# Patient Record
Sex: MALE
[De-identification: names, ages, dates, MRNs, and addresses within clinical notes are randomized per-mention and may not be internally consistent; named-entity substitution may affect disease eponyms.]

---

## 2017-12-26 ENCOUNTER — HOSPITAL ENCOUNTER (OUTPATIENT)
Dept: HOSPITAL 42 - ED | Age: 67
Setting detail: OBSERVATION
LOS: 2 days | Discharge: HOME | End: 2017-12-28
Attending: INTERNAL MEDICINE | Admitting: INTERNAL MEDICINE
Payer: MEDICARE

## 2017-12-26 VITALS — BODY MASS INDEX: 34.4 KG/M2

## 2017-12-26 VITALS — OXYGEN SATURATION: 97 %

## 2017-12-26 DIAGNOSIS — I48.91: Primary | ICD-10-CM

## 2017-12-26 DIAGNOSIS — E78.5: ICD-10-CM

## 2017-12-26 DIAGNOSIS — Z90.49: ICD-10-CM

## 2017-12-26 DIAGNOSIS — I11.0: ICD-10-CM

## 2017-12-26 DIAGNOSIS — J40: ICD-10-CM

## 2017-12-26 DIAGNOSIS — I50.9: ICD-10-CM

## 2017-12-26 DIAGNOSIS — K50.90: ICD-10-CM

## 2017-12-26 DIAGNOSIS — I27.20: ICD-10-CM

## 2017-12-26 DIAGNOSIS — Z79.899: ICD-10-CM

## 2017-12-26 DIAGNOSIS — M47.812: ICD-10-CM

## 2017-12-26 DIAGNOSIS — M54.16: ICD-10-CM

## 2017-12-26 DIAGNOSIS — J06.9: ICD-10-CM

## 2017-12-26 DIAGNOSIS — G89.29: ICD-10-CM

## 2017-12-26 DIAGNOSIS — E11.9: ICD-10-CM

## 2017-12-26 DIAGNOSIS — G25.81: ICD-10-CM

## 2017-12-26 LAB
ALBUMIN SERPL-MCNC: 4.3 G/DL (ref 3–4.8)
ALBUMIN/GLOB SERPL: 1.1 {RATIO} (ref 1.1–1.8)
ALT SERPL-CCNC: 37 U/L (ref 7–56)
APTT BLD: 28.5 SECONDS (ref 25.1–36.5)
ARTERIAL BLOOD GAS HEMOGLOBIN: 13.2 G/DL (ref 11.7–17.4)
ARTERIAL BLOOD GAS O2 SAT: 98.1 % (ref 95–98)
ARTERIAL BLOOD GAS PCO2: 35 MM/HG (ref 35–45)
ARTERIAL BLOOD GAS TCO2: 27.2 MMOL.L (ref 22–28)
AST SERPL-CCNC: 29 U/L (ref 17–59)
BASE EXCESS BLDV CALC-SCNC: 7 MMOL/L (ref 0–2)
BASOPHILS # BLD AUTO: 0.02 K/MM3 (ref 0–2)
BASOPHILS NFR BLD: 0.2 % (ref 0–3)
BNP SERPL-MCNC: 893 PG/ML (ref 0–450)
BUN SERPL-MCNC: 24 MG/DL (ref 7–21)
CALCIUM SERPL-MCNC: 9.8 MG/DL (ref 8.4–10.5)
EOSINOPHIL # BLD: 0.4 10*3/UL (ref 0–0.7)
EOSINOPHIL NFR BLD: 3.8 % (ref 1.5–5)
ERYTHROCYTE [DISTWIDTH] IN BLOOD BY AUTOMATED COUNT: 16.6 % (ref 11.5–14.5)
GFR NON-AFRICAN AMERICAN: > 60
GRANULOCYTES # BLD: 7.47 10*3/UL (ref 1.4–6.5)
GRANULOCYTES NFR BLD: 72 % (ref 50–68)
HCO3 BLDA-SCNC: 26.1 MMOL/L (ref 21–28)
HGB BLD-MCNC: 12.2 G/DL (ref 14–18)
INHALED O2 CONCENTRATION: 21 %
INR PPP: 1.17 (ref 0.93–1.08)
LYMPHOCYTES # BLD: 1.7 10*3/UL (ref 1.2–3.4)
LYMPHOCYTES NFR BLD AUTO: 16 % (ref 22–35)
MCH RBC QN AUTO: 24.7 PG (ref 25–35)
MCHC RBC AUTO-ENTMCNC: 31.5 G/DL (ref 31–37)
MCV RBC AUTO: 78.3 FL (ref 80–105)
MONOCYTES # BLD AUTO: 0.8 10*3/UL (ref 0.1–0.6)
MONOCYTES NFR BLD: 8 % (ref 1–6)
O2 CAP BLDA-SCNC: 18.2 ML/DL (ref 16–24)
O2 CT BLDA-SCNC: 17.9 ML/DL (ref 15–23)
PH BLDA: 7.48 [PH] (ref 7.35–7.45)
PH BLDV: 7.32 [PH] (ref 7.32–7.43)
PLATELET # BLD: 339 10^3/UL (ref 120–450)
PMV BLD AUTO: 9.6 FL (ref 7–11)
PO2 BLDA: 94 MM/HG (ref 80–100)
PROTHROMBIN TIME: 12.8 SECONDS (ref 9.4–12.5)
RBC # BLD AUTO: 4.94 10^6/UL (ref 3.5–6.1)
TROPONIN I SERPL-MCNC: < 0.01 NG/ML
VENOUS BLOOD FIO2: 21 %
VENOUS BLOOD GAS PCO2: 69 (ref 40–60)
VENOUS BLOOD GAS PO2: 28 MM/HG (ref 30–55)
WBC # BLD AUTO: 10.4 10^3/UL (ref 4.5–11)

## 2017-12-26 PROCEDURE — 82948 REAGENT STRIP/BLOOD GLUCOSE: CPT

## 2017-12-26 PROCEDURE — 85610 PROTHROMBIN TIME: CPT

## 2017-12-26 PROCEDURE — 94640 AIRWAY INHALATION TREATMENT: CPT

## 2017-12-26 PROCEDURE — 36415 COLL VENOUS BLD VENIPUNCTURE: CPT

## 2017-12-26 PROCEDURE — 80053 COMPREHEN METABOLIC PANEL: CPT

## 2017-12-26 PROCEDURE — 83615 LACTATE (LD) (LDH) ENZYME: CPT

## 2017-12-26 PROCEDURE — 93306 TTE W/DOPPLER COMPLETE: CPT

## 2017-12-26 PROCEDURE — 99285 EMERGENCY DEPT VISIT HI MDM: CPT

## 2017-12-26 PROCEDURE — 80048 BASIC METABOLIC PNL TOTAL CA: CPT

## 2017-12-26 PROCEDURE — 93005 ELECTROCARDIOGRAM TRACING: CPT

## 2017-12-26 PROCEDURE — 85025 COMPLETE CBC W/AUTO DIFF WBC: CPT

## 2017-12-26 PROCEDURE — 71010: CPT

## 2017-12-26 PROCEDURE — 96372 THER/PROPH/DIAG INJ SC/IM: CPT

## 2017-12-26 PROCEDURE — 82803 BLOOD GASES ANY COMBINATION: CPT

## 2017-12-26 PROCEDURE — 82550 ASSAY OF CK (CPK): CPT

## 2017-12-26 PROCEDURE — 96375 TX/PRO/DX INJ NEW DRUG ADDON: CPT

## 2017-12-26 PROCEDURE — 96365 THER/PROPH/DIAG IV INF INIT: CPT

## 2017-12-26 PROCEDURE — 87040 BLOOD CULTURE FOR BACTERIA: CPT

## 2017-12-26 PROCEDURE — 85730 THROMBOPLASTIN TIME PARTIAL: CPT

## 2017-12-26 PROCEDURE — 83880 ASSAY OF NATRIURETIC PEPTIDE: CPT

## 2017-12-26 PROCEDURE — 84484 ASSAY OF TROPONIN QUANT: CPT

## 2017-12-26 RX ADMIN — IPRATROPIUM BROMIDE AND ALBUTEROL SULFATE SCH ML: .5; 3 SOLUTION RESPIRATORY (INHALATION) at 15:43

## 2017-12-26 RX ADMIN — INSULIN HUMAN SCH UNITS: 100 INJECTION, SOLUTION PARENTERAL at 23:22

## 2017-12-26 RX ADMIN — IPRATROPIUM BROMIDE AND ALBUTEROL SULFATE SCH ML: .5; 3 SOLUTION RESPIRATORY (INHALATION) at 15:49

## 2017-12-26 RX ADMIN — IPRATROPIUM BROMIDE AND ALBUTEROL SULFATE SCH ML: .5; 3 SOLUTION RESPIRATORY (INHALATION) at 16:00

## 2017-12-26 RX ADMIN — OXYCODONE AND ACETAMINOPHEN PRN TAB: 10; 325 TABLET ORAL at 21:01

## 2017-12-26 NOTE — RAD
HISTORY:

cough r/o pna  



COMPARISON:

01/30/2014. 



FINDINGS:



LUNGS:

The lungs are well inflated and clear.



PLEURA:

No significant pleural effusion identified, no pneumothorax apparent.



CARDIOVASCULAR:

Normal.



OSSEOUS STRUCTURES:

No significant abnormalities.



VISUALIZED UPPER ABDOMEN:

Normal.



OTHER FINDINGS:

None.



IMPRESSION:

No active pulmonary disease.

## 2017-12-26 NOTE — CARD
--------------- APPROVED REPORT --------------





EKG Measurement

Heart [id number]

[id number]

[id number]

[id number]



<Conclusion>

Atrial fibrillation

Right bundle branch block

Inferior infarct, age undetermined

Abnormal ECG

## 2017-12-27 LAB
BASOPHILS # BLD AUTO: 0 K/MM3 (ref 0–2)
BASOPHILS NFR BLD: 0 % (ref 0–3)
BUN SERPL-MCNC: 28 MG/DL (ref 7–21)
CALCIUM SERPL-MCNC: 9.1 MG/DL (ref 8.4–10.5)
EOSINOPHIL # BLD: 0 10*3/UL (ref 0–0.7)
EOSINOPHIL NFR BLD: 0.1 % (ref 1.5–5)
ERYTHROCYTE [DISTWIDTH] IN BLOOD BY AUTOMATED COUNT: 16.7 % (ref 11.5–14.5)
GFR NON-AFRICAN AMERICAN: > 60
GRANULOCYTES # BLD: 13.07 10*3/UL (ref 1.4–6.5)
GRANULOCYTES NFR BLD: 92 % (ref 50–68)
HGB BLD-MCNC: 12.1 G/DL (ref 14–18)
LG PLATELETS BLD QL SMEAR: PRESENT
LYMPHOCYTE: 8 % (ref 22–35)
LYMPHOCYTES # BLD: 0.9 10*3/UL (ref 1.2–3.4)
LYMPHOCYTES NFR BLD AUTO: 6.4 % (ref 22–35)
MCH RBC QN AUTO: 25.1 PG (ref 25–35)
MCHC RBC AUTO-ENTMCNC: 32.7 G/DL (ref 31–37)
MCV RBC AUTO: 76.6 FL (ref 80–105)
MONOCYTES # BLD AUTO: 0.2 10*3/UL (ref 0.1–0.6)
MONOCYTES NFR BLD: 1.5 % (ref 1–6)
NEUTROPHILS NFR BLD AUTO: 92 % (ref 50–70)
PLATELET # BLD EST: NORMAL 10*3/UL
PLATELET # BLD: 306 10^3/UL (ref 120–450)
PMV BLD AUTO: 9.7 FL (ref 7–11)
RBC # BLD AUTO: 4.83 10^6/UL (ref 3.5–6.1)
WBC # BLD AUTO: 14.2 10^3/UL (ref 4.5–11)

## 2017-12-27 RX ADMIN — INSULIN HUMAN SCH UNITS: 100 INJECTION, SOLUTION PARENTERAL at 17:29

## 2017-12-27 RX ADMIN — INSULIN HUMAN SCH: 100 INJECTION, SOLUTION PARENTERAL at 21:52

## 2017-12-27 RX ADMIN — MESALAMINE SCH MG: 500 CAPSULE ORAL at 12:18

## 2017-12-27 RX ADMIN — OXYCODONE AND ACETAMINOPHEN PRN TAB: 10; 325 TABLET ORAL at 08:10

## 2017-12-27 RX ADMIN — INSULIN HUMAN SCH UNITS: 100 INJECTION, SOLUTION PARENTERAL at 08:11

## 2017-12-27 RX ADMIN — INSULIN HUMAN SCH UNITS: 100 INJECTION, SOLUTION PARENTERAL at 12:17

## 2017-12-27 RX ADMIN — OXYCODONE AND ACETAMINOPHEN PRN TAB: 10; 325 TABLET ORAL at 15:30

## 2017-12-27 NOTE — CARD
--------------- APPROVED REPORT --------------





EXAM: Two-dimensional and M-mode echocardiogram with Doppler and 

color Doppler.



INDICATION

NEW ONSET A-FIB



2D DIMENSIONS 

Left Atrium (2D)4.7   (1.6-4.0cm)IVSd1.4   (0.7-1.1cm)

LVDd4.9   (3.9-5.9cm)PWd1.6   (0.7-1.1cm)

LVDs3.6   (2.5-4.0cm)FS (%) 27.3   %

LVEF (%)53.1   (>50%)



M-Mode DIMENSIONS 

Aortic Root4.60   (2.2-3.7cm)Aortic Cusp Exc.2.00   (1.5-2.0cm)



Aortic Valve

AoV Peak Budsnfhh312.0cm/Sue Peak GR.8mmHg



Mitral Valve

E/A ratio0.0



TDI

E/Lateral E'0.0E/Medial E'0.0



Pulmonary Valve

PV Peak Awvipepj10.5cm/sPV Peak Grad.2mmHg



Tricuspid Valve

TR Peak Gtzundzx160wj/sRAP [id number] Peak Gr.37mmHg

[id number]



 LEFT VENTRICLE 

The left ventricle is normal size. There is mild concentric left 

ventricular hypertrophy. The left ventricular function is normal. The 

left ventricular ejection fraction is within the normal range. There 

is normal LV segmental wall motion. A Fib



 RIGHT VENTRICLE 

The right ventricle is normal size. There is normal right ventricular 

wall thickness. The right ventricular systolic function is normal.



 ATRIA 

The left atrium is mildly dilated. The right atrium size is normal.



 AORTIC VALVE 

The aortic valve is normal in structure. There is trace to mild 

aortic regurgitation.



 MITRAL VALVE 

The mitral valve is mildly thickened. Mitral regurgitation is trace 

to mild.



 TRICUSPID VALVE 

There is mild tricuspid regurgitation. There is mild to moderate 

pulmonary hypertension.



 GREAT VESSELS 

The aortic root is normal in size. The IVC is normal in size and 

collapses >50% with inspiration.



 PERICARDIAL EFFUSION 

There is a trace loculated anterior pericardial effusion.



<Conclusion>

The left ventricle is normal size.

There is mild concentric left ventricular hypertrophy.

The left ventricular function is normal.

The left ventricular ejection fraction is within the normal range.

There is normal LV segmental wall motion.

There is trace to mild aortic regurgitation.

Mitral regurgitation is trace to mild.

There is mild to moderate pulmonary hypertension.

## 2017-12-27 NOTE — PN
DATE:



LOCATION:  The patient is seen at [facility] in [place], in

room 271, bed 1.



SUBJECTIVE:  The patient was admitted yesterday with symptoms related to

cardiac condition, cough, and respiratory infection.  The patient has

significant past history of diabetes, hypertension; and the patient also

has history of Crohn's disease, neuromuscular pain, neuropathic pain,

lumbar neuritis, and cervical spondylosis.  This morning, the patient is

sitting in the chair.  He is relatively comfortable, but he complains of

pain.  He is on Percocet for pain treatment.



PHYSICAL EXAMINATION

VITAL SIGNS:  His pulse is 98, blood pressure 120/67, respirations 20, O2

sat is 97% on 2 L of oxygen nasal cannula.

LUNGS:  Clear.

HEART:  Atypical ____ with moderate ventricular response.

ABDOMEN:  Soft.  Obesity present.  The patient has had prior gallbladder

surgery.

CENTRAL NERVOUS SYSTEM:  He is conscious, rational, oriented, and no focal

neurological deficit noted.



LABORATORY DATA:  The patient's blood work, his white count is 14,200,

hemoglobin 12.1.  He had 92% granulocytes.  His chemistry, the blood sugar

is 246, BUN 28, creatinine 0.9.  His BNP was only 893 at the time of

admission.



ASSESSMENT AND PLAN:  The patient's history of cardiac condition, the

patient's echocardiogram results from prior study, however, shows ejection

fraction is close to 50%.  Currently, the patient is waiting for cardiac

evaluation and have the Infectious Disease see the patient because of the

elevated white count and the cough.  He is to continue current management

and follow up with us.





__________________________________________

Myron Lange MD



DD:  12/27/2017 8:30:54

DT:  12/27/2017 9:39:12

Job # [id number]

## 2017-12-27 NOTE — CON
DATE:  12/27/2017



HISTORY OF PRESENT ILLNESS:  The patient is a 67-year-old male who

presented with dyspnea.



In the emergency room, he was found to be in atrial fibrillation.



The patient's past medical history includes a history of hypertension and

mild diabetes mellitus.



No previous cardiac history was noted.  No previous myocardial infarction. 

He denies chest pain.



SOCIAL HISTORY:  The patient does not smoke.



REVIEW OF SYSTEMS:  Fourteen-point review of systems was reviewed.  Besides

his chronic dyspnea, he does complain of cough without edema in the lower

extremities.



PHYSICAL EXAMINATION:

VITAL SIGNS:  On physical exam, blood pressure is 125/67, heart rate is

atrial fibrillation in the 90s.

NECK:  Negative JVD.

LUNGS:  Decreased breath sounds without rales.

HEART:  Reveals S1, S2.

EXTREMITIES:  Without edema.



DIAGNOSTIC DATA:  EKG shows atrial fibrillation with nonspecific ST-T

changes.



LABORATORY DATA:  Laboratories reveal an INR of 1.7.  Chemistries, BUN and

creatinine is 28 and 0.9 with a troponin of 0.01, the hemoglobin is 12.1

with a white count of 14.2.



IMPRESSION:

1.  New-onset atrial fibrillation.

2.  Dyspnea.

3.  Hypertension.

4.  Diabetes mellitus.



Given these findings, we will start the patient on beta blockers for better

heart rate control.



We will obtain an echocardiogram to evaluate his LV function.  In addition,

Lovenox has been ordered.  The patient will need long-term anticoagulation.





__________________________________________

Olu Ramirez MD



DD:  12/27/2017 10:23:58

DT:  12/27/2017 10:28:10

Job # [id number]

## 2017-12-28 VITALS — DIASTOLIC BLOOD PRESSURE: 94 MMHG | SYSTOLIC BLOOD PRESSURE: 132 MMHG | HEART RATE: 87 BPM | TEMPERATURE: 97.9 F

## 2017-12-28 VITALS — RESPIRATION RATE: 20 BRPM

## 2017-12-28 RX ADMIN — OXYCODONE AND ACETAMINOPHEN PRN TAB: 10; 325 TABLET ORAL at 05:41

## 2017-12-28 RX ADMIN — INSULIN HUMAN SCH UNITS: 100 INJECTION, SOLUTION PARENTERAL at 13:17

## 2017-12-28 RX ADMIN — INSULIN HUMAN SCH: 100 INJECTION, SOLUTION PARENTERAL at 08:57

## 2017-12-28 RX ADMIN — MESALAMINE SCH MG: 500 CAPSULE ORAL at 09:59

## 2017-12-28 RX ADMIN — OXYCODONE AND ACETAMINOPHEN PRN TAB: 10; 325 TABLET ORAL at 13:25

## 2017-12-28 NOTE — PN
DATE:  12/28/2017



CARDIOLOGY FOLLOWUP NOTE



SUBJECTIVE:  The patient as comfortable without shortness of breath.



PHYSICAL EXAMINATION:

VITAL SIGNS:  Blood pressure is 132/94 with the heart rate is in the 80s.

NECK:  Negative JVD.

LUNGS:  Without rales.

HEART:  Reveals S1 and S2.

EXTREMITIES:  Without edema.



LABORATORY DATA:  Creatinine is 0.9, glucose is 171, and hemoglobin is 12.1



IMPRESSION

1.  New-onset atrial fibrillation.

2.  Hypertension.

3.  Mild mild-to-moderate pulmonary hypertension.

4.  Dyspnea, which is now resolved.

5.  Systemic hypertension.

6.  Diabetes mellitus.



PLAN:

1.  Given these findings, the patient is currently now on Xarelto.  His

heart rate is well controlled on beta blockers.  We will DC telemetry

today.

2.  From a cardiac perspective, the patient can be discharged.  We will

arrange for an outpatient stress test next week to rule out coronary

disease.







__________________________________________

Olu Ramirez MD







DD:  12/28/2017 13:30:02

DT:  12/28/2017 13:33:59

Job # [id number]

## 2017-12-28 NOTE — CP.PCM.PN
Subjective





- Date & Time of Evaluation


Date of Evaluation: 12/28/17


Time of Evaluation: 08:15





- Subjective


Subjective: 





Patient is seen this morning.  He complains of chronic pain in his neck and 

back.  





Objective





- Vital Signs/Intake and Output


Vital Signs (last 24 hours): 


 











Temp Pulse Resp BP Pulse Ox


 


 97.7 F   83   20   144/95 H  97 


 


 12/28/17 06:00  12/28/17 06:00  12/28/17 06:00  12/28/17 06:00  12/28/17 06:00








Intake and Output: 


 











 12/28/17 12/28/17





 06:59 18:59


 


Intake Total 240 


 


Output Total 0 


 


Balance 240 














- Medications


Medications: 


 Current Medications





Amlodipine Besylate (Norvasc)  10 mg PO DAILY [facility]


   Last Admin: 12/27/17 12:18 Dose:  10 mg


Atorvastatin Calcium (Lipitor)  10 mg PO DIN [facility]


   Last Admin: 12/27/17 17:30 Dose:  10 mg


Duloxetine HCl (Cymbalta)  30 mg PO DAILY [facility]


   Last Admin: 12/27/17 12:19 Dose:  30 mg


Enoxaparin Sodium (Lovenox)  40 mg SC DAILY [facility]


   PRN Reason: Protocol


Escitalopram Oxalate (Lexapro)  5 mg PO DAILY [facility]


   Last Admin: 12/27/17 12:19 Dose:  5 mg


Furosemide (Lasix)  40 mg PO DAILY [facility]


   Last Admin: 12/27/17 12:19 Dose:  40 mg


Insulin Human Regular (Humulin R Low)  0 units SC ACHS [facility]


   PRN Reason: Protocol


   Last Admin: 12/27/17 21:52 Dose:  Not Given


Lidocaine (Lidoderm)  1 ea TD DAILY [facility]


Losartan Potassium (Cozaar)  100 mg PO DAILY [facility]


   Last Admin: 12/27/17 12:19 Dose:  100 mg


Mesalamine (Pentasa)  500 mg PO DAILY [facility]


   Last Admin: 12/27/17 12:18 Dose:  500 mg


Metoprolol Tartrate (Lopressor)  50 mg PO DAILY [facility]


   Last Admin: 12/27/17 12:18 Dose:  50 mg


Oxycodone/Acetaminophen (Percocet 10/325 Mg Tab)  1 tab PO Q8 PRN


   PRN Reason: Pain, moderate (4-7)


   Last Admin: 12/28/17 05:41 Dose:  1 tab


Promethazine HCl/Codeine (Phenergan/Codeine Oral Syrup)  5 ml PO Q4H PRN


   PRN Reason: Cough and congestion


   Last Admin: 12/27/17 22:15 Dose:  5 ml


Rivaroxaban (Xarelto)  20 mg PO DAILY [facility]


   PRN Reason: Protocol


Zolpidem Tartrate (Ambien)  5 mg PO HS [facility]


   Last Admin: 12/27/17 22:15 Dose:  5 mg











- Labs


Labs: 


 





 12/27/17 06:00 





 12/27/17 06:00 





 











PT  12.8 SECONDS (9.4-12.5)  H  12/26/17  15:30    


 


INR  1.17  (0.93-1.08)  H  12/26/17  15:30    


 


APTT  28.5 Seconds (25.1-36.5)   12/26/17  15:30    














- Constitutional


Appears: No Acute Distress





- Head Exam


Head Exam: ATRAUMATIC, NORMOCEPHALIC





- Respiratory Exam


Respiratory Exam: Clear to Ausculation Bilateral, NORMAL BREATHING PATTERN





- Cardiovascular Exam


Cardiovascular Exam: Irregular Rhythm, +S1, +S2





- GI/Abdominal Exam


GI & Abdominal Exam: Soft, Normal Bowel Sounds.  absent: Tenderness





- Neurological Exam


Neurological Exam: Alert, Awake, Oriented x3





Assessment and Plan





- Assessment and Plan (Free Text)


Assessment: 





New onset atrial fibrillation


Cervical spondylosis


Lumbar neuritis


DMII


HTN


HLP


Pulmonary hypertension








Plan: 





Patient is complaining of neck pain this morning.  continue Percocet as needed 

for pain.


continue cymbalta and lexapro.  Lidoderm patch will be ordered for pain of the 

neck and


back.  Cardiology consult appreciated.  Patient had echocardiogram which shows 

normal


LV function, and moderate pulmonary hypertension.  Will consult pulmonary for 

pulmonary


hypertension and cough.

## 2018-01-01 NOTE — DS
HISTORY OF PRESENT ILLNESS:  This is a patient of Dr. Kody Pratt, who

presented to the emergency room with history of cough, shortness of breath.

Patient denied chest pain.  He has past history of Crohn disease,

depression, lumbar neuritis, hypertension, hyperlipidemia, and mild

congestive heart failure.  While in the emergency room, the patient was

found to be in atrial fibrillation with pulse of 109.



HOSPITAL COURSE:  Patient was admitted to the telemetry floor for new-onset

atrial fibrillation.  Dr. Ramirez, cardiologist, was consulted.  Patient was

given therapeutic Lovenox.  He was given IV Solu-Medrol in the emergency

room and IV Rocephin.  Chest x-ray was negative for any infiltrates. 

Patient was seen by Dr. Ramirez and was started on beta-blockers for heart

rate control.  An echocardiogram was also ordered.  Echocardiogram showed

normal left ventricular function, mild-to-moderate pulmonary hypertension,

mild concentric left ventricular hypertrophy, ejection fraction within

normal range, trace-to-mild aortic regurgitation, trace-to-mild mitral

regurgitation.  Patient was scheduled for a stress test in the next week

with a cardiologist.  Patient was advised to follow up with his

cardiologist for the stress test.  No further testing was advised by

Cardiology, so patient was discharged home in stable condition, on Xarelto.



DISCHARGE DIAGNOSES:  New-onset atrial fibrillation, upper respiratory

infection, lumbar neuritis, hypertension, neuropathy, depression,

borderline diabetes.



DISCHARGE MEDICATIONS:  Ambien 10 mg once at night, Crestor 10 mg daily,

Cymbalta 30 mg daily, valsartan and hydrochlorothiazide one tab daily,

Lasix 40 mg daily, Lexapro 5 mg daily, Lopressor 50 mg daily, Norvasc 10 mg

daily, Pentasa 500 mg daily, Percocet one tab three times a day as needed

for pain, vitamin _____ daily.  New prescriptions were given for Xarelto 20

mg daily and Lidoderm patch to be applied as needed to the back and neck

area.



FOLLOWUP:  Patient will follow up with Dr. Pratt when he returns from

vacation.  He will also follow up with his cardiologist for stress test.







__________________________________________

Trinidad Lange MD



DD:  12/31/2017 11:52:36

DT:  12/31/2017 13:20:39

Job # [id number]

## 2018-01-08 ENCOUNTER — HOSPITAL ENCOUNTER (OUTPATIENT)
Dept: HOSPITAL 42 - CATH | Age: 68
LOS: 1 days | Discharge: HOME | End: 2018-01-09
Attending: INTERNAL MEDICINE
Payer: MEDICARE

## 2018-01-08 VITALS — RESPIRATION RATE: 20 BRPM

## 2018-01-08 VITALS — BODY MASS INDEX: 36.5 KG/M2

## 2018-01-08 DIAGNOSIS — I10: ICD-10-CM

## 2018-01-08 DIAGNOSIS — Z79.4: ICD-10-CM

## 2018-01-08 DIAGNOSIS — G47.00: ICD-10-CM

## 2018-01-08 DIAGNOSIS — I25.10: Primary | ICD-10-CM

## 2018-01-08 DIAGNOSIS — I27.21: ICD-10-CM

## 2018-01-08 DIAGNOSIS — E78.00: ICD-10-CM

## 2018-01-08 DIAGNOSIS — E55.9: ICD-10-CM

## 2018-01-08 DIAGNOSIS — D50.9: ICD-10-CM

## 2018-01-08 DIAGNOSIS — K50.90: ICD-10-CM

## 2018-01-08 DIAGNOSIS — Z79.01: ICD-10-CM

## 2018-01-08 DIAGNOSIS — E66.9: ICD-10-CM

## 2018-01-08 DIAGNOSIS — Z91.19: ICD-10-CM

## 2018-01-08 DIAGNOSIS — E11.9: ICD-10-CM

## 2018-01-08 DIAGNOSIS — I48.2: ICD-10-CM

## 2018-01-08 DIAGNOSIS — F32.9: ICD-10-CM

## 2018-01-08 DIAGNOSIS — G89.29: ICD-10-CM

## 2018-01-08 LAB
APTT BLD: 27.8 SECONDS (ref 25.1–36.5)
BASOPHILS # BLD AUTO: 0.01 K/MM3 (ref 0–2)
BASOPHILS NFR BLD: 0.1 % (ref 0–3)
BUN SERPL-MCNC: 17 MG/DL (ref 7–21)
CALCIUM SERPL-MCNC: 9.4 MG/DL (ref 8.4–10.5)
EOSINOPHIL # BLD: 0.2 10*3/UL (ref 0–0.7)
EOSINOPHIL NFR BLD: 1.7 % (ref 1.5–5)
ERYTHROCYTE [DISTWIDTH] IN BLOOD BY AUTOMATED COUNT: 16.5 % (ref 11.5–14.5)
GFR NON-AFRICAN AMERICAN: > 60
GRANULOCYTES # BLD: 8.9 10*3/UL (ref 1.4–6.5)
GRANULOCYTES NFR BLD: 77 % (ref 50–68)
HGB BLD-MCNC: 11.9 G/DL (ref 14–18)
INR PPP: 1.24 (ref 0.93–1.08)
LYMPHOCYTES # BLD: 1.4 10*3/UL (ref 1.2–3.4)
LYMPHOCYTES NFR BLD AUTO: 12.3 % (ref 22–35)
MCH RBC QN AUTO: 24.9 PG (ref 25–35)
MCHC RBC AUTO-ENTMCNC: 32.2 G/DL (ref 31–37)
MCV RBC AUTO: 77.6 FL (ref 80–105)
MONOCYTES # BLD AUTO: 1 10*3/UL (ref 0.1–0.6)
MONOCYTES NFR BLD: 8.9 % (ref 1–6)
PLATELET # BLD: 298 10^3/UL (ref 120–450)
PMV BLD AUTO: 9.5 FL (ref 7–11)
PROTHROMBIN TIME: 14.3 SECONDS (ref 9.4–12.5)
RBC # BLD AUTO: 4.77 10^6/UL (ref 3.5–6.1)
WBC # BLD AUTO: 11.6 10^3/UL (ref 4.5–11)

## 2018-01-08 PROCEDURE — 85610 PROTHROMBIN TIME: CPT

## 2018-01-08 PROCEDURE — 86850 RBC ANTIBODY SCREEN: CPT

## 2018-01-08 PROCEDURE — 86900 BLOOD TYPING SEROLOGIC ABO: CPT

## 2018-01-08 PROCEDURE — 85025 COMPLETE CBC W/AUTO DIFF WBC: CPT

## 2018-01-08 PROCEDURE — 80048 BASIC METABOLIC PNL TOTAL CA: CPT

## 2018-01-08 PROCEDURE — 93005 ELECTROCARDIOGRAM TRACING: CPT

## 2018-01-08 PROCEDURE — 85730 THROMBOPLASTIN TIME PARTIAL: CPT

## 2018-01-08 PROCEDURE — 36415 COLL VENOUS BLD VENIPUNCTURE: CPT

## 2018-01-08 PROCEDURE — 93458 L HRT ARTERY/VENTRICLE ANGIO: CPT

## 2018-01-08 PROCEDURE — 99152 MOD SED SAME PHYS/QHP 5/>YRS: CPT

## 2018-01-08 RX ADMIN — MESALAMINE SCH MG: 500 CAPSULE ORAL at 17:11

## 2018-01-08 RX ADMIN — GUAIFENESIN AND DEXTROMETHORPHAN PRN ML: 100; 10 SYRUP ORAL at 23:49

## 2018-01-08 RX ADMIN — HYDROMORPHONE HYDROCHLORIDE PRN MG: 1 INJECTION, SOLUTION INTRAMUSCULAR; INTRAVENOUS; SUBCUTANEOUS at 14:27

## 2018-01-08 RX ADMIN — MESALAMINE SCH MG: 500 CAPSULE ORAL at 21:37

## 2018-01-08 RX ADMIN — MESALAMINE SCH: 500 CAPSULE ORAL at 16:19

## 2018-01-08 NOTE — CARD
--------------- APPROVED REPORT --------------





EKG Measurement

Heart [id number]

[id number]

[id number]

[id number]



<Conclusion>

Atrial fibrillation with rapid ventricular response

Left axis deviation

Right bundle branch block

Inferior infarct, age undetermined

Abnormal ECG

## 2018-01-08 NOTE — CARD
--------------- APPROVED REPORT --------------





EKG Measurement

Heart [id number]

[id number]

[id number]

[id number]



<Conclusion>

Atrial fibrillation

Right bundle branch block

Inferior infarct, age undetermined

Abnormal ECG

## 2018-01-08 NOTE — CP.PCM.HP
History of Present Illness





- History of Present Illness


History of Present Illness: 





CC: S/p cardiac cath 





67 M whose past medical history includes crohn's disease, hypertension, 

hyperlipidemia, DJD to neck, DM II, chronic back pain, CHF?, new onset afib (dx 

aprox 1 month ago) presents following cardiac cath. Pt was just recently d/kevin 

from the hospital for new onset afib. Patient had an abnormal stress test 3 

days ago and was scheduled for cardiac cath today - JOSEPH stent placed in the 

LAD. Pt currently denies any complaints. Denies any chest pain or sob. 





12 Point ROS performed and negative other than stated above. 





PMH: crohn's disease, hypertension, hyperlipidemia, degenerative disc disease 

to neck, DM II, chronic back pain, CHF?, new onset afib


PSH: denies 


Med: refer to [facility] 


ALL: NKA 


SH: Denies any smoking, drinking, or drugs 


FH: HTN runs in his family     





Present on Admission





- Present on Admission


Any Indicators Present on Admission: No





Review of Systems





- Review of Systems


All systems: reviewed and no additional remarkable complaints except





Past Patient History





- Infectious Disease


Hx of Infectious Diseases: None





- Tetanus Immunizations


Tetanus Immunization: >10 years Ago





- Past Social History


Smoking Status: Never Smoked





- CARDIAC


Hx Pacemaker: No





- PULMONARY


Hx Respiratory Disorders: No





- NEUROLOGICAL


Hx Paralysis: No





- HEENT


Hx HEENT Problems: No





- RENAL


Hx Chronic Kidney Disease: No





- ENDOCRINE/METABOLIC


Hx Endocrine Disorders: Yes


Other/Comment: HIGH BLOOD SUGAR





- HEMATOLOGICAL/ONCOLOGICAL


Hx Blood Transfusions: No





- INTEGUMENTARY


Hx Dermatological Problems: No





- MUSCULOSKELETAL/RHEUMATOLOGICAL


Hx Musculoskeletal Disorders: Yes





- GASTROINTESTINAL


Hx Gastrointestinal Disorders: Yes


Other/Comment: CROHNS





- GENITOURINARY/GYNECOLOGICAL


Hx Genitourinary Disorders: No





- PSYCHIATRIC


Hx Emotional Abuse: No


Hx Physical Abuse: No


Hx Substance Use: No





- SURGICAL HISTORY


Hx Surgeries: Yes





- ANESTHESIA


Hx Anesthesia Reactions: No


Hx Malignant Hyperthermia: No





Meds


Allergies/Adverse Reactions: 


 Allergies











Allergy/AdvReac Type Severity Reaction Status Date / Time


 


No Known Allergies Allergy   Verified 12/26/17 14:49














Physical Exam





- Constitutional


Appears: No Acute Distress





- Head Exam


Head Exam: ATRAUMATIC, NORMOCEPHALIC





- Eye Exam


Eye Exam: EOMI, PERRL





- ENT Exam


ENT Exam: Mucous Membranes Moist





- Respiratory Exam


Respiratory Exam: Clear to Auscultation Bilateral.  absent: Wheezes





- Cardiovascular Exam


Cardiovascular Exam: REGULAR RHYTHM, RRR, +S1, +S2





- GI/Abdominal Exam


GI & Abdominal Exam: Normal Bowel Sounds, Soft





- Extremities Exam


Extremities exam: Negative for: calf tenderness, pedal edema


Additional comments: 





Cath site dressing  CDI, femoral and b/l distal DP pulses present





- Neurological Exam


Neurological exam: Alert, Oriented x3





- Psychiatric Exam


Psychiatric exam: Normal Affect, Normal Mood





Results





- Vital Signs


Recent Vital Signs: 





 Last Vital Signs











Temp  98.8 F   01/08/18 07:00


 


Pulse  94 H  01/08/18 09:52


 


Resp  18   01/08/18 07:00


 


BP  135/87   01/08/18 09:52


 


Pulse Ox  92 L  01/08/18 07:00














- Labs


Result Diagrams: 


 01/08/18 07:18





 01/08/18 07:18


Labs: 





 Laboratory Results - last 24 hr











  01/08/18 01/08/18 01/08/18





  07:18 07:18 07:18


 


WBC  11.6 H  


 


RBC  4.77  


 


Hgb  11.9 L  


 


Hct  37.0 L  


 


MCV  77.6 L  


 


MCH  24.9 L  


 


MCHC  32.2  


 


RDW  16.5 H  


 


Plt Count  298  


 


MPV  9.5  


 


Gran %  77.0 H  


 


Lymph % (Auto)  12.3 L  


 


Mono % (Auto)  8.9 H  


 


Eos % (Auto)  1.7  


 


Baso % (Auto)  0.1  


 


Gran #  8.90 H  


 


Lymph #  1.4  


 


Mono #  1.0 H  


 


Eos #  0.2  


 


Baso #  0.01  


 


PT   14.3 H 


 


INR   1.24 H 


 


APTT   27.8 


 


Sodium    140


 


Potassium    3.7


 


Chloride    101


 


Carbon Dioxide    28


 


Anion Gap    15


 


BUN    17


 


Creatinine    1.0


 


Est GFR ( Amer)    > 60


 


Est GFR (Non-Af Amer)    > 60


 


Random Glucose    173 H


 


Calcium    9.4


 


Blood Type   


 


Blood Type Confirm   


 


Antibody Screen   


 


BBK History Checked   














  01/08/18 01/08/18





  07:18 07:25


 


WBC  


 


RBC  


 


Hgb  


 


Hct  


 


MCV  


 


MCH  


 


MCHC  


 


RDW  


 


Plt Count  


 


MPV  


 


Gran %  


 


Lymph % (Auto)  


 


Mono % (Auto)  


 


Eos % (Auto)  


 


Baso % (Auto)  


 


Gran #  


 


Lymph #  


 


Mono #  


 


Eos #  


 


Baso #  


 


PT  


 


INR  


 


APTT  


 


Sodium  


 


Potassium  


 


Chloride  


 


Carbon Dioxide  


 


Anion Gap  


 


BUN  


 


Creatinine  


 


Est GFR ( Amer)  


 


Est GFR (Non-Af Amer)  


 


Random Glucose  


 


Calcium  


 


Blood Type  O POSITIVE 


 


Blood Type Confirm   O POSITIVE


 


Antibody Screen  Negative 


 


BBK History Checked  No verified bt 














Assessment & Plan





- Assessment and Plan (Free Text)


Assessment: 


67 M whose past medical history includes crohn's disease, hypertension, 

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  

presents following cardiac cath with JOSEPH stent of LAD placed. 





1. CAD s/p cardiac cath POD 0 


- post cardiac cath care 


- ASA(d/c in 1-2 week as per cardiology) and plavix (for atleast 1 year)


- Cont home BB 


- Cont Lipitor 


- Cont NS @ 100 





2. Afib 


- Cont Xarelto and BB 


- Cont to monitor 





3. HLD: 


- Cont Lipitor 





4. DM


- Hold home metformin for 72 hour post cardiac cath 





5. Chronic back pain 


- Dilaudid 0.5mg prn for pain 





6. Crohn's dx 


- Cont home mesalamine





7. GI/DVT ppx 


- Protonix and Xarelto 





Case and plan was reviewed and discussed in detail with Dr Pratt.

## 2018-01-08 NOTE — HP
HISTORY OF PRESENT ILLNESS:  The patient is a 67-year-old obese male who

was admitted post cardiac catheterization and angioplasty by Dr. Olu Ramirez

today on Telemetry.  The patient was recently seen and admitted and had a

stress test done 01/04/2018, which shows coronary ischemic changes with

reversible apical defect and LV dysfunction and diffuse hypokinesis with

left ventricle ejection fraction of 40%.  The patient was advised to come

back for cardiac catheterization today.  The patient underwent cardiac

catheterization today by Dr. Olu Ramirez, undergone an successful

angioplasty and stent placement of the 80% mid-LAD stenosis with a

drug-eluting stent.



CODE STATUS:  Full code.



LIVING WILL/ADVANCE DIRECTIVE:  None.



HEIGHT:  5 feet 8 inches.



WEIGHT:  240 pounds.



BMI:  36.5.



HOME MEDICATIONS:

1.  Norvasc 10 mg daily.

2.  Ambien 10 mg at bedtime.

3.  Diovan-HCTZ 160/25 mg daily.

4.  Crestor 10 mg daily.

5.  Pentasa 1000 mg q.i.d.

6.  Percocet 10/325 mg q.8. p.r.n.

7.  Lopressor 50 mg daily.

8.  Lexapro 5 mg daily.

9.  Lidoderm 5% patch to the affected area.

10.  Lasix 40 mg daily.

11.  Cymbalta 60 or 90 mg daily.

12.  Vitamin D2 50,000 units weekly.

13.  Plavix 75 mg daily.

14.  Symbicort 1 puff twice a day 160/4.5.

15.  Aspirin 325 mg daily.

16.  Glucophage 500 mg daily.

17.  Xarelto 20 mg daily.



SOCIAL HISTORY:  According to the [place], the patient denies smoking,

alcohol, drug use, or communicable transmissible disease.



PAST MEDICAL AND SURGICAL HISTORY:  Significant for narcotic-dependent pain

syndrome, history of obesity, history of hypertension, history of new onset

atrial fibrillation in November or December, history of poor compliance,

history of cervical and lumbar radiculopathy, history of cervical and

lumbar spine degenerative disc disease, history of noncompliance, history

of insomnia, history of dyslipidemia, history of questionable colitis,

history of depression, history of hypovitaminosis D, history of diabetic

neuropathy, history of chronic obstructive pulmonary disease, history of

microcytic anemia, history of type 2 diabetes mellitus, history of

hypercholesterolemia, history of O+ blood type, history of degenerative

joint disease of the cervical spine, history of degenerative joint disease

of the lumbar spine, history of abnormal myocardial stress test on

01/04/2018 showing markedly enlarged left ventricle with decreased

perfusion in the apical wall and partial improvement, left ventricle

ejection fraction 44% and diffuse left ventricular hypokinesis and

partially reversible apical defect.  history of left ventricle ejection

fraction of 53% on echocardiogram, history of concentric left ventricle

hypertrophy,history of mildly thickened mitral valve, history of

trace-to-mild mitral regurgitation, mild tricuspid regurgitation, history

of moderate pulmonary arterial hypertension with right ventricular systolic

pressure of 47 mmHg, history of right bundle-branch block, history of age

indeterminate inferior infarct.  The patient's past medical history is also

significant for history of questionable hepatitis C.



PHYSICAL EXAMINATION:

GENERAL:  The patient was seen in room 273, bed three.  The patient is seen

lying in the bed.

VITAL SIGNS:  Post cardiac catheterization, T-max is 98.8.  Telemetry shows

atrial fibrillation, heart rate 103, 123, and 194.  Blood pressure 135/87,

132/76, 141/79, respiration 18, O2 sat is 92%.

HEENT:  Head:  Normocephalic, atraumatic.  HEENT examination shows pink

conjunctivae.  Anicteric sclerae.  No oropharyngeal lesion.

NECK:  No neck rigidity.

CHEST:  Kyphosis.

LUNGS:  Shows no rales, crackles, or wheezing.

CARDIOVASCULAR:  S1, S2, irregular rhythm.  Positive systolic murmur, right

second intercostal space, left second intercostal space, left sternal

border.

ABDOMEN:  Protuberant.  Positive bowel sound.

GENITALIA:  Male.

RECTAL:  Deferred.

EXTREMITIES:  Shows trace swelling of the lower extremity.  No pitting

edema, no calf tenderness, no Homans' sign.

MUSCULOSKELETAL:  Shows a body mass index of 36.

NEUROLOGIC:  The patient is alert, awake, oriented x3.

VASCULAR:  Palpable pulses.  Gait examination could not be tested.  Cranial

nerves II through XII limited.



DIAGNOSTIC DATA:  WBC 11.6, hemoglobin/hematocrit 11.9/37, MCV 77, platelet

298.  Granulocytes 77.  PT/PTT 14.3, 27.8.  Sodium 140, potassium 3.7,

chloride 101, CO2 28, anion gap 15, BUN 17, creatinine 1.0, GFR greater

than 60, glucose 173.  The patient underwent cardiac catheterization, which

was reviewed.



IMPRESSION AND PLAN:

1.  Coronary artery disease.

2.  Abnormal stress test with partially reversible apical defect with

residual ischemia and left ventricular dysfunction with left ventricle

ejection fraction of 44% with diffuse left ventricular hypokinesis.

3.  Status post successful angioplasty and stent placement of the 80% mid

left anterior descending stenosis with drug-eluting stent.

4.  Type 2 diabetes mellitus.

5.  Hypertension.

6.  History of dyslipidemia.

7.  Atrial fibrillation with right bundle-branch block.

8.  Anterior apical hypokinesis with left ventricle ejection fraction of

45% estimated.

9.  Stenosis of the distal posterior lateral branch.

10.  Intimal irregularities of the left circumflex and obtuse marginal

branch.

11.  80% eccentric stenosis of the mid left anterior descending artery.

12.  Left ventricular diffuse hypokinesis and segmental wall motion

abnormalities with ejection fraction of 45%.

13.  Concentric left ventricular hypertrophy.

14.  Moderate pulmonary arterial hypertension with right ventricular

systolic pressure of 47 mmHg.

15.  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

16.  Microcytic anemia.

17.  History of insomnia.

18.  History of chronic narcotic dependent pain syndrome.

19.  History of questionable colitis.

20.  History of depression.

21.  History of neuropathy.

22.  History of hypovitaminosis D.

23.  History of non-insulin requiring diabetes mellitus.



Plan at this time, the patient is to be admitted to telemetry post cardiac

catheterization.  The patient has been resumed on Ambien 10 mg at bedtime

p.r.n. Symbicort 160/4.5 one puff twice a day, Cymbalta 60 mg p.m., Ecotrin

81 g daily, Lexapro 5 mg daily, Lidoderm 5% patch apply to affected area. 

Lipitor 40 mg daily has been ordered, Lopressor 25 mg twice a day has been

ordered.  The patient is on Pentasa 1000 mg q.i.d., Plavix 75 mg daily, IV

fluid 0.9 normal saline at 100 mL an hour.  Xanax was given 0.25 mg one

dose, Xarelto 20 mg daily.  Repeat EKG ordered.  Consistent carbohydrate

ordered.  Bed rest ordered.  Head of the bed postop ordered.  Pressure

dressing applied to right groin.  The patient will be on bedrest post

angioplasty and cardiac catheterization.  The patient has been updated

about the recommendations by Cardiology regarding treatment options and

treatment plans and medications, including continuation of the Plavix for 1

year.  The patient will be resumed on Xarelto.  Aspirin will be given for 2

weeks, then aspirin will be stopped and the patient will continue on Plavix

and Xarelto.  Plavix will be continued for 1 year.  At present, the patient

is seen and examined in room 273 bed 3.  The patient is seen lying in the

bed.  The patient is comfortable at present.  The patient will be closely

followed.





Dictated and electronically signed, not read.





__________________________________________

Kody Pratt MD





DD:  01/08/2018 11:17:46

DT:  01/08/2018 11:29:53

Job # [id number]

## 2018-01-08 NOTE — CARDCATH
PROCEDURE DATE:  01/08/2018



HISTORY:  The patient is a 67-year-old male with multiple cardiac risk

factors and a history of noncompliance with medical advice and medications

who presents with chest pain and abnormal stress test.



His cardiac risk factors include diabetes mellitus, hypertension,

hypercholesterolemia, and is in chronic atrial fibrillation in which he has

been taking beta-blockers as well as Xarelto.



Because of his risk factors, symptoms, and abnormal stress test, cardiac

catheterization was recommended.



The patient's Xarelto was stopped 2 days ago and he was loaded with Plavix.



PROCEDURE:  Left heart catheterization with coronary arteriography and left

ventriculogram followed by PTCA and stent of an LAD.



The right femoral artery was cannulated with a 6-Setswana sheath.  There were

no complications.



I performed moderate sedation, which included the presence of an

independent trained observer that assisted in monitoring the patient's

level of consciousness and physiologic status.  After administration of

Versed and fentanyl, my intra service time was 30 minutes.



The findings on catheterization revealed a left ventricle that revealed an

apical hypokinesis with mild anterior wall hypokinesis with an estimated

ejection fraction of approximately 45%.



His coronary anatomy revealed a right dominant circulation.



The RCA revealed intimal irregularities without critical lesions.  The

distal portion of the posterior lateral branch was stenotic.



The left main artery was unremarkable.



The circumflex artery and obtuse marginal branches revealed intimal

irregularities without significant stenoses.



The LAD and diagonal vessels revealed diffuse intimal irregularities.  In

the midportion of the LAD, there was an eccentric 80% stenosis noted.



The patient was started on intravenous Angiomax.



On the fluoroscopic guide, the guiding catheter was placed in the ostium of

the left main artery.  An 0.014 ATW wire was used to cross the critical

lesion.



A 2.75 x 12 mm drug-eluting stent was placed and deployed at 14 atmospheres

of pressure in the LAD.



After balloon deflation and removal, repeat coronary arteriography revealed

an excellent result with no residual stenosis and TACO III flow.



The patient tolerated the procedure well.



Manual compression will be used to close the femoral artery site.



In summary, the procedure was successful with PTCA and stent of an 80% mid

LAD stenoses with a drug-eluting stent.



LV function was diffusely hypokinetic with segmental wall motion

abnormalities with an EF of approximately 45%.



Given these findings, the patient will need to remain on aspirin and Plavix

with Plavix for at least a year.



We will restart the patient on Xarelto before discharge to protect him from

a thromboembolic phenomenon atrial fibrillation.



We will discontinue the aspirin in a week or two given the increased risk

of bleeding with all three drugs.



I have discussed the risk of bleeding as well as benefits of taking these

drugs with the patient in detail.  Compliance has been an issue in the past

and I have discussed with her about the risks to remain noncompliant with

the medications and following medical advice.





__________________________________________

Olu Ramirez MD





DD:  01/08/2018 9:30:42

DT:  01/08/2018 9:36:22

Job # [id number]

## 2018-01-09 VITALS — DIASTOLIC BLOOD PRESSURE: 95 MMHG | HEART RATE: 98 BPM | SYSTOLIC BLOOD PRESSURE: 136 MMHG

## 2018-01-09 VITALS — OXYGEN SATURATION: 100 % | TEMPERATURE: 98.1 F

## 2018-01-09 LAB
BASOPHILS # BLD AUTO: 0.01 K/MM3 (ref 0–2)
BASOPHILS NFR BLD: 0.1 % (ref 0–3)
BUN SERPL-MCNC: 13 MG/DL (ref 7–21)
CALCIUM SERPL-MCNC: 8.6 MG/DL (ref 8.4–10.5)
EOSINOPHIL # BLD: 0.2 10*3/UL (ref 0–0.7)
EOSINOPHIL NFR BLD: 2 % (ref 1.5–5)
ERYTHROCYTE [DISTWIDTH] IN BLOOD BY AUTOMATED COUNT: 16.7 % (ref 11.5–14.5)
GFR NON-AFRICAN AMERICAN: > 60
GRANULOCYTES # BLD: 7.53 10*3/UL (ref 1.4–6.5)
GRANULOCYTES NFR BLD: 73.4 % (ref 50–68)
HGB BLD-MCNC: 11 G/DL (ref 14–18)
LYMPHOCYTES # BLD: 1.6 10*3/UL (ref 1.2–3.4)
LYMPHOCYTES NFR BLD AUTO: 15.2 % (ref 22–35)
MCH RBC QN AUTO: 24.3 PG (ref 25–35)
MCHC RBC AUTO-ENTMCNC: 31.1 G/DL (ref 31–37)
MCV RBC AUTO: 78.1 FL (ref 80–105)
MONOCYTES # BLD AUTO: 1 10*3/UL (ref 0.1–0.6)
MONOCYTES NFR BLD: 9.3 % (ref 1–6)
PLATELET # BLD: 286 10^3/UL (ref 120–450)
PMV BLD AUTO: 9.8 FL (ref 7–11)
RBC # BLD AUTO: 4.53 10^6/UL (ref 3.5–6.1)
WBC # BLD AUTO: 10.3 10^3/UL (ref 4.5–11)

## 2018-01-09 RX ADMIN — MESALAMINE SCH MG: 500 CAPSULE ORAL at 09:48

## 2018-01-09 RX ADMIN — GUAIFENESIN AND DEXTROMETHORPHAN PRN ML: 100; 10 SYRUP ORAL at 05:23

## 2018-01-09 RX ADMIN — HYDROMORPHONE HYDROCHLORIDE PRN MG: 1 INJECTION, SOLUTION INTRAMUSCULAR; INTRAVENOUS; SUBCUTANEOUS at 09:46

## 2018-01-09 NOTE — PN
DATE:  01/09/2018



SUBJECTIVE:  The patient is asymptomatic.



PHYSICAL EXAMINATION

VITAL SIGNS:  Blood pressure is 136/95, heart rates in the 90s.

NECK:  Negative JVD.

LUNGS:  Without rales or S1, S2.

EXTREMITIES:  Without edema.



LABORATORY DATA:  Hemoglobin is 11.  Chemistries:  BUN and creatinine are

unremarkable, the glucose is 144.



IMPRESSION:

1.  Status post percutaneous transluminal coronary angioplasty and stent.

2.  Coronary artery disease.

3.  History of atrial fibrillation.

4.  Diabetes mellitus.

5.  Hypertension.

6.  Hypercholesterolemia.



PLAN:  Given these findings, the patient will be discharged on Xarelto as

well as Plavix and aspirin.  We will stop the aspirin in 1 week.  I have

discussed the need for compliance with the patient and family in detail.





__________________________________________

Olu Ramirez MD



DD:  01/09/2018 10:37:06

DT:  01/09/2018 10:40:44

Job # [id number]

## 2018-01-09 NOTE — CP.PCM.DIS
Provider





- Provider


Attending physician: 


Olu Ramirez MD





Primary care physician: 


Kody Pratt MD





Consults: 





Cardiology 


Time Spent in preparation of Discharge (in minutes): 40





Hospital Course





- Lab Results


Lab Results: 


 Most Recent Lab Values











WBC  10.3 10^3/ul (4.5-11.0)   01/09/18  05:30    


 


RBC  4.53 10^6/uL (3.5-6.1)   01/09/18  05:30    


 


Hgb  11.0 g/dL (14.0-18.0)  L  01/09/18  05:30    


 


Hct  35.4 % (42.0-52.0)  L  01/09/18  05:30    


 


MCV  78.1 fl (80.0-105.0)  L  01/09/18  05:30    


 


MCH  24.3 pg (25.0-35.0)  L  01/09/18  05:30    


 


MCHC  31.1 g/dl (31.0-37.0)   01/09/18  05:30    


 


RDW  16.7 % (11.5-14.5)  H  01/09/18  05:30    


 


Plt Count  286 10^3/uL (120.0-450.0)   01/09/18  05:30    


 


MPV  9.8 fl (7.0-11.0)   01/09/18  05:30    


 


Gran %  73.4 % (50.0-68.0)  H  01/09/18  05:30    


 


Lymph % (Auto)  15.2 % (22.0-35.0)  L  01/09/18  05:30    


 


Mono % (Auto)  9.3 % (1.0-6.0)  H  01/09/18  05:30    


 


Eos % (Auto)  2.0 % (1.5-5.0)   01/09/18  05:30    


 


Baso % (Auto)  0.1 % (0.0-3.0)   01/09/18  05:30    


 


Gran #  7.53  (1.4-6.5)  H  01/09/18  05:30    


 


Lymph #  1.6  (1.2-3.4)   01/09/18  05:30    


 


Mono #  1.0  (0.1-0.6)  H  01/09/18  05:30    


 


Eos #  0.2  (0.0-0.7)   01/09/18  05:30    


 


Baso #  0.01 K/mm3 (0.0-2.0)   01/09/18  05:30    


 


PT  14.3 SECONDS (9.4-12.5)  H  01/08/18  07:18    


 


INR  1.24  (0.93-1.08)  H  01/08/18  07:18    


 


APTT  27.8 Seconds (25.1-36.5)   01/08/18  07:18    


 


Sodium  137 mmol/L (132-148)   01/09/18  05:30    


 


Potassium  3.8 mmol/L (3.6-5.0)   01/09/18  05:30    


 


Chloride  103 mmol/L ()   01/09/18  05:30    


 


Carbon Dioxide  25 mmol/L (21-33)   01/09/18  05:30    


 


Anion Gap  13  (10-20)   01/09/18  05:30    


 


BUN  13 mg/dL (7-21)   01/09/18  05:30    


 


Creatinine  0.9 mg/dl (0.8-1.5)   01/09/18  05:30    


 


Est GFR ( Amer)  > 60   01/09/18  05:30    


 


Est GFR (Non-Af Amer)  > 60   01/09/18  05:30    


 


Random Glucose  144 mg/dL ()  H  01/09/18  05:30    


 


Calcium  8.6 mg/dL (8.4-10.5)   01/09/18  05:30    


 


Blood Type  O POSITIVE   01/08/18  07:18    


 


Blood Type Confirm  O POSITIVE   01/08/18  07:25    


 


Antibody Screen  Negative   01/08/18  07:18    


 


BBK History Checked  No verified bt   01/08/18  07:18    














- Hospital Course


Hospital Course: 





67 M whose past medical history includes crohn's disease, hypertension, 

hyperlipidemia, DJD to neck, DM II, chronic back pain, CHF?, new onset afib (dx 

aprox 1 month ago) presents following cardiac cath. Pt was just recently d/kevin 

from the hospital for new onset afib. Patient had an abnormal stress test 4 

days ago and was scheduled for cardiac cath yesterday - JOSEPH stent placed in the 

LAD. Today the patient states that he is doing well. As per cardiology he 

should cont to take ASA (d/c in 1-2 week as per cardiology) and plavix (for 

atleast 1 year) cont with Xarelto for his afib- pt verbalizes understanding. Pt 

knows to follow up with Dr Pratt and Dr Ramirez with in 1 week. Today pt denies 

any complaints. Denies any chest pain, palpitations, or sob. 





DX: s/p cardiac cath with JOSEPH stenT placement / Afib 





Discharge Exam





- Head Exam


Head Exam: ATRAUMATIC, NORMOCEPHALIC





- Eye Exam


Eye Exam: EOMI, PERRL


Pupil Exam: NORMAL ACCOMODATION





- Respiratory Exam


Respiratory Exam: Clear to PA & Lateral.  absent: Rales, Rhonchi, Wheezes





- Cardiovascular Exam


Cardiovascular Exam: REGULAR RHYTHM, +S1, +S2





- GI/Abdominal Exam


GI & Abdominal Exam: Normal Bowel Sounds, Soft





- Extremities Exam


Additional comments: 





no calf tenderness / dressing in groin c/d/i no hematoma / dital pulses present 

b/l 





- Neurological Exam


Neurological exam: Alert, CN II-XII Intact, Oriented x3





- Psychiatric Exam


Psychiatric exam: Normal Affect, Normal Mood





- Skin


Skin Exam: Dry, Intact, Warm





Discharge Plan





- Discharge Medications


Prescriptions: 


Clopidogrel [Plavix] 75 mg PO DAILY #30 tab


Pantoprazole [Protonix EC Tab] 40 mg PO 0600 #30 ect





- Follow Up Plan


Condition: IMPROVED


Disposition: HOME/ ROUTINE


Patient education suggested?: Yes


Additional Instructions: 


DISCHARGE HOME AFTER CLEARED BY 


FOLLOW UP  WITHIN 1 WEEK 


DISCHARGE MEDS AS PER UPDATED AMBULATORY ORDERS


HOLD METFORMIN FOR 2 MORE DAYS - MAY RESUME ON THURSDAY


Referrals: 


Kody Pratt MD [Primary Care Provider] - 1 Week


(DISCHARGE HOME AFTER CLEARED BY 


FOLLOW UP  AND  WITHIN 1 WEEK 


DISCHARGE MEDS AS PER UPDATED AMBULATORY ORDERS)

## 2018-01-09 NOTE — DS
FINAL PROGRESS NOTE AND DISCHARGE SUMMARY



HISTORY OF PRESENT ILLNESS:  The patient was seen in room 273, bed 3.  The

patient is seen lying in the bed.  Overnight nurse's notes were reviewed. 

The patient was seen by Dr. Ramirez today cleared for discharge.  The

patient's right groin examination was negative.



PHYSICAL EXAMINATION:

VITAL SIGNS:  T-max 98.1 to 98.4.  Telemetry shows sinus rhythm, heart rate

of 98, 96, and 92.  Blood pressure 136/95, 148/97, 141/84 and 131/90,

respirations 20, and O2 saturation 100%.

HEENT:  Head examination; normocephalic and atraumatic.  HEENT examination

shows pinkish conjunctivae.  Anicteric sclerae.  No oropharyngeal lesion.

NECK:  No neck rigidity.  No jugular venous distention.  No audible carotid

bruit.

CHEST:  Kyphosis.

LUNGS:  Shows occasional rhonchi.

CARDIOVASCULAR:  S1 and S2, regular rhythm.  Questionable soft systolic

murmur left sternal border, left second intercostal space.

ABDOMEN:  Protuberant, obese.

GENITALIA:  Male.

RECTAL:  Deferred.

EXTREMITIES:  Shows no pitting edema.  No calf tenderness.  No Homans'

sign.  No clubbing.  No cyanosis.

VASCULAR:  Palpable pulses.

MUSCULOSKELETAL:  Shows a body mass index of 36.

NEUROLOGIC:  The patient is alert, awake, and oriented x3.  Cranial nerves

II through XII intact.  Gait examination not tested.



DIAGNOSTIC DATA:  On 01/09/2018; WBC 10.3, hemoglobin/hematocrit 11 and

_____, and platelet 286.  Granulocytes 73%.  Sodium 137, potassium 3.8,

chloride 103, CO2 of 25, anion gap 13, BUN 13, creatinine 0.9, GFR greater

than 60, and glucose 144.



FINAL IMPRESSION, PLAN, AND DISCHARGE DIAGNOSES:

1.  Coronary artery disease.

2.  New onset atrial fibrillation.

3.  Poor compliance.

4.  Morbid obesity.

5.  Status post cardiac catheterization and angioplasty and stent placement

of the left anterior descending artery.

6.  Left ventricular ejection fraction of 45% with apical anterior

hypokinesis.

7.  Stenosis of the distal portion of the posterolateral branch of the

right coronary artery.

8.  An 80% eccentric stenosis of the mid portion of the left anterior

descending artery.

9.  Status post angioplasty and stent placement of 80% mid left anterior

descending artery.

10.  Diffuse left ventricular hypokinesis with left ventricular ejection

fraction of 45%.

11.  Atrial fibrillation with right bundle-branch block and age

indeterminate inferior infarct.

12.  Hypertension.

13.  Hyperlipidemia.

14.  Type 2 diabetes mellitus.

15.  Diabetic neuropathy.

16.  History of depression.

17.  Dyslipidemia.

18.  History of questionable colitis.

19.  History of insomnia.

20.  History of asthma and chronic obstructive pulmonary disease.

21.  Hypovitaminosis D.

22.  History of depression.

23.  Chronic narcotic-dependent _____ pain syndrome and opioid dependency.

24.  Insomnia.



PLAN:  At this time, the patient is to be discharged home.  Follow up with

Dr. Pratt and Dr. Ramirez within 1 week.  The patient is to follow up with

discharge medications as per updated ambulatory orders.  The patient is

advised to hold metformin for 48 hours.  The patient is advised to follow

up in the office within 1 week.



Time spent in the entire discharge process, more than 45 minutes.



Dictated and electronically signed, not read.





__________________________________________

Kody Pratt MD





DD:  01/09/2018 11:52:06

DT:  01/09/2018 12:33:16

Job # [id number]

## 2018-01-09 NOTE — CARD
--------------- APPROVED REPORT --------------





EKG Measurement

Heart [id number]

[id number]

[id number]

[id number]



<Conclusion>

Atrial fibrillation with rapid ventricular response

Right bundle branch block

Inferior infarct, age undetermined

Abnormal ECG

## 2018-02-11 ENCOUNTER — HOSPITAL ENCOUNTER (INPATIENT)
Dept: HOSPITAL 42 - ED | Age: 68
LOS: 3 days | Discharge: HOME | DRG: 292 | End: 2018-02-14
Attending: INTERNAL MEDICINE | Admitting: INTERNAL MEDICINE
Payer: MEDICARE

## 2018-02-11 VITALS — BODY MASS INDEX: 36.5 KG/M2

## 2018-02-11 DIAGNOSIS — E11.40: ICD-10-CM

## 2018-02-11 DIAGNOSIS — J44.9: ICD-10-CM

## 2018-02-11 DIAGNOSIS — I25.5: ICD-10-CM

## 2018-02-11 DIAGNOSIS — D68.9: ICD-10-CM

## 2018-02-11 DIAGNOSIS — E11.65: ICD-10-CM

## 2018-02-11 DIAGNOSIS — Z79.899: ICD-10-CM

## 2018-02-11 DIAGNOSIS — E66.9: ICD-10-CM

## 2018-02-11 DIAGNOSIS — E78.00: ICD-10-CM

## 2018-02-11 DIAGNOSIS — G89.29: ICD-10-CM

## 2018-02-11 DIAGNOSIS — Z91.19: ICD-10-CM

## 2018-02-11 DIAGNOSIS — I25.118: ICD-10-CM

## 2018-02-11 DIAGNOSIS — I27.21: ICD-10-CM

## 2018-02-11 DIAGNOSIS — D50.9: ICD-10-CM

## 2018-02-11 DIAGNOSIS — M47.9: ICD-10-CM

## 2018-02-11 DIAGNOSIS — Z95.5: ICD-10-CM

## 2018-02-11 DIAGNOSIS — Z79.02: ICD-10-CM

## 2018-02-11 DIAGNOSIS — Z79.01: ICD-10-CM

## 2018-02-11 DIAGNOSIS — Z79.51: ICD-10-CM

## 2018-02-11 DIAGNOSIS — Z79.82: ICD-10-CM

## 2018-02-11 DIAGNOSIS — I45.10: ICD-10-CM

## 2018-02-11 DIAGNOSIS — E78.5: ICD-10-CM

## 2018-02-11 DIAGNOSIS — I87.8: ICD-10-CM

## 2018-02-11 DIAGNOSIS — K50.90: ICD-10-CM

## 2018-02-11 DIAGNOSIS — I42.0: ICD-10-CM

## 2018-02-11 DIAGNOSIS — K64.9: ICD-10-CM

## 2018-02-11 DIAGNOSIS — K57.90: ICD-10-CM

## 2018-02-11 DIAGNOSIS — I48.91: ICD-10-CM

## 2018-02-11 DIAGNOSIS — I11.0: Primary | ICD-10-CM

## 2018-02-11 DIAGNOSIS — Z79.84: ICD-10-CM

## 2018-02-11 DIAGNOSIS — I25.2: ICD-10-CM

## 2018-02-11 DIAGNOSIS — I50.23: ICD-10-CM

## 2018-02-11 DIAGNOSIS — K64.8: ICD-10-CM

## 2018-02-11 DIAGNOSIS — Z90.49: ICD-10-CM

## 2018-02-11 DIAGNOSIS — M81.0: ICD-10-CM

## 2018-02-11 DIAGNOSIS — F11.20: ICD-10-CM

## 2018-02-11 LAB
ALBUMIN SERPL-MCNC: 4.3 G/DL (ref 3–4.8)
ALBUMIN/GLOB SERPL: 1.3 {RATIO} (ref 1.1–1.8)
ALT SERPL-CCNC: 58 U/L (ref 7–56)
APTT BLD: 37.7 SECONDS (ref 25.1–36.5)
AST SERPL-CCNC: 71 U/L (ref 17–59)
BNP SERPL-MCNC: 1700 PG/ML (ref 0–450)
BUN SERPL-MCNC: 22 MG/DL (ref 7–21)
CALCIUM SERPL-MCNC: 9 MG/DL (ref 8.4–10.5)
ERYTHROCYTE [DISTWIDTH] IN BLOOD BY AUTOMATED COUNT: 15.7 % (ref 11.5–14.5)
GFR NON-AFRICAN AMERICAN: > 60
HGB BLD-MCNC: 10 G/DL (ref 14–18)
INR PPP: 1.94 (ref 0.93–1.08)
MCH RBC QN AUTO: 23.5 PG (ref 25–35)
MCHC RBC AUTO-ENTMCNC: 30.3 G/DL (ref 31–37)
MCV RBC AUTO: 77.6 FL (ref 80–105)
PLATELET # BLD: 303 10^3/UL (ref 120–450)
PMV BLD AUTO: 9.7 FL (ref 7–11)
PROTHROMBIN TIME: 22.4 SECONDS (ref 9.4–12.5)
RBC # BLD AUTO: 4.25 10^6/UL (ref 3.5–6.1)
TROPONIN I SERPL-MCNC: < 0.01 NG/ML
WBC # BLD AUTO: 7.1 10^3/UL (ref 4.5–11)

## 2018-02-11 NOTE — ED PDOC
Arrival/HPI





- General


Chief Complaint: Shortness Of Breath


Time Seen by Provider: 02/11/18 19:59


Historian: Patient





- History of Present Illness


Narrative History of Present Illness (Text): 





02/11/18 20:29


Pt. to ED BIBA with PMHX atrial fibrillation,crohns disease.DM,HTN,

hyperlipidemia,CHF with c/o SOB for past 5-6 days.C/o orthopnea/unable to 

sleep.Also with occassional vague chest discomfort described.Denies any leg or 

back pain.No hx. of any fever,chills or cough.





Past Medical History





- Provider Review


Nursing Documentation Reviewed: Yes





- Travel History


Have you recently traveled outside [place] w/in the past 3 mons?: No





- Infectious Disease


Hx of Infectious Diseases: None





- Tetanus Immunization


Tetanus Immunization: >10 years Ago





- Cardiac


Hx Pacemaker: No


Other/Comment: cardiac cath x 1 stent





- Pulmonary


Hx Respiratory Disorders: No





- Neurological


Hx Paralysis: No





- HEENT


Hx HEENT Disorder: No





- Renal


Hx Renal Disorder: No





- Endocrine/Metabolic


Hx Endocrine Disorders: Yes


Other/Comment: HIGH BLOOD SUGAR





- Hematological/Oncological


Hx Blood Transfusions: No





- Integumentary


Hx Dermatological Disorder: No





- Musculoskeletal/Rheumatological


Hx Musculoskeletal Disorders: Yes





- Gastrointestinal


Hx Gastrointestinal Disorders: Yes


Other/Comment: CROHNS





- Genitourinary/Gynecological


Hx Genitourinary Disorders: No





- Psychiatric


Hx Emotional Abuse: No


Hx Physical Abuse: No


Hx Substance Use: No





- Surgical History


Other/Comment: cardiac cath





- Anesthesia


Hx Anesthesia: Yes


Hx Anesthesia Reactions: No


Hx Malignant Hyperthermia: No





- Suicidal Assessment


Feels Threatened In Home Enviroment: No





Family/Social History





- Physician Review


Nursing Documentation Reviewed: Yes


Family/Social History: Diabetes, Hypertension, CAD/MI


Smoking Status: Never Smoked


Hx Alcohol Use: No


Hx Substance Use: No


Hx Substance Use Treatment: No





Allergies/Home Meds


Allergies/Adverse Reactions: 


Allergies





No Known Allergies Allergy (Verified 12/26/17 14:49)


 








Home Medications: 


 Home Meds











 Medication  Instructions  Recorded  Confirmed


 


DULoxetine [Cymbalta] 60 mg PO QPM 12/26/17 02/12/18


 


Escitalopram Oxalate [Lexapro] 5 mg PO DAILY 12/26/17 02/12/18


 


Furosemide [Lasix] 40 mg PO DAILY 12/26/17 02/12/18


 


Mesalamine ER Cap [Pentasa] 1,000 mg PO QID 12/26/17 02/12/18


 


Metoprolol Tartrate [Lopressor] 50 mg PO DAILY 12/26/17 02/12/18


 


Oxycodone HCl/Acetaminophen 1 tab PO Q8 PRN 12/26/17 02/12/18





[Percocet  mg Tablet]   


 


Rosuvastatin Calcium [Crestor] 10 mg PO DAILY 12/26/17 02/12/18


 


Valsartan/Hydrochlorothiazide 1 tab PO DAILY 12/26/17 02/12/18





[Diovan Hct 160-25 mg Tablet]   


 


Zolpidem [Ambien] 10 mg PO HS 12/26/17 02/12/18


 


Budesonide/Formoterol Fumarate 1 puff INH BID 01/05/18 02/12/18





[Symbicort 160-4.5 Mcg Inhaler]   


 


Ergocalciferol (Vitamin D2) 5,000 units PO Q14D 01/05/18 02/12/18





[Ergocal]   


 


metFORMIN [glucOPHAGE] 500 mg PO DAILY 01/05/18 02/12/18


 


Benzonatate [Tessalon Perles] 1 cap PO TID 02/11/18 02/12/18


 


tiZANidine [Zanaflex] 1 tab PO DAILY 02/11/18 02/12/18














Review of Systems





- Review of Systems


Constitutional: Normal


Eyes: Normal


ENT: Normal


Respiratory: SOB


Cardiovascular: Chest Pain


Gastrointestinal: Normal


Genitourinary Male: Normal


Musculoskeletal: Normal


Skin: Normal


Neurological: Normal


Endocrine: Normal


Hemo/Lymphatic: Normal


Psychiatric: Normal





Physical Exam


Vital Signs











  Temp Pulse Pulse Resp BP Pulse Ox


 


 02/12/18 16:00  98.5 F  91 H   18  100/65  96


 


 02/12/18 14:00   86    


 


 02/12/18 13:04  97.9 F  117 H  117 H  19  126/64 


 


 02/12/18 12:37   80   18  108/72  96


 


 02/12/18 12:11   74   18  110/59 L  97


 


 02/12/18 10:18   110 H    108/72 


 


 02/12/18 09:30   117 H   19  126/64  97


 


 02/12/18 08:21      118/89 


 


 02/12/18 06:40  97.8 F  105 H   17  141/75  97


 


 02/12/18 02:00   106 H    


 


 02/11/18 23:00  97.9 F  74   18  152/81 H  97


 


 02/11/18 22:09      117/74 


 


 02/11/18 21:58  97.8 F  80   19  117/50 L  97


 


 02/11/18 20:00     22   94 L


 


 02/11/18 19:58  97.6 F  86   15  126/61  95











Temperature: Afebrile


Blood Pressure: Normal


Pulse: Regular


Respiratory Rate: Normal


Appearance: Positive for: Well-Appearing, Non-Toxic, Comfortable


Pain Distress: None


Mental Status: Positive for: Alert and Oriented X 3





- Systems Exam


Head: Present: Atraumatic, Normocephalic


Pupils: Present: PERRL


Extroacular Muscles: Present: EOMI


Conjunctiva: Present: Normal


Mouth: Present: Moist Mucous Membranes


Pharnyx: Present: Normal


Neck: Present: Normal Range of Motion


Respiratory/Chest: Present: Good Air Exchange, Rhonchi.  No: Respiratory 

Distress, Accessory Muscle Use


Cardiovascular: Present: Regular Rate and Rhythm, Normal S1, S2.  No: Murmurs


Abdomen: Present: Normal Bowel Sounds.  No: Tenderness, Distention, Peritoneal 

Signs


Back: Present: Normal Inspection


Upper Extremity: Present: Normal Inspection.  No: Cyanosis, Edema


Lower Extremity: Present: Normal Inspection, NORMAL PULSES, Normal ROM, 

Neurovascularly Intact.  No: Edema, CALF TENDERNESS, Staci's Sign, Tenderness, 

Swelling


Neurological: Present: GCS=15, CN II-XII Intact, Speech Normal, Motor Func 

Grossly Intact, Normal Sensory Function


Skin: Present: Warm, Dry, Normal Color.  No: Rashes


Psychiatric: Present: Alert, Oriented x 3, Normal Insight, Normal Concentration





Medical Decision Making


ED Course and Treatment: 





02/11/18 21:00


Chest XRay reviewed, shows cardiomegaly





02/11/18 21:53


Case discussed with Dr. Pratt, who is aware and agrees with plan. Accepts pt 

into his service. Patient will be admitted to telemetry for CHF and Atrial 

fibrillation. Medical resident paged.





02/11/18 22:04


Case discussed with medical resident on call, who is aware and agrees with 

treatment plan. 





- Lab Interpretations


Lab Results: 








 02/11/18 20:00 





 02/11/18 20:00 





 Lab Results





02/11/18 20:00: Iron 31 L, TIBC 423, % Saturation 7 L


02/11/18 20:00: Retic Count 2.24 H


02/11/18 20:00: Magnesium 1.9, Ferritin 8.3, Vitamin B12 599, Folate 15.5


02/11/18 20:00: WBC 7.1  D, RBC 4.25, Hgb 10.0 L, Hct 33.0 L, MCV 77.6 L, MCH 

23.5 L, MCHC 30.3 L, RDW 15.7 H, Plt Count 303, MPV 9.7


02/11/18 20:00: Sodium 137, Potassium 4.2, Chloride 99, Carbon Dioxide 23, 

Anion Gap 19, BUN 22 H, Creatinine 1.1, Est GFR (African Amer) > 60, Est GFR (

Non-Af Amer) > 60, Random Glucose 165 H, Calcium 9.0, Total Bilirubin 0.5, AST 

71 H D, ALT 58 H, Alkaline Phosphatase 56, Lactate Dehydrogenase 597, Total 

Creatine Kinase 103, Troponin I < 0.01, NT-Pro-B Natriuret Pep 1700 H, Total 

Protein 7.5, Albumin 4.3, Globulin 3.2, Albumin/Globulin Ratio 1.3


02/11/18 20:00: PT 22.4 H, INR 1.94 H, APTT 37.7 H








I have reviewed the lab results: Yes





- RAD Interpretation


Radiology Orders: 








02/11/18 20:12


CHEST PORTABLE [RAD] Stat 











: ED Physician





- EKG Interpretation


EKG Interpretation (Text): 





02/11/18 20:33


Atrial fibrillation@94,RBBB,inferior infarct,NSSTT changes


Interpreted by ED Physician: Yes


Type: 12 lead EKG





- Medication Orders


Current Medication Orders: 








Atorvastatin Calcium (Lipitor)  20 mg PO [facility]


   Last Admin: 02/12/18 00:39  Dose: 20 mg





Benzonatate (Tessalon Perles)  100 mg PO TID [facility]


Clopidogrel Bisulfate (Plavix)  75 mg PO DAILY [facility]


   Last Admin: 02/12/18 10:18  Dose: 75 mg





Docusate Sodium (Colace)  100 mg PO TID [facility]


   Last Admin: 02/12/18 17:21  Dose: 100 mg





Last Bowel Movement


 Document     02/12/18 17:21  FG  (Rec: 02/12/18 17:22  FG  [id number])


     Last Bowel Movement


      Last Bowel Movement                        02/12/18





Duloxetine HCl (Cymbalta)  60 mg PO QPM [facility]


   Last Admin: 02/12/18 17:19  Dose: 60 mg





Escitalopram Oxalate (Lexapro)  5 mg PO DAILY [facility]


   Last Admin: 02/12/18 10:36  Dose: 5 mg





Furosemide (Lasix)  40 mg IVP Q12H [facility]


   Last Admin: 02/12/18 08:21  Dose: 40 mg





MAR Blood Pressure


 Document     02/12/18 08:21  LA  (Rec: 02/12/18 08:22  LA  [id number][place]


     Blood Pressure


      Blood Pressure (100//90)             118/89


IVP Administration


 Document     02/12/18 08:21  LA  (Rec: 02/12/18 08:22  LA  [id number])


     Charges for Administration


      # of IVP Administrations                   1





Iron Sucrose 200 mg/ Sodium (Chloride)  110 mls @ 110 mls/hr IVPB DAILY [facility]


   Stop: 02/16/18 10:59


   Last Admin: 02/12/18 10:33  Dose: 110 mls/hr





eMAR Start Stop


 Document     02/12/18 10:33  LA  (Rec: 02/12/18 10:33  LA  [id number])


     Intravenous Solution


      Start Date                                 02/12/18


      Start Time                                 10:33





Insulin Human Regular (Humulin R Med)  0 units SC ACHS [facility]


   PRN Reason: Protocol


   Last Admin: 02/12/18 17:40 Dose:  Not Given


   Non-Admin Reason: Blood Sugar Parameter





MAR Blood Glucose


 Document     02/12/18 17:40  JFR  (Rec: 02/12/18 17:40  JFR  [id number])


     Blood Glucose


      Finger Stick Blood Glucose ()        134





Levalbuterol HCl (Xopenex)  0.63 mg IH [id number] [facility]


   Last Admin: 02/12/18 08:22  Dose: 0.63 mg





Losartan Potassium (Cozaar)  100 mg PO DAILY [facility]


   Last Admin: 02/12/18 10:19  Dose: 100 mg





Mesalamine (Pentasa)  1,000 mg PO QID [facility]


   Last Admin: 02/12/18 17:22  Dose: 1,000 mg





Metoprolol Tartrate (Lopressor)  50 mg PO DAILY [facility]


   Last Admin: 02/12/18 10:18  Dose: 50 mg





MAR Pulse and Blood Pressure


 Document     02/12/18 10:18  LA  (Rec: 02/12/18 10:18  LA  [id number])


     Pulse


      Pulse Rate (60-90)                         110


     Blood Pressure


      Blood Pressure (100//90)             108/72





Oxycodone/Acetaminophen (Percocet 10/325 Mg Tab)  1 tab PO Q8 PRN


   PRN Reason: Pain, moderate (4-7)


   Last Admin: 02/12/18 17:20  Dose: 1 tab





MAR Pain Assessment


 Document     02/12/18 17:20  FG  (Rec: 02/12/18 17:21  FG  [id number])


     Pain Reassessment


      Is this a pain reassessment?               Yes


     Sleep


      Is patient sleeping during reassessment?   No


     Presence of Pain


      Presence of Pain                           Yes


     Pain Scale Used


      Pain Scale Used                            Numeric


     Location


      Left, Right or Bilateral                   Bilateral


      Pain Location Body Site                    Neck


                                                 Shoulder


     Description


      Description                                Pressure


      Intensity of Pain at present               9


      Acceptable Level of Pain                   2


      Pain Behavior                              Irritability


      Alleviating Factors/Management             Medication


       Techniques                                


      Alleviating Factors                        Medication


      Pain not relieved and LIP/MD was           No


       notified                                  





Pantoprazole Sodium (Protonix Ec Tab)  40 mg PO 0600,1600 [facility]


   Last Admin: 02/12/18 17:20  Dose: 40 mg





Rivaroxaban (Xarelto)  20 mg PO DAILY [facility]


   PRN Reason: Protocol


   Last Admin: 02/12/18 10:34  Dose: 20 mg





Tizanidine HCl (Zanaflex)  4 mg PO DAILY [facility]


   Last Admin: 02/12/18 10:35  Dose: 4 mg





Zolpidem Tartrate (Ambien)  10 mg PO HS [facility]


   PRN Reason: Protocol





Discontinued Medications





Albuterol/Ipratropium (Duoneb 3 Mg/0.5 Mg (3 Ml) Ud)  3 ml IH ONCE STA


   Stop: 02/11/18 20:28


   Last Admin: 02/11/18 20:49  Dose: 3 ml





Furosemide (Lasix)  40 mg IVP ONCE ONE


   Stop: 02/11/18 21:29


   Last Admin: 02/11/18 22:09  Dose: 40 mg





MAR Blood Pressure


 Document     02/11/18 22:09  AB  (Rec: 02/11/18 22:10  AB  [email or web address]


     Blood Pressure


      Blood Pressure (100//90)             117/74


IVP Administration


 Document     02/11/18 22:09  AB  (Rec: 02/11/18 22:10  AB  [email or web address]


     Charges for Administration


      # of IVP Administrations                   1





Sodium Chloride (Sodium Chloride 0.9%)  250 mls @ 999 mls/hr IV .Q16M [facility]


   Last Admin: 02/12/18 13:52  Dose:  





Non-Formulary Medication (Budesonide/Formoterol Fumarate [Symbicort 160-4.5 Mcg 

Inhaler])  1 puff INH BID [facility]


Oxycodone/Acetaminophen (Percocet 10/325 Mg Tab)  1 tab PO Q8 PRN


   PRN Reason: Pain, moderate (4-7)


   Last Admin: 02/12/18 07:25  Dose: 1 tab





MAR Pain Assessment


 Document     02/12/18 07:25  CANDI  (Rec: 02/12/18 07:26  CANDI  [id number][place]


     Pain Reassessment


      Is this a pain reassessment?               No


     Presence of Pain


      Presence of Pain                           Yes


     Pain Scale Used


      Pain Scale Used                            Numeric


     Location


      Pain Location Body Site                    Neck


     Description


      Intensity of Pain at present               9





Pantoprazole Sodium (Protonix Ec Tab)  40 mg PO 0600 [facility]


   Last Admin: 02/12/18 06:17  Dose: 40 mg





Pneumococcal Polyvalent Vaccine (Pneumovax 23 Vaccine)  0.5 ml IM .ONCE ONE


   Stop: 02/12/18 13:20


Zolpidem Tartrate (Ambien)  10 mg PO ONCE ONE


   PRN Reason: Protocol


   Stop: 02/12/18 00:31


   Last Admin: 02/12/18 00:39  Dose: 10 mg





Behavioural


 Document     02/12/18 00:39  CANDI  (Rec: 02/12/18 00:39  CANDI  [facility] [id number])


     Maintenance


      Maintenance Dose                           Yes


Re-Assess: Reassess Psych Meds


 Document     02/12/18 01:39  CANDI  (Rec: 02/12/18 03:40  CANDI  [facility] [id number])


      Reassess Psych Med                         Effective














Disposition/Present on Arrival





- Present on Arrival


Any Indicators Present on Arrival: No


History of DVT/PE: No


History of Uncontrolled Diabetes: No


Urinary Catheter: No


History of Decub. Ulcer: No


History Surgical Site Infection Following: None





- Disposition


Have Diagnosis and Disposition been Completed?: Yes


Diagnosis: 


 Dyspnea, CHF (congestive heart failure)





Disposition: HOSPITALIZED


Disposition Time: 22:00


Patient Plan: Admission


Patient Problems: 


 Current Active Problems











Problem Status Onset


 


CHF (congestive heart failure) Acute  


 


Dyspnea Acute  











Condition: STABLE

## 2018-02-11 NOTE — CP.PCM.HP
History of Present Illness





- History of Present Illness


History of Present Illness: 





This patient is a 67 year old male with a PMHx of Crohn's Disease, 

Diverticulitis, Hypertension, Hyperlipidemia, DJD (Cervical), DM II, Chronic 

Lower back pain, and A-fib who presents complaining of shortness of breath x 4-

5 day that has worsened today. Patient states his SOB is worsened with exertion 

and lying flat.  He states that nothing makes his symptoms better. He denies 

any long periods of immobility, recent travel, recent illness, sick contacts, 

or fever.  Patient does admit to having blood in the stool for about a month. 

He admits to nausea but denies any vomiting, diarrhea, or constipation. He also 

denies any urinary symptoms.  He does complain of slight lower abdominal pain 

for the past 2-3 days.





ROS





   POSITIVES: SOB, Dyspnea on exertion, nasal congestion, chest pain(mild/non-

radiating), orthopnea, lower abdominal pain, blood in stool, nausea, insomnia


   NEGATIVES: Fever, Chills Vomiting, Urinary symptoms, long travel, long 

periods of immobilization, cough 





PMHx: Crohn's Disease, Diverticulitis, Hypertension, Hyperlipidemia, DJD (

Cervical), DM II, Chronic Lower back pain, A-fib, CAD s/p JOSEPH


PSHx: Cardiac Cath with JOSEPH placement in LAD (1/9/18)


Allergies: NKDA


SocialHx: Denies tobacco, alcohol, or illicit drug use


Hos: Stent in 1/9/18


FamHx: Denies


Meds: Reviewed. 





PMD: Dr. Pratt 











Present on Admission





- Present on Admission


Any Indicators Present on Admission: No





Review of Systems





- Review of Systems


Review of Systems: 





As per HPI








Past Patient History





- Infectious Disease


Hx of Infectious Diseases: None





- Tetanus Immunizations


Tetanus Immunization: >10 years Ago





- Past Social History


Smoking Status: Never Smoked





- CARDIAC


Hx Pacemaker: No


Other/Comment: cardiac cath x 1 stent





- PULMONARY


Hx Respiratory Disorders: No





- NEUROLOGICAL


Hx Paralysis: No





- HEENT


Hx HEENT Problems: No





- RENAL


Hx Chronic Kidney Disease: No





- ENDOCRINE/METABOLIC


Hx Endocrine Disorders: Yes


Other/Comment: HIGH BLOOD SUGAR





- HEMATOLOGICAL/ONCOLOGICAL


Hx Blood Transfusions: No





- INTEGUMENTARY


Hx Dermatological Problems: No





- MUSCULOSKELETAL/RHEUMATOLOGICAL


Hx Musculoskeletal Disorders: Yes





- GASTROINTESTINAL


Hx Gastrointestinal Disorders: Yes


Other/Comment: CROHNS





- GENITOURINARY/GYNECOLOGICAL


Hx Genitourinary Disorders: No





- PSYCHIATRIC


Hx Emotional Abuse: No


Hx Physical Abuse: No


Hx Substance Use: No





- SURGICAL HISTORY


Other/Comment: cardiac cath





- ANESTHESIA


Hx Anesthesia: Yes


Hx Anesthesia Reactions: No


Hx Malignant Hyperthermia: No





Meds


Allergies/Adverse Reactions: 


 Allergies











Allergy/AdvReac Type Severity Reaction Status Date / Time


 


No Known Allergies Allergy   Verified 12/26/17 14:49














Physical Exam





- Constitutional


Appears: Non-toxic, No Acute Distress





- Head Exam


Head Exam: ATRAUMATIC, NORMAL INSPECTION, NORMOCEPHALIC





- Eye Exam


Eye Exam: EOMI, Normal appearance.  absent: Scleral icterus





- ENT Exam


ENT Exam: Mucous Membranes Moist





- Respiratory Exam


Respiratory Exam: Accessory Muscle Use, Clear to Auscultation Bilateral.  absent

: Rales, Rhonchi





- Cardiovascular Exam


Cardiovascular Exam: Irregular Rhythm, +S1, +S2.  absent: JVD





- GI/Abdominal Exam


GI & Abdominal Exam: Normal Bowel Sounds, Soft.  absent: Firm, Guarding, 

Organomegaly, Rebound, Tenderness





- Rectal Exam


Rectal Exam: Bloody Stool.  absent: Hemorrhoids, Fecal Impaction, NORMAL 

INSPECTION





- Extremities Exam


Extremities exam: Positive for: normal capillary refill, pedal edema (+1 B/L.)





- Neurological Exam


Neurological exam: Alert, Oriented x3





- Psychiatric Exam


Psychiatric exam: Normal Affect, Normal Mood





- Skin


Skin Exam: Dry, Intact, Normal Color, Warm





Results





- Vital Signs


Recent Vital Signs: 





 Last Vital Signs











Temp  97.8 F   02/11/18 21:58


 


Pulse  80   02/11/18 21:58


 


Resp  19   02/11/18 21:58


 


BP  117/74   02/11/18 22:09


 


Pulse Ox  97   02/11/18 21:58














- Labs


Result Diagrams: 


 02/11/18 20:00





 02/11/18 20:00


Labs: 





 Laboratory Results - last 24 hr











  02/11/18 02/11/18 02/11/18





  20:00 20:00 20:00


 


WBC    7.1  D


 


RBC    4.25


 


Hgb    10.0 L


 


Hct    33.0 L


 


MCV    77.6 L


 


MCH    23.5 L


 


MCHC    30.3 L


 


RDW    15.7 H


 


Plt Count    303


 


MPV    9.7


 


PT  22.4 H  


 


INR  1.94 H  


 


APTT  37.7 H  


 


Sodium   137 


 


Potassium   4.2 


 


Chloride   99 


 


Carbon Dioxide   23 


 


Anion Gap   19 


 


BUN   22 H 


 


Creatinine   1.1 


 


Est GFR ( Amer)   > 60 


 


Est GFR (Non-Af Amer)   > 60 


 


Random Glucose   165 H 


 


Calcium   9.0 


 


Total Bilirubin   0.5 


 


AST   71 H D 


 


ALT   58 H 


 


Alkaline Phosphatase   56 


 


Lactate Dehydrogenase   597 


 


Total Creatine Kinase   103 


 


Troponin I   < 0.01 


 


NT-Pro-B Natriuret Pep   1700 H 


 


Total Protein   7.5 


 


Albumin   4.3 


 


Globulin   3.2 


 


Albumin/Globulin Ratio   1.3 














Assessment & Plan





- Assessment and Plan (Free Text)


Assessment: 


67 year old male with a PMHx of Crohn's Disease, Diverticulitis, Hypertension, 

Hyperlipidemia, DJD (Cervical), DM II, Chronic Lower back pain, and A-fib. 

Patient admitted for evaluation and treatment of SOB. 





Plan: 





Shortness of Breath: Likely 2/2 to CHF exacerbation vs Anemia


Lasix 40 IV in ED


Portable CXR: Mild Pulm Congestion/Cardiomegaly. PENDING Official read


EKG: A-fib. PENDING Official read


BNP 1700 on Admission | 1st Trop NEGATIVE





-Lasix 40 Q12H


-Repeat CXR PA/Lat in AM


-Repeat BNP in AM


-Stat Mg


-CBC/CMP with Mg in AM


-Trend EKG


-Trend CHUYITA Panel


-Cardio Consult (Dr. Ramirez) 


-NC 02 PRN





Anemia: Likely 2/2 to Iron Def vs GI Bleed


HgB 10 on Admission


Rectal Exam in ED was positive for Blood 





-CBC in AM


-Iron, TIBC, Ferritin, Retic Count, B12, Folate


-Type and Cross


-Stool Occult Blood


-GI Consult (Dr. Madrid)





Hx of DM II


ISS (Regular)





Hx of Crohn's


-Home Mesalamine 1000 PO QID





Hx of A-fib


-Home Metoprolol 50 Daily 





Hx of HTN


-Home Diovan (Not on Formulary).


   Start HCTZ 25/Losartan 100mg PO Daily





Hx of HLD


-Home Crestor (Not on Formulary)


   Start Lipitor 20





Hx CAD s/p JOSEPH


-Home Plavix


-Home Xarelto 





Hx Insomnia


-Ambien 10 PO HS Daily





Hx of neuromuscular Disorders/MSK


- Home Duloxetine


- Home Percocet


- Home Zanaflex





Proph 


Home Protonix


Home Xarelto 


SCD's Contraindicated due to lower extremity edema 





Patient discussed with Dr. Terence Sheikh, PGY1

## 2018-02-12 LAB
% IRON SATURATION: 7 % (ref 20–55)
ALBUMIN SERPL-MCNC: 4 G/DL (ref 3–4.8)
ALBUMIN/GLOB SERPL: 1.3 {RATIO} (ref 1.1–1.8)
ALT SERPL-CCNC: 56 U/L (ref 7–56)
AST SERPL-CCNC: 44 U/L (ref 17–59)
BASOPHILS # BLD AUTO: 0.02 K/MM3 (ref 0–2)
BASOPHILS # BLD AUTO: 0.02 K/MM3 (ref 0–2)
BASOPHILS NFR BLD: 0.2 % (ref 0–3)
BASOPHILS NFR BLD: 0.2 % (ref 0–3)
BILIRUB DIRECT SERPL-MCNC: 0.4 MG/DL (ref 0–0.4)
BNP SERPL-MCNC: 1660 PG/ML (ref 0–450)
BUN SERPL-MCNC: 25 MG/DL (ref 7–21)
CALCIUM SERPL-MCNC: 9.1 MG/DL (ref 8.4–10.5)
EOSINOPHIL # BLD: 0.3 10*3/UL (ref 0–0.7)
EOSINOPHIL # BLD: 0.4 10*3/UL (ref 0–0.7)
EOSINOPHIL NFR BLD: 3.8 % (ref 1.5–5)
EOSINOPHIL NFR BLD: 3.9 % (ref 1.5–5)
ERYTHROCYTE [DISTWIDTH] IN BLOOD BY AUTOMATED COUNT: 15.6 % (ref 11.5–14.5)
ERYTHROCYTE [DISTWIDTH] IN BLOOD BY AUTOMATED COUNT: 15.6 % (ref 11.5–14.5)
FERRITIN SERPL-MCNC: 8.3 NG/ML
FOLATE SERPL-MCNC: 15.5 NG/ML
GFR NON-AFRICAN AMERICAN: > 60
GRANULOCYTES # BLD: 6.07 10*3/UL (ref 1.4–6.5)
GRANULOCYTES # BLD: 6.21 10*3/UL (ref 1.4–6.5)
GRANULOCYTES NFR BLD: 66 % (ref 50–68)
GRANULOCYTES NFR BLD: 69.8 % (ref 50–68)
HDLC SERPL-MCNC: 28 MG/DL (ref 29–60)
HGB BLD-MCNC: 10.1 G/DL (ref 14–18)
HGB BLD-MCNC: 9.4 G/DL (ref 14–18)
IRON SERPL-MCNC: 31 UG/DL (ref 45–180)
LDLC SERPL-MCNC: < 30 MG/DL (ref 0–129)
LYMPHOCYTES # BLD: 1.6 10*3/UL (ref 1.2–3.4)
LYMPHOCYTES # BLD: 2 10*3/UL (ref 1.2–3.4)
LYMPHOCYTES NFR BLD AUTO: 18.3 % (ref 22–35)
LYMPHOCYTES NFR BLD AUTO: 21.1 % (ref 22–35)
MAGNESIUM SERPL-MCNC: 1.8 MG/DL (ref 1.7–2.2)
MAGNESIUM SERPL-MCNC: 1.9 MG/DL (ref 1.7–2.2)
MCH RBC QN AUTO: 23.5 PG (ref 25–35)
MCH RBC QN AUTO: 23.6 PG (ref 25–35)
MCHC RBC AUTO-ENTMCNC: 30.5 G/DL (ref 31–37)
MCHC RBC AUTO-ENTMCNC: 30.7 G/DL (ref 31–37)
MCV RBC AUTO: 76.7 FL (ref 80–105)
MCV RBC AUTO: 77 FL (ref 80–105)
MONOCYTES # BLD AUTO: 0.7 10*3/UL (ref 0.1–0.6)
MONOCYTES # BLD AUTO: 0.8 10*3/UL (ref 0.1–0.6)
MONOCYTES NFR BLD: 7.8 % (ref 1–6)
MONOCYTES NFR BLD: 8.9 % (ref 1–6)
PLATELET # BLD: 271 10^3/UL (ref 120–450)
PLATELET # BLD: 296 10^3/UL (ref 120–450)
PMV BLD AUTO: 9.3 FL (ref 7–11)
PMV BLD AUTO: 9.9 FL (ref 7–11)
RBC # BLD AUTO: 3.99 10^6/UL (ref 3.5–6.1)
RBC # BLD AUTO: 4.3 10^6/UL (ref 3.5–6.1)
T4 FREE SERPL-MCNC: 1.1 NG/DL (ref 0.78–2.19)
T4 SERPL-MCNC: 7 UG/DL (ref 5.5–11)
TIBC SERPL-MCNC: 423 UG/DL (ref 261–462)
TOTAL NUMBER OF RETICS COUNTED: 0 (ref 0.5–4)
TROPONIN I SERPL-MCNC: < 0.01 NG/ML
VIT B12 SERPL-MCNC: 599 PG/ML (ref 239–931)
WBC # BLD AUTO: 8.7 10^3/UL (ref 4.5–11)
WBC # BLD AUTO: 9.4 10^3/UL (ref 4.5–11)

## 2018-02-12 RX ADMIN — INSULIN HUMAN SCH UNITS: 100 INJECTION, SOLUTION PARENTERAL at 13:22

## 2018-02-12 RX ADMIN — MESALAMINE SCH MG: 500 CAPSULE ORAL at 10:35

## 2018-02-12 RX ADMIN — PANTOPRAZOLE SODIUM SCH MG: 40 TABLET, DELAYED RELEASE ORAL at 17:20

## 2018-02-12 RX ADMIN — MESALAMINE SCH MG: 500 CAPSULE ORAL at 17:22

## 2018-02-12 RX ADMIN — INSULIN HUMAN SCH: 100 INJECTION, SOLUTION PARENTERAL at 08:10

## 2018-02-12 RX ADMIN — LEVALBUTEROL SCH MG: 0.63 SOLUTION RESPIRATORY (INHALATION) at 08:22

## 2018-02-12 RX ADMIN — LEVALBUTEROL SCH MG: 0.63 SOLUTION RESPIRATORY (INHALATION) at 20:32

## 2018-02-12 RX ADMIN — INSULIN HUMAN SCH: 100 INJECTION, SOLUTION PARENTERAL at 22:00

## 2018-02-12 RX ADMIN — OXYCODONE AND ACETAMINOPHEN PRN TAB: 10; 325 TABLET ORAL at 17:20

## 2018-02-12 RX ADMIN — MESALAMINE SCH: 500 CAPSULE ORAL at 16:20

## 2018-02-12 RX ADMIN — INSULIN HUMAN SCH: 100 INJECTION, SOLUTION PARENTERAL at 17:40

## 2018-02-12 RX ADMIN — MESALAMINE SCH MG: 500 CAPSULE ORAL at 21:29

## 2018-02-12 NOTE — HP
HISTORY OF PRESENT ILLNESS:  The patient is a 67-year-old obese male

presented to the emergency room complaining of worsening and increasing

shortness of breath for about 5 days.  The patient came to the emergency

room by the [facility].  The patient has not been taking the

prescribed dose of Lasix which has been reinforced with the patient on the

last few office visits, the patient stated that he has been only taking

Lasix once a day.



REVIEW OF SYSTEMS:  The 13-system review was positive for increasing

shortness of breath.  Complains of orthopnea, PND.  Chest discomfort with

shortness of breath.



CODE STATUS:  Full code.



LIVING WILL ADVANCE DIRECTIVE:  None.



ALLERGIES:  NONE.



HEIGHT:  5 feet 8 inches.



BODY WEIGHT:  240.



BODY MASS INDEX:  36.5.



HOME MEDICATIONS:   Ambien 10 mg at bedtime, Percocet 10/325 one tablet q.

8 p.r.n., Pentasa 1000 mg four times a day, Drisdol 50,000 units every 2

weeks, Symbicort 160/4.5 inhaler daily, Diovan /25 or 320/25,

Crestor 10 mg daily, Protonix 40 mg daily, Lasix 40 mg twice a day, Lexapro

5 mg daily, Cymbalta 60 mg daily, metformin 500 mg once or twice a day,

metoprolol 50 mg daily, Tessalon Perles 200 mg three times a day, Zanaflex

4 mg or 2 mg daily, Xarelto 20 mg daily, Plavix 75 mg daily.



SOCIAL HISTORY:  Positive for narcotic dependent pain syndrome.  Denies

alcohol.  Denies smoking.



OCCUPATIONAL HISTORY:  Disabled.



FAMILY HISTORY:  Positive for hypertension and diabetes.



PAST MEDICAL HISTORY:  History of cervical, lumbar and thoracic spine

degenerative disk disease; history of insomnia, history of hypovitaminosis

D, history of coronary artery disease, history of hypertension, history of

dyslipidemia, history of type 2 diabetes mellitus, history of diabetic

neuropathy, history of Xarelto-dependent atrial fibrillation, history of

cholecystectomy, history of degenerative joint disease of the spine,

history of depression and anxiety.  The patient's past medical history is

significant for normocytic anemia, history of leukocytosis, significant for

iron deficiency, history of congestive heart failure with elevated BNP,

abnormal myocardial stress test done in January 2018, history of reversible

ischemic changes of the apical area with LV dysfunction and diffuse LV

hypokinesis with left ventricle ejection fraction of 44%, multilevel lumbar

spine degenerative disk disease with disk bulging, history of elevated body

mass index, history of history of cardiac catheterization, history of

angioplasty and stent placement of the left anterior descending artery,

history of apical and anterior wall hypokinesis with ejection fraction of

40% to 45%, history of posterior lateral branch distal portion stenosis,

history of 80% stenosis of the midportion of the LAD, history of successful

angioplasty and stent placement of the 80% mid-LAD stenosis with a

drug-eluting stent, history of right bundle-branch block, history of age

indeterminate inferior infarct, history of poor compliance and

noncompliance, history of moderate pulmonary artery hypertension with right

ventricular systolic pressure of 47 mmHg, significant for coronary artery

disease, morbid obesity, history of posterolateral branch of the right

coronary stenosis of the distal portion, history of hypercholesteremia,

history of diabetic neuropathy, history of questionable ulcerative colitis,

on Pentasa; history of COPD and asthma, history of chronic narcotic

dependent pain syndrome and opiate dependency, history of left ventricle

ejection fraction low to mid 40%, history of polypharmacy, O+ blood type,

history of coronary ischemic changes, questionable history of hepatitis C.



PHYSICAL EXAMINATION:

GENERAL:  The patient is seen in the emergency room in bed #18.

VITAL SIGNS:  T-max is 97.8.  Telemetry shows atrial fibrillation, heart

rate is 80s and 90s.  Blood pressure 126/61, respiration 18 to 19, O2 sat

is _____.

HEENT:  Head examination normocephalic, atraumatic.  HEENT examination

shows pinkish pale conjunctivae.  Anicteric sclerae.  No oropharyngeal

lesion.

NECK:  No neck rigidity.  Positive jugular venous distention.

CHEST:  Kyphosis, decreased breath sound at the bases.  Positive

crepitations and crackles, right more than the left.

CARDIOVASCULAR:  S1, S2, Irregular rhythm.  Positive systolic murmur at

left sternal border, right second intercostal space, left second

intercostal space.

ABDOMEN:  Protuberant.  Positive bowel sounds.

GENITALIA:  Male.

RECTAL:  According to the medical resident,  _____ was Guaiac positive

and hard stools.

EXTREMITIES:  Shows 1+ to 2+ pitting edema of the lower extremity, with

swelling and pitting edema of feet.

VASCULAR:  Palpable decreased pulses.

MUSCULOSKELETAL:  Shows a body mass index of 37+.

NEUROLOGIC:  The patient is alert, awake and oriented x3.  Cranial nerves

II through XII grossly intact.  Gait examination is not tested.

PSYCHIATRIC:  Positive for history of depression.



DIAGNOSTICS:  02/11/2018, WBC 7.1, hemoglobin/hematocrit 10 and 33, MCV 77,

platelet 303.  Retic count 2.24.  PT/PTT 22.4, INR 1.94, PTT 37.  Sodium

137, potassium 4.2, chloride 99, CO2 of 23, anion gap 19, BUN 22,

creatinine 1.1, GFR greater than 60, glucose 165, calcium 9.0, magnesium

1.9, iron 31, saturation 7, AST 71, ALT 58, troponin 0.01.  CPK is 103. 

BNP 1700.



Chest x-ray done portable in the emergency room on 02/11/2018 shows

cardiomegaly with questionable left pleural effusion.  EKG shows atrial

fibrillation, right bundle-branch block, left axis deviation, heart rate in

90s.



The patient was seen in the emergency room by Dr. Fuentes.  The patient

was treated in the emergency room.  The patient was given DuoNeb nebulizer.

Lasix 40 IV was given with improvement of the patient's symptoms and the

patient was admitted to telemetry.



IMPRESSION AND PLAN:

1.  Acute recurrent systolic, questionable diastolic congestive heart

failure with elevated BNP and bilateral lower extremity venous stasis and

pitting edema secondary to poor compliance with diuretics.

2.  Microcytic anemia.

3.  Elevated reticulocyte count.

4.  Coagulopathy, etiology undetermined.

5.  Mild prerenal kidney injury.

6.  Hyperglycemia.

7.  Transaminitis.

8.  Iron-deficiency anemia.

9.  Fecal occult blood positive.

10.  Cardiomegaly.

11.  Degenerative joint disease and diffuse bone demineralization of the

thoracic spine with compression deformities.

12.  Atrial fibrillation with right bundle-branch block.

13.  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

14.  Symptomatic congestive heart failure with symptoms of shortness of

breath, dyspnea on exertion, paroxysmal nocturnal dyspnea and orthopnea.

15.  History of insomnia.

16.  History of chronic narcotic dependent pain syndrome, history of opiate

dependency.

17.  History of ulcerative colitis.

18.  History of hypovitaminosis D.

19.  History of chronic obstructive pulmonary disease.

20.  History of dyslipidemia.

21.  History of depression.

22.  History of diabetic neuropathy.

23.  History of type 2 diabetes mellitus.



PLAN:  At this time, the patient is to be admitted to telemetry.  Serial

labs, serial cardiac enzymes are ordered.  Serial CBC, PT/PTT ordered.



CONSULTATION:

1.  Cardiology.

2.  Gastroenterology.,

3.  Diabetic educator ordered.



The patient has been typed and screened.



CURRENT MEDICATIONS:  The patient has been resumed on Ambien 10 mg bedtime,

Symbicort 760/4.5 one puff twice a day, Colace 100 mg three times a day,

Cozaar 100 mg daily, Cymbalta 60 mg daily.  The patient is on insulin

medium dose sliding scale coverage before meals and at bedtime.  The

patient is also started on Venofer 200 mg IV daily, Lasix 40 mg IV q. 12,

Lexapro 5 mg daily, Lipitor 20 mg daily, Lopressor 50 mg daily, Pentasa

1000 mg four times a day; Percocet 10/325 one tablet q. 8 hours p.r.n.,

hold for sedation.  Plavix 75 mg daily, Protonix 40 mg twice a day,

Tessalon Perles 100 three times a day, Xarelto 20 mg daily, Xopenex 0.63 mg

every 6 hours nebulizer, Zanaflex 4 mg daily, oxygen 2 liters _____.



Repeat EKG ordered.  The patient will be put on diabetic diet.  The patient

has been ordered out of bed, BETH stockings, SCDs, physical therapy,

occupational therapy.  At present, the patient was seen and examined in the

stretcher #[id number] in the emergency room.  At present, the patient's further

management will be dependent upon the patient's clinical condition,

hemodynamic status and as per the patient response to therapeutic

intervention, as per the patient's diagnostic test results and as per

recommendation by Gastroenterology and Cardiology.



Dictated and electronically signed, not read.





__________________________________________

Kody Pratt MD



DD:  02/12/2018 11:51:35

DT:  02/12/2018 12:05:19

Job # [id number]

## 2018-02-12 NOTE — CARD
--------------- APPROVED REPORT --------------





EKG Measurement

Heart [id number]

[id number]

[id number]

[id number]



<Conclusion>

Atrial fibrillation with rapid ventricular response

Right bundle branch block

Inferior infarct, age undetermined

NSSTW changes

## 2018-02-12 NOTE — CON
DATE:  02/12/2018



REQUESTING PHYSICIAN:  Kody Pratt MD.



REASON FOR CONSULT:  I have been asked to see this 67-year-old male with

known history of diverticulosis, Crohn disease treated with mesalamine for

many years, who comes to the hospital with shortness of breath and found to

be in congestive heart failure with rectal bleeding for many years.  I have

been asked to see this patient for rectal bleeding, which he states he has

had for over 10 years, mostly on wiping after having a bowel movement.  His

gastroenterologist is Dr. Enzo Burns in [place], who performed a

colonoscopy approximately 1 year ago.  His Crohn disease has been stable

with mesalamine.  He denies any weight loss, nausea, vomiting, abdominal

pain, fevers or chills.



PAST MEDICAL HISTORY:  Notable for atrial fibrillation, hypertension,

diabetes mellitus type 2, hyperlipidemia, cervical disc disease, chronic

low back pain, Crohn disease, coronary artery disease, status post drug

eluting stent placement in his LAD in January 2008.



PAST SURGICAL HISTORY:  Unremarkable.



FAMILY HISTORY:  Unremarkable.



REVIEW OF SYSTEMS:  A 14-point review of systems is notable for dyspnea on

exertion and rectal bleeding.



MEDICATIONS AT HOME:  Include Ambien 10 mg at night, Percocet 10/325 every

8 hours as needed, Pentasa ER 1000 mg q.i.d., vitamin D 5000 units every 14

days, Symbicort inhaler, valsartan hydrochlorothiazide 160/25 mg daily,

Crestor 10 mg daily, Protonix 40 mg once a day, Lasix 40 mg daily, Lexapro

5 mg daily, Cymbalta 60 mg each evening, Glucophage 500 mg daily,

metoprolol 50 mg daily, Tessalon Perles 1 tablet 3 times a day, Zanaflex 1

tablet daily, Xarelto 20 mg once a day, Plavix 75 mg once a day.



PHYSICAL EXAMINATION:

VITAL SIGNS:  Reveal temperature of 97.8, blood pressure 110/59, heart rate

110.

HEENT:  Reveal sclerae to be white.  Conjunctivae pink.

NECK:  Supple.

CHEST:  Reveal lungs to be clear.

HEART:  Reveals an irregularly irregular rate with a mild tachycardia.

ABDOMEN:  Soft, nontender.

EXTREMITIES:  Show no edema.



LABORATORY DATA:  Reveal white blood cell count 9.4, hemoglobin 10.1,

reticulocyte count is 2.24.  Chemistries reveal BUN 25, creatinine 1.2. 

BNP is 1660.  Albumin of 4.  AST, ALT, alk phos were all normal.



IMPRESSION:

1. A 67-year-old male with coronary artery disease, atrial fibrillation,

admitted to the hospital with increasing shortness of breath on exertion. 

Clinically, the patient has congestive heart failure as well as atrial

fibrillation with rapid ventricular response.

2.  Chronic rectal bleeding with stable Crohn disease.  The patient is

being followed by Dr. Enzo Burns in [place] and last had a

colonoscopy within 1 year ago.  He is anemic, but there is no evidence of

significant gastrointestinal bleeding.  His chronic rectal bleeding most

likely secondary to hemorrhoids.



RECOMMENDATIONS:  No invasive GI studies planned at this time.  The patient

can follow up with his gastroenterologist, Dr. Burns for the rectal

bleeding once he is discharged from the hospital.







__________________________________________

Julio César Madrid MD



DD:  02/12/2018 12:24:17

DT:  02/12/2018 12:29:59

Job # [id number]

## 2018-02-12 NOTE — CP.PCM.PN
Subjective





- Date & Time of Evaluation


Date of Evaluation: 02/12/18


Time of Evaluation: 08:00





- Subjective


Subjective: 





Medicine Progress note. Dr. Pratt





Pt seen and examined at bedside. No acute events overnight. No N/V/D. No F/C. 

Denies any more episodes of blood per rectum but does report episodes of BRBPR 

mixed in with stool and on toilet paper. States that he has been increasingly 

SOB at home for the past 5 days and came in to the ER. 





Upon further examination, patient became hypotensive, and dizzy. Improved with 

a fluid bolus and admin of food. 





Objective





- Vital Signs/Intake and Output


Vital Signs (last 24 hours): 


 











Temp Pulse Resp BP Pulse Ox


 


 97.8 F   110 H  19   108/72   97 


 


 02/12/18 06:40  02/12/18 10:18  02/12/18 09:30  02/12/18 10:18  02/12/18 09:30











- Medications


Medications: 


 Current Medications





Albuterol/Ipratropium (Duoneb 3 Mg/0.5 Mg (3 Ml) Ud)  3 ml IH Q4H PRN


   PRN Reason: Shortness of Breath


Atorvastatin Calcium (Lipitor)  20 mg PO HS Counts include 234 beds at the [facility]


   Last Admin: 02/12/18 00:39 Dose:  20 mg


Benzonatate (Tessalon Perles)  100 mg PO TID Counts include 234 beds at the [facility]


Clopidogrel Bisulfate (Plavix)  75 mg PO DAILY Counts include 234 beds at the [facility]


   Last Admin: 02/12/18 10:18 Dose:  75 mg


Duloxetine HCl (Cymbalta)  60 mg PO QPM Counts include 234 beds at the [facility]


Escitalopram Oxalate (Lexapro)  5 mg PO DAILY Counts include 234 beds at the [facility]


   Last Admin: 02/12/18 10:36 Dose:  5 mg


Furosemide (Lasix)  40 mg IVP Q12H Counts include 234 beds at the [facility]


   Last Admin: 02/12/18 08:21 Dose:  40 mg


Iron Sucrose 200 mg/ Sodium (Chloride)  110 mls @ 110 mls/hr IVPB DAILY Counts include 234 beds at the [facility]


   Stop: 02/16/18 10:59


   Last Admin: 02/12/18 10:33 Dose:  110 mls/hr


Insulin Human Regular (Humulin R Med)  0 units SC ACHS Counts include 234 beds at the [facility]


   PRN Reason: Protocol


   Last Admin: 02/12/18 08:10 Dose:  Not Given


Levalbuterol HCl (Xopenex)  0.63 mg IH [id number] Counts include 234 beds at the [facility]


   Last Admin: 02/12/18 08:22 Dose:  0.63 mg


Losartan Potassium (Cozaar)  100 mg PO DAILY Counts include 234 beds at the [facility]


   Last Admin: 02/12/18 10:19 Dose:  100 mg


Mesalamine (Pentasa)  1,000 mg PO QID Counts include 234 beds at the [facility]


   Last Admin: 02/12/18 10:35 Dose:  1,000 mg


Metoprolol Tartrate (Lopressor)  50 mg PO DAILY Counts include 234 beds at the [facility]


   Last Admin: 02/12/18 10:18 Dose:  50 mg


Non-Formulary Medication (Budesonide/Formoterol Fumarate [Symbicort 160-4.5 Mcg 

Inhaler])  1 puff INH BID Counts include 234 beds at the [facility]


Oxycodone/Acetaminophen (Percocet 10/325 Mg Tab)  1 tab PO Q8 PRN


   PRN Reason: Pain, moderate (4-7)


   Last Admin: 02/12/18 07:25 Dose:  1 tab


Pantoprazole Sodium (Protonix Ec Tab)  40 mg PO 0600,1600 Counts include 234 beds at the [facility]


Rivaroxaban (Xarelto)  20 mg PO DAILY Counts include 234 beds at the [facility]


   PRN Reason: Protocol


   Last Admin: 02/12/18 10:34 Dose:  20 mg


Tizanidine HCl (Zanaflex)  4 mg PO DAILY Counts include 234 beds at the [facility]


   Last Admin: 02/12/18 10:35 Dose:  4 mg


Zolpidem Tartrate (Ambien)  10 mg PO HS Counts include 234 beds at the [facility]


   PRN Reason: Protocol











- Labs


Labs: 


 





 02/12/18 06:45 





 02/12/18 06:45 





 











PT  22.4 SECONDS (9.4-12.5)  H  02/11/18  20:00    


 


INR  1.94  (0.93-1.08)  H  02/11/18  20:00    


 


APTT  37.7 Seconds (25.1-36.5)  H  02/11/18  20:00    














- Constitutional


Appears: Non-toxic, No Acute Distress





- Head Exam


Head Exam: ATRAUMATIC, NORMAL INSPECTION, NORMOCEPHALIC





- Eye Exam


Eye Exam: EOMI





- ENT Exam


ENT Exam: Mucous Membranes Moist





- Respiratory Exam


Respiratory Exam: Clear to Ausculation Bilateral.  absent: Accessory Muscle Use

, Rales, Rhonchi


Additional comments: 





visibly becomes mildly SOB during patient encounter questioning





- Cardiovascular Exam


Cardiovascular Exam: RRR.  absent: JVD





- GI/Abdominal Exam


GI & Abdominal Exam: Distended, Soft.  absent: Firm, Guarding, Tenderness


Additional comments: 





diffusely distended





- Extremities Exam


Extremities Exam: Pedal Edema.  absent: Calf Tenderness





- Back Exam


Back Exam: NORMAL INSPECTION





- Neurological Exam


Neurological Exam: Alert, Awake, Oriented x3





- Skin


Skin Exam: Dry, Intact, Normal Color, Warm





Assessment and Plan





- Assessment and Plan (Free Text)


Assessment: 





68yo M with PMHx of crohn's disease, diverticulitis, HTN, HLD, Cervical DJD, DM

, A.Fib here for evaluation of worsening SOB. Also found to be anemic.





1. SOB


- Started on diuresis overnight, however, patient now hypotensive and dizzy. 

Will hold diuresis for at least 24hrs


- Cardiology eval requested, Dr. Ramirez


- Repeat Troponins


- BNP elevated. 


- Supplemental O2 as needed





2. Anemia


- Consider Iron deficiency as apparent on iron studies


- Iron supplements


- Stool hemoccult positive. 


- GI consult requested, Dr. Madrid





3. Hx of DM


- Accuchecks


- ISS





4. Hx of Crohn's 


- Continue home meds


- monitor for signs of exacerbation





5. Hx of AFib, HTN


- Continue Metoprolol


- Will hold Losartan due to hypotension this AM





6. Hx of HLD


- Continue Lipitor





7. Hx of CAD s/p JOSEPH in Jan


- Awaiting Cardio recs


- Continue ASA/Plavix/Xarelto for now





8. PPx


Protonix





Further recs as per Dr. Terence Cat PGY1


[phone]

## 2018-02-12 NOTE — RAD
HISTORY:

sob  



COMPARISON:

12/2026 



FINDINGS:



LUNGS:

No active pulmonary disease.



PLEURA:

No significant pleural effusion identified, no pneumothorax apparent.



CARDIOVASCULAR:

This persistent cardiomegaly.



OSSEOUS STRUCTURES:

No significant abnormalities.



VISUALIZED UPPER ABDOMEN:

Normal.



OTHER FINDINGS:

None.



IMPRESSION:

No active pulmonary disease.

## 2018-02-13 LAB
ALBUMIN SERPL-MCNC: 3.7 G/DL (ref 3–4.8)
ALBUMIN/GLOB SERPL: 1.4 {RATIO} (ref 1.1–1.8)
ALT SERPL-CCNC: 58 U/L (ref 7–56)
APTT BLD: 31.6 SECONDS (ref 25.1–36.5)
AST SERPL-CCNC: 37 U/L (ref 17–59)
BASOPHILS # BLD AUTO: 0.02 K/MM3 (ref 0–2)
BASOPHILS NFR BLD: 0.2 % (ref 0–3)
BILIRUB DIRECT SERPL-MCNC: 0 MG/DL (ref 0–0.4)
BNP SERPL-MCNC: 1160 PG/ML (ref 0–450)
BUN SERPL-MCNC: 21 MG/DL (ref 7–21)
CALCIUM SERPL-MCNC: 9.5 MG/DL (ref 8.4–10.5)
EOSINOPHIL # BLD: 0.3 10*3/UL (ref 0–0.7)
EOSINOPHIL NFR BLD: 3.6 % (ref 1.5–5)
ERYTHROCYTE [DISTWIDTH] IN BLOOD BY AUTOMATED COUNT: 15.6 % (ref 11.5–14.5)
GFR NON-AFRICAN AMERICAN: > 60
GRANULOCYTES # BLD: 5.75 10*3/UL (ref 1.4–6.5)
GRANULOCYTES NFR BLD: 68.6 % (ref 50–68)
HGB BLD-MCNC: 9 G/DL (ref 14–18)
INR PPP: 1.5 (ref 0.93–1.08)
LYMPHOCYTES # BLD: 1.5 10*3/UL (ref 1.2–3.4)
LYMPHOCYTES NFR BLD AUTO: 17.9 % (ref 22–35)
MAGNESIUM SERPL-MCNC: 2.1 MG/DL (ref 1.7–2.2)
MCH RBC QN AUTO: 23.4 PG (ref 25–35)
MCHC RBC AUTO-ENTMCNC: 30.7 G/DL (ref 31–37)
MCV RBC AUTO: 76.3 FL (ref 80–105)
MONOCYTES # BLD AUTO: 0.8 10*3/UL (ref 0.1–0.6)
MONOCYTES NFR BLD: 9.7 % (ref 1–6)
PLATELET # BLD: 260 10^3/UL (ref 120–450)
PMV BLD AUTO: 9.7 FL (ref 7–11)
PROTHROMBIN TIME: 17.4 SECONDS (ref 9.4–12.5)
RBC # BLD AUTO: 3.84 10^6/UL (ref 3.5–6.1)
WBC # BLD AUTO: 8.4 10^3/UL (ref 4.5–11)

## 2018-02-13 RX ADMIN — INSULIN HUMAN SCH UNITS: 100 INJECTION, SOLUTION PARENTERAL at 09:33

## 2018-02-13 RX ADMIN — OXYCODONE AND ACETAMINOPHEN PRN TAB: 10; 325 TABLET ORAL at 20:20

## 2018-02-13 RX ADMIN — LEVALBUTEROL SCH MG: 0.63 SOLUTION RESPIRATORY (INHALATION) at 01:28

## 2018-02-13 RX ADMIN — LEVALBUTEROL SCH MG: 0.63 SOLUTION RESPIRATORY (INHALATION) at 21:39

## 2018-02-13 RX ADMIN — MESALAMINE SCH MG: 500 CAPSULE ORAL at 21:17

## 2018-02-13 RX ADMIN — OXYCODONE AND ACETAMINOPHEN PRN TAB: 10; 325 TABLET ORAL at 08:21

## 2018-02-13 RX ADMIN — PANTOPRAZOLE SODIUM SCH MG: 40 TABLET, DELAYED RELEASE ORAL at 16:59

## 2018-02-13 RX ADMIN — PANTOPRAZOLE SODIUM SCH MG: 40 TABLET, DELAYED RELEASE ORAL at 05:21

## 2018-02-13 RX ADMIN — MESALAMINE SCH MG: 500 CAPSULE ORAL at 17:00

## 2018-02-13 RX ADMIN — OXYCODONE AND ACETAMINOPHEN PRN TAB: 10; 325 TABLET ORAL at 00:23

## 2018-02-13 RX ADMIN — INSULIN HUMAN SCH: 100 INJECTION, SOLUTION PARENTERAL at 22:41

## 2018-02-13 RX ADMIN — LEVALBUTEROL SCH MG: 0.63 SOLUTION RESPIRATORY (INHALATION) at 13:44

## 2018-02-13 RX ADMIN — MESALAMINE SCH MG: 500 CAPSULE ORAL at 13:51

## 2018-02-13 RX ADMIN — LEVALBUTEROL SCH MG: 0.63 SOLUTION RESPIRATORY (INHALATION) at 08:08

## 2018-02-13 RX ADMIN — INSULIN HUMAN SCH UNITS: 100 INJECTION, SOLUTION PARENTERAL at 12:23

## 2018-02-13 RX ADMIN — INSULIN HUMAN SCH UNITS: 100 INJECTION, SOLUTION PARENTERAL at 16:59

## 2018-02-13 RX ADMIN — MESALAMINE SCH MG: 500 CAPSULE ORAL at 09:31

## 2018-02-13 NOTE — CARD
--------------- APPROVED REPORT --------------





EKG Measurement

Heart [id number]

[id number]

[id number]

[id number]



<Conclusion>

Atrial fibrillation with rapid ventricular response

Right bundle branch block

Inferior infarct, age undetermined

NSSTW changes

T waves flatter V 4 - 6 c/w ECG 2/12/18

## 2018-02-13 NOTE — PN
DATE:  02/13/2018



SUBJECTIVE:  The patient is lying in bed.  He denies any abdominal pain. 

He still admits to some dyspnea on exertion.  He had some scant rectal

bleeding with bowel movement yesterday.



MEDICATIONS:  Currently include Ambien 10 mg at bedtime, Colace 100 mg

three times a day, Cymbalta 60 mg each evening, Venofer 200 mg IV daily,

Lasix 40 mg daily, Lexapro 50 mg daily, Lipitor 20 mg at bedtime, Lopressor

50 mg b.i.d., Pentasa 1000 mg q.i.d., Percocet 10/325 one tab every 8

hours, Plavix 75 mg daily, ProAmatine 2.5 mg p.o. three times a day,

Protonix 40 mg once a day, Xarelto 20 mg daily, Xopenex inhaler q. 6 hours,

Zanaflex 4 mg daily.



PHYSICAL EXAMINATION:

VITAL SIGNS:  Reveal temperature of 97.8, blood pressure 118/89, heart rate

of 118.

HEENT:  Reveal sclerae to be white.  Conjunctivae pale.

NECK:  Supple.

CHEST:  Reveal lungs to be clear.

HEART:  Reveals AN irregularly irregular rate with a tachycardia.

ABDOMEN:  Obese, soft, nontender.

EXTREMITIES:  Show no edema.

RECTAL:  Shows the presence of some bleeding external hemorrhoids.



LABORATORY DATA:  Reveals white blood cell count 8.4, hemoglobin 9. 

Chemistries reveal BUN 21, creatinine 1, blood sugar 207.  BNP is 1160.



IMPRESSION:

1.  Chronic rectal bleeding from hemorrhoids.

2.  History of Crohn's colitis, clinically quiescent.

3.  Diverticulosis.

4.  Congestive heart failure.

5.  Atrial fibrillation with rapid ventricular rate.



RECOMMENDATIONS:

1.  We will start the patient on Anusol-HC 25 mg suppository twice a day.

2.  Continue Colace three times a day.

3.  Follow serial hematocrits.





__________________________________________

Julio César Madrid MD





DD:  02/13/2018 12:32:54

DT:  02/13/2018 12:37:37

Job # [id number]

## 2018-02-13 NOTE — CON
DATE:  02/13/2018



CARDIOLOGY CONSULTATION



HISTORY:  The patient is a 67-year-old male who presented with orthopnea

and shortness of breath.



The patient's past medical history is notable for diabetes mellitus,

hypertension, and hypercholesterolemia.



He recently had a PTCA and stent of an LAD in 01/2018 and was found to have

an ejection fraction of approximately 45%.



His breathing is better after IV Lasix.



SOCIAL HISTORY:  The patient does not smoke.



A 14-point review of systems was reviewed in detail.  Positive exertional

dyspnea.  Positive orthopnea.  Negative pedal edema.  No angina.



PHYSICAL EXAMINATION:

VITAL SIGNS:  Blood pressure is 118/90, heart rate is in the 90s.

NECK:  Negative JVD.

LUNGS:  Without rales.

HEART:  Reveal S1, S2.

EXTREMITIES:  Without edema.



BUN and creatinine are unremarkable.  Troponin is negative x1.  ProBNP is

1160, hemoglobin is 9.0.  Coagulation reveals an INR of 1.5.



IMPRESSION:

1.  Stable angina.

2.  Acute systolic congestive heart failure.

3.  Ischemic dilated cardiomyopathy.

4.  Diabetes mellitus.

5.  Hypertension.

6.  Hypercholesterolemia.

7.  History of recent percutaneous transluminal coronary angioplasty and

stent.

8.  Anemia.



Given these findings, I agree with continuing IV Lasix b.i.d.



We will change to p.o. today.



In addition, we will need to follow his hemoglobin carefully given his

anticoagulation and Plavix combination.





__________________________________________

Olu Ramirez MD



DD:  02/13/2018 12:26:51

DT:  02/13/2018 23:25:00

Job # [id number]

## 2018-02-13 NOTE — PN
DATE:  02/12/2018



SUBJECTIVE:  Te patient is seen in the emergency room holding area in bed

18.  The patient is out of bed to recliner.  The patient is alert, awake,

responsive.  The patient was seen with the medical residents.  The patient

still has shortness of breath, but some improvement since yesterday.



PHYSICAL EXAMINATION:

VITAL SIGNS:  T-max 98.5.  Telemetry shows atrial fibrillation, heart rate

in low 100S and high 90s.  Blood pressure 152/81, 141/75, 118/89, 126/64,

108/72.  Respiration 18, O2 sat 96%.

HEENT:  Head examination normocephalic, atraumatic.  HEENT examination

shows pinkish pale conjunctivae.  Anicteric sclerae.  No oropharyngeal

lesion.  No neck rigidity.  Positive jugular venous distention.

CHEST:  Kyphosis.

LUNGS:  Shows positive creps, crackles, right more than the left. 

Decreased breath sound at base.

CARDIOVASCULAR:  S1 and S2.  Irregular rhythm.  Positive systolic murmur,

left sternal border, left second intercostal space, right second

intercostal space.

ABDOMEN:  Protuberant.  Positive bowel sound.

GENITALIA:  Male.  Rectal examination is deferred.

EXTREMITY:  Still shows pitting edema of the lower extremity.

MUSCULOSKELETAL:  Shows a body mass index of 36.

NEUROLOGIC:  The patient is alert, awake, oriented x3.  Cranial nerves II

through XII limited.  Gait examination not tested.

VASCULAR:  Palpable pulses.

PSYCHIATRIC:  Positive history of depression, anxiety.



DIAGNOSTICS:  On 02/12/2018, WBC 9.4, hemoglobin and hematocrit 10.1 and

33.1, MCV 77, platelet 296.  Sodium 137, potassium 3.8, chloride 100, CO2

of 25, anion gap 17, BUN 25, creatinine 1.2, GFR greater than 60, glucose

163, hemoglobin A1c 8.0, calcium 9.1, magnesium 1.8.  Troponin three sets

negative.  BNP 1660, PSA 0.4, vitamin D 25-hydroxy 33.2, cholesterol 65,

LDL less than 30, HDL 28, PSA is 0.4, TSH 2.4, T4 of 7.0, B12 of 599. 

Stool occult blood positive.  Blood type O+.  Chest x-ray shows severe

cardiomegaly, diffuse bony demineralization.  EKG shows atrial fibrillation

with right bundle-branch block, age indeterminate inferior infarct.



ASSESSMENT AND PLAN:

1.  Acute systolic congestive heart failure with elevated BNP.

2.  Atrial fibrillation with rapid ventricle response.

3.  Bilateral lower extremity venous stasis.

4.  Microcytic anemia.

5.  Questionable gastrointestinal bleeding with fecal occult blood

positive.

6.  Elevated reticulocyte count of 2.24.

7.  Questionable coagulopathy.

8.  Uncontrolled diabetes mellitus with hyperglycemia and hemoglobin A1c of

8.0.

9.  Iron-deficiency anemia.

10.  Transaminitis.

11.  Hyperglycemia.

12.  Prerenal kidney injury.

13.  Fecal occult blood positive.

14.  O+ blood type.

15.  Severe cardiomegaly.

16.  Diffuse bony demineralization with age indeterminate chronic superior

endplate compression deformities of the thoracic spine.

17.  Cardiomegaly.

18.  Atrial fibrillation with rapid ventricle response and right

bundle-branch block and age indeterminate inferior infarct.

19.  Gait dysfunction.

20.  Bilateral lower extremity venous stasis.



PLAN:  At this time, the patient is awaiting Cardiology and

Gastroenterology evaluation.  Serial labs ordered.  Current consultation

cardiology and gastroenterology.  The patient's case is referred for

diabetic education, TCU.  Type and crossmatch.  The patient is on Ambien 10

mg at bedtime, Colace 100 mg three times a day, Lasix 40 mg IV q. 12,

Cymbalta 60 mg daily, insulin medium-dose sliding scale coverage before

meals and at bedtime, Venofer 200 mg IV daily.  Lexapro 5 mg daily, Lipitor

20 mg at bedtime, Lopressor 50 mg daily, Pentasa 1000 mg q.i.d., Percocet

10/325 one tab q. 8 p.r.n., Plavix 75 mg daily, Protonix 40 mg twice a day,

Tessalon Perles 100 mg three times a day, Xarelto 20 mg daily, Xopenex 0.63

mg every 6 hours, Zanaflex 4 mg daily.  Repeat EKG ordered.  Heart-healthy

diet, out of bed, BETH stockings, SCDs, occupational therapy, physical

therapy, stool occult blood ordered.  At present, we are still awaiting

Cardiology, Gastroenterology evaluation and recommendation.  At present,

the patient will be continued on above therapeutic intervention.  The

patient is yet to be seen by physical therapist.



Dictated and electronically signed, not read.





__________________________________________

Kody Pratt MD





DD:  02/12/2018 23:05:49

DT:  02/12/2018 23:15:08

[facility] # [id number]

## 2018-02-13 NOTE — PN
DATE:  02/13/2018



SUBJECTIVE:  The patient was seen sitting up in room 372, bed 2.  The

patient is out of bed to chair.  Overnight nurse's notes were reviewed. 

There was no documentation about any bleeding.  The patient slept well

overnight.  No rectal bleeding were reported by the nurses.



PHYSICAL EXAMINATION:

VITAL SIGNS:  T-max 97.8.  Telemetry shows atrial fibrillation, heart rate

88, 106, 100, 98, 82.  Blood pressure is 118/89, 145/99, 117/72, O2 sat

96%.  Respiration is 19-18.

HEENT:  Head examination is normocephalic, atraumatic.  HEENT examination

shows pinkish pale conjunctivae.  Anicteric sclerae.  No oropharyngeal

lesion.  No neck rigidity.  No visible jugular venous distention at this

time.  No audible carotid bruit.

CHEST:  Kyphosis.

LUNGS:  Still shows fine creps at the right base.  No crackles, rales or

wheezing noted.

CARDIOVASCULAR:  S1, S2.  Irregular rhythm.  Positive systolic murmur, left

sternal border, right second intercostal space, left second intercostal

space.

ABDOMEN:  Obese, protuberant.  No hepatosplenomegaly appreciated.  No

guarding.  No rigidity.  No rebound tenderness.

GENITALIA:  Male.

RECTAL:  Deferred.

EXTREMITY:  Shows positive BETH stockings, positive swelling.  1+ pitting

edema of the lower extremity or less than 1+ pitting edema of the lower

extremity, but positive swelling of the ankles noted.  Positive BETH

stockings noted.

NEUROLOGIC:  The patient is alert, awake, oriented x3.  Cranial nerves II

through XII limited.

MUSCULOSKELETAL:  Shows a body mass index of 36.



DIAGNOSTICS:  On 02/13/2018, WBC 8.4, hemoglobin and hematocrit 9 and 29.3,

MCV 76, platelet 260.  PT is 17.4, INR 1.5.  Sodium 137, potassium 4.1,

chloride 101, CO2 of 27, anion gap 13, BUN 21, creatinine 1.0, GFR greater

than 60.  Glucose 118, 161, 134.  Hemoglobin A1c is 8.0, which is elevated.

Calcium 9.5, magnesium 2.1, ALT is 58.  BNP is 1160, down from 1700. 

Cardiac enzymes are negative.  Vitamin D 25-hydroxy 33.  Thyroid profile is

normal.  Stool occult blood x1 is positive.  EKG shows atrial fibrillation,

right bundle-branch block, age indeterminate inferior infarct.



IMPRESSION AND PLAN:

1.  Acute recurrent systolic congestive heart failure.

2.  Probable ischemic cardiomyopathy.

3.  History of diverticulosis and Crohn disease, treated with Pentasa.

4.  Symptomatic acute recurrent systolic congestive heart failure.

5.  Internal hemorrhoids.

6.  Chronic rectal bleed with stable Crohn disease and chronic rectal

bleeding secondary to hemorrhoids.

7.  Hypertension.

8.  Atrial fibrillation.

9.  Right bundle-branch block and age indeterminate inferior infarct.

10.  Microcytic anemia.

11.  Elevated reticulocyte count.

12.  Mild coagulopathy.

13.  Hyperglycemia with uncontrolled type 2 diabetes mellitus with

hemoglobin A1c of 8.0.

14.  Acute recurrent systolic congestive heart failure with elevated BNP.

15.  Fecal occult blood positive.

16.  O+ blood type.

17.  Status post hypotension yesterday (resolved), probably secondary to

medication versus narcotic and Cymbalta and Lexapro and narcotic

combination.



PLAN:  At this time, the patient is to be continued on telemetry with

serial labs.  Current consultation, Gastroenterology and Cardiology.  We

are still awaiting for Cardiology evaluation since admission.  Case is

referred for diabetic educator, ALLY whitney.



CURRENT MEDICATIONS:

1.  Ambien 10 mg at bedtime.

2.  Colace 100 mg three times a day.

3.  Cymbalta 60 mg daily.

4.  Humulin medium-dose sliding scale coverage before meals and at bedtime.

5.  Venofer 200 mg IV daily.

6.  Lasix 20 mg IV q. 12.

7.  Lexapro 5 mg daily.

8.  Lipitor 20 mg daily.

9.  Metoprolol 50 mg daily, which will be considered to be increased to 50

mg twice a day because of rapid ventricular response.  Metoprolol increased

to 50 twice a day.

10.  Pentasa 1000 mg q.i.d.

11.  Percocet 10/325 q. 8 hours p.r.n., which has been ordered to be hold

for sedation.

12.  The patient is on Plavix 75 mg daily.

13.  The patient is started on ProAmatine 2.5 mg three times a day for

hypotension.

14.  Protonix 40 mg daily.

15.  Tessalon Perles 100 three times a day.

16.  Xarelto 20 mg daily.

17.  Xopenex nebulizer 0.63 mg every 6 hours.

18.  Zanaflex 4 mg daily.



Repeat EKG ordered.  Heart-healthy diet, SCDs, BETH stocking, physical

therapy, ambulation therapy ordered.  The patient was seen by physical

therapist.  Recommendation is home with services.  We are still waiting for

Cardiology evaluation and recommendation.



The patient's further disposition, treatment plan, treatment management,

further diagnostic therapeutic intervention will be dependent upon the

evaluation and recommendation by Cardiology.



The patient has been updated about his condition, diagnosis, diagnostic

test results.  All questions concerned answered.



Dictated and electronically signed, not read.





__________________________________________

Kody Pratt MD





DD:  02/13/2018 11:08:37

DT:  02/13/2018 11:17:16

[facility] # [id number]

## 2018-02-13 NOTE — CP.PCM.PN
Subjective





- Date & Time of Evaluation


Date of Evaluation: 02/13/18


Time of Evaluation: 11:20





- Subjective


Subjective: 





Patient seen and examined at bedside. Patient states he has chronic neck pain. 

No acute events overnight. Patient admits to improvement in respiratory status. 

Denies chest pain, shortness of breath, nausea, vomiting, diarrhea, fever, 

chills. 





Objective





- Vital Signs/Intake and Output


Vital Signs (last 24 hours): 


 











Temp Pulse Resp BP Pulse Ox


 


 97.8 F   118 H  19   118/89   96 


 


 02/13/18 06:00  02/13/18 10:00  02/13/18 06:00  02/13/18 09:32  02/13/18 06:00








Intake and Output: 


 











 02/13/18 02/13/18





 06:59 18:59


 


Intake Total 100 


 


Output Total 0 


 


Balance 100 














- Medications


Medications: 


 Current Medications





Atorvastatin Calcium (Lipitor)  20 mg PO HS [facility]


   Last Admin: 02/12/18 21:29 Dose:  20 mg


Benzonatate (Tessalon Perles)  100 mg PO TID [facility]


Clopidogrel Bisulfate (Plavix)  75 mg PO DAILY [facility]


   Last Admin: 02/13/18 09:31 Dose:  75 mg


Docusate Sodium (Colace)  100 mg PO TID [facility]


   Last Admin: 02/13/18 09:31 Dose:  100 mg


Duloxetine HCl (Cymbalta)  60 mg PO QPM [facility]


   Last Admin: 02/12/18 17:19 Dose:  60 mg


Escitalopram Oxalate (Lexapro)  5 mg PO DAILY [facility]


   Last Admin: 02/13/18 09:31 Dose:  5 mg


Furosemide (Lasix)  20 mg IVP Q12 [facility]


   Last Admin: 02/13/18 09:32 Dose:  20 mg


Iron Sucrose 200 mg/ Sodium (Chloride)  110 mls @ 110 mls/hr IVPB DAILY [facility]


   Stop: 02/16/18 10:59


   Last Admin: 02/13/18 10:06 Dose:  110 mls/hr


Insulin Human Regular (Humulin R Med)  0 units SC ACHS [facility]


   PRN Reason: Protocol


   Last Admin: 02/13/18 09:33 Dose:  1 units


Levalbuterol HCl (Xopenex)  0.63 mg IH [id number] [facility]


   Last Admin: 02/13/18 08:08 Dose:  0.63 mg


Mesalamine (Pentasa)  1,000 mg PO QID [facility]


   Last Admin: 02/13/18 09:31 Dose:  1,000 mg


Metoprolol Tartrate (Lopressor)  50 mg PO BID [facility]


Midodrine (Proamatine)  2.5 mg PO TID [facility]


   Last Admin: 02/13/18 09:32 Dose:  2.5 mg


Oxycodone/Acetaminophen (Percocet 10/325 Mg Tab)  1 tab PO Q8 PRN


   PRN Reason: Pain, moderate (4-7)


   Last Admin: 02/13/18 08:21 Dose:  1 tab


Pantoprazole Sodium (Protonix Ec Tab)  40 mg PO 0600,1600 [facility]


   Last Admin: 02/13/18 05:21 Dose:  40 mg


Rivaroxaban (Xarelto)  20 mg PO DAILY [facility]


   PRN Reason: Protocol


   Last Admin: 02/13/18 09:31 Dose:  20 mg


Tizanidine HCl (Zanaflex)  4 mg PO DAILY [facility]


   Last Admin: 02/13/18 09:53 Dose:  4 mg


Zolpidem Tartrate (Ambien)  10 mg PO HS [facility]


   PRN Reason: Protocol


   Last Admin: 02/12/18 21:29 Dose:  10 mg











- Labs


Labs: 


 





 02/13/18 06:30 





 02/13/18 06:30 





 











PT  17.4 SECONDS (9.4-12.5)  H  02/13/18  06:30    


 


INR  1.50  (0.93-1.08)  H  02/13/18  06:30    


 


APTT  31.6 Seconds (25.1-36.5)   02/13/18  06:30    














- Constitutional


Appears: Non-toxic, No Acute Distress





- Head Exam


Head Exam: ATRAUMATIC, NORMAL INSPECTION, NORMOCEPHALIC





- ENT Exam


ENT Exam: Mucous Membranes Moist





- Respiratory Exam


Respiratory Exam: Decreased Breath Sounds, Rales (Mild at bases), NORMAL 

BREATHING PATTERN.  absent: Rhonchi, Wheezes





- Cardiovascular Exam


Cardiovascular Exam: RRR, +S1, +S2





- GI/Abdominal Exam


GI & Abdominal Exam: Soft, Normal Bowel Sounds.  absent: Tenderness





- Extremities Exam


Extremities Exam: Pedal Edema (+1 edema b/l).  absent: Calf Tenderness





- Neurological Exam


Neurological Exam: Alert, Awake, Oriented x3





- Psychiatric Exam


Psychiatric exam: Normal Affect, Normal Mood





- Skin


Skin Exam: Dry, Normal Color, Warm





Assessment and Plan





- Assessment and Plan (Free Text)


Plan: 





68 yo/ Male with PMHx of crohn's disease, diverticulitis, HTN, HLD, Cervical DJD

, DM, A.Fib presents with likely CHF exacerbation and anemia. Patient was 

initially noted to have bright red blood per rectum, but has since denied blood 

with bowel movements. Hemoglobin is stable this morning. Patient seen by GI who 

recommends follow up with outpatient GI doctor, Dr. Burns. No acute 

intervention as per GI. Patient had episode of hypotension yesterday and had 

medications adjusted, in addition to being given fluid. Will consider 

decreasing pain medication. Patient will be seen by cardiology today. Will 

continue to monitor closely. Continue current medical regimen. 





Mars, PGY-2

## 2018-02-14 VITALS
TEMPERATURE: 97.4 F | SYSTOLIC BLOOD PRESSURE: 128 MMHG | DIASTOLIC BLOOD PRESSURE: 82 MMHG | RESPIRATION RATE: 18 BRPM | HEART RATE: 83 BPM

## 2018-02-14 VITALS — OXYGEN SATURATION: 95 %

## 2018-02-14 LAB
ALBUMIN SERPL-MCNC: 4.1 G/DL (ref 3–4.8)
ALBUMIN/GLOB SERPL: 1.3 {RATIO} (ref 1.1–1.8)
ALT SERPL-CCNC: 65 U/L (ref 7–56)
APTT BLD: 31.6 SECONDS (ref 25.1–36.5)
AST SERPL-CCNC: 42 U/L (ref 17–59)
BASOPHILS # BLD AUTO: 0.02 K/MM3 (ref 0–2)
BASOPHILS NFR BLD: 0.2 % (ref 0–3)
BILIRUB DIRECT SERPL-MCNC: 0.4 MG/DL (ref 0–0.4)
BNP SERPL-MCNC: 1360 PG/ML (ref 0–450)
BUN SERPL-MCNC: 22 MG/DL (ref 7–21)
CALCIUM SERPL-MCNC: 9.2 MG/DL (ref 8.4–10.5)
EOSINOPHIL # BLD: 0.4 10*3/UL (ref 0–0.7)
EOSINOPHIL NFR BLD: 4.1 % (ref 1.5–5)
ERYTHROCYTE [DISTWIDTH] IN BLOOD BY AUTOMATED COUNT: 15.8 % (ref 11.5–14.5)
GFR NON-AFRICAN AMERICAN: > 60
GRANULOCYTES # BLD: 6.39 10*3/UL (ref 1.4–6.5)
GRANULOCYTES NFR BLD: 69.2 % (ref 50–68)
HGB BLD-MCNC: 9.4 G/DL (ref 14–18)
INR PPP: 1.39 (ref 0.93–1.08)
LYMPHOCYTES # BLD: 1.6 10*3/UL (ref 1.2–3.4)
LYMPHOCYTES NFR BLD AUTO: 17.3 % (ref 22–35)
MAGNESIUM SERPL-MCNC: 2.2 MG/DL (ref 1.7–2.2)
MCH RBC QN AUTO: 23.2 PG (ref 25–35)
MCHC RBC AUTO-ENTMCNC: 30 G/DL (ref 31–37)
MCV RBC AUTO: 77.1 FL (ref 80–105)
MONOCYTES # BLD AUTO: 0.9 10*3/UL (ref 0.1–0.6)
MONOCYTES NFR BLD: 9.2 % (ref 1–6)
PLATELET # BLD: 299 10^3/UL (ref 120–450)
PMV BLD AUTO: 10 FL (ref 7–11)
PROTHROMBIN TIME: 16.1 SECONDS (ref 9.4–12.5)
RBC # BLD AUTO: 4.06 10^6/UL (ref 3.5–6.1)
WBC # BLD AUTO: 9.2 10^3/UL (ref 4.5–11)

## 2018-02-14 RX ADMIN — MESALAMINE SCH MG: 500 CAPSULE ORAL at 09:44

## 2018-02-14 RX ADMIN — INSULIN HUMAN SCH: 100 INJECTION, SOLUTION PARENTERAL at 08:38

## 2018-02-14 RX ADMIN — PANTOPRAZOLE SODIUM SCH MG: 40 TABLET, DELAYED RELEASE ORAL at 05:32

## 2018-02-14 RX ADMIN — LEVALBUTEROL SCH: 0.63 SOLUTION RESPIRATORY (INHALATION) at 02:04

## 2018-02-14 RX ADMIN — OXYCODONE AND ACETAMINOPHEN PRN TAB: 10; 325 TABLET ORAL at 04:39

## 2018-02-14 RX ADMIN — LEVALBUTEROL SCH: 0.63 SOLUTION RESPIRATORY (INHALATION) at 14:19

## 2018-02-14 RX ADMIN — MESALAMINE SCH MG: 500 CAPSULE ORAL at 13:05

## 2018-02-14 RX ADMIN — LEVALBUTEROL SCH MG: 0.63 SOLUTION RESPIRATORY (INHALATION) at 07:53

## 2018-02-14 RX ADMIN — INSULIN HUMAN SCH UNITS: 100 INJECTION, SOLUTION PARENTERAL at 11:42

## 2018-02-14 RX ADMIN — LEVALBUTEROL SCH MG: 0.63 SOLUTION RESPIRATORY (INHALATION) at 02:04

## 2018-02-14 NOTE — PN
DATE:  02/14/2018



CARDIOLOGY [facility]



SUBJECTIVE:  The patient's breathing is improved.



PHYSICAL EXAMINATION

VITAL SIGNS:  Stable.

NECK:  Negative JVD.

LUNGS:  Decreased breath sounds bilaterally.

HEART:  Reveals S1, S2.

EXTREMITIES:  Without edema.



LABORATORY DATA:  BUN and creatinine are 22 and 1.1 with a potassium of

4.1.  ProBNP is 1360, glucose is 144.  The hemoglobin is 9.4.



Ejection fraction is approximately 45%.



Review of echo reveals mild-to-moderate pulmonary hypertension.



IMPRESSION

1.  Acute systolic congestive heart failure, which is better.

2.  Mild dilated cardiomyopathy.

3.  Pulmonary hypertension.

4.  Coronary artery disease.

5.  Diabetes mellitus.

6.  Hypertension.

7.  Hypercholesterolemia.



PLAN:  Given these findings, the patient is responding to bronchodilators.



We will continue the Lasix.  We will increase the Lasix to b.i.d.  We will

begin ambulation today.



__________________________________________

Olu Ramirez MD





DD:  02/14/2018 8:21:12

DT:  02/14/2018 8:25:25

[facility] # [id number]

## 2018-02-14 NOTE — PN
DATE:  02/14/2018



SUBJECTIVE:  The patient reports two episodes of "blood dripping from his

rectum early this morning."  He denies any abdominal pain, nausea, or

vomiting.  His breathing is better.  He denies any chest pain, shortness of

breath, or palpitations.



OBJECTIVE:

VITAL SIGNS:  Reveal temperature of 97.7, blood pressure 129/85, heart rate

of 108.

HEENT:  Reveal sclerae to be white.  Conjunctivae pale.

NECK:  Supple.

CHEST:  Reveals coarse rales at the bases.

HEART:  Reveals an irregularly irregular rate with a mild tachycardia.

ABDOMEN:  Obese, soft, nontender.

EXTREMITIES:  Show no edema.



LABORATORY DATA:  Reveals hemoglobin of 9.4 which is remained stable over

the last 48 hours.  Chemistries reveal BUN 22, creatinine 1.1.  BNP remains

elevated at 1360.



MEDICATIONS:  Currently include Ambien 10 mg at bedtime, hydrocortisone

suppository 25 mg per rectum b.i.d., Colace 100 mg t.i.d., Venofer 200 mg

IV, Lasix 40 mg p.o. b.i.d., Lexapro 5 mg daily, atorvastatin 20 mg once a

day, metoprolol 50 mg twice a day, Pentasa 1 g q.i.d., Plavix 75 mg daily,

ProAmatine 2.5 mg p.o. three times a day, Cymbalta 60 mg each evening,

Lopressor 50 mg b.i.d., Xarelto 20 mg once a day, Zanaflex 4 mg daily,

Xopenex 0.63 mg q. 6 hours inhaled.



IMPRESSION:

1.  Rectal bleeding secondary to hemorrhoids.

2.  Crohn disease, stable.

3.  Diverticulosis.

4.  Congestive heart failure.

5.  Atrial fibrillation on Xarelto.

6.  Coronary artery disease, on aspirin.



RECOMMENDATIONS:

1.  Patient may need to hold Xarelto and/or Plavix if rectal bleeding

continues and his blood count drops.

2.  He will need an elective referral to Colorectal Surgery for possible

hemorrhoidectomy.

3.  The patient is to follow up with his outside gastroenterologist, Dr. Enzo Burns upon discharge from the hospital.

4.  Continue mesalamine 1 g four times a day as well as stool softeners and

Dulcolax 25 mg suppository twice a day.  He is stable at this time from a

GI standpoint.









__________________________________________

Julio César Madrid MD



DD:  02/14/2018 11:44:53

DT:  02/14/2018 11:48:31

[facility] # [id number]

## 2018-02-14 NOTE — CARD
--------------- APPROVED REPORT --------------





EKG Measurement

Heart [id number]

[id number]

[id number]

[id number]



<Conclusion>

Atrial fibrillation with rapid ventricular response

Right bundle branch block

Inferior infarct, age undetermined

NSSTW changes

## 2018-02-15 NOTE — DS
HOSPITAL COURSE:  The patient was seen and cleared by Dr. Olu Ramirez for

discharge.  The patient's IV medication was switched to p.o. medication. 

The patient was seen lying in the bed in room 372, bed 2.  The patient is

seen lying in the bed comfortable.  Patient's overnight nurse's notes were

reviewed.  Patient slept well without any adverse events.



PHYSICAL EXAMINATION:

GENERAL:  Patient was found to be alert, awake, and oriented x3.

VITAL SIGNS:  Telemetry monitoring shows atrial fibrillation.  Heart rate

in 80s, 90s, low 100s.  T-max 97.4, heart rate 83, blood pressure 128/82

and 129/85, respirations 18, O2 sat 95%.

INTAKE AND OUTPUT:  Output was documented as 500.

HEENT:  Head examination:  Normocephalic, atraumatic.  HEENT examination

shows pinkish conjunctivae.  Anicteric sclerae.  No oropharyngeal lesion.

NECK:  No neck rigidity.  No jugular venous distention.

CHEST:  Kyphosis.

LUNGS:  Shows positive bibasilar crepitus which are unchanged.  No

crackles, rales, or wheezing noted.

CARDIOVASCULAR:  S1, S2, regular rhythm.  Positive systolic murmur, left

sternal border, right second intercostal space, left second intercostal

space.

ABDOMEN:  Protuberant.  No hepatosplenomegaly appreciable.

GENITALIA:  Male.

RECTAL:  Deferred.

EXTREMITIES:  Shows positive trace to 1+ pitting edema of the lower

extremity.  No pitting edema of the feet or the ankle.  Patient has BETH

stockings on, what patient was advised to take home with him.

MUSCULOSKELETAL:  Shows body mass index of 36.

NEUROLOGIC:  The patient is alert, awake, and oriented x3.  Cranial nerves

II though XII grossly intact.  Motor strength is 5/5.



DIAGNOSTICS:  February 14; WBC 9.2, hemoglobin/hematocrit 9.4/31.3, MCV 77,

and platelets 299.  PT 16.1, INR 1.39.  Sodium 138, potassium 4.1, chloride

101, CO2 of 28, anion gap 14.  BUN 22, creatinine 1.1.  GFR greater than

60.  Glucose 111.  Calcium 9.2, magnesium 2.2.  LFTs are normal.  . 

BNP is 1360.



EKG shows atrial fibrillation, left axis deviation, and right bundle-branch

block.



Patient was seen by Gastroenterology, Infectious Disease, and Cardiology,

cleared for discharge.



FINAL IMPRESSION AND DISCHARGE DIAGNOSES:

1.  Acute recurrent systolic congestive heart failure.

2.  Ischemic dilated cardiomyopathy.

3.  Bleeding internal hemorrhoid causing rectal bleeding.

4.  Crohn disease.

5.  Diverticulosis.

6.  Atrial fibrillation with rapid ventricle response.

7.  Coronary artery disease.

8.  Dilated ischemic cardiomyopathy.

9.  Pulmonary hypertension.

10.  Noncompliance.

11.  Bilateral lower extremity venous stasis.

12.  Atrial fibrillation, right bundle-branch block, left axis deviation,

and inferior age indeterminate inferior infarct.

13.  Status post transient hypotension.

14.  Microcytic anemia.

15.  Elevated reticulocyte count of 2.2.

16.  Xarelto dependent atrial fibrillation.

17.  Hyperglycemia.

18.  Acute recurrent systolic congestive heart failure with elevated BNP.

19.  Iron deficiency.

20.  Uncontrolled non-insulin requiring diabetes mellitus with hemoglobin

A1c of 8 and hyperglycemia.

21.  Fecal occult blood positive.

22.  O+ blood type.

23.  Chronic narcotic dependent pain syndrome.

24.  Constipation.

25.  Diabetic neuropathy.

26.  Iron deficiency microcytic anemia.

27.  Depression.

28.  Dyslipidemia.

29.  Hypotension.



PLAN:  At this time;

1.  Patient was seen by Cardiology, recommended to increase the Lasix to 40

mg twice a day.

2.  Patient was seen by Gastroenterology, recommendations were given to the

patient by me and Dr. Madrid extensively.  Patient was advised to hold

Xarelto and/or Plavix if active bleeding continues and hemoglobin and

hematocrit drops.  The patient was advised to see a colorectal surgeon as

an outpatient for possible hemorrhoidectomy.  Patient was also advised to

follow up with his primary gastroenterologist, Dr. Enzo Burns. 

Patient was advised to continue Pentasa 1 g q.i.d., stool softeners, and

Dulcolax suppository.

3.  Patient was cleared for discharge by Cardiology, Gastroenterology.



MEDICATIONS:

1.  Patient was discharged on Dulcolax suppository twice a day.

2.  Pentasa 1 g q.i.d.

3.  Patient was discharged home on Ambien 10 mg at bedtime p.r.n.

4.  Diovan 160/25 daily.

5.  Zanaflex 2 mg daily.

6.  Crestor 10 mg daily.

7.  Xarelto 20 mg daily.

8.  Protonix 40 mg daily.

9.  Patient is to resume his Percocet 10/325 one tablet q.8 p.r.n.

10.  ProAmatine 2.5 mg three times a day.

11.  Lopressor 50 mg twice a day.

12.  Metformin 500 mg daily.

13.  Xopenex nebulizer 0.63 mg four times a day.

14.  Anusol suppository 25 mg twice a day.

15.  Lasix 40 mg twice a day.

16.  Lexapro 5 mg daily.

17.  Drisdol 50,000 weekly every 2 weeks.

18.  Cymbalta 60 mg daily.

19.  Colace 100 mg three times a day.

20.  Plavix 75 mg daily.

21.  Symbicort 4.5/160 one puff twice a day.

22.  Tessalon Perles 100 to 200 mg three times a day.

23.  Lopressor 50 mg twice a day.

24.  Midodrine 2.5 mg three times a day.



In addition, patient was also discharged on Pentasa 1 g four times a day,

Dulcolax suppository twice a day.



Patient was discharged home after cleared by Cardiology and after today's

dose of Venofer.  Patient's discharge medications as per ambulatory orders

and new scripts.  Patient's discharge followup with Dr. Pratt within 1 week

with all medications.  Copy of diet to patient upon discharge.  Patient was

advised weight loss of about 50 pounds.  Patient was advised to follow up

with Dr. Burns within 1 week.



Time spent in the entire discharge process more than 45 minutes.



Dictated and electronically signed, not read.











__________________________________________

Kody Pratt MD



DD:  02/14/2018 20:12:18

DT:  02/14/2018 20:22:49

Job # [id number]

## 2018-02-21 ENCOUNTER — HOSPITAL ENCOUNTER (OUTPATIENT)
Dept: HOSPITAL 42 - ED | Age: 68
Setting detail: OBSERVATION
LOS: 1 days | Discharge: HOME | End: 2018-02-22
Attending: INTERNAL MEDICINE | Admitting: INTERNAL MEDICINE
Payer: MEDICARE

## 2018-02-21 VITALS — BODY MASS INDEX: 34.3 KG/M2

## 2018-02-21 DIAGNOSIS — I25.118: ICD-10-CM

## 2018-02-21 DIAGNOSIS — I11.0: ICD-10-CM

## 2018-02-21 DIAGNOSIS — J44.9: ICD-10-CM

## 2018-02-21 DIAGNOSIS — G47.00: ICD-10-CM

## 2018-02-21 DIAGNOSIS — Z91.19: ICD-10-CM

## 2018-02-21 DIAGNOSIS — E55.9: ICD-10-CM

## 2018-02-21 DIAGNOSIS — E11.40: ICD-10-CM

## 2018-02-21 DIAGNOSIS — K64.8: ICD-10-CM

## 2018-02-21 DIAGNOSIS — I50.23: ICD-10-CM

## 2018-02-21 DIAGNOSIS — I48.2: Primary | ICD-10-CM

## 2018-02-21 DIAGNOSIS — E66.01: ICD-10-CM

## 2018-02-21 DIAGNOSIS — I87.2: ICD-10-CM

## 2018-02-21 DIAGNOSIS — I42.0: ICD-10-CM

## 2018-02-21 DIAGNOSIS — E78.5: ICD-10-CM

## 2018-02-21 DIAGNOSIS — I25.5: ICD-10-CM

## 2018-02-21 DIAGNOSIS — F32.9: ICD-10-CM

## 2018-02-21 DIAGNOSIS — F11.20: ICD-10-CM

## 2018-02-21 DIAGNOSIS — K59.00: ICD-10-CM

## 2018-02-21 DIAGNOSIS — M47.9: ICD-10-CM

## 2018-02-21 DIAGNOSIS — E87.6: ICD-10-CM

## 2018-02-21 DIAGNOSIS — K50.90: ICD-10-CM

## 2018-02-21 DIAGNOSIS — Z95.5: ICD-10-CM

## 2018-02-21 DIAGNOSIS — E11.65: ICD-10-CM

## 2018-02-21 DIAGNOSIS — D50.9: ICD-10-CM

## 2018-02-21 LAB
ALBUMIN SERPL-MCNC: 3.9 G/DL (ref 3–4.8)
ALBUMIN/GLOB SERPL: 1.2 {RATIO} (ref 1.1–1.8)
ALT SERPL-CCNC: 108 U/L (ref 7–56)
APTT BLD: 30.9 SECONDS (ref 25.1–36.5)
AST SERPL-CCNC: 54 U/L (ref 17–59)
BASOPHILS # BLD AUTO: 0.02 K/MM3 (ref 0–2)
BASOPHILS NFR BLD: 0.2 % (ref 0–3)
BNP SERPL-MCNC: 966 PG/ML (ref 0–450)
BUN SERPL-MCNC: 16 MG/DL (ref 7–21)
CALCIUM SERPL-MCNC: 9.4 MG/DL (ref 8.4–10.5)
EOSINOPHIL # BLD: 0.3 10*3/UL (ref 0–0.7)
EOSINOPHIL NFR BLD: 3.4 % (ref 1.5–5)
ERYTHROCYTE [DISTWIDTH] IN BLOOD BY AUTOMATED COUNT: 18.6 % (ref 11.5–14.5)
GFR NON-AFRICAN AMERICAN: > 60
GRANULOCYTES # BLD: 5.85 10*3/UL (ref 1.4–6.5)
GRANULOCYTES NFR BLD: 71.7 % (ref 50–68)
HGB BLD-MCNC: 10.1 G/DL (ref 14–18)
INR PPP: 2.1 (ref 0.93–1.08)
LYMPHOCYTES # BLD: 1.1 10*3/UL (ref 1.2–3.4)
LYMPHOCYTES NFR BLD AUTO: 13.5 % (ref 22–35)
MCH RBC QN AUTO: 23.9 PG (ref 25–35)
MCHC RBC AUTO-ENTMCNC: 30.7 G/DL (ref 31–37)
MCV RBC AUTO: 77.8 FL (ref 80–105)
MONOCYTES # BLD AUTO: 0.9 10*3/UL (ref 0.1–0.6)
MONOCYTES NFR BLD: 11.2 % (ref 1–6)
PLATELET # BLD: 302 10^3/UL (ref 120–450)
PMV BLD AUTO: 10 FL (ref 7–11)
PROTHROMBIN TIME: 24.5 SECONDS (ref 9.4–12.5)
RBC # BLD AUTO: 4.23 10^6/UL (ref 3.5–6.1)
TROPONIN I SERPL-MCNC: < 0.01 NG/ML
TROPONIN I SERPL-MCNC: < 0.01 NG/ML
WBC # BLD AUTO: 8.2 10^3/UL (ref 4.5–11)

## 2018-02-21 PROCEDURE — 83615 LACTATE (LD) (LDH) ENZYME: CPT

## 2018-02-21 PROCEDURE — 82550 ASSAY OF CK (CPK): CPT

## 2018-02-21 PROCEDURE — 84484 ASSAY OF TROPONIN QUANT: CPT

## 2018-02-21 PROCEDURE — 82948 REAGENT STRIP/BLOOD GLUCOSE: CPT

## 2018-02-21 PROCEDURE — 96374 THER/PROPH/DIAG INJ IV PUSH: CPT

## 2018-02-21 PROCEDURE — 99284 EMERGENCY DEPT VISIT MOD MDM: CPT

## 2018-02-21 PROCEDURE — 85025 COMPLETE CBC W/AUTO DIFF WBC: CPT

## 2018-02-21 PROCEDURE — 85610 PROTHROMBIN TIME: CPT

## 2018-02-21 PROCEDURE — 93005 ELECTROCARDIOGRAM TRACING: CPT

## 2018-02-21 PROCEDURE — 82248 BILIRUBIN DIRECT: CPT

## 2018-02-21 PROCEDURE — 80053 COMPREHEN METABOLIC PANEL: CPT

## 2018-02-21 PROCEDURE — 97530 THERAPEUTIC ACTIVITIES: CPT

## 2018-02-21 PROCEDURE — 71045 X-RAY EXAM CHEST 1 VIEW: CPT

## 2018-02-21 PROCEDURE — 83880 ASSAY OF NATRIURETIC PEPTIDE: CPT

## 2018-02-21 PROCEDURE — 36415 COLL VENOUS BLD VENIPUNCTURE: CPT

## 2018-02-21 PROCEDURE — 83735 ASSAY OF MAGNESIUM: CPT

## 2018-02-21 PROCEDURE — 97116 GAIT TRAINING THERAPY: CPT

## 2018-02-21 PROCEDURE — 85730 THROMBOPLASTIN TIME PARTIAL: CPT

## 2018-02-21 PROCEDURE — 97161 PT EVAL LOW COMPLEX 20 MIN: CPT

## 2018-02-21 PROCEDURE — 87040 BLOOD CULTURE FOR BACTERIA: CPT

## 2018-02-21 RX ADMIN — OXYCODONE AND ACETAMINOPHEN PRN TAB: 10; 325 TABLET ORAL at 13:52

## 2018-02-21 RX ADMIN — MESALAMINE SCH MG: 500 CAPSULE ORAL at 22:17

## 2018-02-21 RX ADMIN — MESALAMINE SCH MG: 500 CAPSULE ORAL at 14:22

## 2018-02-21 RX ADMIN — DILTIAZEM HYDROCHLORIDE PRN MLS/HR: 100 INJECTION, POWDER, LYOPHILIZED, FOR SOLUTION INTRAVENOUS at 18:16

## 2018-02-21 RX ADMIN — INSULIN LISPRO SCH UNITS: 100 INJECTION, SOLUTION INTRAVENOUS; SUBCUTANEOUS at 17:26

## 2018-02-21 RX ADMIN — POTASSIUM CHLORIDE SCH MEQ: 20 TABLET, EXTENDED RELEASE ORAL at 22:17

## 2018-02-21 RX ADMIN — MESALAMINE SCH MG: 500 CAPSULE ORAL at 17:26

## 2018-02-21 RX ADMIN — OXYCODONE AND ACETAMINOPHEN PRN TAB: 10; 325 TABLET ORAL at 22:18

## 2018-02-21 NOTE — RAD
HISTORY:

sob  



COMPARISON:

02/12/2018 



FINDINGS:



LUNGS:

No active pulmonary disease.



PLEURA:

No significant pleural effusion identified, no pneumothorax apparent.



CARDIOVASCULAR:

Normal.



OSSEOUS STRUCTURES:

No significant abnormalities.



VISUALIZED UPPER ABDOMEN:

Normal.



OTHER FINDINGS:

None.



IMPRESSION:

No active disease.

## 2018-02-21 NOTE — CARD
--------------- APPROVED REPORT --------------





EKG Measurement

Heart [id number]

[id number]

[id number]

[id number]



<Conclusion>

Atrial fibrillation with rapid ventricular response

PVCs vs aberrancy

Left axis deviation

Right bundle branch block

Minimal voltage criteria for LVH, may be normal variant

Inferior infarct, age undetermined

Abnormal ECG

## 2018-02-21 NOTE — CARD
--------------- APPROVED REPORT --------------





EKG Measurement

Heart [id number]

[id number]

[id number]

[id number]



<Conclusion>

Atrial fibrillation

Right bundle branch block

Minimal voltage criteria for LVH, may be normal variant

Inferior infarct, age undetermined

Abnormal ECG

## 2018-02-21 NOTE — CP.PCM.HP
<Jean Crews - Last Filed: 02/21/18 14:02>





History of Present Illness





- History of Present Illness


History of Present Illness: 








67 year old male with a PMHx of Crohn's Disease, Diverticulitis, Hypertension, 

Hyperlipidemia, DJD, DM II, Chronic Lower back pain, and A-fib presents for 

shortness of breath and palpitations. Patient states he has had a fast heart 

rate since he was discharged last week. Patient admits to feeling short of 

breath on exertion at home and a few pre-syncopal episodes in which he became 

lightheaded and felt like he was going to pass out. Patient noticed increasing 

shortness of breath today, which is why he came in the hospital. Patient was 

given IV metoprolol for his fast heart rate in the ED. Patient admits to being 

complaint with his medications at home. Patient's shortness of breath improved 

thereafter. Denies current chest pain or shortness of breath, nausea, vomiting, 

diarrhea, changes in vision, pain, fever, chills, syncope. 





PMHx: Crohn's Disease, Diverticulitis, Hypertension, Hyperlipidemia, DJD, DM II

, Chronic Lower back pain, A-fib, CAD 


PSHx: Cardiac Cath with JOSEPH placement in LAD (1/9/18)


Allergies: NKDA


Social Hx: Denies tobacco, alcohol, or illicit drug use


Fam Hx: Noncontributory


Meds: Reviewed, as per MAR.











Present on Admission





- Present on Admission


Any Indicators Present on Admission: No





Review of Systems





- Review of Systems


Review of Systems: 





12 point ROS as per HPI, otherwise negative. 





Past Patient History





- Infectious Disease


Hx of Infectious Diseases: None





- Tetanus Immunizations


Tetanus Immunization: >10 years Ago





- Past Social History


Smoking Status: Never Smoked





- CARDIAC


Hx Cardiac Disorders: Yes


Hx Hypertension: Yes


Other/Comment: cardiac cath x 1 stent





- PULMONARY


Hx Respiratory Disorders: No





- NEUROLOGICAL


Hx Paralysis: No





- HEENT


Hx HEENT Problems: No





- RENAL


Hx Chronic Kidney Disease: No





- ENDOCRINE/METABOLIC


Hx Endocrine Disorders: Yes


Hx Diabetes Mellitus Type 2: Yes





- HEMATOLOGICAL/ONCOLOGICAL


Hx Blood Disorders: No





- INTEGUMENTARY


Hx Dermatological Problems: No





- MUSCULOSKELETAL/RHEUMATOLOGICAL


Hx Musculoskeletal Disorders: Yes





- GASTROINTESTINAL


Hx Gastrointestinal Disorders: Yes


Hx Crohn's Disease: Yes


Other/Comment: CROHNS





- GENITOURINARY/GYNECOLOGICAL


Hx Genitourinary Disorders: No





- PSYCHIATRIC


Hx Emotional Abuse: No


Hx Physical Abuse: No


Hx Substance Use: No





- SURGICAL HISTORY


Hx Coronary Stent: Yes


Other/Comment: cardiac cath





- ANESTHESIA


Hx Anesthesia: Yes


Hx Anesthesia Reactions: No


Hx Malignant Hyperthermia: No





Meds


Allergies/Adverse Reactions: 


 Allergies











Allergy/AdvReac Type Severity Reaction Status Date / Time


 


No Known Allergies Allergy   Verified 02/21/18 15:24














Physical Exam





- Constitutional


Appears: Non-toxic, No Acute Distress





- Head Exam


Head Exam: ATRAUMATIC, NORMAL INSPECTION, NORMOCEPHALIC





- Eye Exam


Eye Exam: EOMI, Normal appearance





- ENT Exam


ENT Exam: Mucous Membranes Moist, Normal Exam





- Neck Exam


Neck exam: Positive for: Normal Inspection.  Negative for: Lymphadenopathy





- Respiratory Exam


Respiratory Exam: Rales (Mild, b/l bases), NORMAL BREATHING PATTERN.  absent: 

Rhonchi, Wheezes





- Cardiovascular Exam


Cardiovascular Exam: Tachycardia, REGULAR RHYTHM, +S1, +S2





- GI/Abdominal Exam


GI & Abdominal Exam: Normal Bowel Sounds, Soft.  absent: Distended, Guarding, 

Tenderness





- Extremities Exam


Extremities exam: Positive for: pedal edema (+1 edema b/l).  Negative for: calf 

tenderness





- Neurological Exam


Neurological exam: Alert, CN II-XII Intact, Oriented x3





- Psychiatric Exam


Psychiatric exam: Normal Affect, Normal Mood





- Skin


Skin Exam: Intact, Normal Color, Warm





Results





- Vital Signs


Recent Vital Signs: 





 Last Vital Signs











Temp  98.0 F   02/21/18 11:46


 


Pulse  108 H  02/21/18 12:49


 


Resp  21   02/21/18 12:49


 


BP  102/63   02/21/18 12:49


 


Pulse Ox  98   02/21/18 12:49














- Labs


Result Diagrams: 


 02/21/18 12:15





 02/21/18 12:15





Assessment & Plan





- Assessment and Plan (Free Text)


Plan: 





7 year old male with a PMHx of Crohn's Disease, Diverticulitis, Hypertension, 

Hyperlipidemia, DJD, DM II, Chronic Lower back pain, and A-fib presents for 

shortness of breath secondary to A-fib in RVR. Patient's symptoms improved 

after being given Metoprolol in the ED. Patient will be admitted and will 

consult cardiology at this time. Patient will have his electrolytes repleted. 

We will also trend troponin levels. Patient will also have his home medications 

restarted. We will continue to monitor patient closely. 





Mars, PGY-2





<TerenceKody U - Last Filed: 02/23/18 13:46>





Results





- Vital Signs


Recent Vital Signs: 





 Last Vital Signs











Temp  97.8 F   02/22/18 11:46


 


Pulse  103 H  02/22/18 11:46


 


Resp  20   02/22/18 11:46


 


BP  107/75   02/22/18 11:46


 


Pulse Ox  100   02/22/18 06:00














- Labs


Result Diagrams: 


 02/22/18 06:30





 02/22/18 06:30





Attending/Attestation





- Attestation


I have personally seen and examined this patient.: Yes


I have fully participated in the care of the patient.: Yes


I have reviewed all pertinent clinical information: Yes


Notes (Text): 


Please see/read my dictated notes.

## 2018-02-21 NOTE — ED PDOC
Arrival/HPI





- General


Chief Complaint: Shortness Of Breath


Time Seen by Provider: 02/21/18 11:34


Historian: Patient





- History of Present Illness


Narrative History of Present Illness (Text): 


02/21/18 12:04


A 67 year old male, whose past medical history includes diabetes, hypertension, 

atrial fibrillation on xarelto, CHF on lasix, crohn's disease, diverticulitis, 

and arthritis, presents to the emergency department complaining of shortness of 

breath and lightheadedness for 6 days. Patient reports his symptoms began after 

being discharged from the hospital 6 days ago on 02/15. Patient states his 

symptoms have not improved which caused him to come in today for further 

evaluation. Patient denies any fever, chills, nausea, vomiting, abdominal pain, 

chest pain or any other complaints. Patient took 40 mg of lasix this morning at 

09:30.





PMD: Dr. Pratt





Time/Duration: Other (6 days)


Symptom Course: Unchanged


Context: Home





Past Medical History





- Provider Review


Nursing Documentation Reviewed: Yes





- Infectious Disease


Hx of Infectious Diseases: None





- Tetanus Immunization


Tetanus Immunization: >10 years Ago





- Cardiac


Hx Cardiac Disorders: Yes


Hx Hypertension: Yes


Other/Comment: cardiac cath x 1 stent





- Pulmonary


Hx Respiratory Disorders: No





- Neurological


Hx Paralysis: No





- HEENT


Hx HEENT Disorder: No





- Renal


Hx Renal Disorder: No





- Endocrine/Metabolic


Hx Endocrine Disorders: Yes


Hx Diabetes Mellitus Type 2: Yes





- Hematological/Oncological


Hx Blood Disorders: No





- Integumentary


Hx Dermatological Disorder: No





- Musculoskeletal/Rheumatological


Hx Musculoskeletal Disorders: Yes





- Gastrointestinal


Hx Gastrointestinal Disorders: Yes


Hx Crohn's Disease: Yes


Other/Comment: CROHNS





- Genitourinary/Gynecological


Hx Genitourinary Disorders: No





- Psychiatric


Hx Emotional Abuse: No


Hx Physical Abuse: No


Hx Substance Use: No





- Surgical History


Hx Coronary Stent: Yes


Other/Comment: cardiac cath





- Anesthesia


Hx Anesthesia: Yes


Hx Anesthesia Reactions: No


Hx Malignant Hyperthermia: No





- Suicidal Assessment


Feels Threatened In Home Enviroment: No





Family/Social History





- Physician Review


Nursing Documentation Reviewed: Yes


Family/Social History: No Known Family HX


Smoking Status: Never Smoked


Hx Alcohol Use: No


Hx Substance Use: No


Hx Substance Use Treatment: No





Allergies/Home Meds


Allergies/Adverse Reactions: 


Allergies





No Known Allergies Allergy (Verified 02/21/18 11:41)


 








Home Medications: 


 Home Meds











 Medication  Instructions  Recorded  Confirmed


 


DULoxetine [Cymbalta] 60 mg PO QPM 12/26/17 02/21/18


 


Escitalopram Oxalate [Lexapro] 5 mg PO DAILY 12/26/17 02/21/18


 


Mesalamine ER Cap [Pentasa] 1,000 mg PO QID 12/26/17 02/21/18


 


Oxycodone HCl/Acetaminophen 1 tab PO Q8 PRN 12/26/17 02/21/18





[Percocet  mg Tablet]   


 


Rosuvastatin Calcium [Crestor] 10 mg PO DAILY 12/26/17 02/21/18


 


Valsartan/Hydrochlorothiazide 1 tab PO DAILY 12/26/17 02/21/18





[Diovan Hct 160-25 mg Tablet]   


 


metFORMIN [glucOPHAGE] 500 mg PO DAILY 01/05/18 02/21/18


 


Benzonatate [Tessalon Perles] 1 cap PO TID 02/11/18 02/21/18














Review of Systems





- Physician Review


All systems were reviewed & negative as marked: Yes





- Review of Systems


Constitutional: absent: Fevers, Night Sweats


Respiratory: SOB


Cardiovascular: absent: Chest Pain


Gastrointestinal: absent: Abdominal Pain, Nausea, Vomiting


Neurological: Other (ligthheadedness)





Physical Exam


Vital Signs Reviewed: Yes


Vital Signs











  Temp Pulse Resp BP Pulse Ox


 


 02/21/18 12:49   108 H  21  102/63  98


 


 02/21/18 12:38   107 H   119/77 


 


 02/21/18 12:33    18  


 


 02/21/18 12:30   139 H   118/67 


 


 02/21/18 12:18   139 H  18  118/67  99


 


 02/21/18 11:47   90  18  103/77  98


 


 02/21/18 11:46  98.0 F  88  21  122/75  96











Temperature: Afebrile


Blood Pressure: Normal


Pulse: Regular


Respiratory Rate: Normal


Appearance: Positive for: Well-Appearing, Non-Toxic, Comfortable


Pain Distress: None


Mental Status: Positive for: Alert and Oriented X 3





- Systems Exam


Head: Present: Atraumatic, Normocephalic


Pupils: Present: PERRL


Extroacular Muscles: Present: EOMI


Conjunctiva: Present: Normal


Mouth: Present: Moist Mucous Membranes


Neck: Present: Normal Range of Motion


Respiratory/Chest: Present: Good Air Exchange, Rales (Crackles to bilateral 

lower lung fields).  No: Respiratory Distress, Accessory Muscle Use


Cardiovascular: Present: Normal S1, S2, Tachycardic.  No: Murmurs


Abdomen: Present: Normal Bowel Sounds.  No: Tenderness, Distention, Peritoneal 

Signs


Back: Present: Normal Inspection


Upper Extremity: Present: Normal Inspection.  No: Cyanosis, Edema


Lower Extremity: Present: Edema, NORMAL PULSES.  No: CALF TENDERNESS


Neurological: Present: GCS=15, CN II-XII Intact, Speech Normal


Skin: Present: Warm, Dry, Normal Color.  No: Rashes


Psychiatric: Present: Alert, Oriented x 3, Normal Insight, Normal Concentration





Medical Decision Making


ED Course and Treatment: 


02/21/18 12:04


Impression:


A 67 year old male with shortness of breath and lightheadedness for 6 days





Differential Diagnosis included but are not limited to: CHF exacerbation vs. 

Rapid atrial fibrillation





Plan:


-- Chest xray


-- EKG


-- Labs


-- Blood culture


-- Lasix


-- Reassess and disposition





Progress Notes:


EKG shows atrial fibrillation at 131 BPM with RBBB, LVH. Interpreted by me.





02/21/18 12:30


Metropolol 5 mg IV administered. Heart improved from the 130's to 110-115 BPM. 

Blood pressure stable.





Report Date : 02/21/2018 12:29:58


Procedure: Chest xray


Dictator : Jasbir Zaidi MD


IMPRESSION: No active disease.





02/21/18 12:37


Repeat EKG shows atrial fibrillation at 98 BPM with RBBB, LVH, and Q-waves more 

profound in lead III and aVF compared to prior on 11/28/16 Interpreted by me.





02/21/18 13:04


On re-examination, lungs clear to auscultation. Patient reports his symptoms 

are improving after treatment.





02/21/18 13:29


Case was discussed with Dr. Pratt who agreed to place patient on Telemetry 

observation. Potassium replaced with PO Potassium. Consult placed for Dr. Ramirez. 





02/21/18 13:33


Case discussed with Resident Dr. Crews who will evaluate patient. 





- Critical Care


Critical Care Minutes: 30 minutes





- Lab Interpretations


Lab Results: 








 02/21/18 12:15 





 02/21/18 12:15 





 Lab Results





02/21/18 12:15: Sodium 141, Potassium 3.1 L, Chloride 100, Carbon Dioxide 26, 

Anion Gap 18, BUN 16, Creatinine 1.0, Est GFR (African Amer) > 60, Est GFR (Non-

Af Amer) > 60, Random Glucose 176 H, Calcium 9.4, Total Bilirubin 0.7, AST 54, 

 H, Alkaline Phosphatase 49, Lactate Dehydrogenase 531, Total Creatine 

Kinase 73, Troponin I < 0.01, NT-Pro-B Natriuret Pep 966 H, Total Protein 7.1, 

Albumin 3.9, Globulin 3.2, Albumin/Globulin Ratio 1.2


02/21/18 12:15: PT 24.5 H, INR 2.10 H, APTT 30.9


02/21/18 12:15: WBC 8.2, RBC 4.23, Hgb 10.1 L, Hct 32.9 L, MCV 77.8 L, MCH 23.9 

L, MCHC 30.7 L, RDW 18.6 H, Plt Count 302, MPV 10.0, Gran % 71.7 H, Lymph % (

Auto) 13.5 L, Mono % (Auto) 11.2 H, Eos % (Auto) 3.4, Baso % (Auto) 0.2, Gran # 

5.85, Lymph # (Auto) 1.1 L, Mono # (Auto) 0.9 H, Eos # (Auto) 0.3, Baso # (Auto

) 0.02








I have reviewed the lab results: Yes





- RAD Interpretation


Radiology Orders: 








02/21/18 12:07


CHEST PORTABLE [RAD] Stat 














- Medication Orders


Current Medication Orders: 








Benzonatate (Tessalon Perles)  100 mg PO TID [facility]


Clopidogrel Bisulfate (Plavix)  75 mg PO DAILY [facility]


Docusate Sodium (Colace)  100 mg PO TID HUMBERTO


Duloxetine HCl (Cymbalta)  60 mg PO QPM [facility]


Escitalopram Oxalate (Lexapro)  5 mg PO DAILY [facility]


Furosemide (Lasix)  40 mg IVP DAILY HUMBERTO


Mesalamine (Pentasa)  1,000 mg PO QID HUMBERTO


Metoprolol Tartrate (Lopressor)  50 mg PO BID HUMBERTO


Midodrine (Proamatine)  2.5 mg PO TID [facility]


Non-Formulary Medication (Rosuvastatin Calcium [Crestor])  10 mg PO DAILY HUMBERTO


Oxycodone/Acetaminophen (Percocet 10/325 Mg Tab)  1 tab PO Q8 PRN


   PRN Reason: Pain, moderate (4-7)


Pantoprazole Sodium (Protonix Ec Tab)  40 mg PO 0600 HUMBERTO


Rivaroxaban (Xarelto)  20 mg PO DAILY HUMBERTO


   PRN Reason: Protocol





Discontinued Medications





Furosemide (Lasix)  20 mg IVP STAT STA


   Stop: 02/21/18 12:11


Metoprolol Tartrate (Lopressor)  5 mg IVP STAT STA


   Stop: 02/21/18 12:21


   Last Admin: 02/21/18 12:30  Dose: 5 mg





IVP Administration


 Document     02/21/18 12:30  SRE  (Rec: 02/21/18 12:33  SRE  [id number])


     Charges for Administration


      # of IVP Administrations                   1


MAR Pulse and Blood Pressure


 Document     02/21/18 12:30  SRE  (Rec: 02/21/18 12:33  SRE  [id number])


     Pulse


      Pulse Rate (60-90)                         139


     Blood Pressure


      Blood Pressure (100//90)             118/67





Potassium Chloride (K-Dur 20 Meq Er Tab)  40 meq PO STAT STA


   Stop: 02/21/18 13:23


Potassium Chloride (K-Dur 20 Meq Er Tab)  40 meq PO STAT STA


   Stop: 02/21/18 13:30











- Scribe Statement


The provider has reviewed the documentation as recorded by the David Rothman





Provider Scribe Attestation:


All medical record entries made by the Scribe were at my direction and 

personally dictated by me. I have reviewed the chart and agree that the record 

accurately reflects my personal performance of the history, physical exam, 

medical decision making, and the department course for this patient. I have 

also personally directed, reviewed, and agree with the discharge instructions 

and disposition.








Disposition/Present on Arrival





- Present on Arrival


Any Indicators Present on Arrival: No


History of DVT/PE: No


History of Uncontrolled Diabetes: No


Urinary Catheter: No


History of Decub. Ulcer: No


History Surgical Site Infection Following: None





- Disposition


Have Diagnosis and Disposition been Completed?: Yes


Diagnosis: 


 CHF (congestive heart failure), Rapid atrial fibrillation





Disposition: HOSPITALIZED


Disposition Time: 13:35


Patient Plan: Observation


Condition: GUARDED


Discharge Instructions (ExitCare):  Heart Failure (ED)


Referrals: 


Kody Pratt MD [Primary Care Provider] - Follow up with primary


Forms:  "[employer] (English)

## 2018-02-22 VITALS — DIASTOLIC BLOOD PRESSURE: 75 MMHG | TEMPERATURE: 97.8 F | HEART RATE: 103 BPM | SYSTOLIC BLOOD PRESSURE: 107 MMHG

## 2018-02-22 VITALS — OXYGEN SATURATION: 100 %

## 2018-02-22 VITALS — HEART RATE: 89 BPM

## 2018-02-22 VITALS — RESPIRATION RATE: 20 BRPM

## 2018-02-22 LAB
ALBUMIN SERPL-MCNC: 3.6 G/DL (ref 3–4.8)
ALBUMIN/GLOB SERPL: 1.2 {RATIO} (ref 1.1–1.8)
ALT SERPL-CCNC: 97 U/L (ref 7–56)
AST SERPL-CCNC: 45 U/L (ref 17–59)
BASOPHILS # BLD AUTO: 0.01 K/MM3 (ref 0–2)
BASOPHILS NFR BLD: 0.1 % (ref 0–3)
BILIRUB DIRECT SERPL-MCNC: 0.4 MG/DL (ref 0–0.4)
BNP SERPL-MCNC: 712 PG/ML (ref 0–450)
BUN SERPL-MCNC: 22 MG/DL (ref 7–21)
CALCIUM SERPL-MCNC: 9.2 MG/DL (ref 8.4–10.5)
EOSINOPHIL # BLD: 0.3 10*3/UL (ref 0–0.7)
EOSINOPHIL NFR BLD: 4.5 % (ref 1.5–5)
ERYTHROCYTE [DISTWIDTH] IN BLOOD BY AUTOMATED COUNT: 18.6 % (ref 11.5–14.5)
GFR NON-AFRICAN AMERICAN: 51
GRANULOCYTES # BLD: 4.46 10*3/UL (ref 1.4–6.5)
GRANULOCYTES NFR BLD: 64.5 % (ref 50–68)
HGB BLD-MCNC: 9.6 G/DL (ref 14–18)
LYMPHOCYTES # BLD: 1.5 10*3/UL (ref 1.2–3.4)
LYMPHOCYTES NFR BLD AUTO: 21.5 % (ref 22–35)
MAGNESIUM SERPL-MCNC: 2.1 MG/DL (ref 1.7–2.2)
MCH RBC QN AUTO: 23.9 PG (ref 25–35)
MCHC RBC AUTO-ENTMCNC: 29.8 G/DL (ref 31–37)
MCV RBC AUTO: 80.1 FL (ref 80–105)
MONOCYTES # BLD AUTO: 0.7 10*3/UL (ref 0.1–0.6)
MONOCYTES NFR BLD: 9.4 % (ref 1–6)
PLATELET # BLD: 308 10^3/UL (ref 120–450)
PMV BLD AUTO: 9.9 FL (ref 7–11)
RBC # BLD AUTO: 4.02 10^6/UL (ref 3.5–6.1)
TROPONIN I SERPL-MCNC: < 0.01 NG/ML
WBC # BLD AUTO: 6.9 10^3/UL (ref 4.5–11)

## 2018-02-22 RX ADMIN — POTASSIUM CHLORIDE SCH MEQ: 20 TABLET, EXTENDED RELEASE ORAL at 09:24

## 2018-02-22 RX ADMIN — INSULIN LISPRO SCH: 100 INJECTION, SOLUTION INTRAVENOUS; SUBCUTANEOUS at 08:18

## 2018-02-22 RX ADMIN — MESALAMINE SCH MG: 500 CAPSULE ORAL at 09:24

## 2018-02-22 RX ADMIN — OXYCODONE AND ACETAMINOPHEN PRN TAB: 10; 325 TABLET ORAL at 05:23

## 2018-02-22 RX ADMIN — INSULIN LISPRO SCH UNITS: 100 INJECTION, SOLUTION INTRAVENOUS; SUBCUTANEOUS at 12:50

## 2018-02-22 RX ADMIN — MESALAMINE SCH MG: 500 CAPSULE ORAL at 13:36

## 2018-02-22 RX ADMIN — DILTIAZEM HYDROCHLORIDE PRN MLS/HR: 100 INJECTION, POWDER, LYOPHILIZED, FOR SOLUTION INTRAVENOUS at 09:26

## 2018-02-22 NOTE — CARD
--------------- APPROVED REPORT --------------





EKG Measurement

Heart [id number]

[id number]

[id number]

[id number]



<Conclusion>

Atrial fibrillation

Right bundle branch block

Inferior infarct, age undetermined

Abnormal ECG

## 2018-02-22 NOTE — HP
DATE:  02/21/2018



HISTORY OF PRESENT ILLNESS:  The patient is a 67-year-old, not very

compliant patient, who came to the emergency room ambulatory, walking,

complaining of lightheadedness with shortness of breath.  The patient

called the office couple of days ago and stated that he is not feeling well

and having trouble with breathing.  The patient was advised to come to the

office right away, but the patient did not come for his appointment, and

later on when our office contacted this patient, the patient's wife stated

that instead, the patient went to the pain management doctor and got new

pain medications, and the patient is feeling better and the patient was no

show for his emergency followup visit.  The patient today came in with

above symptoms..  According to the ER physician evaluation, the patient

presented with complaints of shortness of breath and lightheadedness.



CODE STATUS:  FULL CODE.



LIVING WILL/ADVANCE DIRECTIVES:  None.



ALLERGIES:  NONE.



Height is 5 feet 8 inches.



Weight is 226.



BMI is 35.



Home medications as per the last discharge.  The patient was recently

discharged from [facility] on 02/14.  The patient's last

discharge medications were,

1.  Tessalon Perles 100 to 200 mg three times a day.

2.  Plavix 75 mg daily.

3.  Colace 100 mg three times a day.

4.  Cymbalta 60 mg p.m.

5.  Lexapro 5 mg daily.

6.  Pentasa 1000 mg q.i.d.

7.  Metformin 500 mg once or twice a day.

8.  Lopressor 50 mg twice a day.

9.  ProAmatine 2.5 mg three times a day.

10.  Percocet 10/325 mg one tablet q. 8 p.r.n.

11.  Protonix 40 mg daily.

12.  Xarelto 20 mg daily.

13.  Crestor 10 mg daily.

14.  Diovan /25 mg daily.



SOCIAL HISTORY:  Negative for smoking.  Negative for alcohol.  Negative for

drug use.  Negative for communicable transmissible disease.  Positive for

narcotic dependence.



FAMILY HISTORY:  Hypertension.



OCCUPATIONAL HISTORY:  Disabled.



PAST MEDICAL HISTORY:  Significant for atrial fibrillation with rapid

ventricular response, history of right bundle-branch block, history of

coronary artery disease, history of unstable angina, history of

angioplasty, stent placement, history of transient hypotension, history of

narcotic-dependent pain syndrome, history of cervical lumbar spine

degenerative disc disease, history of ulcerative colitis versus Crohn's

disease, history of depression, history of neuropathy, history of type 2

diabetes mellitus, history of hyperlipidemia, hypertension, history of

bilateral lower extremity venous stasis, history of hemorrhoidal bleeding,

history of morbid obesity, history of congestive heart failure, history of

cardiac catheterization, history of hypercholesterolemia, history of

bilateral lower extremity venous stasis, history of type 2 diabetes

mellitus, history of constipation, history of cholecystectomy, history of

Crohn's disease, history of anxiety, history of hemorrhoidectomy.  Past

medical history is also significant for Crohn's disease, diverticulosis,

diverticulitis, hypertension, hyperlipidemia, degenerative joint disease,

history of bifascicular block, left axis deviation.



The patient's past medical history is significant for insomnia, history of

chronic obstructive pulmonary disease, history of asthma, history of

Xarelto-dependent atrial fibrillation, history of normocytic anemia,

history of left ventricular ejection fraction of 45% with diffuse LV

hypokinesis, history of angioplasty and stent placement of the left

anterior descending artery, history of stenosis of the posterolateral

branch, history of 80% stenosis of the midportion of the LAD, history of

successful angioplasty with stent placement of the 80% mid-LAD stenosis,

history of pulmonary arterial hypertension with right ventricular systolic

pressure of 47 mmHg, history of diabetic neuropathy, history of

polypharmacy, history of poor compliance and noncompliance, history of

hypovitaminosis D, history of acute recurrent systolic congestive heart

failure with elevated BNP, history of iron-deficiency anemia, history of

degenerative joint disease and diffuse bone demineralization of the

thoracic spine with compression deformities, history of ischemic dilated

cardiomyopathy, history of bleeding internal hemorrhoids, history of

iron-deficiency microcytic anemia.



PHYSICAL EXAMINATION:

GENERAL:  The patient was seen and examined.

VITAL SIGNS:  T-max 98.7 to 97.8.  Telemetry shows atrial fibrillation,

heart rate in 120s, 130s.  Respirations 20 to 21.  Blood pressure is

122/75, 118/67, 129/79.

HEAD:  Normocephalic, atraumatic.

HEENT:  Shows pinkish pale conjunctivae.  Anicteric sclerae.  No

oropharyngeal lesion.  Positive jugular venous distention.

CHEST:  Kyphosis.

LUNGS:  Shows positive crackles, creps bilaterally, right more than the

left.  Decreased breath sounds heard at the left base.

CARDIOVASCULAR:  Shows S1, S2, irregular rhythm.  Positive systolic murmur

left sternal border, right second intercostal space, left second

intercostal space.

ABDOMEN:  Obese, protuberant.  Positive bowel sounds.  No

hepatosplenomegaly noted.  No guarding.  No rigidity.  No rebound

tenderness.

GENITALIA:  Male.

RECTAL:  Deferred.

EXTREMITIES:  Shows positive pitting edema of the lower extremity. 

Positive ankle and feet swelling.

MUSCULOSKELETAL:  Shows a body mass index of 34.4.

NEUROLOGIC:  The patient is alert, awake, oriented x3.  Cranial nerves

II-XII grossly intact.

GAIT:  Not tested.



DIAGNOSTICS:  WBC 8.2, hemoglobin and hematocrit 10.1 and 33, MCV 77,

platelets 302.  PT 24.5, INR 2.10.  Sodium 141, potassium 3.1, chloride

100, CO2 of 26, anion gap 18, BUN 16, creatinine 1.0, GFR greater than 60,

glucose 156 and 176, calcium 9.4, magnesium 2.0, .  . 

Troponin 0.01. Chest x-ray shows cardiomegaly.  EKG shows atrial

fibrillation with rapid ventricle response with left axis deviation and

right bundle-branch block, left ventricular hypertrophy, age-indeterminate

inferior infarct.



TREATMENT IN THE EMERGENCY ROOM:  The patient was seen in emergency room by

the ER director, Dr. Mendoza.  The patient was given potassium 20 mEq x2,

Lasix 20 IV, Lopressor 5 mg IV, and the patient was advised to be admitted

to [facility].



IMPRESSION AND PLAN:

1.  Acute exacerbation of recurrent systolic congestive heart failure with

elevated BNP.

2.  Atrial fibrillation with rapid ventricle response and right

bundle-branch block and left axis deviation.

3.  History of hypertension and hypotension.

4.  Morbid obesity.

5.  Symptomatic hypokalemia.

6.  Microcytic anemia.

7.  Granulocytosis.

8.  Coagulopathy, etiology undetermined.

9.  Hyperglycemia with history of type 2 diabetes mellitus.

10.  Bilateral lower extremity venous stasis and pitting edema.

11.  Constipation.

12.  Diabetic neuropathy.

13.  Depression.

14.  History of dyslipidemia.

15.  History of Crohn's disease.

16.  History of narcotic-dependent pain syndrome.

17.  Xarelto-dependent atrial fibrillation.

1.  Acute recurrent systolic congestive heart failure.

2.  Ischemic dilated cardiomyopathy.

3.  Bleeding internal hemorrhoid causing rectal bleeding.

4.  Crohn disease.

5.  Diverticulosis.

6.  Atrial fibrillation with rapid ventricle response.

7.  Coronary artery disease.

8.  Dilated ischemic cardiomyopathy.

9.  Pulmonary hypertension.

10.  Noncompliance.

11.  Bilateral lower extremity venous stasis.

12.  Atrial fibrillation, right bundle-branch block, left axis deviation,

and inferior age indeterminate inferior infarct.

13.  Status post transient hypotension.

14.  Microcytic anemia.

15.  Elevated reticulocyte count of 2.2.

16.  Xarelto dependent atrial fibrillation.

17.  Hyperglycemia.

18.  Acute recurrent systolic congestive heart failure with elevated BNP.

19.  Iron deficiency.

20.  Uncontrolled non-insulin requiring diabetes mellitus with hemoglobin

A1c of 8 and hyperglycemia.

21.  Fecal occult blood positive.

22.  O+ blood type.

23.  Chronic narcotic dependent pain syndrome.

24.  Constipation.

25.  Diabetic neuropathy.

26.  Iron deficiency microcytic anemia.

27.  Depression.

28.  Dyslipidemia.

29.  Hypotension.

1.  Acute recurrent systolic congestive heart failure.

2.  Probable ischemic cardiomyopathy.

3.  History of diverticulosis and Crohn disease, treated with Pentasa.

4.  Symptomatic acute recurrent systolic congestive heart failure.

5.  Internal hemorrhoids.

6.  Chronic rectal bleed with stable Crohn disease and chronic rectal

bleeding secondary to hemorrhoids.

7.  Hypertension.

8.  Atrial fibrillation.

9.  Right bundle-branch block and age indeterminate inferior infarct.

10.  Microcytic anemia.

11.  Elevated reticulocyte count.

12.  Mild coagulopathy.

13.  Hyperglycemia with uncontrolled type 2 diabetes mellitus with

hemoglobin A1c of 8.0.

14.  Acute recurrent systolic congestive heart failure with elevated BNP.

15.  Fecal occult blood positive.

16.  O+ blood type.

17.  Status post hypotension yesterday (resolved), probably secondary to

medication versus narcotic and Cymbalta and Lexapro and narcotic

combination.

1.  Acute systolic congestive heart failure with elevated BNP.

2.  Atrial fibrillation with rapid ventricle response.

3.  Bilateral lower extremity venous stasis.

4.  Microcytic anemia.

5.  Questionable gastrointestinal bleeding with fecal occult blood

positive.

6.  Elevated reticulocyte count of 2.24.

7.  Questionable coagulopathy.

8.  Uncontrolled diabetes mellitus with hyperglycemia and hemoglobin A1c of

8.0.

9.  Iron-deficiency anemia.

10.  Transaminitis.

11.  Hyperglycemia.

12.  Prerenal kidney injury.

13.  Fecal occult blood positive.

14.  O+ blood type.

15.  Severe cardiomegaly.

16.  Diffuse bony demineralization with age indeterminate chronic superior

endplate compression deformities of the thoracic spine.

17.  Cardiomegaly.

18.  Atrial fibrillation with rapid ventricle response and right

bundle-branch block and age indeterminate inferior infarct.

19.  Gait dysfunction.

20.  Bilateral lower extremity venous stasis.

1.  Acute recurrent systolic, questionable diastolic congestive heart

failure with elevated BNP and bilateral lower extremity venous stasis and

pitting edema secondary to poor compliance with diuretics.

2.  Microcytic anemia.

3.  Elevated reticulocyte count.

4.  Coagulopathy, etiology undetermined.

5.  Mild prerenal kidney injury.

6.  Hyperglycemia.

7.  Transaminitis.

8.  Iron-deficiency anemia.

9.  Fecal occult blood positive.

10.  Cardiomegaly.

11.  Degenerative joint disease and diffuse bone demineralization of the

thoracic spine with compression deformities.

12.  Atrial fibrillation with right bundle-branch block.

13.  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

14.  Symptomatic congestive heart failure with symptoms of shortness of

breath, dyspnea on exertion, paroxysmal nocturnal dyspnea and orthopnea.

15.  History of insomnia.

16.  History of chronic narcotic dependent pain syndrome, history of opiate

dependency.

17.  History of ulcerative colitis.

18.  History of hypovitaminosis D.

19.  History of chronic obstructive pulmonary disease.

20.  History of dyslipidemia.

21.  History of depression.

22.  History of diabetic neuropathy.

23.  History of type 2 diabetes mellitus.



PLAN:  At this time, the patient is to be admitted and placed on telemetry

observation as per the ER physician.  The patient has been ordered serial

labs, serial BNPs, serial CMPs, serial CPK, LFT, magnesium, serial CBC. 

The patient was ordered blood cultures by the ER physician, reason unknown.

Current consultation with Cardiology.



The patient was started on Cardizem drip by Dr. Olu Ramirez at 5 mg per

hour.  The patient is on Colace 100 mg three times a day, Cymbalta 60 mg

daily, Humalog medium dose sliding scale coverage a.c. The patient was

supplemented with potassium.  The patient was started on standing order of

potassium, K-Dur 20 mEq twice a day, Lasix 40 mg IV daily, Lexapro 5 mg

daily, Lipitor 40 mg daily, Lopressor 50 mg twice a day, Pentasa 1000 mg

q.i.d., Percocet 10/325 mg q. 8 hours p.r.n., Plavix 75 mg daily, midodrine

2.5 mg three times a day, Protonix 40 mg daily, Tessalon 100 mg three times

a day, Xarelto 20 mg daily.  Repeat EKG ordered.  Consistent carbohydrate

diet ordered, elevation of the edematous and affected extremities on two

pillows, out of bed, BETH stockings, SCDs, physical therapy, occupational

therapy ordered.



The patient at present is admitted to Telemetry pending further evaluation

and management by Cardiology.  We will await further recommendation from

Cardiology.  The patient's further management will be dependent upon the

patient's clinical condition, hemodynamic status, and as per the patient's

response to therapeutic intervention, as per the patient's diagnostic test

results and as per recommendation by Cardiology.



Dictated and electronically signed, not read.





__________________________________________

Kody Pratt MD



DD:  02/21/2018 21:17:38

DT:  02/21/2018 21:33:45

Job # [id number]





[email or web address]

## 2018-02-22 NOTE — CON
DATE:  02/22/2018



HISTORY OF PRESENT ILLNESS:  The patient is a 67-year-old male, who was

recently discharged from the hospital, who presents with atrial

fibrillation with a rapid heart rate.



PAST MEDICAL HISTORY:  Includes PTCA and stent recently.  In addition, he

has an ejection fraction of 45% consistent with an ischemic cardiomyopathy.

He suffers from diabetes mellitus, hypertension, has chronic atrial

fibrillation treated with Xarelto.



He denies chest pain, denies shortness of breath, but complains of marked

fatigue.



SOCIAL HISTORY:  The patient does not smoke.



REVIEW OF SYSTEMS:  On 14-point review of systems, the patient does not

complain of chest pain, does complain of occasional shortness of breath. 

Negative edema in lower extremities.  No dizziness.  No loss of

consciousness.



PHYSICAL EXAMINATION:

VITAL SIGNS:  Blood pressure 118/80, heart rates in the 70s, atrial

fibrillation.

NECK:  Negative JVD.

LUNGS:  Without rales.

HEART:  S1, S2.

EXTREMITIES:  Without edema.



EKG shows atrial fibrillation with nonspecific ST-T changes.  Hemoglobin is

9.6, BUN and creatinine is 22 and 1.4.  Troponins are negative x2.  The

ProBNP is 900 and now it is 712.



IMPRESSION:

1.  Atrial fibrillation with rapid heart rate.

2.  Ischemic dilated cardiomyopathy.

3.  Stable angina.

4.  No evidence for acute coronary syndrome.

5.  Recent percutaneous transluminal coronary angioplasty and stent.

6.  Coronary artery disease.

7.  History of percutaneous transluminal coronary angioplasty.

8.  Diabetes mellitus.

9.  Deconditioned.



Given these findings, we will discontinue his IV Cardizem today.  We will

change his beta-blockers to 50 b.i.d.  We will add digoxin to his regimen. 

Stay with physical therapy and now TCU would be helpful given his weakness.







__________________________________________

Olu Ramirez MD



DD:  02/22/2018 10:54:51

DT:  02/22/2018 11:00:17

Job # [id number]

## 2018-02-23 NOTE — DS
FINAL PROGRESS NOTE AND DISCHARGE SUMMARY



LOCATION:  The patient is seen in room 269, bed 2.



SUBJECTIVE:  Patient is out of bed to chair.  Patient's overnight nurse's

notes were reviewed.  Patient requested a sleeping pill over the night. 

Patient requested Percocet for the back and neck pain.  Patient slept well

after above.



PHYSICAL EXAMINATION:

VITAL SIGNS:  T-max 97.9.  Telemetry shows atrial fibrillation, heart rate

controlled after the patient was started on 5 mg of Cardizem drip and the

beta-blocker was increased.  Telemetry monitoring for the last 24 hours

shows heart rate of 74, 72, 76 after above interventions were instituted. 

Respirations 20, blood pressure in the last 24 hours 102/63, 116/63,

135/76, 118/80, 107/75; respirations 20 and O2 sat is 97% to 100%.  Intake,

output is only documented as 400 mL.

HEENT:  Head:  Normocephalic and atraumatic.  HEENT examination shows

pinkish conjunctivae.  Anicteric sclerae.  No oropharyngeal lesion.

NECK:  No neck rigidity.  No jugular venous distention.

CHEST:  Kyphosis.

LUNG:  Shows chronic crackles at the left base and right base.  No rales,

no rhonchi.  Chronic crepitant at the bases, right more than the left.  No

crackles, rales or wheezing.

CARDIOVASCULAR:  S1, S2, irregular rhythm.  Positive systolic murmur at

left sternal border, right second intercostal space, left second

intercostal space.

ABDOMEN:  Protuberant, obese and no hepatosplenomegaly appreciated.  No

guarding, no rigidity, no rebound tenderness.

GENITALIA:  Male.

RECTAL:  Deferred.

EXTREMITIES:  Shows positive ankle swelling.  Trace swelling of the legs.

MUSCULOSKELETAL:  Shows a body mass index of 34.

NEUROLOGIC:  The patient is alert, awake, oriented x3.  Cranial nerves II

through XII intact.  Gait examination is not tested.

VASCULAR:  Palpable pulses.

PSYCHIATRIC:  Positive history of anxiety and history of narcotic dependent

pain syndrome.



DIAGNOSTICS:  On 02/22/2018, WBC 6.9, hemoglobin and hematocrit 9.6 and

32.2, platelets 308.  Sodium 139, potassium 4.4, chloride 101, CO2 27,

anion gap 15, BUN 22, creatinine 1.4, GFR greater than 60, glucose 193,

143, 141, 172.  LFTs shows ALT of 97.  Troponin all three sets are

negative.  BNP down to 712.  Microbiology:  Blood cultures, negative.



Patient's chest x-ray was reviewed.  Repeat EKG from this morning on

02/22/2018 shows atrial fibrillation, right bundle-branch block, heart rate

73.



Patient was seen by cardiologist, cleared for discharge.  Patient's

Cardizem drip was discontinued by the cardiology, Dr. Ramirez.  Patient's

beta-blocker was increased, digoxin was added.  Patient was cleared for

discharge.



FINAL IMPRESSION, PLAN, DISCHARGE DIAGNOSES:

1.  Atrial fibrillation with rapid ventricular response (resolved).

2.  Ischemic dilated cardiomyopathy with ejection fraction of 45%.

3.  Coronary artery disease.

4.  Morbid obesity.

5.  Hypertension.

6.  Right bundle-branch block.

7.  Microcytic anemia.

8.  Granulocytosis.

9.  Mild coagulopathy.

10.  Hypokalemia (resolved).

11.  Hyperglycemia.

12.  Acute recurrent mild systolic congestive heart failure with elevated

BNP.

13.  Bilateral lower extremity venous stasis (resolving).

14.  Insomnia.

15.  Constipation.

16.  History of internal hemorrhoid.

17.  Diabetic neuropathy.

18.  Hypokalemia.

19.  Depression.

20.  Dyslipidemia.

21.  History of Crohn disease.

22.  Narcotic dependent pain syndrome.

23.  Hypotension.

25.  Xarelto dependent atrial fibrillation.



Patient is discharged on following medications:

1.  Tessalon Perles 100 to 200 mg three times a day.

2.  Plavix 75 mg daily.

3.  Colace 100 mg three times a day.

4.  Cymbalta 60 mg daily.

5.  Lexapro 5 mg daily.

6.  Pentasa 1000 mg q.i.d.

7.  Metformin 500 mg once or twice a day.

8.  Lopressor 50 mg q.8 hours.

9.  ProAmatine 2.5 mg three times a day.

10.  Percocet 10/325 one tab q.8 hours p.r.n. which the patient receives

from pain management doctor.

11.  Protonix 40 mg daily.

12.  K-Dur 20 mEq twice a day.

13.  Digoxin 0.125 mg p.o. daily.

14.  Xarelto 20 mg daily.

15.  Crestor 10 mg daily.

16.  Diovan HCTZ 160/25 mg daily.



Patient is to be discharged home.  Patient's digoxin was not given upon

discharge because the patient's metoprolol was increased to 50 mg three

times a day.  Patient is to be discharged home if the patient is not

accepted to TCU.  Patient's discharge medications as per updated ambulatory

orders and new script.  Copy of the diet was given to the patient upon

discharge.  Discharge followup with Dr. Pratt and Dr. Ramirez within 1 week.



Time spent in the entire discharge process more than 45 minutes.



Dictated and electronically signed, not read.







__________________________________________

Kody Pratt MD



DD:  02/22/2018 20:04:26

DT:  02/22/2018 20:09:34

Job # [id number]

## 2018-04-06 ENCOUNTER — HOSPITAL ENCOUNTER (INPATIENT)
Dept: HOSPITAL 42 - ED | Age: 68
LOS: 6 days | Discharge: HOME | DRG: 394 | End: 2018-04-12
Attending: INTERNAL MEDICINE | Admitting: INTERNAL MEDICINE
Payer: MEDICARE

## 2018-04-06 VITALS — BODY MASS INDEX: 36.5 KG/M2 | HEART RATE: 89 BPM

## 2018-04-06 DIAGNOSIS — M51.36: ICD-10-CM

## 2018-04-06 DIAGNOSIS — I27.20: ICD-10-CM

## 2018-04-06 DIAGNOSIS — K29.70: ICD-10-CM

## 2018-04-06 DIAGNOSIS — E11.40: ICD-10-CM

## 2018-04-06 DIAGNOSIS — K57.30: ICD-10-CM

## 2018-04-06 DIAGNOSIS — Z95.5: ICD-10-CM

## 2018-04-06 DIAGNOSIS — Z90.49: ICD-10-CM

## 2018-04-06 DIAGNOSIS — G89.29: ICD-10-CM

## 2018-04-06 DIAGNOSIS — Z79.01: ICD-10-CM

## 2018-04-06 DIAGNOSIS — M47.9: ICD-10-CM

## 2018-04-06 DIAGNOSIS — F32.89: ICD-10-CM

## 2018-04-06 DIAGNOSIS — K64.9: Primary | ICD-10-CM

## 2018-04-06 DIAGNOSIS — I50.22: ICD-10-CM

## 2018-04-06 DIAGNOSIS — I25.118: ICD-10-CM

## 2018-04-06 DIAGNOSIS — M19.011: ICD-10-CM

## 2018-04-06 DIAGNOSIS — K50.90: ICD-10-CM

## 2018-04-06 DIAGNOSIS — E78.5: ICD-10-CM

## 2018-04-06 DIAGNOSIS — K44.9: ICD-10-CM

## 2018-04-06 DIAGNOSIS — I48.2: ICD-10-CM

## 2018-04-06 DIAGNOSIS — E78.00: ICD-10-CM

## 2018-04-06 DIAGNOSIS — D62: ICD-10-CM

## 2018-04-06 DIAGNOSIS — E66.9: ICD-10-CM

## 2018-04-06 DIAGNOSIS — M19.012: ICD-10-CM

## 2018-04-06 DIAGNOSIS — I11.0: ICD-10-CM

## 2018-04-06 LAB
% IRON SATURATION: 2 % (ref 20–55)
APTT BLD: 34.1 SECONDS (ref 25.1–36.5)
BASE EXCESS BLDV CALC-SCNC: -0.4 MMOL/L (ref 0–2)
BASE EXCESS BLDV CALC-SCNC: -0.7 MMOL/L (ref 0–2)
BASE EXCESS BLDV CALC-SCNC: 0.3 MMOL/L (ref 0–2)
BASOPHILS # BLD AUTO: 0.01 K/MM3 (ref 0–2)
BASOPHILS NFR BLD: 0.1 % (ref 0–3)
BNP SERPL-MCNC: 2780 PG/ML (ref 0–450)
BUN SERPL-MCNC: 22 MG/DL (ref 7–21)
CALCIUM SERPL-MCNC: 9.3 MG/DL (ref 8.4–10.5)
EOSINOPHIL # BLD: 0.2 10*3/UL (ref 0–0.7)
EOSINOPHIL NFR BLD: 2.5 % (ref 1.5–5)
ERYTHROCYTE [DISTWIDTH] IN BLOOD BY AUTOMATED COUNT: 18.3 % (ref 11.5–14.5)
GFR NON-AFRICAN AMERICAN: > 60
GRANULOCYTES # BLD: 6.66 10*3/UL (ref 1.4–6.5)
GRANULOCYTES NFR BLD: 73.2 % (ref 50–68)
HGB BLD-MCNC: 7.2 G/DL (ref 14–18)
HGB BLD-MCNC: 7.8 G/DL (ref 14–18)
INR PPP: 2.6 (ref 0.93–1.08)
IRON SERPL-MCNC: 10 UG/DL (ref 45–180)
LYMPHOCYTES # BLD: 1.4 10*3/UL (ref 1.2–3.4)
LYMPHOCYTES NFR BLD AUTO: 15.7 % (ref 22–35)
MCH RBC QN AUTO: 20.2 PG (ref 25–35)
MCHC RBC AUTO-ENTMCNC: 29.4 G/DL (ref 31–37)
MCV RBC AUTO: 68.6 FL (ref 80–105)
MONOCYTES # BLD AUTO: 0.8 10*3/UL (ref 0.1–0.6)
MONOCYTES NFR BLD: 8.5 % (ref 1–6)
PH BLDV: 7.3 [PH] (ref 7.32–7.43)
PH BLDV: 7.31 [PH] (ref 7.32–7.43)
PH BLDV: 7.35 [PH] (ref 7.32–7.43)
PLATELET # BLD: 307 10^3/UL (ref 120–450)
PMV BLD AUTO: 9.2 FL (ref 7–11)
PROTHROMBIN TIME: 30.5 SECONDS (ref 9.4–12.5)
RBC # BLD AUTO: 3.57 10^6/UL (ref 3.5–6.1)
TIBC SERPL-MCNC: 476 UG/DL (ref 261–462)
TROPONIN I SERPL-MCNC: < 0.01 NG/ML
VENOUS BLOOD FIO2: 21 %
VENOUS BLOOD GAS PCO2: 48 (ref 40–60)
VENOUS BLOOD GAS PCO2: 53 (ref 40–60)
VENOUS BLOOD GAS PCO2: 54 (ref 40–60)
VENOUS BLOOD GAS PO2: 103 MM/HG (ref 30–55)
VENOUS BLOOD GAS PO2: 31 MM/HG (ref 30–55)
VENOUS BLOOD GAS PO2: 32 MM/HG (ref 30–55)
WBC # BLD AUTO: 9.1 10^3/UL (ref 4.5–11)

## 2018-04-06 RX ADMIN — Medication PRN MG: at 21:51

## 2018-04-06 RX ADMIN — INSULIN HUMAN SCH: 100 INJECTION, SOLUTION PARENTERAL at 20:10

## 2018-04-06 RX ADMIN — INSULIN HUMAN SCH: 100 INJECTION, SOLUTION PARENTERAL at 22:00

## 2018-04-06 NOTE — RAD
HISTORY:

sob  



COMPARISON:

02/21/2018 



FINDINGS:



LUNGS:

No active pulmonary disease.



PLEURA:

No significant pleural effusion identified, no pneumothorax apparent.



CARDIOVASCULAR:

Moderate cardiomegaly



OSSEOUS STRUCTURES:

No significant abnormalities.



VISUALIZED UPPER ABDOMEN:

Normal.



OTHER FINDINGS:

None.



IMPRESSION:

No active disease.
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
## 2018-04-06 NOTE — CP.CCUPN
CCU Objective





- Vital Signs / Intake & Output


Vital Signs (Last 4 hours): 


Vital Signs











  Temp Pulse Pulse Resp BP Pulse Ox


 


 04/06/18 15:36  98.2 F  95 H   20  134/71 


 


 04/06/18 15:12   88   20  110/70  99


 


 04/06/18 14:51  97.6 F  88   20  121/99 H 


 


 04/06/18 14:50  97.6 F  87   20  121/99 H 


 


 04/06/18 14:26  97.0 F L  88   20  114/71 


 


 04/06/18 14:25  97.0 F L  88   20  114/71  99


 


 04/06/18 14:21  98.7 F  82  82  20  112/71 


 


 04/06/18 12:47   82   20  112/71  99











Intake and Output (Last 8hrs): 


 Intake & Output











 04/06/18 04/06/18 04/06/18





 06:59 14:59 22:59


 


Intake Total  0 


 


Output Total   300


 


Balance  0 -300


 


Weight  240 lb 2.29 oz 


 


Intake:   


 


  Blood Product  0 


 


    Red Blood Cells Cpd As1  0 





    Lr  Unit [id number]   


 


Output:   


 


  Urine   300


 


    Urine, Voided   300


 


Other:   


 


  Voiding Method  Urinal 














- Physical Exam


Head: Positive for: Atraumatic, Normocephalic


Pupils: Positive for: PERRL


Extroacular Muscles: Positive for: EOMI


Conjunctiva: Positive for: Other (pale conjunctiva)


Mouth: Positive for: Moist Mucous Membranes


Respiratory/Chest: Positive for: Good Air Exchange, Other (crackles right lung)

.  Negative for: Clear to Auscultation, Respiratory Distress, Accessory Muscle 

Use, Wheezes, Rhonchi


Cardiovascular: Positive for: Regular Rate and Rhythm, Normal S1, S2.  Negative 

for: Murmurs


Abdomen: Positive for: Normal Bowel Sounds.  Negative for: Tenderness, 

Distention, Peritoneal Signs, Rebound, Guarding


Lower Extremity: Positive for: Edema (+1).  Negative for: Normal Inspection


Neurological: Positive for: GCS=15, CN II-XII Intact, Speech Normal


Skin: Positive for: Warm, Dry, Normal Color.  Negative for: Rashes


Psychiatric: Positive for: Alert, Oriented x 3, Normal Insight, Normal 

Concentration





- Medications


Active Medications: 


Active Medications











Generic Name Dose Route Start Last Admin





  Trade Name Freq  PRN Reason Stop Dose Admin


 


Atorvastatin Calcium  40 mg  04/06/18 17:00  





  Lipitor  PO   





  DIN HUMBERTO   


 


Clopidogrel Bisulfate  75 mg  04/07/18 10:00  





  Plavix  PO   





  DAILY [facility]   


 


Docusate Sodium  100 mg  04/06/18 18:00  





  Colace  PO   





  TID [facility]   


 


Duloxetine HCl  60 mg  04/07/18 10:00  





  Cymbalta  PO   





  DAILY [facility]   


 


Escitalopram Oxalate  5 mg  04/07/18 10:00  





  Lexapro  PO   





  DAILY [facility]   


 


Folic Acid  1 mg  04/07/18 10:00  





  Folic Acid  PO   





  DAILY [facility]   


 


Sodium Chloride  500 mls @ 100 mls/hr  04/06/18 12:00  04/06/18 12:07





  Sodium Chloride 0.9%  IV   100 mls/hr





  .Q5H [facility]   Administration


 


Insulin Human Regular  0 units  04/06/18 16:30  





  Humulin R Low  SC   





  ACHS [facility]   





  Protocol   


 


Mesalamine  1,000 mg  04/06/18 18:00  





  Pentasa  PO   





  QID [facility]   


 


Metoprolol Tartrate  50 mg  04/06/18 14:45  04/06/18 15:11





  Lopressor  PO   Not Given





  Q8H [facility]   


 


Midodrine  2.5 mg  04/06/18 18:00  





  Proamatine  PO   





  TID [facility]   


 


Oxycodone/Acetaminophen  1 tab  04/06/18 14:34  04/06/18 15:10





  Percocet 10/325 Mg Tab  PO   1 tab





  Q8 PRN   Administration





  Pain, moderate (4-7)   


 


Pantoprazole Sodium  40 mg  04/07/18 06:00  





  Protonix Ec Tab  PO   





  0600 [facility]   


 


Potassium Chloride  20 meq  04/06/18 18:00  





  K-Dur 20 Meq Er Tab  PO   





  BID [facility]   


 


Tizanidine HCl  2 mg  04/06/18 15:23  





  Zanaflex  PO   





  TID PRN   





  SPASTICITY   


 


Zolpidem Tartrate  5 mg  04/06/18 15:05  





  Ambien  PO   





  HS PRN   





  Insomnia   





  Protocol   














- Patient Studies


Lab Studies: 


 Lab Studies











  04/06/18 Range/Units





  15:54 


 


pO2  31  (30-55)  mm/Hg


 


VBG pH  7.30 L  (7.32-7.43)  


 


VBG pCO2  54.0  (40-60)  


 


VBG HCO3  26.6  (21-28)  mmol/l


 


VBG Total CO2  28.3 H  (22-28)  mmol.L


 


VBG O2 Sat (Calc)  54.8  (40-65)  %


 


VBG Base Excess  -0.7 L  (0.0-2.0)  mmol/L


 


VBG Potassium  4.6  (3.6-5.2)  mmol/L


 


Sodium  133.0  (132-148)  mmol/L


 


Chloride  102.0  ()  mmol/L


 


Glucose  162 H  ()  mg/dl


 


Lactate  2.0  (0.7-2.1)  mmol/L


 


FiO2  21.0  %


 


Venous Blood Potassium  4.6  (3.6-5.2)  mmol/L








 Laboratory Results - last 24 hr











  04/06/18





  15:54


 


pO2  31


 


VBG pH  7.30 L


 


VBG pCO2  54.0


 


VBG HCO3  26.6


 


VBG Total CO2  28.3 H


 


VBG O2 Sat (Calc)  54.8


 


VBG Base Excess  -0.7 L


 


VBG Potassium  4.6


 


Sodium  133.0


 


Chloride  102.0


 


Glucose  162 H


 


Lactate  2.0


 


FiO2  21.0


 


Venous Blood Potassium  4.6














Critical Care Progress Note





- Nutrition


Nutrition: 


 Nutrition











 Category Date Time Status


 


 Heart Healthy Diet [DIET] Diets  04/06/18 Dinner Ordered

## 2018-04-06 NOTE — ED PDOC
Arrival/HPI





- General


Time Seen by Provider: 04/06/18 10:41


Historian: Patient





- History of Present Illness


Narrative History of Present Illness (Text): 





04/06/18 11:00


67 year old male, whose PMH includes CHF, hypertension, diabetes, 

hypercholesterolemia, atrial fibrillation, and stomach colitis, who presents to 

the emergency department complaining of shortness of breath that is worse with 

movement since a couple of days. Patient reports feeling lightheaded this 

morning, associated with nausea, chest discomfort, and dry mouth. Patient 

states he's had bloody stool for many months. These past few weeks he's had a 

little bit more than usual. Patient denies fever, cough, abdominal pain, or 

other complaints.





PMD: Dr. Pratt 








Time/Duration: < week


Symptom Onset: Gradual


Symptom Course: Worsening


Activities at Onset: Rest


Context: Home





Past Medical History





- Provider Review


Nursing Documentation Reviewed: Yes





- Infectious Disease


Hx of Infectious Diseases: None





- Tetanus Immunization


Tetanus Immunization: >10 years Ago





- Cardiac


Hx Cardiac Disorders: Yes


Hx Hypertension: Yes





- Pulmonary


Hx Respiratory Disorders: No





- Neurological


Hx Neurological Disorder: Yes


Hx Dizziness: Yes





- HEENT


Hx HEENT Disorder: No





- Renal


Hx Renal Disorder: No





- Endocrine/Metabolic


Hx Diabetes Mellitus Type 2: Yes





- Hematological/Oncological


Hx Blood Disorders: No





- Integumentary


Hx Dermatological Disorder: No





- Musculoskeletal/Rheumatological


Hx Musculoskeletal Disorders: Yes


Hx Back Pain: Yes


Hx Falls: No


Other/Comment: CERVICAL AND LUMBAR RADICULOPATHY





- Gastrointestinal


Hx Gastrointestinal Disorders: Yes (CONSTIPATION,)


Hx Crohn's Disease: Yes


Hx Gall Bladder Disease: Yes (CHOLECYSTECTOMY)


Other/Comment: CROHNS,HEMORRHOIDECTOMY





- Genitourinary/Gynecological


Hx Genitourinary Disorders: No





- Psychiatric


Hx Anxiety: Yes


Hx Depression: Yes


Hx Emotional Abuse: No


Hx Physical Abuse: No


Hx Substance Use: No





- Surgical History


Hx Cholecystectomy: Yes


Hx Coronary Stent: Yes


Other/Comment: cardiac cath





- Anesthesia


Hx Anesthesia: Yes


Hx Anesthesia Reactions: No


Hx Malignant Hyperthermia: No





- Suicidal Assessment


Feels Threatened In Home Enviroment: No





Family/Social History





- Physician Review


Nursing Documentation Reviewed: Yes


Family/Social History: Unknown Family HX


Smoking Status: Never Smoked


Hx Alcohol Use: No


Hx Substance Use: No


Hx Substance Use Treatment: No





Allergies/Home Meds


Allergies/Adverse Reactions: 


Allergies





No Known Allergies Allergy (Verified 02/21/18 15:24)


 








Home Medications: 


 Home Meds











 Medication  Instructions  Recorded  Confirmed


 


Oxycodone HCl/Acetaminophen 1 tab PO Q8 PRN 12/26/17 04/06/18





[Percocet  mg Tablet]   


 


Valsartan/Hydrochlorothiazide 1 tab PO DAILY 12/26/17 04/06/18





[Diovan Hct 160-25 mg Tablet]   


 


metFORMIN [glucOPHAGE] 500 mg PO DAILY 01/05/18 04/06/18


 


Ergocalciferol [Drisdol 50,000 1 cap PO MON 04/06/18 04/06/18





Intl Units Cap]   


 


Folic Acid 1 mg PO DAILY 04/06/18 04/06/18


 


Zolpidem Tartrate [Ambien] 10 mg PO PRN PRN 04/06/18 04/06/18


 


tiZANidine [Zanaflex] 2 mg PO PRN PRN 04/06/18 04/06/18














Review of Systems





- Review of Systems


Constitutional: absent: Fevers


ENT: absent: Sore Throat, Sinus Congestion


Respiratory: SOB.  absent: Cough


Cardiovascular: Other (chest discomfort).  absent: Palpitations


Gastrointestinal: Nausea.  absent: Abdominal Pain, Diarrhea, Vomiting


Genitourinary Male: absent: Dysuria


Musculoskeletal: absent: Back Pain


Skin: absent: Rash


Neurological: Dizziness (lightheadedness).  absent: Headache


Endocrine: absent: Diaphoresis





Physical Exam


Vital Signs Reviewed: Yes


Vital Signs











  Temp Pulse Resp BP Pulse Ox


 


 04/06/18 12:16   79  22  106/65  97


 


 04/06/18 11:32   82  22  94/66 L  99


 


 04/06/18 11:30     94/66 L 


 


 04/06/18 11:05   84  20  101/67  99


 


 04/06/18 10:39  98.7 F  78  18  112/68  97











Temperature: Afebrile


Blood Pressure: Normal


Pulse: Regular


Respiratory Rate: Normal


Appearance: Positive for: Non-Toxic, Comfortable, Ill-Appearing


Pain Distress: None


Mental Status: Positive for: Alert and Oriented X 3





- Systems Exam


Head: Present: Atraumatic, Normocephalic


Pupils: Present: PERRL


Extroacular Muscles: Present: EOMI


Conjunctiva: Present: Other (pale conjunctiva)


Mouth: Present: Moist Mucous Membranes


Respiratory/Chest: Present: Good Air Exchange, Other (crackles right lung).  No

: Clear to Auscultation, Respiratory Distress, Accessory Muscle Use, Wheezes, 

Rhonchi


Cardiovascular: Present: Regular Rate and Rhythm, Normal S1, S2.  No: Murmurs


Abdomen: Present: Normal Bowel Sounds.  No: Tenderness, Distention, Peritoneal 

Signs, Rebound, Guarding


Lower Extremity: Present: Edema (+1).  No: Normal Inspection


Neurological: Present: GCS=15, CN II-XII Intact, Speech Normal


Skin: Present: Warm, Dry, Normal Color.  No: Rashes


Psychiatric: Present: Alert, Oriented x 3, Normal Insight, Normal Concentration





Medical Decision Making


ED Course and Treatment: 





04/06/18 


Impression:


67 year old male with crackles on left lung and +1 lower extremity edema 

complaining of shortness of breath.





Differential Diagnosis included but are not limited to:  CHF exacerbation r/o 

ACS, GI Bleed r/o Anemia





Plan:


-- EKG


-- Labs


-- Chest X-ray


-- Lasix


-- Reassess and disposition





Progress Notes:


EKG:


Ordered, reviewed, and independently interpreted the EKG.


Rate :  98 BPM


Rhythm : atrial fibrillation


Interpretation : RBBB. No ST-segment elevations or depressions, no T-wave 

inversions, normal intervals.





04/06/18 11:35


Patient slightly hypotensive in the setting of lightheadedness. He does appear 

weak. Will hydrated with NS 100mls/hr. Lasix held. Transfusion 2 units of PRBCs 

ordered stat.





04/06/18 12:37


Case was discussed with Dr. Garza, Intensivist, who came to evaluate the 

patient. He recommends Telemetry. He recommends serial H/H and close 

observation of CHF symptoms. He recommends only 1 unit of PRBC at this time, 

Vitamin K 10mg IV and 1 unit of FFP. All were ordered by me. I called the blood 

bank to change the order to 1 unit of PRBC because I was unable to edit it 

myself. Renee from Blood bank will change it. 





I discussed case with Dr. Spaulding who agrees to admit to Telemetry. 





- Critical Care


Critical Care Minutes: 30 minutes





- Lab Interpretations


Lab Results: 








 04/06/18 10:45 





 04/06/18 10:45 





 Lab Results





04/06/18 10:45: Sodium 136, Chloride 99, Potassium 4.1, Carbon Dioxide 25, 

Anion Gap 17, BUN 22 H, Creatinine 1.0, Est GFR (African Amer) > 60, Est GFR (

Non-Af Amer) > 60, Random Glucose 155 H, Calcium 9.3, Lactate Dehydrogenase 485

, Total Creatine Kinase 59, Troponin I < 0.01, NT-Pro-B Natriuret Pep 2780 H


04/06/18 10:45: pO2 32, VBG pH 7.31 L, VBG pCO2 53.0, VBG HCO3 26.7, VBG Total 

CO2 28.3 H, VBG O2 Sat (Calc) 58.2, VBG Base Excess -0.4 L, VBG Potassium 4.1, 

Sodium 134.0, Chloride 101.0, Glucose 158 H, Lactate 2.3 H, FiO2 21.0, Venous 

Blood Potassium 4.1


04/06/18 10:45: WBC 9.1  D, RBC 3.57, Hgb 7.2 L D, Hct 24.5 L, MCV 68.6 L D, 

MCH 20.2 L, MCHC 29.4 L, RDW 18.3 H, Plt Count 307, MPV 9.2, Gran % 73.2 H, 

Lymph % (Auto) 15.7 L, Mono % (Auto) 8.5 H, Eos % (Auto) 2.5, Baso % (Auto) 0.1

, Gran # 6.66 H, Lymph # (Auto) 1.4, Mono # (Auto) 0.8 H, Eos # (Auto) 0.2, 

Baso # (Auto) 0.01


04/06/18 10:45: PT 30.5 H, INR 2.60 H, APTT 34.1








I have reviewed the lab results: Yes





- RAD Interpretation


Radiology Orders: 








04/06/18 10:56


CHEST PORTABLE [RAD] Stat 











: Radiologist





- EKG Interpretation


Interpreted by ED Physician: Yes


Type: 12 lead EKG





- Medication Orders


Current Medication Orders: 








Sodium Chloride (Sodium Chloride 0.9%)  500 mls @ 100 mls/hr IV .Q5H [facility]


   Last Admin: 04/06/18 12:07  Dose: 100 mls/hr





eMAR Start Stop


 Document     04/06/18 12:07  SS  (Rec: 04/06/18 12:07  SS  [id number])


     Intravenous Solution


      Start Date                                 04/06/18


      Start Time                                 12:07


      End Date                                   04/06/18








Discontinued Medications





Furosemide (Lasix)  40 mg IVP STAT STA


   Stop: 04/06/18 10:56


   Last Admin: 04/06/18 11:30 Dose:  Not Given


   Non-Admin Reason: BP Parameters Not Met





MAR Blood Pressure


 Document     04/06/18 11:30  SS  (Rec: 04/06/18 11:30  SS  [id number])


     Blood Pressure


      Blood Pressure (100//90)             94/66


IVP Administration


 Document     04/06/18 11:30  SS  (Rec: 04/06/18 11:30  SS  [id number])


     Charges for Administration


      # of IVP Administrations                   0





Pantoprazole Sodium (Protonix Inj)  80 mg IVP STAT STA


   Stop: 04/06/18 11:37


   Last Admin: 04/06/18 11:49  Dose: 80 mg





IVP Administration


 Document     04/06/18 11:49  SS  (Rec: 04/06/18 11:49  SS  [id number])


     Charges for Administration


      # of IVP Administrations                   1














- Scribe Statement


The provider has reviewed the documentation as recorded by the David Grace





Provider Scribe Attestation:


All medical record entries made by the David were at my direction and 

personally dictated by me. I have reviewed the chart and agree that the record 

accurately reflects my personal performance of the history, physical exam, 

medical decision making, and the department course for this patient. I have 

also personally directed, reviewed, and agree with the discharge instructions 

and disposition. 





Disposition/Present on Arrival





- Present on Arrival


Any Indicators Present on Arrival: No


History of DVT/PE: No


History of Uncontrolled Diabetes: No


Urinary Catheter: No


History Surgical Site Infection Following: None





- Disposition


Have Diagnosis and Disposition been Completed?: Yes


Diagnosis: 


 CHF (congestive heart failure), GI bleed





Disposition Time: 12:39


Patient Plan: Admission


Patient Problems: 


 Current Active Problems











Problem Status Onset


 


CHF (congestive heart failure) Acute  


 


GI bleed Acute  











Condition: GUARDED


Discharge Instructions (ExitCare):  Heart Failure (ED)

## 2018-04-06 NOTE — CP.PCM.PN
Subjective





- Date & Time of Evaluation


Date of Evaluation: 04/06/18


Time of Evaluation: 18:26





- Subjective


Subjective: 


Patient with large bloody BM while on telemetry unit. BP stable, awake, alert, 

NAD, receiving 1u PRBC. 


FFP 2u ordered to be transfused, VitK given. 


Patient transferred to MICU for further care. 





Objective





- Vital Signs/Intake and Output


Vital Signs (last 24 hours): 


 











Temp Pulse Resp BP Pulse Ox


 


 98.2 F   95 H  20   134/71   99 


 


 04/06/18 15:36  04/06/18 15:36  04/06/18 15:36  04/06/18 15:36  04/06/18 15:12








Intake and Output: 


 











 04/06/18 04/06/18





 06:59 18:59


 


Intake Total  0


 


Output Total  300


 


Balance  -300














- Medications


Medications: 


 Current Medications





Atorvastatin Calcium (Lipitor)  40 mg PO DIN [facility]


Clopidogrel Bisulfate (Plavix)  75 mg PO DAILY [facility]


Docusate Sodium (Colace)  100 mg PO TID [facility]


Duloxetine HCl (Cymbalta)  60 mg PO DAILY [facility]


Escitalopram Oxalate (Lexapro)  5 mg PO DAILY [facility]


Folic Acid (Folic Acid)  1 mg PO DAILY [facility]


Sodium Chloride (Sodium Chloride 0.9%)  500 mls @ 100 mls/hr IV .Q5H [facility]


   Last Admin: 04/06/18 12:07 Dose:  100 mls/hr


Insulin Human Regular (Humulin R Low)  0 units SC ACHS [facility]


   PRN Reason: Protocol


Mesalamine (Pentasa)  1,000 mg PO QID [facility]


Metoprolol Tartrate (Lopressor)  50 mg PO Q8H [facility]


   Last Admin: 04/06/18 15:11 Dose:  Not Given


Midodrine (Proamatine)  2.5 mg PO TID [facility]


Oxycodone/Acetaminophen (Percocet 10/325 Mg Tab)  1 tab PO Q8 PRN


   PRN Reason: Pain, moderate (4-7)


   Last Admin: 04/06/18 15:10 Dose:  1 tab


Pantoprazole Sodium (Protonix Ec Tab)  40 mg PO 0600 [facility]


Potassium Chloride (K-Dur 20 Meq Er Tab)  20 meq PO BID [facility]


Tizanidine HCl (Zanaflex)  2 mg PO TID PRN


   PRN Reason: SPASTICITY


Zolpidem Tartrate (Ambien)  5 mg PO HS PRN; Protocol


   PRN Reason: Insomnia











- Labs


Labs: 


 











PT  30.5 SECONDS (9.4-12.5)  H  04/06/18  10:45    


 


INR  2.60  (0.93-1.08)  H  04/06/18  10:45    


 


APTT  34.1 Seconds (25.1-36.5)   04/06/18  10:45

## 2018-04-06 NOTE — CP.PCM.CON
<Andrey Mercado - Last Filed: 04/06/18 12:58>





History of Present Illness





- History of Present Illness


History of Present Illness: 


67 year old male with Crohn's Disease, Diverticulitis, Hypertension, 

Hyperlipidemia, DJD, DM II, Chronic Lower back pain, A-fib, CAD presents  with 

shortness of breath for past couple of days. Patient also states he's had 

bloody stool for many months. He last had a colonoscopy over a year ago which 

demonstrated some bleeding hemorrhoids.Patient denies chest pain, fever, cough, 

abdominal pain, or other complaints.





PMHx: Crohn's Disease, Diverticulitis, Hypertension, Hyperlipidemia, DJD, DM II

, Chronic Lower back pain, A-fib, CAD 


PSHx: Cardiac Cath with JOSEPH placement in LAD (1/9/18)


Allergies: NKDA


Social Hx: Denies tobacco, alcohol, or illicit drug use


Fam Hx: Noncontributory


Meds: Reviewed, as per MAR.








Past Patient History





- Infectious Disease


Hx of Infectious Diseases: None





- Tetanus Immunizations


Tetanus Immunization: >10 years Ago





- Past Social History


Smoking Status: Never Smoked





- CARDIAC


Hx Cardiac Disorders: Yes


Hx Hypertension: Yes





- PULMONARY


Hx Respiratory Disorders: No





- NEUROLOGICAL


Hx Neurological Disorder: Yes


Hx Dizziness: Yes





- HEENT


Hx HEENT Problems: No





- RENAL


Hx Chronic Kidney Disease: No





- ENDOCRINE/METABOLIC


Hx Diabetes Mellitus Type 2: Yes





- HEMATOLOGICAL/ONCOLOGICAL


Hx Blood Disorders: No





- INTEGUMENTARY


Hx Dermatological Problems: No





- MUSCULOSKELETAL/RHEUMATOLOGICAL


Hx Musculoskeletal Disorders: Yes


Hx Back Pain: Yes


Hx Falls: No


Other/Comment: CERVICAL AND LUMBAR RADICULOPATHY





- GASTROINTESTINAL


Hx Gastrointestinal Disorders: Yes (CONSTIPATION,)


Hx Crohn's Disease: Yes


Hx Gall Bladder Disease: Yes (CHOLECYSTECTOMY)


Other/Comment: CROHNS,HEMORRHOIDECTOMY





- GENITOURINARY/GYNECOLOGICAL


Hx Genitourinary Disorders: No





- PSYCHIATRIC


Hx Anxiety: Yes


Hx Depression: Yes


Hx Emotional Abuse: No


Hx Physical Abuse: No


Hx Substance Use: No





- SURGICAL HISTORY


Hx Cholecystectomy: Yes


Hx Coronary Stent: Yes


Other/Comment: cardiac cath





- ANESTHESIA


Hx Anesthesia: Yes


Hx Anesthesia Reactions: No


Hx Malignant Hyperthermia: No





Meds


Allergies/Adverse Reactions: 


 Allergies











Allergy/AdvReac Type Severity Reaction Status Date / Time


 


No Known Allergies Allergy   Verified 02/21/18 15:24














- Medications


Medications: 


 Current Medications





Sodium Chloride (Sodium Chloride 0.9%)  500 mls @ 100 mls/hr IV .Q5H [facility]


   Last Admin: 04/06/18 12:07 Dose:  100 mls/hr











Results





- Vital Signs


Recent Vital Signs: 


 Last Vital Signs











Temp  98.7 F   04/06/18 10:39


 


Pulse  79   04/06/18 12:16


 


Resp  22   04/06/18 12:16


 


BP  106/65   04/06/18 12:16


 


Pulse Ox  97   04/06/18 12:16














- Labs


Result Diagrams: 


 04/06/18 10:45





 04/06/18 10:45


Labs: 


 Laboratory Results - last 24 hr











  04/06/18 04/06/18 04/06/18





  10:45 10:45 10:45


 


WBC   9.1  D 


 


RBC   3.57 


 


Hgb   7.2 L D 


 


Hct   24.5 L 


 


MCV   68.6 L D 


 


MCH   20.2 L 


 


MCHC   29.4 L 


 


RDW   18.3 H 


 


Plt Count   307 


 


MPV   9.2 


 


Gran %   73.2 H 


 


Lymph % (Auto)   15.7 L 


 


Mono % (Auto)   8.5 H 


 


Eos % (Auto)   2.5 


 


Baso % (Auto)   0.1 


 


Gran #   6.66 H 


 


Lymph # (Auto)   1.4 


 


Mono # (Auto)   0.8 H 


 


Eos # (Auto)   0.2 


 


Baso # (Auto)   0.01 


 


PT  30.5 H  


 


INR  2.60 H  


 


APTT  34.1  


 


pO2    32


 


VBG pH    7.31 L


 


VBG pCO2    53.0


 


VBG HCO3    26.7


 


VBG Total CO2    28.3 H


 


VBG O2 Sat (Calc)    58.2


 


VBG Base Excess    -0.4 L


 


VBG Potassium    4.1


 


Sodium    134.0


 


Chloride    101.0


 


Glucose    158 H


 


Lactate    2.3 H


 


FiO2    21.0


 


Potassium   


 


Carbon Dioxide   


 


Anion Gap   


 


BUN   


 


Creatinine   


 


Est GFR ( Amer)   


 


Est GFR (Non-Af Amer)   


 


Random Glucose   


 


Calcium   


 


Lactate Dehydrogenase   


 


Total Creatine Kinase   


 


Troponin I   


 


NT-Pro-B Natriuret Pep   


 


Venous Blood Potassium    4.1














  04/06/18





  10:45


 


WBC 


 


RBC 


 


Hgb 


 


Hct 


 


MCV 


 


MCH 


 


MCHC 


 


RDW 


 


Plt Count 


 


MPV 


 


Gran % 


 


Lymph % (Auto) 


 


Mono % (Auto) 


 


Eos % (Auto) 


 


Baso % (Auto) 


 


Gran # 


 


Lymph # (Auto) 


 


Mono # (Auto) 


 


Eos # (Auto) 


 


Baso # (Auto) 


 


PT 


 


INR 


 


APTT 


 


pO2 


 


VBG pH 


 


VBG pCO2 


 


VBG HCO3 


 


VBG Total CO2 


 


VBG O2 Sat (Calc) 


 


VBG Base Excess 


 


VBG Potassium 


 


Sodium  136


 


Chloride  99


 


Glucose 


 


Lactate 


 


FiO2 


 


Potassium  4.1


 


Carbon Dioxide  25


 


Anion Gap  17


 


BUN  22 H


 


Creatinine  1.0


 


Est GFR ( Amer)  > 60


 


Est GFR (Non-Af Amer)  > 60


 


Random Glucose  155 H


 


Calcium  9.3


 


Lactate Dehydrogenase  485


 


Total Creatine Kinase  59


 


Troponin I  < 0.01


 


NT-Pro-B Natriuret Pep  2780 H


 


Venous Blood Potassium 














Assessment & Plan





- Assessment and Plan (Free Text)


Assessment: 


67 year old male with Crohn's Disease, Diverticulitis, Hypertension, 

Hyperlipidemia, DJD, DM II, Chronic Lower back pain, A-fib, CAD presents  with 

shortness of breath for past couple of days and dark stools. Hgb found to be 

7.2. Patient is hemodynamically stable, no acute distress, saturating well. 

Patient will be transfused 1 unit PRBC, FFP, and given vitamin K inj. 





Anemia


SOB


Afib


CHF


DM





Plan: 


Recommend


-FFP


-PRBC


-Vitamin K


-maintain oxygen saturation>90%


-repeat Hgb in 4 hours


-daily CBC 


-hold anticoag


-repeat INR


-procal, pan cultures


-monitor lytes


-maintain euglycemia, ISS


-GI ppx 


-close monitoring in telemetry 








<Shashi Garza - Last Filed: 04/06/18 14:30>





Meds





- Medications


Medications: 


 Current Medications





Sodium Chloride (Sodium Chloride 0.9%)  500 mls @ 100 mls/hr IV .Q5H [facility]


   Last Admin: 04/06/18 12:07 Dose:  100 mls/hr











Results





- Vital Signs


Recent Vital Signs: 


 Last Vital Signs











Temp  98.7 F   04/06/18 10:39


 


Pulse  82   04/06/18 12:47


 


Resp  20   04/06/18 12:47


 


BP  112/71   04/06/18 12:47


 


Pulse Ox  99   04/06/18 12:47














- Labs


Result Diagrams: 


 04/06/18 10:45





 04/06/18 10:45





Assessment & Plan





- Assessment and Plan (Free Text)


Plan: 


Patient seen and examined with resident, agree with note with following 

additions/exceptions:


Patient is 68yo male with PMHx of Afib on Eliquis, CHF, Anemia, HTN, obesity, 

on Plavix, hemorrhoids, presented with melena and bloody stools x 3-4 days. Pt 

notes also he has been SOB and has SCHNEIDER. Labs with elevated INR 2.6, Anemia. 

Currently afebrile, HD stable, -110, patient awake, alert in NAD, 

lactate wnl. Pt set to receive FFP, pRBC, VitK. 








GIB


Anemia


CHF


HTN


SOB





Recommend:


- supp o2 as needed


- pan culture, BCx, UCx, Procal


- HOLD BP meds


- reverse coagulopathy 2u FFP, VitK


- transfuse 1u PRBC, close lung checks, may require Lasix


- maintain 2 large bore PIVs


- NPO


- GI evaluation


- q6hr CBC monitoring


- avoid HSQ


- Monitor on tele

## 2018-04-06 NOTE — CARD
--------------- APPROVED REPORT --------------





EKG Measurement

Heart [id number]

[id number]

[id number]

[id number]



<Conclusion>

Atrial fibrillation

Right bundle branch block

Inferior infarct, age undetermined

Abnormal ECG

## 2018-04-07 LAB
ALBUMIN SERPL-MCNC: 4.2 G/DL (ref 3–4.8)
ALBUMIN/GLOB SERPL: 1.2 {RATIO} (ref 1.1–1.8)
ALT SERPL-CCNC: 58 U/L (ref 7–56)
AST SERPL-CCNC: 44 U/L (ref 17–59)
BASOPHILS # BLD AUTO: 0.01 K/MM3 (ref 0–2)
BASOPHILS NFR BLD: 0.1 % (ref 0–3)
BUN SERPL-MCNC: 18 MG/DL (ref 7–21)
CALCIUM SERPL-MCNC: 9.8 MG/DL (ref 8.4–10.5)
EOSINOPHIL # BLD: 0.1 10*3/UL (ref 0–0.7)
EOSINOPHIL NFR BLD: 1.8 % (ref 1.5–5)
ERYTHROCYTE [DISTWIDTH] IN BLOOD BY AUTOMATED COUNT: 20 % (ref 11.5–14.5)
GFR NON-AFRICAN AMERICAN: > 60
GRANULOCYTES # BLD: 5.49 10*3/UL (ref 1.4–6.5)
GRANULOCYTES NFR BLD: 71.4 % (ref 50–68)
HGB BLD-MCNC: 8.8 G/DL (ref 14–18)
LYMPHOCYTES # BLD: 1.2 10*3/UL (ref 1.2–3.4)
LYMPHOCYTES NFR BLD AUTO: 15 % (ref 22–35)
MCH RBC QN AUTO: 21.8 PG (ref 25–35)
MCHC RBC AUTO-ENTMCNC: 31 G/DL (ref 31–37)
MCV RBC AUTO: 70.5 FL (ref 80–105)
MONOCYTES # BLD AUTO: 0.9 10*3/UL (ref 0.1–0.6)
MONOCYTES NFR BLD: 11.7 % (ref 1–6)
PLATELET # BLD: 239 10^3/UL (ref 120–450)
PMV BLD AUTO: 9.3 FL (ref 7–11)
RBC # BLD AUTO: 4.03 10^6/UL (ref 3.5–6.1)
WBC # BLD AUTO: 7.7 10^3/UL (ref 4.5–11)

## 2018-04-07 RX ADMIN — MORPHINE SULFATE PRN MG: 4 INJECTION, SOLUTION INTRAMUSCULAR; INTRAVENOUS at 10:35

## 2018-04-07 RX ADMIN — INSULIN HUMAN SCH: 100 INJECTION, SOLUTION PARENTERAL at 09:03

## 2018-04-07 RX ADMIN — MORPHINE SULFATE PRN MG: 4 INJECTION, SOLUTION INTRAMUSCULAR; INTRAVENOUS at 18:55

## 2018-04-07 RX ADMIN — Medication PRN MG: at 01:30

## 2018-04-07 RX ADMIN — INSULIN HUMAN SCH: 100 INJECTION, SOLUTION PARENTERAL at 23:04

## 2018-04-07 RX ADMIN — Medication PRN MG: at 05:46

## 2018-04-07 RX ADMIN — MORPHINE SULFATE PRN MG: 4 INJECTION, SOLUTION INTRAMUSCULAR; INTRAVENOUS at 23:28

## 2018-04-07 RX ADMIN — INSULIN HUMAN SCH: 100 INJECTION, SOLUTION PARENTERAL at 17:36

## 2018-04-07 RX ADMIN — INSULIN HUMAN SCH: 100 INJECTION, SOLUTION PARENTERAL at 12:27

## 2018-04-07 NOTE — CP.PCM.PN
Subjective





- Date & Time of Evaluation


Date of Evaluation: 04/07/18


Time of Evaluation: 08:40





- Subjective


Subjective: 





(covering for Dr. Pratt)


Patient is seen this morning in the intensive care unit bed 4.  He was admitted


yesterday to telemetry with GI bleed.  Patient had a large bloody BM yesterday


and became tachycardic so he was transferred to the ICU.  He denies dizziness 


or chest pain.  





Objective





- Vital Signs/Intake and Output


Vital Signs (last 24 hours): 


 











Temp Pulse Resp BP Pulse Ox


 


 98 F   95 H  21   146/91 H  96 


 


 04/06/18 18:58  04/07/18 08:40  04/07/18 08:40  04/07/18 08:00  04/07/18 08:40








Intake and Output: 


 











 04/07/18 04/07/18





 06:59 18:59


 


Intake Total 375 


 


Balance 375 














- Medications


Medications: 


 Current Medications





Folic Acid (Folic Acid)  1 mg IVP DAILY [facility]


Insulin Human Regular (Humulin R Low)  0 units SC ACHS [facility]


   PRN Reason: Protocol


   Last Admin: 04/06/18 22:00 Dose:  Not Given


Mesalamine (Pentasa)  1,000 mg PO QID [facility]


Metoprolol Tartrate (Lopressor)  50 mg PO Q8H [facility]


   Last Admin: 04/06/18 15:11 Dose:  Not Given


Metoprolol Tartrate (Lopressor)  2.5 mg IVP Q6H PRN


   PRN Reason: HR > 120


   Last Admin: 04/07/18 02:56 Dose:  2.5 mg


Midodrine (Proamatine)  2.5 mg PO TID [facility]


Morphine Sulfate (Morphine)  1 mg IVP Q4H PRN


   PRN Reason: Pain, moderate (4-7)


   Last Admin: 04/07/18 05:46 Dose:  1 mg


Pantoprazole Sodium (Protonix Inj)  40 mg IVP Q12 [facility]


   Last Admin: 04/07/18 01:40 Dose:  40 mg











- Labs


Labs: 


 





 04/06/18 21:35 





 











PT  30.5 SECONDS (9.4-12.5)  H  04/06/18  10:45    


 


INR  2.60  (0.93-1.08)  H  04/06/18  10:45    


 


APTT  34.1 Seconds (25.1-36.5)   04/06/18  10:45    














- Constitutional


Appears: No Acute Distress





- Head Exam


Head Exam: ATRAUMATIC, NORMOCEPHALIC





- Respiratory Exam


Respiratory Exam: Decreased Breath Sounds





- Cardiovascular Exam


Cardiovascular Exam: Tachycardia, +S1, +S2





- GI/Abdominal Exam


GI & Abdominal Exam: Soft, Normal Bowel Sounds.  absent: Tenderness





- Extremities Exam


Extremities Exam: Pedal Edema





- Neurological Exam


Neurological Exam: Alert, Awake, Oriented x3





Assessment and Plan





- Assessment and Plan (Free Text)


Assessment: 





GI Bleed


CAD s/p angioplasty


AFib


Diabetes mellitus


Degenerative disk disease


HTN


Crohn's disease


Plan: 





Patient is seen this morning.  He denies chest pain or shortness of breath.


He is currently receiving FFP.  Patient was on Xarelto and Plavix at home for


AFib and coronary artery disease with stent.  He is to be seen by GI and 

cardiology.


Iron levels are low; awaiting today's hemoglobin.

## 2018-04-07 NOTE — PN
DATE:  04/07/2018



INTENSIVIST NOTE



LOCATION:  [facility].



SUBJECTIVE:  The patient is resting in bed.  No complaints of shortness of

breath, cough, wheezing, chest congestion.  No chest pain.  No fever,

chills, nauseousness or vomiting.  The patient has been having bowel

movements this morning and he states that there is certain amount of blood

there when he notices the bowel movements.  The patient has gotten 2 units

of FFP and a unit of packed red blood cells.  He is scheduled for a second

unit of packed red blood cells.



PHYSICAL EXAMINATION:

VITAL SIGNS:  Physical exam note that his temperature is 98, pulse is 95,

respirations of 21 and BP is 148/91.  O2 saturation is 97%.

HEENT:  Head is atraumatic, normocephalic.  Eyes reactive to light.  Ear,

nose and throat seemed to be within normal limits.

NECK:  Supple.  No JVD.  No thyroid enlargement.  No lymph nodes.

HEART:  Has a regular rate and rhythm.  Normal S1, S2.

LUNGS:  Reveal good breath sounds bilaterally.

ABDOMEN:  Soft, nontender.  Normal bowel sounds.  No organomegaly noted.

GENITALIA AND RECTAL:  Deferred.

MUSCULOSKELETAL:  No joint deformities.

EXTREMITIES:  Reveal trace lower extremity edema.

NEUROLOGICAL:  He seemed to be grossly intact.



LABORATORY DATA:  As far as his laboratories are concerned, his white count

is 7.7, hemoglobin is 8.8, hematocrit 28.4 with platelets of 239,000.  His

sodium is 139, potassium 3.8, chloride 98, CO2 of 28 with a BUN of 18,

creatinine is 0.9 and a glucose of 124.



IMPRESSION:  As far as my impression, this patient has acute

gastrointestinal bleed with anemia, which is symptomatic.  He has a history

of coronary artery disease, status post angioplasty and stent.  He has

atrial fibrillation, diabetes, degenerative disc disease, hypertension and

Crohn disease.



PLAN:  As far as our plan, we will continue with transfusions of the packed

red blood cells.  Follow his hemoglobin closely and as speaking with GI,

there is a planned sigmoidoscopy on Monday.  The patient also has a history

of congestive heart failure.  We will follow closely.  We will continue to

treat aggressively and continue the patient on his Protonix as well as his

Lopressor and folic acid.



__________________________________________

Jean-Pierre Gonzalez MD





DD:  04/07/2018 11:47:33

DT:  04/07/2018 11:55:23

[facility] # [id number]

## 2018-04-08 LAB
ALBUMIN SERPL-MCNC: 3.9 G/DL (ref 3–4.8)
ALBUMIN/GLOB SERPL: 1.1 {RATIO} (ref 1.1–1.8)
ALT SERPL-CCNC: 59 U/L (ref 7–56)
APTT BLD: 26 SECONDS (ref 25.1–36.5)
AST SERPL-CCNC: 42 U/L (ref 17–59)
BASOPHILS # BLD AUTO: 0.01 K/MM3 (ref 0–2)
BASOPHILS NFR BLD: 0.1 % (ref 0–3)
BUN SERPL-MCNC: 17 MG/DL (ref 7–21)
CALCIUM SERPL-MCNC: 9.5 MG/DL (ref 8.4–10.5)
EOSINOPHIL # BLD: 0.2 10*3/UL (ref 0–0.7)
EOSINOPHIL NFR BLD: 2.2 % (ref 1.5–5)
ERYTHROCYTE [DISTWIDTH] IN BLOOD BY AUTOMATED COUNT: 20.4 % (ref 11.5–14.5)
GFR NON-AFRICAN AMERICAN: > 60
GRANULOCYTES # BLD: 5.67 10*3/UL (ref 1.4–6.5)
GRANULOCYTES NFR BLD: 67 % (ref 50–68)
HDLC SERPL-MCNC: 27 MG/DL (ref 29–60)
HGB BLD-MCNC: 8.4 G/DL (ref 14–18)
INR PPP: 1.4 (ref 0.93–1.08)
LDLC SERPL-MCNC: 39 MG/DL (ref 0–129)
LYMPHOCYTES # BLD: 1.3 10*3/UL (ref 1.2–3.4)
LYMPHOCYTES NFR BLD AUTO: 15.5 % (ref 22–35)
MCH RBC QN AUTO: 21.4 PG (ref 25–35)
MCHC RBC AUTO-ENTMCNC: 30.8 G/DL (ref 31–37)
MCV RBC AUTO: 69.6 FL (ref 80–105)
MONOCYTES # BLD AUTO: 1.3 10*3/UL (ref 0.1–0.6)
MONOCYTES NFR BLD: 15.2 % (ref 1–6)
PLATELET # BLD: 233 10^3/UL (ref 120–450)
PMV BLD AUTO: 9.7 FL (ref 7–11)
PROTHROMBIN TIME: 16.2 SECONDS (ref 9.4–12.5)
RBC # BLD AUTO: 3.92 10^6/UL (ref 3.5–6.1)
WBC # BLD AUTO: 8.5 10^3/UL (ref 4.5–11)

## 2018-04-08 RX ADMIN — OXYCODONE AND ACETAMINOPHEN PRN TAB: 10; 325 TABLET ORAL at 15:58

## 2018-04-08 RX ADMIN — INSULIN HUMAN SCH: 100 INJECTION, SOLUTION PARENTERAL at 21:35

## 2018-04-08 RX ADMIN — METOPROLOL TARTRATE PRN MG: 5 INJECTION INTRAVENOUS at 08:57

## 2018-04-08 RX ADMIN — INSULIN HUMAN SCH: 100 INJECTION, SOLUTION PARENTERAL at 16:29

## 2018-04-08 RX ADMIN — INSULIN HUMAN SCH: 100 INJECTION, SOLUTION PARENTERAL at 08:12

## 2018-04-08 RX ADMIN — OXYCODONE AND ACETAMINOPHEN PRN TAB: 10; 325 TABLET ORAL at 22:27

## 2018-04-08 RX ADMIN — INSULIN HUMAN SCH: 100 INJECTION, SOLUTION PARENTERAL at 12:08

## 2018-04-08 NOTE — CP.PCM.PN
Subjective





- Date & Time of Evaluation


Date of Evaluation: 04/08/18


Time of Evaluation: 07:45





- Subjective


Subjective: 





(covering for Dr. Pratt)


Patient is seen this morning in intensive care unit bed 4. He is sitting up


in the chair.  He complains of chronic back pain.  





Objective





- Vital Signs/Intake and Output


Vital Signs (last 24 hours): 


 











Temp Pulse Resp BP Pulse Ox


 


 97.5 F L  86   22   150/90   98 


 


 04/08/18 06:00  04/08/18 06:00  04/08/18 06:00  04/08/18 06:00  04/08/18 06:00








Intake and Output: 


 











 04/08/18 04/08/18





 06:59 18:59


 


Intake Total 940 


 


Output Total 1700 


 


Balance -760 














- Medications


Medications: 


 Current Medications





Folic Acid (Folic Acid)  1 mg IVP DAILY [facility]


   Last Admin: 04/07/18 12:26 Dose:  1 mg


Insulin Human Regular (Humulin R Low)  0 units SC ACHS [facility]


   PRN Reason: Protocol


   Last Admin: 04/08/18 08:12 Dose:  Not Given


Mesalamine (Pentasa)  1,000 mg PO QID [facility]


Metoprolol Tartrate (Lopressor)  50 mg PO Q8H [facility]


   Last Admin: 04/06/18 15:11 Dose:  Not Given


Metoprolol Tartrate (Lopressor)  2.5 mg IVP Q6H [facility]


   PRN Reason: HR > 120


   Last Admin: 04/07/18 02:56 Dose:  2.5 mg


Metoprolol Tartrate (Lopressor)  25 mg PO BID [facility]


   Last Admin: 04/07/18 18:08 Dose:  25 mg


Midodrine (Proamatine)  2.5 mg PO TID [facility]


Morphine Sulfate (Morphine)  2 mg IVP Q4H PRN


   PRN Reason: Pain, moderate (4-7)


Pantoprazole Sodium (Protonix Inj)  40 mg IVP Q12 [facility]


   Last Admin: 04/07/18 22:21 Dose:  40 mg











- Labs


Labs: 


 





 04/08/18 05:30 





 04/08/18 05:30 





 











PT  16.2 SECONDS (9.4-12.5)  H  04/08/18  07:20    


 


INR  1.40  (0.93-1.08)  H  04/08/18  07:20    


 


APTT  26.0 Seconds (25.1-36.5)   04/08/18  07:20    














- Constitutional


Appears: No Acute Distress





- Head Exam


Head Exam: ATRAUMATIC, NORMOCEPHALIC





- Respiratory Exam


Respiratory Exam: Clear to Ausculation Bilateral, NORMAL BREATHING PATTERN





- Cardiovascular Exam


Cardiovascular Exam: +S1, +S2





- GI/Abdominal Exam


GI & Abdominal Exam: Soft, Normal Bowel Sounds.  absent: Tenderness





- Neurological Exam


Neurological Exam: Alert, Awake





Assessment and Plan





- Assessment and Plan (Free Text)


Assessment: 





GI Bleed


HTN


CAD s/p stent


Atrial Fibrillation


Degenerative disk disease


diabetes





Plan: 





Patient's hemoglobin is stable around 8.4 after blood transfusion, FFP and 

vitamin K.


He will be transferred out of ICU today.  He is on liquid diet.  He will be 

prepped for


sigmoidoscopy for tomorrow.  GI and cardiology consults appreciated.  Patient 

is currently


off Plavix and Xarelto secondary to GI Bleed.  continue IV Protonix.   continue 

to monitor H&H.

## 2018-04-08 NOTE — PN
DATE:  04/08/2018



SUBJECTIVE:  I saw Mr. Henson this morning.  He is a 67-year-old 

male.  He has history of Crohn disease, coronary artery disease with recent

stent placement.  There were complaints of rectal bleeding and anemia.  The

patient indicated no abdominal pain yesterday; however, did have several

bowel movements with no blood.



The patient had no hematemesis.



PHYSICAL EXAMINATION:

VITAL SIGNS:  I reviewed this patient's vital signs.

HEENT:  Noncontributory.

LUNGS:  Decreased breath sounds at base.

HEART:  Irregular rhythm.

ABDOMEN:  Soft, protuberant.  No tenderness elicited in any quadrant.



LABORATORY DATA:  Include H and H of 8.8/28 with INR 2.6.  Chemistry was

reviewed as well.  I reviewed the progress note of respective consults.



ASSESSMENT:  A 67-year-old  male admitted with anemia, rectal

bleeding, also has significant history of coronary artery disease status

post recent stent placement.  The patient has been on Plavix plus Eliquis. 

Note that his last INR has been elevated.  Repeat value today.



The patient now will be scheduled for endoscopic procedure tomorrow. Due to

coagulation issues, procedure will be limited at this point in time.  I

reviewed this issue with Dr. Ramirez, who placed the patient's stents as well

as Dr. Clarke, who probably will see the patient today.  He will undertake a

partial GoLYTELY prep later on today.  This will be facilitated by several

enemas before his procedure tomorrow.



I reviewed the risks, benefits and alternatives of the procedure with the

patient this morning including alternative of no procedure.  The patient

wanted to have the procedure done.



This is progress note on behalf of Dr. Julio César Madrid, who will return tomorrow.





__________________________________________

Edenilson Hernández DO, PhD





DD:  04/08/2018 6:03:54

DT:  04/08/2018 10:43:17

Job # [id number]

OPHELIA

## 2018-04-09 LAB
BASOPHILS # BLD AUTO: 0.01 K/MM3 (ref 0–2)
BASOPHILS NFR BLD: 0.1 % (ref 0–3)
EOSINOPHIL # BLD: 0.3 10*3/UL (ref 0–0.7)
EOSINOPHIL NFR BLD: 4.3 % (ref 1.5–5)
ERYTHROCYTE [DISTWIDTH] IN BLOOD BY AUTOMATED COUNT: 20.9 % (ref 11.5–14.5)
GRANULOCYTES # BLD: 4.38 10*3/UL (ref 1.4–6.5)
GRANULOCYTES NFR BLD: 63.2 % (ref 50–68)
HGB BLD-MCNC: 8.2 G/DL (ref 14–18)
LYMPHOCYTES # BLD: 1.4 10*3/UL (ref 1.2–3.4)
LYMPHOCYTES NFR BLD AUTO: 20.6 % (ref 22–35)
MCH RBC QN AUTO: 21.6 PG (ref 25–35)
MCHC RBC AUTO-ENTMCNC: 30.9 G/DL (ref 31–37)
MCV RBC AUTO: 69.9 FL (ref 80–105)
MONOCYTES # BLD AUTO: 0.8 10*3/UL (ref 0.1–0.6)
MONOCYTES NFR BLD: 11.8 % (ref 1–6)
PLATELET # BLD: 217 10^3/UL (ref 120–450)
PMV BLD AUTO: 9.5 FL (ref 7–11)
RBC # BLD AUTO: 3.79 10^6/UL (ref 3.5–6.1)
WBC # BLD AUTO: 6.9 10^3/UL (ref 4.5–11)

## 2018-04-09 PROCEDURE — 0DJD8ZZ INSPECTION OF LOWER INTESTINAL TRACT, VIA NATURAL OR ARTIFICIAL OPENING ENDOSCOPIC: ICD-10-PCS | Performed by: INTERNAL MEDICINE

## 2018-04-09 RX ADMIN — OXYCODONE AND ACETAMINOPHEN PRN TAB: 10; 325 TABLET ORAL at 17:52

## 2018-04-09 RX ADMIN — INSULIN HUMAN SCH: 100 INJECTION, SOLUTION PARENTERAL at 22:02

## 2018-04-09 RX ADMIN — OXYCODONE AND ACETAMINOPHEN PRN TAB: 10; 325 TABLET ORAL at 23:37

## 2018-04-09 RX ADMIN — INSULIN HUMAN SCH: 100 INJECTION, SOLUTION PARENTERAL at 14:28

## 2018-04-09 RX ADMIN — OXYCODONE AND ACETAMINOPHEN PRN TAB: 10; 325 TABLET ORAL at 11:36

## 2018-04-09 RX ADMIN — INSULIN HUMAN SCH: 100 INJECTION, SOLUTION PARENTERAL at 09:13

## 2018-04-09 RX ADMIN — INSULIN HUMAN SCH: 100 INJECTION, SOLUTION PARENTERAL at 17:54

## 2018-04-09 NOTE — PN
DATE:  04/09/2018



SUBJECTIVE:  I saw Mr. Henson this morning.  He is a 67-year-old 

male admitted with complaints of anemia, rectal bleeding for the past

several months.  The patient had been transfused, was scheduled for an

endoscopic procedure later on this morning.  I reviewed this case with the

nurse this morning as well as the nurse on the floor throughout the day

yesterday.  Note that the patient was advised multiple times about

consuming a clear liquid diet during the process of his colon prep.  The

patient against medical advice ate a full meal for lunch yesterday.



I reviewed the issue of the bowel prep with the nurse on the floor this

morning.  The patient is still not clear, I reiterated this to the patient

at the bedside.  At bedside this morning, the patient indicated no

abdominal pain or shortness of breath.  The patient wanted to have the

procedure done.



PHYSICAL EXAMINATION:

VITAL SIGNS:  I reviewed this patient's vital signs.

HEENT:  Noncontributory.

LUNGS:  Decreased breath sounds, basilar.

HEART:  Irregular rhythm.

ABDOMEN:  Protuberant.  No tenderness elicited.



LABORATORY DATA:  I reviewed the patient's laboratory data in the past

couple of days.  New data will be out this morning.



OVERALL ASSESSMENT:  A 67-year-old  male with history of coronary

artery disease with a recent stent scheduled for a colonoscopy procedure

this morning.  

He is scheduled for tap-water enemas within the next hour or 2 as ordered

previously.  Note that the patient's cardiac status is labile right now and

the  rationale for endoscopy  was discussed with both Dr. Ramirez as well as Dr. Clarke

over several days.  We will make an attempt to do the procedure later this

morning, roughly around 9 o'clock.  Note that this patient had been seen

over past couple of days on behalf of Dr. Julio César Madrid who is on vacation.  He

will most likely take over the case when he comes back later on this

morning.





__________________________________________

Edenilson Hernández DO, PhD



DD:  04/09/2018 5:23:45

DT:  04/09/2018 8:14:32

Job # [id number]

OPHELIA

## 2018-04-09 NOTE — CON
DATE:  04/07/2018



SERVICE:  Cardiology.



REASON FOR CONSULTATION:  GI bleed, history of AFib, history of coronary

artery disease, on Xarelto and Plavix.



BRIEF CLINICAL HISTORY:  This is a 67-year-old male with past medical

history of Crohn's disease, colonic diverticular chronic disease,

hypertension, hyperlipidemia, diabetes, atrial fibrillation, coronary

artery disease, on Xarelto and Plavix, came in with a bloody stool for many

month off and on, admitted to ICU because of low H and H, admitting

hemoglobin 7.2.  Patient denies any chest pain, shortness of breath, or any

palpitations.



PAST MEDICAL HISTORY:  Significant for as mentioned coronary artery

disease, status post a stent; history of chronic atrial fibrillation,

diabetes, hypertension, hyperlipidemia, diverticular colonic disease,

Crohn's disease, chronic back pain.



PREVIOUS CARDIAC WORKUP:  As follows, patient had a cardiac

catheterization, drug-eluting stent in LAD in 01/09/2018; history of

chronic atrial fibrillation, on Xarelto.



ALLERGIES:  NO KNOWN DRUG ALLERGIES.



CURRENT MEDICATIONS:  Patient at home was taking a Glucophage, valsartan,

Xarelto 20 mg daily, Plavix 75 mg daily, Colace, folic acid, metoprolol 50

mg twice a day, oxycodone, potassium chloride.



REVIEW OF SYSTEMS:  As per HPI.



PHYSICAL EXAMINATION:

VITAL SIGNS:  Temperature afebrile, heart rate 104, blood pressure 146/90.

HEENT:  PERRLA.  Extraocular muscles intact.

NECK:  Supple.  No carotid bruit or thyromegaly.

CHEST:  Clear to auscultation.

HEART:  S1 and S2 regular.

ABDOMEN:  Soft.

EXTREMITIES:  Clubbing and cyanosis negative.



LABORATORY DATA:  Blood workup as follows:  WBC 9, hemoglobin 8.7,

hematocrit 24.5, platelet count 307.  Chemistry shows sodium 136, 

potassium 4.0, chloride 99, carbon dioxide of 25, anion gap 17, BUN 22,

creatinine 1.  Iron level 10, TIBC of 476, saturation 2%.



IMPRESSION:  Severe iron-deficiency anemia, gastrointestinal bleed, history

of coronary artery disease, status post recently a stent in 01/09/2018 in

left anterior descending artery with a drug-eluting stent; history of

chronic atrial fibrillation, on Xarelto; hyperlipidemia; gout.  Last

catheterization on 01/08/2018 had left anterior descending 80% stenosis,

stented; left ventricular function ejection fraction of 45%; circumflex and

right coronary artery shows some irregularity, but no flow obstructive

stenosis noted, ejection fraction of 45%.  Patient had drug-eluting stent

on 01/08/2018 in left anterior descending; mid left anterior descending is

80% stenosis, which was stented.  History of atrial fibrillation.



RECOMMENDATION:  Hold Xarelto.  Plavix is also on hold.  Continue beta

blocker.  Patient is a strict n.p.o., monitor H and H, keep hemoglobin

around 10.  We will increase the Lopressor to 50 every 6 hours with holding

parameter.  The risk of holding Plavix is high, but  risk-benefit ratio is

the same.  Otherwise, the patient can bleed to death.  Patient has a

dropping H and H.  We will follow and monitor closely and we will increase

IV Lopressor till the patient is n.p.o.  Once p.o. started, we will fill

back to p.o. meds.  We will follow with you.  Follow the H and H.  I will

turn over the care on Monday to Dr. Ramirez.  We will get magnesium as well as

phosphate and lipid profile on Monday.







__________________________________________

Kwasi Clarke MD



DD:  04/07/2018 9:54:11

DT:  04/07/2018 21:50:25

Job # [id number]

## 2018-04-09 NOTE — PN
DATE:  04/07/2018



SUBJECTIVE:  I saw Mr. Henson this morning.  This is a 67-year-old 

male with past medical history of atrial fibrillation, coronary artery

disease with a recent stent placed roughly a month ago, degenerative disk

disease, hypertension, diabetes, Crohn disease, internal hemorrhoids.  The

patient coming into the hospital with complaints of right-sided abdominal

pain and rectal bleeding.  He has indicated that bleeding had been on a

recurrent basis since last July.  Note that he has history of Crohn disease

which he located to the proximal colon and the distal small bowel. 

Medications he used for Crohn's  includes Pentasa only.  His last

endoscopic procedure was several years ago.



PHYSICAL EXAMINATION

VITAL SIGNS:   I reviewed this patient's vital signs.

HEENT:  Noncontributory.

LUNGS:  Decreased breath sounds, basilar.

HEART:  Irregular rhythm.

ABDOMEN:  Soft, protuberant.  No tenderness elicited in any quadrant,

except mildly in the epigastric area.



LABORATORY DATA:  Reviewed this patient's laboratory data which includes

last H&H of 7.8 and 25.  INR is 2.6.  Chemistry indicates iron 10 with

low TIBC.  BNP 2780 yesterday morning.



ASSESSMENT AND PLAN:  This is a 67-year-old  male with history of

recurrent bright red blood per rectum for protracted time.  He is admitted

with anemia.  Note that the patient had a recent stent placed.  He is on

Xarelto, which cannot be stopped.  The patient has a confounding factor of 

a history of Crohn's disease.  He did note also he has had several

episodes of diverticulitis and one small polyp removed in the past.  At the

current time point, he has dimunitive abdominal pain.



Because of the recent stent placed and the use of Eliquis, this resulted in

a therapeutic dilemma for an endoscopic procedure.  We reviewed the case

with nurses in the ICU this morning, indicates his bowel movements have

been brown with blood on the outside.  For this reason in lieu of a

colonoscopy, electively try for a flexible sigmoidoscopy on Monday morning

roughly around 09:30.  The patient will remain on his anticoagulation

regimen.  At a later date, the patient can have evaluation of his upper GI

either by enteroscopy or capsule endoscopy by his gastroenterologist.



Later on this morning, I will discuss the issue of possibly initiating

steroid pulse therapy in hopes of stopping bleeding which may be IBD

related.  There is no guarantee for this, but it may stabilize the

patient's H&H to alleviate recurrent blood transfusions.  I will start the

patient on small volume of ice chips.





__________________________________________

Edenilson Hernández DO, PhD



DD:  04/07/2018 5:15:07

DT:  04/07/2018 9:18:20

Job # [id number]

OPHELIA

## 2018-04-09 NOTE — PN
DATE:  04/09/2018



CARDIOLOGY FOLLOWUP



SUBJECTIVE:  The patient is no longer bleeding from the rectum.



PHYSICAL EXAMINATION:

VITAL SIGNS:  Blood pressure is 129/73, the heart rates in the 80s.

NECK:  Negative JVD.

LUNGS:  Without rales.

HEART:  Reveals S1, S2.

EXTREMITIES:  Without edema.



LABORATORY DATA:  Hemoglobin is 8.2.  Chemistries, glucose is 137.



IMPRESSION:  GI workup reveals hemorrhoids, is the source of bleeding.



PLAN:  Given these findings, we will restart the patient back on his Plavix

today.  We will hold on Xarelto for now and recheck his hemoglobin.  I have

discussed the risk of strokes from his unprotected atrial fibrillation with

the patient and his wife.





__________________________________________

Olu Ramirez MD



DD:  04/09/2018 13:16:35

DT:  04/09/2018 13:21:19

Job # [id number]

## 2018-04-09 NOTE — CP.PCM.PN
Subjective





- Date & Time of Evaluation


Date of Evaluation: 04/09/18


Time of Evaluation: 07:45





- Subjective


Subjective: 





(covering for Dr. Pratt)


Patient is seen this morning.  He is lying in bed.  There were no bloody bowel 

movements


yesterday as per patient.  





Objective





- Vital Signs/Intake and Output


Vital Signs (last 24 hours): 


 











Temp Pulse Resp BP Pulse Ox


 


 97.8 F   94 H  20   154/95 H  100 


 


 04/09/18 06:00  04/09/18 06:00  04/09/18 06:00  04/09/18 06:00  04/09/18 06:00








Intake and Output: 


 











 04/09/18 04/09/18





 06:59 18:59


 


Intake Total 0 


 


Balance 0 














- Medications


Medications: 


 Current Medications





Folic Acid (Folic Acid)  1 mg IVP DAILY [facility]


   Last Admin: 04/08/18 10:04 Dose:  1 mg


Hydralazine HCl (Apresoline)  10 mg PO QID PRN


   PRN Reason: for SBP>160


Insulin Human Regular (Humulin R Low)  0 units SC ACHS [facility]


   PRN Reason: Protocol


   Last Admin: 04/08/18 21:35 Dose:  Not Given


Mesalamine (Pentasa)  1,000 mg PO QID [facility]


Metoprolol Tartrate (Lopressor)  50 mg PO Q8H [facility]


   Last Admin: 04/06/18 15:11 Dose:  Not Given


Metoprolol Tartrate (Lopressor)  5 mg IVP Q6H PRN


   PRN Reason: HR > 120


   Last Admin: 04/08/18 08:57 Dose:  5 mg


Metoprolol Tartrate (Lopressor)  50 mg PO TID [facility]


   Last Admin: 04/08/18 17:43 Dose:  50 mg


Midodrine (Proamatine)  2.5 mg PO TID [facility]


Oxycodone/Acetaminophen (Percocet 10/325 Mg Tab)  1 tab PO Q6H PRN


   PRN Reason: Pain, severe (8-10)


   Last Admin: 04/08/18 22:27 Dose:  1 tab


Pantoprazole Sodium (Protonix Inj)  40 mg IVP Q12 [facility]


   Last Admin: 04/08/18 22:00 Dose:  Not Given











- Labs


Labs: 


 





 04/09/18 06:30 





 04/08/18 05:30 





 











PT  16.2 SECONDS (9.4-12.5)  H  04/08/18  07:20    


 


INR  1.40  (0.93-1.08)  H  04/08/18  07:20    


 


APTT  26.0 Seconds (25.1-36.5)   04/08/18  07:20    














- Constitutional


Appears: No Acute Distress





- Head Exam


Head Exam: ATRAUMATIC, NORMOCEPHALIC





- Respiratory Exam


Respiratory Exam: Clear to Ausculation Bilateral, NORMAL BREATHING PATTERN





- Cardiovascular Exam


Cardiovascular Exam: +S1, +S2





- GI/Abdominal Exam


GI & Abdominal Exam: Soft, Normal Bowel Sounds.  absent: Tenderness





- Neurological Exam


Neurological Exam: Alert, Awake, Oriented x3





Assessment and Plan





- Assessment and Plan (Free Text)


Assessment: 





GI Bleed


CAD s/p stent


Diabetes


Atrial Fibrillation


Crohn's disease





Plan: 





Patient is seen this morning.  His hemoglobin is 8.2, which is slightly lower 

than yesterday.


He has been transfused 2 units of packed red blood cells.  He will go for 

sigmoidoscopy this 


morning.  He is currently off Plavix and Xarelto due to GI Bleed.  We will 

await results of 


sigmoidoscopy.

## 2018-04-10 LAB
BASOPHILS # BLD AUTO: 0.01 K/MM3 (ref 0–2)
BASOPHILS NFR BLD: 0.1 % (ref 0–3)
EOSINOPHIL # BLD: 0.3 10*3/UL (ref 0–0.7)
EOSINOPHIL NFR BLD: 4 % (ref 1.5–5)
ERYTHROCYTE [DISTWIDTH] IN BLOOD BY AUTOMATED COUNT: 21.1 % (ref 11.5–14.5)
ERYTHROCYTE [DISTWIDTH] IN BLOOD BY AUTOMATED COUNT: 21.2 % (ref 11.5–14.5)
GRANULOCYTES # BLD: 4.98 10*3/UL (ref 1.4–6.5)
GRANULOCYTES NFR BLD: 67.9 % (ref 50–68)
HGB BLD-MCNC: 7.9 G/DL (ref 14–18)
HGB BLD-MCNC: 8.5 G/DL (ref 14–18)
LYMPHOCYTES # BLD: 1.2 10*3/UL (ref 1.2–3.4)
LYMPHOCYTES NFR BLD AUTO: 16.5 % (ref 22–35)
MCH RBC QN AUTO: 21 PG (ref 25–35)
MCH RBC QN AUTO: 21.5 PG (ref 25–35)
MCHC RBC AUTO-ENTMCNC: 29.9 G/DL (ref 31–37)
MCHC RBC AUTO-ENTMCNC: 30.5 G/DL (ref 31–37)
MCV RBC AUTO: 70 FL (ref 80–105)
MCV RBC AUTO: 70.6 FL (ref 80–105)
MONOCYTES # BLD AUTO: 0.8 10*3/UL (ref 0.1–0.6)
MONOCYTES NFR BLD: 11.5 % (ref 1–6)
PLATELET # BLD: 189 10^3/UL (ref 120–450)
PLATELET # BLD: 197 10^3/UL (ref 120–450)
PMV BLD AUTO: 10.3 FL (ref 7–11)
PMV BLD AUTO: 8.6 FL (ref 7–11)
RBC # BLD AUTO: 3.77 10^6/UL (ref 3.5–6.1)
RBC # BLD AUTO: 3.95 10^6/UL (ref 3.5–6.1)
WBC # BLD AUTO: 7.3 10^3/UL (ref 4.5–11)
WBC # BLD AUTO: 7.5 10^3/UL (ref 4.5–11)

## 2018-04-10 RX ADMIN — INSULIN HUMAN SCH: 100 INJECTION, SOLUTION PARENTERAL at 08:25

## 2018-04-10 RX ADMIN — OXYCODONE AND ACETAMINOPHEN PRN TAB: 10; 325 TABLET ORAL at 18:24

## 2018-04-10 RX ADMIN — INSULIN HUMAN SCH: 100 INJECTION, SOLUTION PARENTERAL at 16:51

## 2018-04-10 RX ADMIN — INSULIN HUMAN SCH: 100 INJECTION, SOLUTION PARENTERAL at 21:54

## 2018-04-10 RX ADMIN — INSULIN HUMAN SCH: 100 INJECTION, SOLUTION PARENTERAL at 12:05

## 2018-04-10 RX ADMIN — OXYCODONE AND ACETAMINOPHEN PRN TAB: 10; 325 TABLET ORAL at 09:42

## 2018-04-10 NOTE — PN
DATE:  04/10/2018



SUBJECTIVE:  The patient denies any rectal bleeding or melena.  His blood

count this morning again dropped to 7.9 but on repeat this morning, four

and half hours later, his blood count is at 8.5.  Colonoscopy performed by

Dr. Hernández yesterday revealed no blood throughout the colon with

extensive diverticulosis in the sigmoid and descending colon as well as

large internal hemorrhoids with stigmata of recent bleeding.



MEDICATIONS:  Currently include hydrocortisone cream to the rectum, folic

acid 1 mg IV daily, metoprolol 50 mg q.8 hours, Pentasa 1000 mg p.o.

q.i.d., Plavix 75 mg daily, ProAmatine 2.5 mg p.o. t.i.d., pantoprazole 40

mg IV q.12 hours.



PHYSICAL EXAMINATION:

VITAL SIGNS:  Reveal temperature of 97.8, blood pressure 137/71, heart rate

of 104.

HEENT:  Reveal sclerae to be white.  Conjunctivae pale.

NECK:  Supple.

CHEST:  Reveal lungs to be clear.

HEART:  Reveals an irregularly irregular rate.

ABDOMEN:  Protuberant, soft, nontender.

EXTREMITIES:  Show 1+ pitting edema.



LABORATORY DATA:  Reveal last hemoglobin this morning of 8.5, white blood

cell count 7.5.  Chemistries reveal blood sugar of 125, otherwise no other

recent laboratory data are available.



IMPRESSION:  Gastrointestinal bleeding with anemia in a patient with

coronary artery disease status post drug-eluding stent in the LAD several

months ago on Plavix with a history of atrial fibrillation, formerly on

Xarelto with history of Crohn disease in the past.  There is no evidence of

Crohn disease on colonoscopy.  He remains anemic, although his hemoglobin

appears to remain stable over the last 48 hours.



RECOMMENDATIONS:

1.  I will schedule the patient for an upper endoscopy in the morning.

2.  Maintain on liquid diet.

3.  Follow serial hematocrits.

4.  The patient is to receive 2 units of packed red blood cells today.





__________________________________________

Julio César Madrid MD



DD:  04/10/2018 11:19:14

DT:  04/10/2018 11:21:25

Job # [id number]

## 2018-04-10 NOTE — CP.PCM.PN
Subjective





- Date & Time of Evaluation


Date of Evaluation: 04/10/18


Time of Evaluation: 07:30





- Subjective


Subjective: 





(covering for Dr. Pratt)


Patient is seen this morning.  He complains of chronic pain.  Sigmoidoscopy 


was done yesterday.  





Objective





- Vital Signs/Intake and Output


Vital Signs (last 24 hours): 


 











Temp Pulse Resp BP Pulse Ox


 


 97.8 F   104 H  20   137/71   97 


 


 04/10/18 06:00  04/10/18 06:00  04/10/18 06:00  04/10/18 06:00  04/10/18 06:00








Intake and Output: 


 











 04/10/18 04/10/18





 06:59 18:59


 


Intake Total 460 


 


Output Total 800 


 


Balance -340 














- Medications


Medications: 


 Current Medications





Clopidogrel Bisulfate (Plavix)  75 mg PO DAILY [facility]


   Last Admin: 04/09/18 14:21 Dose:  75 mg


Folic Acid (Folic Acid)  1 mg IVP DAILY [facility]


   Last Admin: 04/09/18 17:54 Dose:  1 mg


Furosemide (Lasix)  40 mg IVP ONCE ONE


   Stop: 04/10/18 08:11


Hydralazine HCl (Apresoline)  10 mg PO QID PRN


   PRN Reason: for SBP>160


Hydrocortisone (Anusol-Hc)  25 mg RC BID [facility]


   Stop: 04/15/18 23:59


   Last Admin: 04/09/18 17:53 Dose:  Not Given


Insulin Human Regular (Humulin R Low)  0 units SC ACHS [facility]


   PRN Reason: Protocol


   Last Admin: 04/09/18 22:02 Dose:  Not Given


Mesalamine (Pentasa)  1,000 mg PO QID [facility]


Metoprolol Tartrate (Lopressor)  50 mg PO Q8H [facility]


   Last Admin: 04/06/18 15:11 Dose:  Not Given


Metoprolol Tartrate (Lopressor)  5 mg IVP Q6H PRN


   PRN Reason: HR > 120


   Last Admin: 04/08/18 08:57 Dose:  5 mg


Metoprolol Tartrate (Lopressor)  50 mg PO TID [facility]


   Last Admin: 04/09/18 17:52 Dose:  50 mg


Midodrine (Proamatine)  2.5 mg PO TID [facility]


Oxycodone/Acetaminophen (Percocet 10/325 Mg Tab)  1 tab PO Q6H PRN


   PRN Reason: Pain, severe (8-10)


   Last Admin: 04/09/18 23:37 Dose:  1 tab


Pantoprazole Sodium (Protonix Inj)  40 mg IVP Q12 [facility]


   Last Admin: 04/09/18 22:01 Dose:  40 mg











- Labs


Labs: 


 





 04/10/18 06:30 





 04/08/18 05:30 





 











PT  16.2 SECONDS (9.4-12.5)  H  04/08/18  07:20    


 


INR  1.40  (0.93-1.08)  H  04/08/18  07:20    


 


APTT  26.0 Seconds (25.1-36.5)   04/08/18  07:20    














- Constitutional


Appears: No Acute Distress





- Head Exam


Head Exam: ATRAUMATIC, NORMOCEPHALIC





- Respiratory Exam


Respiratory Exam: Clear to Ausculation Bilateral, NORMAL BREATHING PATTERN





- Cardiovascular Exam


Cardiovascular Exam: +S1, +S2





- GI/Abdominal Exam


GI & Abdominal Exam: Soft, Normal Bowel Sounds.  absent: Tenderness





- Extremities Exam


Extremities Exam: Pedal Edema





- Neurological Exam


Neurological Exam: Alert, Awake, Oriented x3





Assessment and Plan





- Assessment and Plan (Free Text)


Assessment: 





GI Bleed due to internal hemorrhoids


CAD s/p stents


AFib


Diabetes


Chronic back pain





Plan: 





Patient's hemoglobin is 7.9 today and has been steadily decreasing over the 


last few days.  will transfuse 2 units today.  Give one dose of Lasix in between


the two units.  continue to monitor H&H.  Sigmoidoscopy showed internal 

hemorrhoids.


No active sites of bleeding or lesions were seen.  Patient has been re-started 

on Plavix.


Re-start Xarelto as per Dr. Ramirez, cardiologist.  Patient is OOB to chair.

## 2018-04-10 NOTE — PN
DATE:  04/10/2018



CARDIOLOGY FOLLOWUP



SUBJECTIVE:  The patient denies any more rectal bleeding.



He was started on Plavix yesterday.



PHYSICAL EXAMINATION:

VITAL SIGNS:  Blood pressure is 142/81, heart rate is 100.

NECK:  Negative JVD.

LUNGS:  Without rales.

HEART:  Reveals S1, S2.

EXTREMITIES:  Without edema.



LABORATORY DATA:  Glucose is 125, hemoglobin is 7.9.



IMPRESSION:

1.  Status post gastrointestinal bleed, which was thought to be

hemorrhoidal in nature.

2.  Atrial fibrillation.

3.  Coronary artery disease.

4.  Obesity.

5.  Hypertension.



PLAN:  Given these findings, we will get another stat CBC to check.  We

will advance diet if okay by GI.





__________________________________________

Olu Ramirez MD



DD:  04/10/2018 11:02:12

DT:  04/10/2018 11:05:59

Job # [id number]

## 2018-04-11 LAB
ALBUMIN SERPL-MCNC: 4.6 G/DL (ref 3–4.8)
ALBUMIN/GLOB SERPL: 1.2 {RATIO} (ref 1.1–1.8)
ALT SERPL-CCNC: 49 U/L (ref 7–56)
ARTERIAL BLOOD GAS HEMOGLOBIN: 9.5 G/DL (ref 11.7–17.4)
ARTERIAL BLOOD GAS O2 SAT: 98.5 % (ref 95–98)
ARTERIAL BLOOD GAS PCO2: 35 MM/HG (ref 35–45)
ARTERIAL BLOOD GAS TCO2: 29 MMOL.L (ref 22–28)
AST SERPL-CCNC: 35 U/L (ref 17–59)
BASOPHILS # BLD AUTO: 0.01 K/MM3 (ref 0–2)
BASOPHILS # BLD AUTO: 0.02 K/MM3 (ref 0–2)
BASOPHILS NFR BLD: 0.1 % (ref 0–3)
BASOPHILS NFR BLD: 0.2 % (ref 0–3)
BNP SERPL-MCNC: 2480 PG/ML (ref 0–450)
BUN SERPL-MCNC: 13 MG/DL (ref 7–21)
CALCIUM SERPL-MCNC: 10.2 MG/DL (ref 8.4–10.5)
EOSINOPHIL # BLD: 0.2 10*3/UL (ref 0–0.7)
EOSINOPHIL # BLD: 0.2 10*3/UL (ref 0–0.7)
EOSINOPHIL NFR BLD: 2 % (ref 1.5–5)
EOSINOPHIL NFR BLD: 2.6 % (ref 1.5–5)
ERYTHROCYTE [DISTWIDTH] IN BLOOD BY AUTOMATED COUNT: 21.9 % (ref 11.5–14.5)
ERYTHROCYTE [DISTWIDTH] IN BLOOD BY AUTOMATED COUNT: 21.9 % (ref 11.5–14.5)
GFR NON-AFRICAN AMERICAN: > 60
GRANULOCYTES # BLD: 6.27 10*3/UL (ref 1.4–6.5)
GRANULOCYTES # BLD: 7.08 10*3/UL (ref 1.4–6.5)
GRANULOCYTES NFR BLD: 74.7 % (ref 50–68)
GRANULOCYTES NFR BLD: 76.7 % (ref 50–68)
HCO3 BLDA-SCNC: 27.9 MMOL/L (ref 21–28)
HGB BLD-MCNC: 10.5 G/DL (ref 14–18)
HGB BLD-MCNC: 9.6 G/DL (ref 14–18)
INHALED O2 CONCENTRATION: 21 %
LYMPHOCYTES # BLD: 1 10*3/UL (ref 1.2–3.4)
LYMPHOCYTES # BLD: 1 10*3/UL (ref 1.2–3.4)
LYMPHOCYTES NFR BLD AUTO: 11.3 % (ref 22–35)
LYMPHOCYTES NFR BLD AUTO: 12 % (ref 22–35)
MCH RBC QN AUTO: 22.2 PG (ref 25–35)
MCH RBC QN AUTO: 22.2 PG (ref 25–35)
MCHC RBC AUTO-ENTMCNC: 30.8 G/DL (ref 31–37)
MCHC RBC AUTO-ENTMCNC: 30.8 G/DL (ref 31–37)
MCV RBC AUTO: 71.9 FL (ref 80–105)
MCV RBC AUTO: 72.1 FL (ref 80–105)
MONOCYTES # BLD AUTO: 0.9 10*3/UL (ref 0.1–0.6)
MONOCYTES # BLD AUTO: 0.9 10*3/UL (ref 0.1–0.6)
MONOCYTES NFR BLD: 10.5 % (ref 1–6)
MONOCYTES NFR BLD: 9.9 % (ref 1–6)
O2 CAP BLDA-SCNC: 13.1 ML/DL (ref 16–24)
O2 CT BLDA-SCNC: 12.9 ML/DL (ref 15–23)
PH BLDA: 7.51 [PH] (ref 7.35–7.45)
PLATELET # BLD: 203 10^3/UL (ref 120–450)
PLATELET # BLD: 203 10^3/UL (ref 120–450)
PMV BLD AUTO: 9.3 FL (ref 7–11)
PMV BLD AUTO: 9.8 FL (ref 7–11)
PO2 BLDA: 82 MM/HG (ref 80–100)
RBC # BLD AUTO: 4.33 10^6/UL (ref 3.5–6.1)
RBC # BLD AUTO: 4.74 10^6/UL (ref 3.5–6.1)
T4 SERPL-MCNC: 8.5 UG/DL (ref 5.5–11)
TROPONIN I SERPL-MCNC: 0.02 NG/ML
WBC # BLD AUTO: 8.4 10^3/UL (ref 4.5–11)
WBC # BLD AUTO: 9.2 10^3/UL (ref 4.5–11)

## 2018-04-11 RX ADMIN — OXYCODONE AND ACETAMINOPHEN SCH TAB: 10; 325 TABLET ORAL at 23:47

## 2018-04-11 RX ADMIN — INSULIN HUMAN SCH: 100 INJECTION, SOLUTION PARENTERAL at 11:30

## 2018-04-11 RX ADMIN — INSULIN HUMAN SCH: 100 INJECTION, SOLUTION PARENTERAL at 21:53

## 2018-04-11 RX ADMIN — INSULIN HUMAN SCH: 100 INJECTION, SOLUTION PARENTERAL at 08:26

## 2018-04-11 RX ADMIN — OXYCODONE AND ACETAMINOPHEN SCH: 10; 325 TABLET ORAL at 10:46

## 2018-04-11 RX ADMIN — OXYCODONE AND ACETAMINOPHEN SCH TAB: 10; 325 TABLET ORAL at 18:14

## 2018-04-11 RX ADMIN — OXYCODONE AND ACETAMINOPHEN SCH TAB: 10; 325 TABLET ORAL at 13:52

## 2018-04-11 RX ADMIN — METOPROLOL TARTRATE PRN MG: 5 INJECTION INTRAVENOUS at 08:45

## 2018-04-11 RX ADMIN — INSULIN HUMAN SCH UNITS: 100 INJECTION, SOLUTION PARENTERAL at 18:15

## 2018-04-11 NOTE — CON
DATE:  04/11/2018



PULMONARY CONSULTATION



REASON FOR CONSULTATION:  Shortness of breath.



REFERRING PHYSICIAN:  Myron Lange MD



HISTORY OF PRESENT ILLNESS:  History is obtained via extensive discussion

with the nurse.  I have also reviewed the chart at length and discussed the

case with the patient at length.



The patient is a 67-year-old male, with past medical history significant

for atrial fibrillation (on Xarelto), coronary artery disease, status post

angioplasty, degenerative joint disease, hypertension, diabetes, obesity,

who presented to [facility] - originally on 04/06/2018 - with

main complaints of right-sided abdominal pain and blood in his stool.  In

addition, the patient also complained of being weak and lightheaded.  In

the emergency room, the patient was noted to have a significant anemia.  He

was thus admitted for additional evaluation.



Again, I did discuss the case with the telemetry nurse at length.  The night

nurse did confirm that the patient has been mildly short of breath starting

from last night.  The patient was also noted to be somewhat agitated. 

There is no history of cough or sputum production.  There is no history of

chest pain, coughing up of blood, or chest pain - made worse with deep

respirations.  There is no history of temperatures, chills or infectious

exposure.  There is no history of night sweats, weight loss or appetite

change prior to the above events.  No history of leg or calf pains.  No

history of syncope or diaphoresis.  No history of recent travel or trauma.



REVIEW OF SYSTEMS:  No history of acute urinary symptoms.  No new

 musculoskeletal complaints.  Rest of the review of systems is

negative.



ALLERGIES:  NO KNOWN ALLERGIES.



SOCIAL HISTORY:  Negative for tobacco and negative for alcohol-- as per the

patient.



FAMILY HISTORY  No inheritable diseases.



HOME MEDICATIONS:  include Zanaflex, Glucophage, Diovan, Crestor, Xarelto,

Percocet, Lopressor, Lexapro, Colace, Cymbalta, Plavix and Tessalon.



PHYSICAL EXAMINATION

GENERAL:  The patient is very mildly short of breath.  He is certainly in no

acute distress.

VITAL SIGNS:  Temperature is 98.7, pulse on the monitor is 128, respiratory

rate 20/22, blood pressure 148/78.  Oxygen saturation on nasal cannula is

99%.

HEENT:  Normocephalic, atraumatic.  No JVD.

CARDIOVASCULAR:  Systolic ejection murmur at the lower left sternal border.

Questionable S3 gallop.

LUNGS:  Minimal crackles at the bases.  No rhonchi.  No wheezing.

EXTREMITIES:  Positive for edema.  No cyanosis, no clubbing.  Calves are

nontender to palpation.

GI:  Abdomen is soft, nontender and nondistended.  Bowel sounds are

positive.

SKIN:  No acute rash.

NEUROLOGIC:  Limited at the present time.



PERTINENT LABORATORY DATA:  Chest x-ray was done and reviewed.  There is a

mild increase in pulmonary vascular congestion noted.  There is also

significant cardiomegaly noted.  CBC:  White count 9.2, hemoglobin 9.6,

hematocrit 31.2, platelets of 203,000.  Arterial blood gas was done on room

air.  Results are pH 7.51, pCO2 of 35, pO2 of 82.  Complete metabolic

profile:  Glucose 137, B-type natriuretic peptide 2480.  Total protein 8.5.

Rest of the metabolic profiles within normal limits.



IMPRESSION:

1.  Shortness of breath.

2.  Mild congestive heart failure.

3.  Gastrointestinal bleeding.

4.  Anemia.

5.  Coronary artery disease.



PLAN:  Again, I did discuss the case with the nurse at length.  I have also

reviewed the chart at length, and discussed the case with the patient at

length.



The patient presented to [facility] - originally on 04/06/2018

- with right-sided abdominal pain and gastrointestinal bleeding.  Again, in

the emergency room, he was noted to be significantly anemic, and thus

admitted for additional evaluation.



Apparently, starting from last night, the patient was noted to be mildly

short of breath.  I did review the chest x-ray as above.  The chest x-ray

does show an increase in pulmonary vascular congestion.  I have also

reviewed the laboratory data.  A significant rise in the B-type natriuretic

peptide is noted.  The patient was given Lasix last night and this morning.

I have also reviewed the arterial blood gas.  There is a mixed respiratory

and metabolic alkalosis present.  There is no significant alveolar-arterial

gradient.  On physical exam, there is no significant bronchospasm noted. 

Due to the above history, I will order aspiration precautions.  GI and

Cardiology evaluations are ordered.  The patient is for endoscopy in the

near future.  Neurology evaluation is also ordered.



The patient's clinical status appears somewhat guarded at this point in

time.  Additional pulmonary intervention will be based on the clinical

status of the patient.  I will discuss the above with the attending

physician.



Thank you very much for this pulmonary consultation.





__________________________________________

Pepe Owens MD



DD:  04/11/2018 10:03:14

DT:  04/11/2018 10:08:33

Job # [id number]



OPHELIA

## 2018-04-11 NOTE — CP.PCM.PN
Subjective





- Date & Time of Evaluation


Date of Evaluation: 04/11/18


Time of Evaluation: 07:45





- Subjective


Subjective: 





(covering for Dr. Pratt)


Patient is seen this morning.  He is to go for endoscopy this morning.  





Objective





- Vital Signs/Intake and Output


Vital Signs (last 24 hours): 


 











Temp Pulse Resp BP Pulse Ox


 


 98.7 F   145 H  22   146/84   99 


 


 04/11/18 06:00  04/11/18 06:00  04/11/18 06:00  04/11/18 06:00  04/11/18 06:00








Intake and Output: 


 











 04/11/18 04/11/18





 06:59 18:59


 


Intake Total 562 0


 


Output Total  800


 


Balance 562 -800














- Medications


Medications: 


 Current Medications





Clopidogrel Bisulfate (Plavix)  75 mg PO DAILY [facility]


   Last Admin: 04/10/18 09:44 Dose:  75 mg


Folic Acid (Folic Acid)  1 mg IVP DAILY [facility]


   Last Admin: 04/10/18 10:25 Dose:  1 mg


Hydralazine HCl (Apresoline)  10 mg PO QID PRN


   PRN Reason: for SBP>160


Hydrocortisone (Anusol-Hc)  25 mg RC BID [facility]


   Stop: 04/15/18 23:59


   Last Admin: 04/10/18 18:25 Dose:  Not Given


Insulin Human Regular (Humulin R Low)  0 units SC ACHS [facility]


   PRN Reason: Protocol


   Last Admin: 04/10/18 21:54 Dose:  Not Given


Mesalamine (Pentasa)  1,000 mg PO QID [facility]


Metoprolol Tartrate (Lopressor)  5 mg IVP Q6H [facility]


   PRN Reason: HR > 120


   Last Admin: 04/08/18 08:57 Dose:  5 mg


Metoprolol Tartrate (Lopressor)  50 mg PO TID [facility]


   Last Admin: 04/10/18 18:24 Dose:  50 mg


Midodrine (Proamatine)  2.5 mg PO TID [facility]


Oxycodone/Acetaminophen (Percocet 10/325 Mg Tab)  1 tab PO Q6H [facility]


   PRN Reason: Pain, severe (8-10)


   Last Admin: 04/10/18 18:24 Dose:  1 tab


Pantoprazole Sodium (Protonix Inj)  40 mg IVP Q12 [facility]


   Last Admin: 04/10/18 21:01 Dose:  40 mg











- Labs


Labs: 


 





 04/11/18 06:30 





 04/11/18 04:10 





 











PT  16.2 SECONDS (9.4-12.5)  H  04/08/18  07:20    


 


INR  1.40  (0.93-1.08)  H  04/08/18  07:20    


 


APTT  26.0 Seconds (25.1-36.5)   04/08/18  07:20    














- Constitutional


Appears: No Acute Distress





- Head Exam


Head Exam: ATRAUMATIC, NORMOCEPHALIC





- Respiratory Exam


Respiratory Exam: Rhonchi, NORMAL BREATHING PATTERN





- Cardiovascular Exam


Cardiovascular Exam: +S1, +S2





- GI/Abdominal Exam


GI & Abdominal Exam: Soft, Normal Bowel Sounds.  absent: Tenderness





- Extremities Exam


Extremities Exam: Pedal Edema





- Neurological Exam


Neurological Exam: Alert, Awake, Oriented x3





Assessment and Plan





- Assessment and Plan (Free Text)


Assessment: 





GI bleed


CAD s/p stent


HTN


Diabetes


chronic back pain





Plan: 





Patient was short of breath this morning.  He was given one dose of IV Lasix.  

He was transfused


2 units PRBCs yesterday.  One dose of Lasix was also given before the 2nd unit.

  Hemoglobin this morning


was done twice.  Initial was 10.5.  Repeat CBC shows hemoglobin of 9.6.  

Patient to go 


for endoscopy today.

## 2018-04-11 NOTE — CP.PCM.PN
Subjective





- Date & Time of Evaluation


Date of Evaluation: 04/11/18


Time of Evaluation: 04:16





- Subjective


Subjective: 





calld by nurse pt is sob . pt is admitted for anemia , hx of crohn,s disease 

hemmorhoids pulmonary htn last echo 2017 concentric hypertrophy of heart ,


s/p transfusion of 1 unit of prbc. pt  is sob.pulse ox is 98 %.hr 94.bp 163/94.





Objective





- Vital Signs/Intake and Output


Vital Signs (last 24 hours): 


 











Temp Pulse Resp BP Pulse Ox


 


 97.9 F   103 H  20   107/74   97 


 


 04/11/18 00:01  04/11/18 02:00  04/11/18 00:01  04/11/18 00:01  04/11/18 00:01








Intake and Output: 


 











 04/10/18 04/11/18





 18:59 06:59


 


Intake Total 278 281


 


Balance 278 281














- Medications


Medications: 


 Current Medications





Clopidogrel Bisulfate (Plavix)  75 mg PO DAILY [facility]


   Last Admin: 04/10/18 09:44 Dose:  75 mg


Folic Acid (Folic Acid)  1 mg IVP DAILY [facility]


   Last Admin: 04/10/18 10:25 Dose:  1 mg


Hydralazine HCl (Apresoline)  10 mg PO QID PRN


   PRN Reason: for SBP>160


Hydrocortisone (Anusol-Hc)  25 mg RC BID [facility]


   Stop: 04/15/18 23:59


   Last Admin: 04/10/18 18:25 Dose:  Not Given


Insulin Human Regular (Humulin R Low)  0 units SC ACHS [facility]


   PRN Reason: Protocol


   Last Admin: 04/10/18 21:54 Dose:  Not Given


Mesalamine (Pentasa)  1,000 mg PO QID [facility]


Metoprolol Tartrate (Lopressor)  5 mg IVP Q6H PRN


   PRN Reason: HR > 120


   Last Admin: 04/08/18 08:57 Dose:  5 mg


Metoprolol Tartrate (Lopressor)  50 mg PO TID [facility]


   Last Admin: 04/10/18 18:24 Dose:  50 mg


Midodrine (Proamatine)  2.5 mg PO TID [facility]


Oxycodone/Acetaminophen (Percocet 10/325 Mg Tab)  1 tab PO Q6H PRN


   PRN Reason: Pain, severe (8-10)


   Last Admin: 04/10/18 18:24 Dose:  1 tab


Pantoprazole Sodium (Protonix Inj)  40 mg IVP Q12 [facility]


   Last Admin: 04/10/18 21:01 Dose:  40 mg











- Labs


Labs: 


 





 04/10/18 11:00 





 04/08/18 05:30 





 











PT  16.2 SECONDS (9.4-12.5)  H  04/08/18  07:20    


 


INR  1.40  (0.93-1.08)  H  04/08/18  07:20    


 


APTT  26.0 Seconds (25.1-36.5)   04/08/18  07:20    














- Constitutional


Appears: Other





- Head Exam


Head Exam: NORMOCEPHALIC





- Eye Exam


Eye Exam: Normal appearance


Pupil Exam: NORMAL ACCOMODATION





- ENT Exam


ENT Exam: Mucous Membranes Moist





- Neck Exam


Neck Exam: Full ROM





- Respiratory Exam


Respiratory Exam: Decreased Breath Sounds, Rales





- Cardiovascular Exam


Cardiovascular Exam: RRR, +S1





- GI/Abdominal Exam


GI & Abdominal Exam: Soft





- Rectal Exam


Rectal Exam: Deferred





- Neurological Exam


Neurological Exam: Awake, Oriented x3





- Psychiatric Exam


Psychiatric exam: Normal Affect





- Skin


Skin Exam: Dry, Warm





Assessment and Plan





- Assessment and Plan (Free Text)


Assessment: 





s/p transfusion of prbc and hx of concentric hypertrophy of heart /chf .


Plan: 





lasix 40 mg x1 stat. cbc, bmp, chest xray stat. 


abg stat.

## 2018-04-11 NOTE — PN
DATE:  04/11/2018



CARDIOLOGY FOLLOWUP



SUBJECTIVE:  The patient with an episode of confusion last night.  He is

comfortable now.



PHYSICAL EXAMINATION:

VITAL SIGNS:  Blood pressure is 148/78, heart rate is 100.

NECK:  Negative JVD.

LUNGS:  Without rales.

HEART:  Reveals S1, S2.

EXTREMITIES:  Without edema.



LABORATORY DATA:  Hemoglobin was 10.5 and 9.6 today.  Chemistries, glucose

is 136.  Troponins are negative.



IMPRESSION:

1.  Gastrointestinal bleed.

2.  Stable angina.

3.  Coronary artery disease.

4.  Obesity.

5.  Transient confusion this morning.



Given these findings, the patient was replaced back on Plavix.  He is

scheduled for endoscopy in the morning.







__________________________________________

Olu Ramirez MD



DD:  04/11/2018 14:37:13

DT:  04/11/2018 14:40:01

Job # [id number]

## 2018-04-11 NOTE — RAD
HISTORY:

SOB/chest congestion  



COMPARISON:

04/06/2018. 



FINDINGS:



LUNGS:

No active pulmonary disease.



PLEURA:

No significant pleural effusion identified, no pneumothorax apparent.



CARDIOVASCULAR:

Cardiomegaly.  No evidence of acute, significant cardiovascular 

disease. 



OSSEOUS STRUCTURES:

No significant abnormalities.



VISUALIZED UPPER ABDOMEN:

Normal.



OTHER FINDINGS:

None.



IMPRESSION:

No active disease. No significant interval change compared to the 

prior examination(s).

## 2018-04-11 NOTE — PN
DATE:  04/11/2018



SUBJECTIVE:  The patient has been restless this morning.  There have also

been periods of confusion and possibly delirium.  He denies any overt GI

bleeding.  He wants to go home.  He denies any chest pain or shortness of

breath.



PHYSICAL EXAMINATION:

VITAL SIGNS:  Reveal temperature of 98.7, blood pressure 148/78, heart rate

of 130.

HEENT:  Reveals sclerae to be white, conjunctivae pink.

NECK:  Supple.

CHEST:  Reveals lungs to be clear.

HEART:  Reveals an irregularly irregular rate.

ABDOMEN:  Obese, soft, nontender.

EXTREMITIES:  Show no edema.



LABORATORY DATA:  Reveal chloride 97, BUN 13, creatinine 0.9.  BNP is 2480,

total protein is 8.5.  CBC reveals hemoglobin of 9.6, white blood cell

count 9.2, platelet count of 203,000.



CURRENT MEDICATIONS:  Include Anusol-HC cream suppository b.i.d., folic

acid 1 mg IV daily, Lopressor 50 mg three times a day, Pentasa 1 g four

times a day, Plavix 75 mg daily, midodrine 2.5 mg t.i.d., and pantoprazole

40 mg IV every 12 hours.



IMPRESSION:

1.  Status post lower gastrointestinal bleed with anemia requiring multiple

units of packed red blood cells.

2.  Coronary artery disease, status post recent drug eluding stent

placement of left anterior descending, on Plavix.

3.  Atrial fibrillation.

4.  Anemia.

5.  Recent restlessness and delirium, CT scan of the head performed this

morning shows no acute changes.



RECOMMENDATIONS:

1.  Upper endoscopy has been postponed due to his recent change in mental

status and behavior.

2.  Awaiting neurology evaluation.

3.  Follow serial hematocrits.

4.  Advance diet.

5.  We will plan for endoscopy in the morning if his mental status

stabilizes.





__________________________________________

Julio César Madrid MD

DD:  04/11/2018 11:30:35

DT:  04/11/2018 11:33:09

Job # [id number]

## 2018-04-12 VITALS — DIASTOLIC BLOOD PRESSURE: 73 MMHG | TEMPERATURE: 97.1 F | RESPIRATION RATE: 19 BRPM | SYSTOLIC BLOOD PRESSURE: 115 MMHG

## 2018-04-12 VITALS — HEART RATE: 102 BPM

## 2018-04-12 VITALS — OXYGEN SATURATION: 99 %

## 2018-04-12 LAB
ALBUMIN SERPL-MCNC: 4 G/DL (ref 3–4.8)
ALBUMIN/GLOB SERPL: 1.2 {RATIO} (ref 1.1–1.8)
ALT SERPL-CCNC: 49 U/L (ref 7–56)
APPEARANCE UR: CLEAR
AST SERPL-CCNC: 29 U/L (ref 17–59)
BACTERIA #/AREA URNS HPF: (no result) /[HPF]
BASOPHILS # BLD AUTO: 0.01 K/MM3 (ref 0–2)
BASOPHILS NFR BLD: 0.1 % (ref 0–3)
BILIRUB UR-MCNC: NEGATIVE MG/DL
BUN SERPL-MCNC: 14 MG/DL (ref 7–21)
CALCIUM SERPL-MCNC: 9.4 MG/DL (ref 8.4–10.5)
COLOR UR: YELLOW
EOSINOPHIL # BLD: 0.4 10*3/UL (ref 0–0.7)
EOSINOPHIL NFR BLD: 3.8 % (ref 1.5–5)
EPI CELLS #/AREA URNS HPF: (no result) /HPF (ref 0–5)
ERYTHROCYTE [DISTWIDTH] IN BLOOD BY AUTOMATED COUNT: 22.5 % (ref 11.5–14.5)
GFR NON-AFRICAN AMERICAN: > 60
GLUCOSE UR STRIP-MCNC: NEGATIVE MG/DL
GRANULOCYTES # BLD: 6.83 10*3/UL (ref 1.4–6.5)
GRANULOCYTES NFR BLD: 74.5 % (ref 50–68)
HGB BLD-MCNC: 9.5 G/DL (ref 14–18)
LEUKOCYTE ESTERASE UR-ACNC: NEGATIVE LEU/UL
LYMPHOCYTES # BLD: 1.1 10*3/UL (ref 1.2–3.4)
LYMPHOCYTES NFR BLD AUTO: 11.6 % (ref 22–35)
MCH RBC QN AUTO: 22.1 PG (ref 25–35)
MCHC RBC AUTO-ENTMCNC: 30.6 G/DL (ref 31–37)
MCV RBC AUTO: 72.3 FL (ref 80–105)
MONOCYTES # BLD AUTO: 0.9 10*3/UL (ref 0.1–0.6)
MONOCYTES NFR BLD: 10 % (ref 1–6)
PH UR STRIP: 6 [PH] (ref 4.7–8)
PLATELET # BLD: 182 10^3/UL (ref 120–450)
PMV BLD AUTO: 9.2 FL (ref 7–11)
PROT UR STRIP-MCNC: 30 MG/DL
RBC # BLD AUTO: 4.29 10^6/UL (ref 3.5–6.1)
RBC # UR STRIP: (no result) /UL
RBC #/AREA URNS HPF: (no result) /HPF (ref 0–2)
SP GR UR STRIP: 1.02 (ref 1–1.03)
UROBILINOGEN UR STRIP-ACNC: 0.2 E.U./DL
WBC # BLD AUTO: 9.2 10^3/UL (ref 4.5–11)
WBC #/AREA URNS HPF: (no result) /HPF (ref 0–6)

## 2018-04-12 PROCEDURE — 0DJ08ZZ INSPECTION OF UPPER INTESTINAL TRACT, VIA NATURAL OR ARTIFICIAL OPENING ENDOSCOPIC: ICD-10-PCS | Performed by: SPECIALIST

## 2018-04-12 RX ADMIN — INSULIN HUMAN SCH: 100 INJECTION, SOLUTION PARENTERAL at 12:16

## 2018-04-12 RX ADMIN — OXYCODONE AND ACETAMINOPHEN SCH TAB: 10; 325 TABLET ORAL at 10:48

## 2018-04-12 RX ADMIN — INSULIN HUMAN SCH: 100 INJECTION, SOLUTION PARENTERAL at 08:23

## 2018-04-12 RX ADMIN — OXYCODONE AND ACETAMINOPHEN SCH TAB: 10; 325 TABLET ORAL at 13:29

## 2018-04-12 NOTE — CON
DATE:  04/11/2018



HISTORY OF PRESENT ILLNESS:  This is a 67-year-old male with past medical

history of atrial fibrillation, on Xarelto; coronary artery disease, status

post angioplasty; hypertension; diabetes; OSD, came to [facility] with abdominal pain and blood in the stools and also weak and

lightheaded and patient was admitted and now sitting on a chair,

transferred from ICU to the floor.  Called to evaluate the patient for

altered mental status.  MRI of the head was done, which was reported

negative for any intracranial pathology.



PHYSICAL EXAMINATION:

HEENT:  Normocephalic, atraumatic.

NECK:  Supple.

NEUROLOGIC:  Awake, oriented to self.  Cranial nerves II through XII are

tested.  Pupils reactive.  EOM intact.  Visual fields full.  No facial

asymmetry.  Tongue midline.  Motor examination:  Spontaneous movement of

the extremities noted.  Deep tendon reflexes 1+.  Both plantars are

downgoing.  Sensory appears intact.  Cerebellar gait deferred.



IMPRESSION:  Intermittent confusional state and less likely seizure.  MRI

of the head was done, which was reported negative and workup in progress. 

Continue present management.  We will follow up.







__________________________________________

Herberth Reis MD



DD:  04/11/2018 17:45:26

DT:  04/11/2018 18:05:12

Job # [id number]

## 2018-04-12 NOTE — PN
DATE:  04/12/2018



PULMONARY NOTE



SUBJECTIVE:  The patient appears much more comfortable this morning.  He is

not short of breath at rest.



PHYSICAL EXAMINATION

VITAL SIGNS:  Temperature is 98, pulse on the monitor is 91, respiratory

rate 18, blood pressure 154/103.  Oxygen saturation on nasal cannula is

98%.

HEENT:  Normocephalic, atraumatic.  No JVD.

CARDIOVASCULAR:  Systolic ejection murmur at the lower left sternal border.

Questionable S3 gallop.

LUNGS:  Less crackles at the bases.  No rhonchi.  No wheezing.

EXTREMITIES:  Less edema.  No cyanosis, no clubbing.  Calves are nontender

to palpation.

GI:  Abdomen is soft, nontender and nondistended.  Bowel sounds are

positive.

SKIN:  No acute rash.

NEUROLOGIC:  Limited at the present time.



IMPRESSION:

1.  Shortness of breath - resolving.

2.  Mild congestive heart failure.

3.  Gastrointestinal bleeding.

4.  Anemia.

5.  Coronary artery disease.



PLAN:  The patient appears much more comfortable this morning.  He is not

short of breath at rest.  He does state to feeling much better overall.  I

did discuss the case with the night nurse at length.  The night nurse

stated that the patient had a very good night.



On physical exam, there is no significant bronchospasm noted.  In addition,

the oxygen saturation on nasal cannula is now 98%.  I will continue with

the aspiration precautions for now.  Inputs by GI and Cardiology are noted.



The clinical status of this patient is certainly improved - compared to 

yesterday.  However, his overall status/prognosis does remain very

guarded.  I will discuss the above with the attending physician.





__________________________________________

Pepe Owens MD



DD:  04/12/2018 7:48:50

DT:  04/12/2018 7:50:54

Job # [id number]

[email or web address]

## 2018-04-12 NOTE — PN
DATE:  04/12/2018



CARDIOLOGY FOLLOWUP



SUBJECTIVE:  The patient tolerated his GI procedures well.



PHYSICAL EXAMINATION:

VITAL SIGNS:  Stable.

NECK:  Negative JVD.

LUNGS:  Without rales.

HEART:  Reveals S1, S2.

EXTREMITIES:  Without edema.



LABORATORY DATA:  Hemoglobin remains at 9.5.  Chemistries:  BUN and

creatinine are unremarkable.  Glucose 135.



IMPRESSION:

1.  Status post gastrointestinal bleed.

2.  Stable hemoglobin, on Plavix.

3.  History of coronary artery disease.

4.  Hypertension.

5.  Obesity.

6.  Hypercholesterolemia.



PLAN:  Given these findings, the patient's cardiac status remained stable. 

I have discussed with the patient about followup in 3-4 weeks' in my office

if he gets discharged today.





__________________________________________

Olu Ramirez MD





DD:  04/12/2018 10:49:55

DT:  04/12/2018 10:54:15

Job # [id number]

## 2018-04-12 NOTE — CT
PROCEDURE:  CT Abdomen and Pelvis with contrast



HISTORY:

anemia, history of Crohn's disease



COMPARISON:

None.



TECHNIQUE:

Contrast dose: 150 cc of Omni 350



Radiation dose:



Total exam DLP = 918 mGy-cm.



This CT exam was performed using one or more of the following dose 

reduction techniques: Automated exposure control, adjustment of the 

mA and/or kV according to patient size, and/or use of iterative 

reconstruction technique.



FINDINGS:



LOWER THORAX:

Unremarkable. 



LIVER:

Unremarkable. No gross lesion or ductal dilatation. 



GALLBLADDER AND BILE DUCTS:

Gallbladder removed 



PANCREAS:

Unremarkable. No gross lesion or ductal dilatation.



SPLEEN:

Unremarkable. 



ADRENALS:

Unremarkable. No mass. 



KIDNEYS AND URETERS:

Unremarkable. No hydronephrosis. No solid mass. 



VASCULATURE:

Unremarkable. No aortic aneurysm. 



BOWEL:

Unremarkable. No obstruction. No gross mural thickening. Mild 

diverticulosis of the descending and sigmoid colon



APPENDIX:

Normal appendix. 



PERITONEUM:

Unremarkable. No free fluid. No free air. 



LYMPH NODES:

Unremarkable. No enlarged lymph nodes. 



BLADDER:

Unremarkable. 



REPRODUCTIVE:

Unremarkable. 



BONES:

No acute fracture. 



OTHER FINDINGS:

None.



IMPRESSION:

No acute findings

## 2018-04-12 NOTE — CP.PCM.PN
Subjective





- Date & Time of Evaluation


Date of Evaluation: 04/12/18


Time of Evaluation: 07:30





- Subjective


Subjective: 





(covering for Dr. Pratt)


Patient is seen this morning.  He is feeling better than yesterday but 

complains of 


chronic neck and back pain.





Objective





- Vital Signs/Intake and Output


Vital Signs (last 24 hours): 


 











Temp Pulse Resp BP Pulse Ox


 


 98.0 F   96 H  20   154/103 H  98 


 


 04/12/18 06:00  04/12/18 06:00  04/12/18 06:00  04/12/18 06:00  04/12/18 06:00








Intake and Output: 


 











 04/12/18 04/12/18





 06:59 18:59


 


Intake Total 0 


 


Balance 0 














- Medications


Medications: 


 Current Medications





Clopidogrel Bisulfate (Plavix)  75 mg PO DAILY [facility]


   Last Admin: 04/11/18 14:29 Dose:  75 mg


Folic Acid (Folic Acid)  1 mg PO DAILY [facility]


   Last Admin: 04/11/18 18:14 Dose:  1 mg


Hydralazine HCl (Apresoline)  10 mg PO QID PRN


   PRN Reason: for SBP>160


Hydrocortisone (Anusol-Hc)  25 mg RC BID [facility]


   Stop: 04/15/18 23:59


   Last Admin: 04/11/18 18:05 Dose:  Not Given


Insulin Human Regular (Humulin R Low)  0 units SC ACHS [facility]


   PRN Reason: Protocol


   Last Admin: 04/11/18 21:53 Dose:  Not Given


Mesalamine (Pentasa)  1,000 mg PO QID [facility]


Metoprolol Tartrate (Lopressor)  5 mg IVP Q6H PRN


   PRN Reason: HR > 120


   Last Admin: 04/11/18 08:45 Dose:  5 mg


Metoprolol Tartrate (Lopressor)  50 mg PO TID [facility]


   Last Admin: 04/11/18 18:14 Dose:  50 mg


Midodrine (Proamatine)  2.5 mg PO TID [facility]


Oxycodone/Acetaminophen (Percocet 10/325 Mg Tab)  1 tab PO TID [facility]


   Last Admin: 04/11/18 23:47 Dose:  1 tab


Pantoprazole Sodium (Protonix Inj)  40 mg IVP Q12 [facility]


   Last Admin: 04/11/18 21:02 Dose:  40 mg











- Labs


Labs: 


 





 04/12/18 06:30 





 04/12/18 06:30 





 











PT  16.2 SECONDS (9.4-12.5)  H  04/08/18  07:20    


 


INR  1.40  (0.93-1.08)  H  04/08/18  07:20    


 


APTT  26.0 Seconds (25.1-36.5)   04/08/18  07:20    














- Constitutional


Appears: No Acute Distress





- Head Exam


Head Exam: ATRAUMATIC, NORMOCEPHALIC





- Respiratory Exam


Respiratory Exam: Clear to Ausculation Bilateral, NORMAL BREATHING PATTERN





- Cardiovascular Exam


Cardiovascular Exam: +S1, +S2





- GI/Abdominal Exam


GI & Abdominal Exam: Soft, Normal Bowel Sounds.  absent: Tenderness





- Neurological Exam


Neurological Exam: Alert, Awake, Oriented x3





Assessment and Plan





- Assessment and Plan (Free Text)


Assessment: 





GI Bleed


Internal hemorrhoids


CAD s/p stent


Chronic heart failure


AFib currently off Xarelto








Plan: 





Patient is seen this morning.  He complains of chronic neck and back pain.  

Will order stat dose


of Morphine as patient is NPO for endoscopy this morning.  Colonoscopy done on 

Monday showed


no active sites of bleeding, but internal hemorrhoids were seen.  Hemoglobin is 

stable at 9.5 this 


morning.  Discussed with Dr. Madrid.  If EGD is satisfactory, patient will be 

discharged home later


today.

## 2018-04-16 ENCOUNTER — HOSPITAL ENCOUNTER (INPATIENT)
Dept: HOSPITAL 42 - ED | Age: 68
LOS: 2 days | Discharge: HOME | DRG: 292 | End: 2018-04-18
Attending: INTERNAL MEDICINE | Admitting: INTERNAL MEDICINE
Payer: MEDICARE

## 2018-04-16 VITALS — BODY MASS INDEX: 34.9 KG/M2 | HEART RATE: 89 BPM

## 2018-04-16 DIAGNOSIS — I45.10: ICD-10-CM

## 2018-04-16 DIAGNOSIS — E11.40: ICD-10-CM

## 2018-04-16 DIAGNOSIS — K59.00: ICD-10-CM

## 2018-04-16 DIAGNOSIS — Z23: ICD-10-CM

## 2018-04-16 DIAGNOSIS — E66.01: ICD-10-CM

## 2018-04-16 DIAGNOSIS — K50.911: ICD-10-CM

## 2018-04-16 DIAGNOSIS — Z79.899: ICD-10-CM

## 2018-04-16 DIAGNOSIS — Z91.14: ICD-10-CM

## 2018-04-16 DIAGNOSIS — Z79.02: ICD-10-CM

## 2018-04-16 DIAGNOSIS — Z87.19: ICD-10-CM

## 2018-04-16 DIAGNOSIS — G89.29: ICD-10-CM

## 2018-04-16 DIAGNOSIS — F51.04: ICD-10-CM

## 2018-04-16 DIAGNOSIS — I25.5: ICD-10-CM

## 2018-04-16 DIAGNOSIS — I11.0: Primary | ICD-10-CM

## 2018-04-16 DIAGNOSIS — I50.21: ICD-10-CM

## 2018-04-16 DIAGNOSIS — E78.5: ICD-10-CM

## 2018-04-16 DIAGNOSIS — E11.65: ICD-10-CM

## 2018-04-16 DIAGNOSIS — I87.8: ICD-10-CM

## 2018-04-16 DIAGNOSIS — I48.2: ICD-10-CM

## 2018-04-16 DIAGNOSIS — I25.10: ICD-10-CM

## 2018-04-16 DIAGNOSIS — Z90.49: ICD-10-CM

## 2018-04-16 DIAGNOSIS — Z79.01: ICD-10-CM

## 2018-04-16 DIAGNOSIS — D50.9: ICD-10-CM

## 2018-04-16 DIAGNOSIS — J44.9: ICD-10-CM

## 2018-04-16 DIAGNOSIS — Z87.11: ICD-10-CM

## 2018-04-16 DIAGNOSIS — I42.0: ICD-10-CM

## 2018-04-16 DIAGNOSIS — E87.6: ICD-10-CM

## 2018-04-16 DIAGNOSIS — Z95.5: ICD-10-CM

## 2018-04-16 LAB
ALBUMIN SERPL-MCNC: 4.2 G/DL (ref 3–4.8)
ALBUMIN/GLOB SERPL: 1.4 {RATIO} (ref 1.1–1.8)
ALT SERPL-CCNC: 50 U/L (ref 7–56)
APPEARANCE UR: CLEAR
APTT BLD: 28.3 SECONDS (ref 25.1–36.5)
AST SERPL-CCNC: 41 U/L (ref 17–59)
BASOPHILS # BLD AUTO: 0.01 K/MM3 (ref 0–2)
BASOPHILS NFR BLD: 0.1 % (ref 0–3)
BILIRUB UR-MCNC: NEGATIVE MG/DL
BNP SERPL-MCNC: 2780 PG/ML (ref 0–450)
BUN SERPL-MCNC: 19 MG/DL (ref 7–21)
CALCIUM SERPL-MCNC: 8.8 MG/DL (ref 8.4–10.5)
COLOR UR: YELLOW
EOSINOPHIL # BLD: 0.3 10*3/UL (ref 0–0.7)
EOSINOPHIL NFR BLD: 2.3 % (ref 1.5–5)
ERYTHROCYTE [DISTWIDTH] IN BLOOD BY AUTOMATED COUNT: 23 % (ref 11.5–14.5)
GFR NON-AFRICAN AMERICAN: > 60
GLUCOSE UR STRIP-MCNC: NEGATIVE MG/DL
GRANULOCYTES # BLD: 8.78 10*3/UL (ref 1.4–6.5)
GRANULOCYTES NFR BLD: 80.1 % (ref 50–68)
HGB BLD-MCNC: 9.3 G/DL (ref 14–18)
INR PPP: 1.51 (ref 0.93–1.08)
LEUKOCYTE ESTERASE UR-ACNC: NEGATIVE LEU/UL
LIPASE SERPL-CCNC: 45 U/L (ref 23–300)
LYMPHOCYTES # BLD: 0.9 10*3/UL (ref 1.2–3.4)
LYMPHOCYTES NFR BLD AUTO: 8.6 % (ref 22–35)
MCH RBC QN AUTO: 22.1 PG (ref 25–35)
MCHC RBC AUTO-ENTMCNC: 31 G/DL (ref 31–37)
MCV RBC AUTO: 71.3 FL (ref 80–105)
MONOCYTES # BLD AUTO: 1 10*3/UL (ref 0.1–0.6)
MONOCYTES NFR BLD: 8.9 % (ref 1–6)
PH UR STRIP: 6 [PH] (ref 4.7–8)
PLATELET # BLD: 246 10^3/UL (ref 120–450)
PMV BLD AUTO: 9.4 FL (ref 7–11)
PROT UR STRIP-MCNC: NEGATIVE MG/DL
PROTHROMBIN TIME: 17.5 SECONDS (ref 9.4–12.5)
RBC # BLD AUTO: 4.21 10^6/UL (ref 3.5–6.1)
RBC # UR STRIP: NEGATIVE /UL
SP GR UR STRIP: 1.01 (ref 1–1.03)
TROPONIN I SERPL-MCNC: < 0.01 NG/ML
UROBILINOGEN UR STRIP-ACNC: 0.2 E.U./DL
WBC # BLD AUTO: 11 10^3/UL (ref 4.5–11)

## 2018-04-16 PROCEDURE — 3E0234Z INTRODUCTION OF SERUM, TOXOID AND VACCINE INTO MUSCLE, PERCUTANEOUS APPROACH: ICD-10-PCS | Performed by: INTERNAL MEDICINE

## 2018-04-16 RX ADMIN — MESALAMINE SCH MG: 500 CAPSULE ORAL at 17:36

## 2018-04-16 RX ADMIN — INSULIN HUMAN SCH: 100 INJECTION, SOLUTION PARENTERAL at 21:39

## 2018-04-16 RX ADMIN — MESALAMINE SCH MG: 500 CAPSULE ORAL at 21:06

## 2018-04-16 RX ADMIN — INSULIN HUMAN SCH: 100 INJECTION, SOLUTION PARENTERAL at 16:42

## 2018-04-16 NOTE — CARD
--------------- APPROVED REPORT --------------





EKG Measurement

Heart [id number]

[id number]

[id number]

[id number]



<Conclusion>

*** Poor data quality, interpretation may be adversely affected

Atrial fibrillation with rapid ventricular response

Right bundle branch block

Inferior infarct, age undetermined

Abnormal ECG
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
## 2018-04-16 NOTE — CT
PROCEDURE:  CT Chest with contrast (Pulmonary Angiogram)



HISTORY:

Shortness of breath 



COMPARISON:

None available. 



TECHNIQUE:

Axial computed tomography images were obtained of the chest in the 

pulmonary arterial phase of enhancement. Coronal and sagittal 

reformatted images were created and reviewed.



Intravenous contrast dose: 100 mL Omnipaque 350



Radiation dose:



Total exam DLP = 514.43 mGy-cm.



This CT exam was performed using one or more of the following dose 

reduction techniques: Automated exposure control, adjustment of the 

mA and/or kV according to patient size, and/or use of iterative 

reconstruction technique.



FINDINGS:



PULMONARY ARTERIES:

There are no filling defects in the pulmonary arteries to suggest 

acute pulmonary embolism. 



AORTA:

No acute findings. No thoracic aortic aneurysm. 



LUNGS:

The lungs are clear. No nodule, mass or pulmonary consolidation. 



PLEURAL SPACES:

No effusion or pneuomothorax. 



HEART:

There is moderate cardiomegaly and small pericardial effusion. 



LYMPH NODES:

No pathologic lymphadenopathy.



BONES, CHEST WALL:

There are age indeterminate but likely chronic superior endplate 

compression deformities in the lower thoracic spine. No destructive 

lesion 



OTHER FINDINGS:

Unremarkable. 



IMPRESSION:

1. No evidence of acute pulmonary embolism.



2. No focal consolidation, pneumothorax or pleural effusion.

## 2018-04-16 NOTE — RAD
HISTORY:

Shortness of breath.



COMPARISON:

04/11/2018 



FINDINGS:



LUNGS:

No active pulmonary disease.



PLEURA:

No significant pleural effusion identified, no pneumothorax apparent.



CARDIOVASCULAR:

Cardiomegaly without congestive heart failure.



OSSEOUS STRUCTURES:

No significant abnormalities.



VISUALIZED UPPER ABDOMEN:

Normal.



OTHER FINDINGS:

None.



IMPRESSION:

No active disease. No significant interval change compared to the 

prior examination(s).

## 2018-04-17 LAB
ALBUMIN SERPL-MCNC: 4 G/DL (ref 3–4.8)
ALBUMIN/GLOB SERPL: 1.2 {RATIO} (ref 1.1–1.8)
ALT SERPL-CCNC: 54 U/L (ref 7–56)
AST SERPL-CCNC: 35 U/L (ref 17–59)
BASOPHILS # BLD AUTO: 0.02 K/MM3 (ref 0–2)
BASOPHILS NFR BLD: 0.2 % (ref 0–3)
BUN SERPL-MCNC: 17 MG/DL (ref 7–21)
CALCIUM SERPL-MCNC: 8.3 MG/DL (ref 8.4–10.5)
EOSINOPHIL # BLD: 0.4 10*3/UL (ref 0–0.7)
EOSINOPHIL NFR BLD: 3.3 % (ref 1.5–5)
ERYTHROCYTE [DISTWIDTH] IN BLOOD BY AUTOMATED COUNT: 23.5 % (ref 11.5–14.5)
GFR NON-AFRICAN AMERICAN: > 60
GRANULOCYTES # BLD: 7.92 10*3/UL (ref 1.4–6.5)
GRANULOCYTES NFR BLD: 73 % (ref 50–68)
HGB BLD-MCNC: 9.1 G/DL (ref 14–18)
LYMPHOCYTES # BLD: 1.5 10*3/UL (ref 1.2–3.4)
LYMPHOCYTES NFR BLD AUTO: 13.5 % (ref 22–35)
MCH RBC QN AUTO: 21.6 PG (ref 25–35)
MCHC RBC AUTO-ENTMCNC: 30.1 G/DL (ref 31–37)
MCV RBC AUTO: 71.6 FL (ref 80–105)
MONOCYTES # BLD AUTO: 1.1 10*3/UL (ref 0.1–0.6)
MONOCYTES NFR BLD: 10 % (ref 1–6)
PLATELET # BLD: 303 10^3/UL (ref 120–450)
PMV BLD AUTO: 9.7 FL (ref 7–11)
RBC # BLD AUTO: 4.22 10^6/UL (ref 3.5–6.1)
WBC # BLD AUTO: 10.8 10^3/UL (ref 4.5–11)

## 2018-04-17 RX ADMIN — OXYCODONE AND ACETAMINOPHEN PRN TAB: 10; 325 TABLET ORAL at 04:15

## 2018-04-17 RX ADMIN — OXYCODONE AND ACETAMINOPHEN PRN TAB: 10; 325 TABLET ORAL at 22:26

## 2018-04-17 RX ADMIN — MESALAMINE SCH MG: 500 CAPSULE ORAL at 13:17

## 2018-04-17 RX ADMIN — MESALAMINE SCH MG: 500 CAPSULE ORAL at 09:43

## 2018-04-17 RX ADMIN — OXYCODONE AND ACETAMINOPHEN PRN TAB: 10; 325 TABLET ORAL at 11:25

## 2018-04-17 RX ADMIN — DILTIAZEM HYDROCHLORIDE SCH MG: 180 CAPSULE, EXTENDED RELEASE ORAL at 13:17

## 2018-04-17 RX ADMIN — INSULIN HUMAN SCH UNIT: 100 INJECTION, SOLUTION PARENTERAL at 11:47

## 2018-04-17 RX ADMIN — PANTOPRAZOLE SODIUM SCH MG: 40 TABLET, DELAYED RELEASE ORAL at 06:42

## 2018-04-17 RX ADMIN — INSULIN HUMAN SCH: 100 INJECTION, SOLUTION PARENTERAL at 08:31

## 2018-04-17 RX ADMIN — IPRATROPIUM BROMIDE AND ALBUTEROL SULFATE SCH: .5; 3 SOLUTION RESPIRATORY (INHALATION) at 20:32

## 2018-04-17 RX ADMIN — MESALAMINE SCH MG: 500 CAPSULE ORAL at 22:26

## 2018-04-17 RX ADMIN — IPRATROPIUM BROMIDE AND ALBUTEROL SULFATE SCH: .5; 3 SOLUTION RESPIRATORY (INHALATION) at 02:40

## 2018-04-17 RX ADMIN — INSULIN HUMAN SCH UNITS: 100 INJECTION, SOLUTION PARENTERAL at 16:57

## 2018-04-17 RX ADMIN — MESALAMINE SCH MG: 500 CAPSULE ORAL at 17:01

## 2018-04-17 RX ADMIN — INSULIN HUMAN SCH: 100 INJECTION, SOLUTION PARENTERAL at 22:16

## 2018-04-17 NOTE — HP
DATE OF EXAM: 4/16/18



LOCATION:  This patient is seen in the second floor, [facility] in [place], room 261, bed 2.



HISTORY OF PRESENT ILLNESS:  Patient presented in the emergency room with

complaint that the patient was evaluated by the visiting nurses in the

Telemedicine Unit, showed that the patient was in tachycardia, short of

breath and patient was very anxious and also complained of some chest

discomfort.  Patient was advised by the nurse to go to the emergency room. 

Patient presented in the emergency room and the patient was evaluated by

the emergency room and the emergency room physician was witness to the fact

that the patient had shortness of breath associated with clinically

congestive heart failure.  Patient has recent colonoscopy and

upper endoscopy for GI bleeding.  Patient was anemic, has had blood

transfusion.  Patient has past history of lumbar neuritis, cervical

neuritis.  Patient also has history of diabetes mellitus, coronary artery

disease.  He has had multiple angioplasties.  Patient has history of peptic

ulcer disease and chronic obstructive lung disease and patient is on pain

management.  He also takes medication for sleep and patient has chronic

insomnia.



PAST SURGICAL HISTORY:  Patient had past history of cholecystectomy.  As

mentioned, the patient has past history of angioplasty by Dr. Olu Ramirez,

his cardiologist.



PHYSICAL EXAMINATION:

GENERAL:  On examination, the patient is sitting in the chair and is

requesting for sleeping pill.  He said, he has not slept for a few days and

he is very cranky because he feels that he has not got any rest.  On

examination, as mentioned, he is sitting in the chair and he is able to

answer questions, but he keeps rambling.

VITAL SIGNS:  His pulse is 103, he is on a cardiac monitor, his blood

pressure is 140/96.  His O2 sat is 97% on 2 liters of oxygen.

HEENT:  Patient's head is normocephalic.

NECK:  Thyroid is not enlarged.  There is no lymphadenopathy.  JVP is flat.

HEART:  Atrial fibrillation, moderate ventricular response.  Heart rate of

103.

ABDOMEN:  Shows no distention.  There is no tenderness, no localizing

signs, no masses noted.

CNS EXAMINATION:  Patient is conscious, rational on conversation, but he

seems to be very involved with the fact that his wife is mentally ill and

seemed to be aggressive with him and he feels very disturbed about the

whole thing and he feels sick.



MEDICATIONS:  The patient's medication list consists of Cardizem 60 mg

every 8 hours.  Patient takes Cymbalta 60 mg daily.  Patient is on Colace. 

He is on DuoNeb, folic acid, insulin coverage for diabetes, Lasix 40 mg

every 12 hours.  Patient is on Lexapro 5 mg daily, metoprolol 50 mg b.i.d. 

Patient is on Pentasa for ulcerative colitis, and Percocet 10/325 mg

for pain..  Patient is on Plavix 75 mg currently.  Patient is also on

pantoprazole 40 mg daily.  Patient is not put on any anticoagulation at

this time.  It is held because of the fact that he has recent GI bleeding

and blood transfusion.  Patient's cardiologist will make the evaluation

when to introduce the anticoagulant, but the patient's clinical condition

seemed to be stable at the time that I examined the patient.  He is

requesting sleeping pill.  We will give the patient 10 mg of Ambien for

sleep tonight.



LABORATORY DATA:  His blood work done in the emergency room shows that his

hemoglobin 9.3, his platelet count is 246,000.  His chemistry, the

patient's blood sugar is 138.  His BNP was 2780, which is consistent with

congestive heart failure and acute shortness of breath and 

tachycardia: it could be due to coronary artery disease and atherosclerotic

heart syndrome.



Patient's chest x-ray clinically does not show severe pulmonary edema, but

he does have evidence enough to suggest that patient had congestive heart

failure.  His EKG showed ischemic heart disease.



ASSESSMENT AND PLAN:  Patient would be on a heart-healthy diet and patient

is known to Dr. Kody Pratt.  The patient will be admitted to the hospital

today by myself and the patient will be transferred to Dr. Pratt's service

tomorrow.  In the meantime, the patient will be under my care.







__________________________________________

Myron Lange MD



DD:  04/16/2018 20:01:23

DT:  04/17/2018 0:42:06

Job # [id number]

[id number]

## 2018-04-17 NOTE — CON
DATE:  04/17/2018



HISTORY OF PRESENT ILLNESS:  The patient is a 67-year-old male who presents

with shortness of breath.



The patient was discharged 2 days ago in stable condition with chronic

atrial fibrillation, history of PTCA and stent of an LAD.



Hypertension as well as recent GI bleed in which he was sent home off of

Xarelto in good hemodynamic condition.  After 48 hours at home, the patient

presented with fluid overload likely due to high salt intake, in which he

denies and does not remember what he actually ate at home.



The patient received diuretic therapy in which he put out 3 liters of urine

and his breathing is back to normal.



He denies chest pain.  No GI bleeding noted.



SOCIAL HISTORY  The patient does not smoke and has been historically

noncompliant with medications or medical advice.



REVIEW OF SYSTEMS: A 14-point review of systems was reviewed in detail.  No

other cardiac symptomatology is noted.



PHYSICAL EXAMINATION:

VITAL SIGNS:  Blood pressure 137/90, heart rates in the 80s, atrial

fibrillation.

NECK:  Negative JVD.

LUNGS:  Decreased breath sounds without rales.

HEART:  S1, S2.

EXTREMITIES:  Without edema.



LABORATORY DATA AND IMAGING:  EKG shows rapid atrial fibrillation with

nonspecific ST-T changes.  Hemoglobin is 9.1.  Chemistries:  BUN and

creatinine are unremarkable.  Potassium is 3.5, glucose is 167.  Troponin

is negative x1.  The ProBNP is mildly elevated.



IMPRESSION:

1.  Acute systolic congestive heart failure.

2.  Ischemic dilated cardiomyopathy with an EF of 45-50%.

3.  Coronary artery disease and status post percutaneous transluminal

coronary angioplasty and stent in January.

4.  Chronic atrial fibrillation.

5.  History of gastrointestinal bleed.



Given these findings, the patient was discharged on Plavix only without

Xarelto until his GI bleed is documented to have resolved.



Given the patient's lack of ability to eat a low-salt diet at home, we will

need to add Lasix to his regimen.  I have discussed with him in a very

strictly about his need to stop his salt intake.  We will change his IV

Lasix to p.o. Lasix.  We will change his Cardizem to long-acting Cardizem. 

We will still need to hold off on his anticoagulant for now.  We will

continue his Plavix.





__________________________________________

Olu Ramirez MD



DD:  04/17/2018 12:55:01

DT:  04/17/2018 12:59:34

[facility] # [id number]

## 2018-04-17 NOTE — CP.PCM.PN
Subjective





- Date & Time of Evaluation


Date of Evaluation: 04/17/18


Time of Evaluation: 14:53





- Subjective


Subjective: 


Medicine progress note: Dr. Pratt


Patient seen and examined at bedside.  Patient denies any new complaints, but 

is still feeling shortness of breath.





Objective





- Vital Signs/Intake and Output


Vital Signs (last 24 hours): 


 











Temp Pulse Resp BP Pulse Ox


 


 97.5 F L  96 H  20   137/90   97 


 


 04/17/18 12:00  04/17/18 13:17  04/17/18 12:00  04/17/18 12:00  04/17/18 06:00








Intake and Output: 


 











 04/17/18 04/17/18





 06:59 18:59


 


Intake Total 600 


 


Output Total 450 


 


Balance 150 














- Medications


Medications: 


 Current Medications





Albuterol/Ipratropium (Duoneb 3 Mg/0.5 Mg (3 Ml) Ud)  3 ml IH [id number] [facility]


   Last Admin: 04/17/18 02:40 Dose:  Not Given


Clopidogrel Bisulfate (Plavix)  75 mg PO DAILY [facility]


   Last Admin: 04/17/18 09:43 Dose:  75 mg


Diltiazem HCl (Cardizem Cd)  180 mg PO DAILY [facility]


   Last Admin: 04/17/18 13:17 Dose:  180 mg


Docusate Sodium (Colace)  100 mg PO TID [facility]


   Last Admin: 04/17/18 13:17 Dose:  100 mg


Duloxetine HCl (Cymbalta)  60 mg PO DAILY [facility]


   Last Admin: 04/17/18 09:43 Dose:  60 mg


Escitalopram Oxalate (Lexapro)  5 mg PO DAILY [facility]


   Last Admin: 04/17/18 09:43 Dose:  5 mg


Folic Acid (Folic Acid)  1 mg PO DAILY [facility]


   Last Admin: 04/17/18 09:43 Dose:  1 mg


Furosemide (Lasix)  40 mg PO BID [facility]


Insulin Human Regular (Humulin R Low)  0 units SC ACHS [facility]


   PRN Reason: Protocol


   Last Admin: 04/17/18 11:47 Dose:  1 unit


Mesalamine (Pentasa)  1,000 mg PO QID [facility]


   Last Admin: 04/17/18 13:17 Dose:  1,000 mg


Metoprolol Tartrate (Lopressor)  50 mg PO BID [facility]


   Last Admin: 04/17/18 09:44 Dose:  50 mg


Oxycodone/Acetaminophen (Percocet 10/325 Mg Tab)  1 tab PO TID [facility]


   PRN Reason: pain


   Last Admin: 04/17/18 11:25 Dose:  1 tab


Pantoprazole Sodium (Protonix Ec Tab)  40 mg PO 0600 [facility]


   Last Admin: 04/17/18 06:42 Dose:  40 mg


Potassium Chloride (K-Dur 20 Meq Er Tab)  20 meq PO BID [facility]


Zolpidem Tartrate (Ambien)  5 mg PO HS PRN; Protocol


   PRN Reason: Insomnia


   Last Admin: 04/16/18 23:22 Dose:  5 mg











- Labs


Labs: 


 





 04/17/18 05:30 





 04/17/18 05:30 





 











PT  17.5 SECONDS (9.4-12.5)  H  04/16/18  12:00    


 


INR  1.51  (0.93-1.08)  H  04/16/18  12:00    


 


APTT  28.3 Seconds (25.1-36.5)   04/16/18  12:00    














- Constitutional


Appears: Well





- Head Exam


Head Exam: ATRAUMATIC, NORMAL INSPECTION, NORMOCEPHALIC





- Eye Exam


Eye Exam: EOMI, Normal appearance, PERRL


Pupil Exam: NORMAL ACCOMODATION, PERRL





- ENT Exam


ENT Exam: Mucous Membranes Moist, Normal Exam





- Neck Exam


Neck Exam: Full ROM, Normal Inspection.  absent: Lymphadenopathy





- Respiratory Exam


Respiratory Exam: Clear to Ausculation Bilateral, NORMAL BREATHING PATTERN





- Cardiovascular Exam


Cardiovascular Exam: REGULAR RHYTHM, +S1, +S2.  absent: Murmur





- GI/Abdominal Exam


GI & Abdominal Exam: Soft, Normal Bowel Sounds.  absent: Tenderness





- Extremities Exam


Extremities Exam: Full ROM, Normal Capillary Refill, Normal Inspection.  absent

: Joint Swelling, Pedal Edema





- Back Exam


Back Exam: NORMAL INSPECTION





- Neurological Exam


Neurological Exam: Alert, Awake, CN II-XII Intact, Normal Gait, Oriented x3





- Psychiatric Exam


Psychiatric exam: Normal Affect, Normal Mood





- Skin


Skin Exam: Dry, Intact, Normal Color, Warm





Assessment and Plan





- Assessment and Plan (Free Text)


Assessment: 


67 M whose past medical history includes crohn's disease, hypertension, 

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 

with chest pain and shortness of breath.  First two tropes are negative, EKG 

shows AFib with RVR but no ST/T changes.  Chest XR showed venous congestion and 

is consistent with CHF exacerbation.  BNP 2780.  Patient also had potassium of 

3.5 with normal magnesium.  Patient was recently admitted for a GI bleed and 

given blood, and his H/H are low on this admission as well.





Plan





Shortness of Breath Likely 2/2 to CHF exacerbation


- Lasix 60 Q12H


- Repeat CXR PA/Lat in AM


- Repeat BNP in AM


- Stat Mg


- CBC/CMP with Mg in AM


- Trend EKG


- Trend CHUYITA Panel


- Cardio Consult (Dr. Ramirez) 


- NC 02 PRN





Anemia Likely 2/2 to Iron Deficiency


- Monitor for now: H/H stable


- CBC in AM





Hx of DM II


- ISS low





Hx of Crohn's


- Home Mesalamine 1000 PO QID





Hx of A-fib


- Home Metoprolol 50 Daily, Plavix





Hx of HTN


- Lopressor





Hx of HLD


-Home Crestor (Not on Formulary)


   Start Lipitor 20





Hx CAD s/p JOSEPH


-Home Plavix, ASA





Hx Insomnia


-Ambien 10 PO HS Daily





Hx of neuromuscular Disorders/MSK


- Home Duloxetine


- Home Percocet


- Home Zanaflex





GI/DVT Prophylaxis


Home Protonix


Home Xarelto

## 2018-04-17 NOTE — RAD
HISTORY:



COMPARISON:

04/16/2018.



TECHNIQUE:

Chest PA and lateral



FINDINGS:



LINES AND TUBES:

None. 



LUNG AND PLEURA:

The lungs are well inflated and clear. 



HEART AND MEDIASTINUM:

There is moderate cardiomegaly. The hilar and mediastinal contours 

are within normal limits.



SKELETAL STRUCTURES:

The bony structures are within normal limits for the patient's age.



VISUALIZED UPPER ABDOMEN:

Normal.



OTHER FINDINGS:

None.



IMPRESSION:

No active pulmonary disease.

## 2018-04-17 NOTE — PN
DATE:  04/17/2018



SUBJECTIVE:  The patient was seen in room 267, bed 2.  The patient is out

of bed to chair, watching TV.  The patient appears to be comfortable, but

complains of dyspnea on exertion.  Overnight nurse's notes were reviewed. 

The patient did complain of chronic back pain and insomnia.



PHYSICAL EXAMINATION:

VITAL SIGNS:  T-max 98.2-98.3; telemetry shows atrial fibrillation, heart

rate 90s, 96, 109; blood pressure is 124/75, 129/89; respirations 20; O2

sat 97%.  Intake, output noted.

HEENT:  Head examination normocephalic, atraumatic.  HEENT examination

shows pinkish pale conjunctivae.  Anicteric sclerae.  No jugular venous

distention.

CHEST:  Kyphosis.

LUNGS:  Shows decreased breath sound at the bases.  Positive rhonchi noted.

CARDIOVASCULAR:  S1, S2.  Irregular rhythm.  Positive systolic murmur, left

sternal border, right second intercostal space.

ABDOMEN:  Protuberant.  Positive bowel sound.

GENITALIA:  Male.

EXTREMITY:  Shows positive 2+ pitting edema of the left leg more than the

right.  Right leg shows 1+ pitting edema.

MUSCULOSKELETAL:  Shows a body mass index of 36.5.

NEUROLOGIC:  The patient is alert, awake, oriented x3.  Cranial nerves II

through XII limited.  Gait examination is not tested.  Motor strength is

5/5 in upper and lower extremity.



DIAGNOSTICS:  WBC 10.8, hemoglobin and hematocrit 9.1 and 30.2, MCV 71,

platelet 303, granulocytes 73%.  Abnormal chemistry shows potassium 3.5,

glucose 167.  LFTs are normal.



CT chest, EKG were reviewed.



IMPRESSION AND PLAN:

1.  Acute recurrent systolic versus diastolic congestive heart failure with

pulmonary arterial hypertension and elevated right ventricular systolic

pressure of 47 mmHg.

2.  Atrial fibrillation.

3.  Morbid obesity with body mass index of 36.5.

4.  Bilateral lower extremity venous stasis and pitting edema.

5.  Microcytic anemia.

6.  Granulocytosis.

7.  Hypokalemia.

8.  Non-insulin-requiring diabetes mellitus.

9.  Hyperglycemia.

10.  Insomnia.

11.  Constipation.

12.  Diabetic neuropathy.

13.  Hypokalemia.

14.  Depression.

15.  History of colitis.

16.  Chronic narcotic-dependent pain syndrome.

17.  Right bundle-branch block.



Plan at this time, we are awaiting further recommendations by Cardiology. 

The patient has been given potassium supplementation for hypokalemia.  The

patient has been ordered serial labs.  Repeat chest x-ray PA and lateral

has been ordered.  The patient's CT of the chest was reviewed, which shows

moderate cardiomegaly, small pericardial effusion and thoracic spine

compression deformities, age indeterminate.  The patient's EKG was

reviewed.



Plan at this time, the patient is to be continued on Ambien 5 mg at bedtime

p.r.n., the patient was started on Cardizem 60 mg three times a day by

Cardiology, Colace 100 three times a day, Cymbalta 60 mg daily, DuoNeb

nebulizer every 6 hours, folic acid 1 mg daily, Humulin low-dose sliding

scale coverage before meals and at bedtime, the patient was started on

K-Dur 20 mEq twice a day from tomorrow, the patient's Lasix increased to 60

mg IV every 12, Lexapro 5 mg daily, Lopressor 50 mg twice a day, Pentasa

1000 mg four times daily, Percocet 10/325 one tablet t.i.d. p.r.n., the

patient is on Plavix 75 mg daily, the patient was given potassium

supplementation, Protonix 40 daily.  The patient has been ordered repeat

chest x-ray PA and lateral.  The patient has been ordered out of bed. 

Physical therapy, occupational therapy ordered.  The patient has been

ordered head of the bed at 30 degrees.  The patient has been ordered out of

bed, sequential compression devices, BETH stockings, physical therapy,

occupational therapy ordered.  The patient's further management will be

dependent upon the patient's clinical condition, hemodynamic status and as

per response to therapeutic intervention, as per recommendation by

Cardiology.



Dictated and electronically signed, not read.





__________________________________________

Kody Pratt MD

 



DD:  04/17/2018 10:29:46

DT:  04/17/2018 10:42:35

Job # [id number]

## 2018-04-18 VITALS — HEART RATE: 90 BPM | DIASTOLIC BLOOD PRESSURE: 78 MMHG | SYSTOLIC BLOOD PRESSURE: 130 MMHG

## 2018-04-18 VITALS — OXYGEN SATURATION: 98 % | TEMPERATURE: 98 F | RESPIRATION RATE: 20 BRPM

## 2018-04-18 LAB
ALBUMIN SERPL-MCNC: 3.7 G/DL (ref 3–4.8)
ALBUMIN/GLOB SERPL: 1.1 {RATIO} (ref 1.1–1.8)
ALT SERPL-CCNC: 41 U/L (ref 7–56)
AST SERPL-CCNC: 36 U/L (ref 17–59)
BILIRUB DIRECT SERPL-MCNC: 0.3 MG/DL (ref 0–0.4)
BNP SERPL-MCNC: 951 PG/ML (ref 0–450)
BUN SERPL-MCNC: 21 MG/DL (ref 7–21)
CALCIUM SERPL-MCNC: 8.5 MG/DL (ref 8.4–10.5)
GFR NON-AFRICAN AMERICAN: > 60

## 2018-04-18 RX ADMIN — POTASSIUM CHLORIDE SCH MEQ: 20 TABLET, EXTENDED RELEASE ORAL at 08:06

## 2018-04-18 RX ADMIN — POTASSIUM CHLORIDE ONE MEQ: 1.5 SOLUTION ORAL at 10:31

## 2018-04-18 RX ADMIN — INSULIN HUMAN SCH UNITS: 100 INJECTION, SOLUTION PARENTERAL at 08:08

## 2018-04-18 RX ADMIN — PANTOPRAZOLE SODIUM SCH MG: 40 TABLET, DELAYED RELEASE ORAL at 05:36

## 2018-04-18 RX ADMIN — POTASSIUM CHLORIDE SCH MEQ: 20 TABLET, EXTENDED RELEASE ORAL at 09:44

## 2018-04-18 RX ADMIN — OXYCODONE AND ACETAMINOPHEN PRN TAB: 10; 325 TABLET ORAL at 08:06

## 2018-04-18 RX ADMIN — DILTIAZEM HYDROCHLORIDE SCH MG: 180 CAPSULE, EXTENDED RELEASE ORAL at 09:45

## 2018-04-18 RX ADMIN — MESALAMINE SCH MG: 500 CAPSULE ORAL at 09:44

## 2018-04-18 RX ADMIN — POTASSIUM CHLORIDE ONE: 1.5 SOLUTION ORAL at 09:59

## 2018-04-18 NOTE — CP.PCM.DIS
Provider





- Provider


Date of Admission: 


04/16/18 13:00





Attending physician: 


Kody Pratt MD





Primary care physician: 


Kody Pratt MD





Consults: 


Cardio - Dr. Ramirez


Time Spent in preparation of Discharge (in minutes): 35





Hospital Course





- Lab Results


Lab Results: 


 Most Recent Lab Values











WBC  10.8 10^3/ul (4.5-11.0)   04/17/18  05:30    


 


RBC  4.22 10^6/uL (3.5-6.1)   04/17/18  05:30    


 


Hgb  9.1 g/dL (14.0-18.0)  L  04/17/18  05:30    


 


Hct  30.2 % (42.0-52.0)  L  04/17/18  05:30    


 


MCV  71.6 fl (80.0-105.0)  L  04/17/18  05:30    


 


MCH  21.6 pg (25.0-35.0)  L  04/17/18  05:30    


 


MCHC  30.1 g/dl (31.0-37.0)  L  04/17/18  05:30    


 


RDW  23.5 % (11.5-14.5)  H  04/17/18  05:30    


 


Plt Count  303 10^3/uL (120.0-450.0)   04/17/18  05:30    


 


MPV  9.7 fl (7.0-11.0)   04/17/18  05:30    


 


Gran %  73.0 % (50.0-68.0)  H  04/17/18  05:30    


 


Lymph % (Auto)  13.5 % (22.0-35.0)  L  04/17/18  05:30    


 


Mono % (Auto)  10.0 % (1.0-6.0)  H  04/17/18  05:30    


 


Eos % (Auto)  3.3 % (1.5-5.0)   04/17/18  05:30    


 


Baso % (Auto)  0.2 % (0.0-3.0)   04/17/18  05:30    


 


Gran #  7.92  (1.4-6.5)  H  04/17/18  05:30    


 


Lymph # (Auto)  1.5  (1.2-3.4)   04/17/18  05:30    


 


Mono # (Auto)  1.1  (0.1-0.6)  H  04/17/18  05:30    


 


Eos # (Auto)  0.4  (0.0-0.7)   04/17/18  05:30    


 


Baso # (Auto)  0.02 K/mm3 (0.0-2.0)   04/17/18  05:30    


 


PT  17.5 SECONDS (9.4-12.5)  H  04/16/18  12:00    


 


INR  1.51  (0.93-1.08)  H  04/16/18  12:00    


 


APTT  28.3 Seconds (25.1-36.5)   04/16/18  12:00    


 


Sodium  138 mmol/L (132-148)   04/18/18  06:00    


 


Potassium  3.5 mmol/L (3.6-5.0)  L  04/18/18  06:00    


 


Chloride  98 mmol/L ()   04/18/18  06:00    


 


Carbon Dioxide  29 mmol/L (21-33)   04/18/18  06:00    


 


Anion Gap  14  (10-20)   04/18/18  06:00    


 


BUN  21 mg/dL (7-21)   04/18/18  06:00    


 


Creatinine  0.9 mg/dl (0.8-1.5)   04/18/18  06:00    


 


Est GFR ( Amer)  > 60   04/18/18  06:00    


 


Est GFR (Non-Af Amer)  > 60   04/18/18  06:00    


 


POC Glucose (mg/dL)  152 mg/dL ()  H  04/17/18  21:22    


 


Random Glucose  137 mg/dL ()  H  04/18/18  06:00    


 


Calcium  8.5 mg/dL (8.4-10.5)   04/18/18  06:00    


 


Magnesium  2.1 mg/dL (1.7-2.2)   04/18/18  06:00    


 


Total Bilirubin  0.5 mg/dL (0.2-1.3)   04/18/18  06:00    


 


Direct Bilirubin  0.3 mg/dL (0.0-0.4)   04/18/18  06:00    


 


AST  36 U/L (17-59)   04/18/18  06:00    


 


ALT  41 U/L (7-56)   04/18/18  06:00    


 


Alkaline Phosphatase  68 U/L ()   04/18/18  06:00    


 


Lactate Dehydrogenase  630 U/L (333-699)   04/16/18  12:00    


 


Total Creatine Kinase  85 U/L ()   04/16/18  12:00    


 


Troponin I  < 0.01 ng/mL  04/17/18  06:00    


 


NT-Pro-B Natriuret Pep  951 pg/mL (0-450)  H  04/18/18  06:00    


 


Total Protein  7.0 g/dL (5.8-8.3)   04/18/18  06:00    


 


Albumin  3.7 g/dL (3.0-4.8)   04/18/18  06:00    


 


Globulin  3.3 gm/dL  04/18/18  06:00    


 


Albumin/Globulin Ratio  1.1  (1.1-1.8)   04/18/18  06:00    


 


Lipase  45 U/L ()   04/16/18  12:00    


 


Urine Color  Yellow  (YELLOW)   04/16/18  13:20    


 


Urine Appearance  Clear  (CLEAR)   04/16/18  13:20    


 


Urine pH  6.0  (4.7-8.0)   04/16/18  13:20    


 


Ur Specific Gravity  1.010  (1.005-1.035)   04/16/18  13:20    


 


Urine Protein  Negative mg/dL (<30 mg/dL)   04/16/18  13:20    


 


Urine Glucose (UA)  Negative mg/dL (NEGATIVE)   04/16/18  13:20    


 


Urine Ketones  Negative mg/dL (NEGATIVE)   04/16/18  13:20    


 


Urine Blood  Negative  (NEGATIVE)   04/16/18  13:20    


 


Urine Nitrate  Negative  (NEGATIVE)   04/16/18  13:20    


 


Urine Bilirubin  Negative  (NEGATIVE)   04/16/18  13:20    


 


Urine Urobilinogen  0.2 E.U./dL (<1 E.U./dL)   04/16/18  13:20    


 


Ur Leukocyte Esterase  Negative Breana/uL (NEGATIVE)   04/16/18  13:20    


 


Blood Type  O POSITIVE   04/16/18  12:00    


 


Antibody Screen  Negative   04/16/18  12:00    


 


BBK History Checked  Patient has bt   04/16/18  12:00    














- Hospital Course


Hospital Course: 


Patient is a 66yo male with history of Crohn's disease, hypertension, 

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 

fibrillation that presented with chest pain associated with shortness of 

breath.  An EKG revealed atrial fibrillation at 119bpm with RBBB and inferior 

infarct (age undetermined). A CXR revealed no active disease. A chest CT 

revealed no evidence of pulmonary embolism and no focal consolidation, 

pneumothorax or effusion. His BNP was noted to be 2780 and troponin was 

negative. Cardiology was consulted. He was started on IV lasix as well as 

cardizem for rate control of his atrial fibrillation. He reported improvement 

of his symptoms. Electrolyte abnormalities were monitored and repleted as 

indicated. He was subsequently discharged with instructions to follow up with 

his cardiologist and primary care physician within 1 week of discharge. He was 

instructed to stop his home xarelto due to recent history of GI bleed. 





Discharge Exam





- Head Exam


Head Exam: ATRAUMATIC, NORMAL INSPECTION, NORMOCEPHALIC





- Eye Exam


Eye Exam: EOMI, PERRL





- Respiratory Exam


Respiratory Exam: absent: Rales, Rhonchi, Wheezes





- Cardiovascular Exam


Cardiovascular Exam: RRR, +S1, +S2.  absent: Gallop, Rubs





- GI/Abdominal Exam


GI & Abdominal Exam: Soft.  absent: Distended, Firm, Guarding, Rebound, 

Tenderness





- Neurological Exam


Neurological exam: Alert, CN II-XII Intact, Oriented x3





- Psychiatric Exam


Psychiatric exam: Normal Affect, Normal Mood





- Skin


Skin Exam: Dry, Intact, Normal Color, Warm





Discharge Plan





- Discharge Medications


Prescriptions: 


diltiaZEM CD [Cardizem CD] 180 mg PO DAILY #30 cap





- Follow Up Plan


Condition: FAIR


Disposition: HOME/ ROUTINE


Instructions:  High Blood Pressure in Adults, Heart Failure, Adult (DC), 

Shortness of Breath (Dyspnea) (DC)


Additional Instructions: 


MAY DISCHARGE HOME


FOLLOW UP  WITHIN 1 WEEK


STOP XARELTO AS PER CARDIOLOGY 


DISCHARGE MEDS AS PER AMBULATORY ORDERS PLUS NEW SCRIPTS


STOP NASIDS AT HOME 


Referrals: 


Kody Pratt MD [Primary Care Provider] - 1 Week


(MAY DISCHARGE HOME


FOLLOW UP  WITHIN 1 WEK


STOP XARELTO AS PER CARDIOLOGY 


DISCHARGE MEDS AS PER AMBULATORY ORDERS PLUS NEW SCRIPTS


SYOP NSAIDS AT HOME )

## 2018-04-18 NOTE — PN
DATE:  04/18/2018



CARDIOLOGY FOLLOWUP



SUBJECTIVE:  The patient's breathing is better.



PHYSICAL EXAMINATION:

VITAL SIGNS:  Stable.

NECK:  Negative JVD.

LUNGS:  Clear to auscultation.

HEART:  Reveals S1 and S2.

EXTREMITIES:  Without edema.



LABORATORY DATA:  Hemoglobin is 9.1.  BUN and creatinine are unremarkable.



IMPRESSION:

1.  Resolution of congestive heart failure.

2.  Dietary indiscretion.

3.  Obesity.

4.  Diabetes mellitus.



PLAN:  Given these findings, the patient is stable for discharge.  He will

go home on Lasix.  After a day of reflection, the patient now remembers

taking excess saltine crackers while he was eating.  The patient's

discharge, followup and instructions given to the patient in detail.





__________________________________________

Olu Ramirez MD





DD:  04/18/2018 10:49:36

DT:  04/18/2018 10:53:08

Job # [id number]

## 2018-04-19 NOTE — DS
LOCATION:  The patient was seen in room 261, bed 2.



HISTORY OF PRESENT ILLNESS:  The patient is out of bed to chair.  The

patient is watching TV.  The patient is comfortable, in no distress.  The

patient has been requesting back pain medications, Percocet.  The patient

slept overnight without any adverse events documented.



PHYSICAL EXAMINATION:

VITAL SIGNS: T-max 98, _____ telemetry shows atrial fibrillation, heart

rate in the 70s and 80s.  Blood pressure 130/78, respiration 20, O2 sat

98%.  Output 775.

HEENT:  Head examination, normocephalic, atraumatic.  HEENT examination

shows pinkish pale conjunctivae.  Anicteric sclerae.  No oropharyngeal

lesion.  No neck rigidity.

CHEST:  Kyphosis; decreased breath sound at the bases, left more than the

right.  No crackles, rales or wheezing noted today.

CARDIOVASCULAR:  S1, S2, irregular rhythm.  Positive systolic murmur, left

sternal border, right second intercostal space, left second intercostal

space.

ABDOMEN: Protuberant, obese.  Positive bowel sounds.

GENITALIA:  Male.

RECTAL:  Deferred.

EXTREMITIES: Shows positive swelling of the lower extremity, left more than

the right, decreased since yesterday on admission.

MUSCULOSKELETAL:  Shows a body mass index of 35.2.

NEUROLOGIC:  The patient is alert, awake, oriented x3.  Cranial nerves II

to XII intact.  Gait examination is not tested.



DIAGNOSTICS:  From 04/18, sodium 138, potassium 3.5, chloride 98, CO2 29,

anion gap 14, BUN 21, creatinine 0.9, GFR greater than 60, glucose 137,

calcium 8.5, magnesium 2.1.  LFTs are normal.  BNP is down to 951 from

2780.  Troponin is 0.01.  Blood type O+.  Chest x-ray from 04/17,

cardiomegaly, no CHF or pneumonia noted.



IMPRESSION:

1.  Acute recurrent systolic congestive heart failure.

2.  Chronic atrial fibrillation.

3.  Coronary artery disease with history of angioplasty and stent placement

of the left anterior descending artery.

4.  History of hemorrhoidal rectal bleeding.

5.  History of nonsteroidal anti-inflammatory drug use.

6.  Ischemic dilated cardiomyopathy with ejection fraction of 45% to 50%.

7.  Bilateral lower extremity venous stasis (resolving).

8.  Status post angioplasty and stent placement of the 80% mid left

anterior descending stenosis with drug-eluting stent.

9.  Left ventricular ejection fraction of 45% with diffuse segmental wall

hypokinesis.

10.  Type 2 non-insulin-requiring diabetes mellitus with hyperglycemia.

11.  Hypertension.

12.  Morbid obesity.

13.  Moderate pulmonary hypertension with right ventricular systolic

pressure of 47 mmHg.

14.  Mild tricuspid regurgitation.

15.  Mildly thickened mitral valve with mild mitral regurgitation.

16  Normocytic and microcytic anemia.

17.  Granulocytosis.

18.  Hypokalemia.

19.  Dyslipidemia.

20.  Cardiomegaly.

21.  Right bundle-branch block.

22.  Insomnia.

23.  Depression.

24.  Chronic narcotic dependent pain syndrome.

25.  Constipation.

26.  Hypercholesteremia.

27.  Questionable colitis.



PLAN:  At this time, the patient has been cleared for discharge after

patient was seen by Dr. Ramirez.  Yesterday, the patient has been cleared for

discharge.  The patient's Xarelto has been stopped.  The patient was

recommended to stop the Xarelto and continue on the Plavix.  The patient

was advised weight loss, strict compliance with diet and medication and

anticoagulation was stopped with Xarelto as per Cardiology.



The patient was cleared for discharge.  The patient is to be discharged

home with discharge followup with Dr. Pratt within one week.  Stop Xarelto

as per Cardiology.  Stop all nonsteroidal anti-inflammatory drug use at

home. Discharge medications as per ambulatory orders.  Discharge followup

within 1 week.



DISCHARGE MEDICATIONS:

1.  Ambien 5 mg at bedtime p.r.n.

2.  Diovan/HCTZ  160/25 mg daily.

3.  Crestor 10 mg daily.

4.  K-Dur 20 mEq twice a day.

5.  Protonix 40 mg daily.

6.  The patient is to resume his Percocet 10/325 one tablet every 6 p.r.n.

7.  Lopressor 50 mg twice a day.

8.  Metformin 500 twice a day.

9.  Pentasa 1000 mg four times a day.

10.  Lasix 40 mg twice a day.

11.  Folic acid 1 mg daily.

12.  Lexapro 5 mg daily.

13.  Cymbalta 60 mg daily.

14.  Colace 100 mg three times a day.

15.  Cardizem  mg daily.

16.  Plavix 75 mg daily.



Time spent in the entire discharge process more than 45 minutes.  The

patient was extensively explained about the details of his diagnosis, test

results, recommendation by Cardiology.



DISCHARGE INSTRUCTIONS:  Diet, activity, limitations, restrictions were

explained to the patient at length and all questions concerned answered.





















Dictated and electronically signed, not read.





__________________________________________

Kody Pratt MD



DD:  04/18/2018 11:21:05

DT:  04/18/2018 11:24:46

Job # [id number]

## 2018-04-20 ENCOUNTER — HOSPITAL ENCOUNTER (INPATIENT)
Dept: HOSPITAL 42 - ED | Age: 68
LOS: 12 days | Discharge: NURSING, REHAB OR LONG-TERM CARE | DRG: 871 | End: 2018-05-02
Attending: INTERNAL MEDICINE | Admitting: INTERNAL MEDICINE
Payer: MEDICARE

## 2018-04-20 VITALS — BODY MASS INDEX: 36.5 KG/M2 | HEART RATE: 89 BPM | HEART RATE: 89 BPM | HEART RATE: 89 BPM

## 2018-04-20 DIAGNOSIS — Z53.20: ICD-10-CM

## 2018-04-20 DIAGNOSIS — K76.1: ICD-10-CM

## 2018-04-20 DIAGNOSIS — Z79.899: ICD-10-CM

## 2018-04-20 DIAGNOSIS — K59.00: ICD-10-CM

## 2018-04-20 DIAGNOSIS — K86.89: ICD-10-CM

## 2018-04-20 DIAGNOSIS — K57.32: ICD-10-CM

## 2018-04-20 DIAGNOSIS — R13.13: ICD-10-CM

## 2018-04-20 DIAGNOSIS — J98.11: ICD-10-CM

## 2018-04-20 DIAGNOSIS — Z86.010: ICD-10-CM

## 2018-04-20 DIAGNOSIS — K40.20: ICD-10-CM

## 2018-04-20 DIAGNOSIS — Z79.02: ICD-10-CM

## 2018-04-20 DIAGNOSIS — R13.12: ICD-10-CM

## 2018-04-20 DIAGNOSIS — F32.89: ICD-10-CM

## 2018-04-20 DIAGNOSIS — N17.0: ICD-10-CM

## 2018-04-20 DIAGNOSIS — E11.65: ICD-10-CM

## 2018-04-20 DIAGNOSIS — E11.22: ICD-10-CM

## 2018-04-20 DIAGNOSIS — K50.90: ICD-10-CM

## 2018-04-20 DIAGNOSIS — E87.5: ICD-10-CM

## 2018-04-20 DIAGNOSIS — M47.814: ICD-10-CM

## 2018-04-20 DIAGNOSIS — E78.00: ICD-10-CM

## 2018-04-20 DIAGNOSIS — N18.6: ICD-10-CM

## 2018-04-20 DIAGNOSIS — R65.20: ICD-10-CM

## 2018-04-20 DIAGNOSIS — S36.119A: ICD-10-CM

## 2018-04-20 DIAGNOSIS — G89.4: ICD-10-CM

## 2018-04-20 DIAGNOSIS — M47.812: ICD-10-CM

## 2018-04-20 DIAGNOSIS — Z99.2: ICD-10-CM

## 2018-04-20 DIAGNOSIS — K76.0: ICD-10-CM

## 2018-04-20 DIAGNOSIS — E66.01: ICD-10-CM

## 2018-04-20 DIAGNOSIS — K55.9: ICD-10-CM

## 2018-04-20 DIAGNOSIS — D47.3: ICD-10-CM

## 2018-04-20 DIAGNOSIS — R09.02: ICD-10-CM

## 2018-04-20 DIAGNOSIS — E78.5: ICD-10-CM

## 2018-04-20 DIAGNOSIS — E11.40: ICD-10-CM

## 2018-04-20 DIAGNOSIS — I50.42: ICD-10-CM

## 2018-04-20 DIAGNOSIS — I27.21: ICD-10-CM

## 2018-04-20 DIAGNOSIS — D68.9: ICD-10-CM

## 2018-04-20 DIAGNOSIS — R57.8: ICD-10-CM

## 2018-04-20 DIAGNOSIS — Z91.14: ICD-10-CM

## 2018-04-20 DIAGNOSIS — Z79.891: ICD-10-CM

## 2018-04-20 DIAGNOSIS — J39.2: ICD-10-CM

## 2018-04-20 DIAGNOSIS — E86.1: ICD-10-CM

## 2018-04-20 DIAGNOSIS — F11.20: ICD-10-CM

## 2018-04-20 DIAGNOSIS — B37.0: ICD-10-CM

## 2018-04-20 DIAGNOSIS — Z90.49: ICD-10-CM

## 2018-04-20 DIAGNOSIS — E83.39: ICD-10-CM

## 2018-04-20 DIAGNOSIS — K76.7: ICD-10-CM

## 2018-04-20 DIAGNOSIS — I25.5: ICD-10-CM

## 2018-04-20 DIAGNOSIS — N32.89: ICD-10-CM

## 2018-04-20 DIAGNOSIS — N25.81: ICD-10-CM

## 2018-04-20 DIAGNOSIS — I45.10: ICD-10-CM

## 2018-04-20 DIAGNOSIS — E87.2: ICD-10-CM

## 2018-04-20 DIAGNOSIS — E87.6: ICD-10-CM

## 2018-04-20 DIAGNOSIS — A41.9: Primary | ICD-10-CM

## 2018-04-20 DIAGNOSIS — I08.1: ICD-10-CM

## 2018-04-20 DIAGNOSIS — I25.118: ICD-10-CM

## 2018-04-20 DIAGNOSIS — I42.0: ICD-10-CM

## 2018-04-20 DIAGNOSIS — K29.70: ICD-10-CM

## 2018-04-20 DIAGNOSIS — R40.2412: ICD-10-CM

## 2018-04-20 DIAGNOSIS — Z95.5: ICD-10-CM

## 2018-04-20 DIAGNOSIS — K42.9: ICD-10-CM

## 2018-04-20 DIAGNOSIS — G47.00: ICD-10-CM

## 2018-04-20 DIAGNOSIS — K72.00: ICD-10-CM

## 2018-04-20 DIAGNOSIS — I48.2: ICD-10-CM

## 2018-04-20 DIAGNOSIS — J40: ICD-10-CM

## 2018-04-20 DIAGNOSIS — F41.9: ICD-10-CM

## 2018-04-20 DIAGNOSIS — Z79.4: ICD-10-CM

## 2018-04-20 DIAGNOSIS — M19.90: ICD-10-CM

## 2018-04-20 DIAGNOSIS — K21.9: ICD-10-CM

## 2018-04-20 DIAGNOSIS — I87.8: ICD-10-CM

## 2018-04-20 DIAGNOSIS — I48.0: ICD-10-CM

## 2018-04-20 DIAGNOSIS — B18.2: ICD-10-CM

## 2018-04-20 DIAGNOSIS — A09: ICD-10-CM

## 2018-04-20 DIAGNOSIS — D50.9: ICD-10-CM

## 2018-04-20 DIAGNOSIS — J18.9: ICD-10-CM

## 2018-04-20 DIAGNOSIS — I13.2: ICD-10-CM

## 2018-04-20 DIAGNOSIS — K51.90: ICD-10-CM

## 2018-04-20 LAB
ALBUMIN SERPL-MCNC: 4.4 G/DL (ref 3–4.8)
ALBUMIN/GLOB SERPL: 1.3 {RATIO} (ref 1.1–1.8)
ALT SERPL-CCNC: 287 U/L (ref 7–56)
AMYLASE SERPL-CCNC: 59 U/L (ref 35–125)
APTT BLD: 25.2 SECONDS (ref 25.1–36.5)
AST SERPL-CCNC: 390 U/L (ref 17–59)
BASOPHILS # BLD AUTO: 0.03 K/MM3 (ref 0–2)
BASOPHILS NFR BLD: 0.3 % (ref 0–3)
BUN SERPL-MCNC: 29 MG/DL (ref 7–21)
CALCIUM SERPL-MCNC: 9.3 MG/DL (ref 8.4–10.5)
EOSINOPHIL # BLD: 0.1 10*3/UL (ref 0–0.7)
EOSINOPHIL NFR BLD: 1.2 % (ref 1.5–5)
ERYTHROCYTE [DISTWIDTH] IN BLOOD BY AUTOMATED COUNT: 23.7 % (ref 11.5–14.5)
GFR NON-AFRICAN AMERICAN: 44
GRANULOCYTES # BLD: 8.56 10*3/UL (ref 1.4–6.5)
GRANULOCYTES NFR BLD: 74.4 % (ref 50–68)
HGB BLD-MCNC: 9.9 G/DL (ref 14–18)
INR PPP: 1.68 (ref 0.93–1.08)
LYMPHOCYTES # BLD: 1.4 10*3/UL (ref 1.2–3.4)
LYMPHOCYTES NFR BLD AUTO: 12.5 % (ref 22–35)
MCH RBC QN AUTO: 21.9 PG (ref 25–35)
MCHC RBC AUTO-ENTMCNC: 30.5 G/DL (ref 31–37)
MCV RBC AUTO: 71.9 FL (ref 80–105)
MONOCYTES # BLD AUTO: 1.3 10*3/UL (ref 0.1–0.6)
MONOCYTES NFR BLD: 11.6 % (ref 1–6)
PLATELET # BLD: 773 10^3/UL (ref 120–450)
PMV BLD AUTO: 9.9 FL (ref 7–11)
PROTHROMBIN TIME: 19.4 SECONDS (ref 9.4–12.5)
RBC # BLD AUTO: 4.52 10^6/UL (ref 3.5–6.1)
WBC # BLD AUTO: 11.5 10^3/UL (ref 4.5–11)
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
## 2018-04-21 LAB
ACANTHROCYTES: (no result)
ALBUMIN SERPL-MCNC: 3.9 G/DL (ref 3–4.8)
ALBUMIN SERPL-MCNC: 4.2 G/DL (ref 3–4.8)
ALBUMIN SERPL-MCNC: 4.3 G/DL (ref 3–4.8)
ALBUMIN/GLOB SERPL: 1.3 {RATIO} (ref 1.1–1.8)
ALBUMIN/GLOB SERPL: 1.3 {RATIO} (ref 1.1–1.8)
ALBUMIN/GLOB SERPL: 1.4 {RATIO} (ref 1.1–1.8)
ALT SERPL-CCNC: 2313 U/L (ref 7–56)
ALT SERPL-CCNC: 2551 U/L (ref 7–56)
ANISOCYTOSIS BLD QL SMEAR: (no result)
APAP SERPL-MCNC: 11 UG/ML (ref 10–20)
APPEARANCE UR: CLEAR
APTT BLD: 18.9 SECONDS (ref 25.1–36.5)
APTT BLD: 23.8 SECONDS (ref 25.1–36.5)
APTT BLD: 25.5 SECONDS (ref 25.1–36.5)
ARTERIAL BLOOD GAS O2 SAT: 97.9 % (ref 95–98)
ARTERIAL BLOOD GAS O2 SAT: 98.5 % (ref 95–98)
ARTERIAL BLOOD GAS PCO2: 24 MM/HG (ref 35–45)
ARTERIAL BLOOD GAS PCO2: 34 MM/HG (ref 35–45)
ARTERIAL BLOOD GAS TCO2: 12.2 MMOL.L (ref 22–28)
ARTERIAL BLOOD GAS TCO2: 14.3 MMOL.L (ref 22–28)
AST SERPL-CCNC: 5440 U/L (ref 17–59)
BASE EXCESS BLDV CALC-SCNC: -11.3 MMOL/L (ref 0–2)
BASE EXCESS BLDV CALC-SCNC: -11.9 MMOL/L (ref 0–2)
BASOPHILS # BLD AUTO: 0.03 K/MM3 (ref 0–2)
BASOPHILS # BLD AUTO: 0.03 K/MM3 (ref 0–2)
BASOPHILS NFR BLD: 0.1 % (ref 0–3)
BASOPHILS NFR BLD: 0.1 % (ref 0–3)
BILIRUB DIRECT SERPL-MCNC: 1.1 MG/DL (ref 0–0.4)
BILIRUB UR-MCNC: NEGATIVE MG/DL
BUN SERPL-MCNC: 29 MG/DL (ref 7–21)
BUN SERPL-MCNC: 31 MG/DL (ref 7–21)
BUN SERPL-MCNC: 36 MG/DL (ref 7–21)
CALCIUM SERPL-MCNC: 8.9 MG/DL (ref 8.4–10.5)
CALCIUM SERPL-MCNC: 9 MG/DL (ref 8.4–10.5)
CALCIUM SERPL-MCNC: 9.3 MG/DL (ref 8.4–10.5)
COLOR UR: YELLOW
DACRYOCYTES BLD QL SMEAR: (no result)
EOSINOPHIL # BLD: 0 10*3/UL (ref 0–0.7)
EOSINOPHIL # BLD: 0 10*3/UL (ref 0–0.7)
EOSINOPHIL NFR BLD: 0 % (ref 1.5–5)
EOSINOPHIL NFR BLD: 0.1 % (ref 1.5–5)
EPI CELLS #/AREA URNS HPF: (no result) /HPF (ref 0–5)
ERYTHROCYTE [DISTWIDTH] IN BLOOD BY AUTOMATED COUNT: 23.2 % (ref 11.5–14.5)
ERYTHROCYTE [DISTWIDTH] IN BLOOD BY AUTOMATED COUNT: 23.3 % (ref 11.5–14.5)
ERYTHROCYTE [DISTWIDTH] IN BLOOD BY AUTOMATED COUNT: 23.4 % (ref 11.5–14.5)
ERYTHROCYTE [DISTWIDTH] IN BLOOD BY AUTOMATED COUNT: 23.7 % (ref 11.5–14.5)
GFR NON-AFRICAN AMERICAN: 24
GFR NON-AFRICAN AMERICAN: 27
GFR NON-AFRICAN AMERICAN: 30
GLUCOSE UR STRIP-MCNC: NEGATIVE MG/DL
GRANULOCYTES # BLD: 16.99 10*3/UL (ref 1.4–6.5)
GRANULOCYTES # BLD: 27.63 10*3/UL (ref 1.4–6.5)
GRANULOCYTES NFR BLD: 78.2 % (ref 50–68)
GRANULOCYTES NFR BLD: 86.9 % (ref 50–68)
HCO3 BLDA-SCNC: 11.5 MMOL/L (ref 21–28)
HCO3 BLDA-SCNC: 13.3 MMOL/L (ref 21–28)
HGB BLD-MCNC: 8.6 G/DL (ref 14–18)
HGB BLD-MCNC: 9.6 G/DL (ref 14–18)
HGB BLD-MCNC: 9.7 G/DL (ref 14–18)
HGB BLD-MCNC: 9.9 G/DL (ref 14–18)
HYPOCHROMIA BLD QL SMEAR: (no result)
INHALED O2 CONCENTRATION: 40 %
INHALED O2 CONCENTRATION: 40 %
INR PPP: 2.08 (ref 0.93–1.08)
INR PPP: 2.14 (ref 0.93–1.08)
INR PPP: 2.49 (ref 0.93–1.08)
LEUKOCYTE ESTERASE UR-ACNC: NEGATIVE LEU/UL
LG PLATELETS BLD QL SMEAR: PRESENT
LYMPHOCYTE: 2 % (ref 22–35)
LYMPHOCYTES # BLD: 1.2 10*3/UL (ref 1.2–3.4)
LYMPHOCYTES # BLD: 2.2 10*3/UL (ref 1.2–3.4)
LYMPHOCYTES NFR BLD AUTO: 10 % (ref 22–35)
LYMPHOCYTES NFR BLD AUTO: 3.9 % (ref 22–35)
MCH RBC QN AUTO: 21.2 PG (ref 25–35)
MCH RBC QN AUTO: 21.6 PG (ref 25–35)
MCH RBC QN AUTO: 22.8 PG (ref 25–35)
MCH RBC QN AUTO: 23 PG (ref 25–35)
MCHC RBC AUTO-ENTMCNC: 28.8 G/DL (ref 31–37)
MCHC RBC AUTO-ENTMCNC: 29.7 G/DL (ref 31–37)
MCHC RBC AUTO-ENTMCNC: 30.1 G/DL (ref 31–37)
MCHC RBC AUTO-ENTMCNC: 30.3 G/DL (ref 31–37)
MCV RBC AUTO: 72.7 FL (ref 80–105)
MCV RBC AUTO: 73.6 FL (ref 80–105)
MCV RBC AUTO: 75.3 FL (ref 80–105)
MCV RBC AUTO: 76.5 FL (ref 80–105)
MICROCYTES BLD QL SMEAR: (no result)
MONOCYTE: 1 % (ref 1–6)
MONOCYTES # BLD AUTO: 2.5 10*3/UL (ref 0.1–0.6)
MONOCYTES # BLD AUTO: 2.9 10*3/UL (ref 0.1–0.6)
MONOCYTES NFR BLD: 11.6 % (ref 1–6)
MONOCYTES NFR BLD: 9.1 % (ref 1–6)
NEUTROPHILS NFR BLD AUTO: 88 % (ref 50–70)
NEUTS BAND NFR BLD: 9 % (ref 0–2)
OVALOCYTES BLD QL SMEAR: (no result)
PH BLDA: 7.2 [PH] (ref 7.35–7.45)
PH BLDA: 7.29 [PH] (ref 7.35–7.45)
PH BLDV: 7.18 [PH] (ref 7.32–7.43)
PH BLDV: 7.21 [PH] (ref 7.32–7.43)
PH UR STRIP: 6 [PH] (ref 4.7–8)
PLATELET # BLD EST: (no result) 10*3/UL
PLATELET # BLD: 377 10^3/UL (ref 120–450)
PLATELET # BLD: 472 10^3/UL (ref 120–450)
PLATELET # BLD: 662 10^3/UL (ref 120–450)
PLATELET # BLD: 732 10^3/UL (ref 120–450)
PMV BLD AUTO: 10.1 FL (ref 7–11)
PMV BLD AUTO: 10.6 FL (ref 7–11)
PMV BLD AUTO: 9.8 FL (ref 7–11)
PMV BLD AUTO: 9.8 FL (ref 7–11)
PO2 BLDA: 88 MM/HG (ref 80–100)
PO2 BLDA: 97 MM/HG (ref 80–100)
POIKILOCYTOSIS BLD QL SMEAR: (no result)
POLYCHROMASIA BLD QL SMEAR: (no result)
PROT UR STRIP-MCNC: 30 MG/DL
PROTHROMBIN TIME: 24.1 SECONDS (ref 9.4–12.5)
PROTHROMBIN TIME: 24.8 SECONDS (ref 9.4–12.5)
PROTHROMBIN TIME: 28.9 SECONDS (ref 9.4–12.5)
RBC # BLD AUTO: 4.06 10^6/UL (ref 3.5–6.1)
RBC # BLD AUTO: 4.21 10^6/UL (ref 3.5–6.1)
RBC # BLD AUTO: 4.21 10^6/UL (ref 3.5–6.1)
RBC # BLD AUTO: 4.58 10^6/UL (ref 3.5–6.1)
RBC # UR STRIP: NEGATIVE /UL
RBC #/AREA URNS HPF: (no result) /HPF (ref 0–2)
SALICYLATES SERPL-MCNC: 2 MG/DL (ref 2–20)
SP GR UR STRIP: 1.02 (ref 1–1.03)
TROPONIN I SERPL-MCNC: 0.03 NG/ML
UROBILINOGEN UR STRIP-ACNC: 0.2 E.U./DL
VENOUS BLOOD FIO2: 21 %
VENOUS BLOOD FIO2: 21 %
VENOUS BLOOD GAS PCO2: 38 (ref 40–60)
VENOUS BLOOD GAS PCO2: 45 (ref 40–60)
VENOUS BLOOD GAS PO2: 39 MM/HG (ref 30–55)
VENOUS BLOOD GAS PO2: 45 MM/HG (ref 30–55)
WBC # BLD AUTO: 21.8 10^3/UL (ref 4.5–11)
WBC # BLD AUTO: 29.6 10^3/UL (ref 4.5–11)
WBC # BLD AUTO: 31.8 10^3/UL (ref 4.5–11)
WBC # BLD AUTO: 34.9 10^3/UL (ref 4.5–11)
WBC #/AREA URNS HPF: (no result) /HPF (ref 0–6)

## 2018-04-21 PROCEDURE — 30233N1 TRANSFUSION OF NONAUTOLOGOUS RED BLOOD CELLS INTO PERIPHERAL VEIN, PERCUTANEOUS APPROACH: ICD-10-PCS | Performed by: INTERNAL MEDICINE

## 2018-04-21 PROCEDURE — 30233K1 TRANSFUSION OF NONAUTOLOGOUS FROZEN PLASMA INTO PERIPHERAL VEIN, PERCUTANEOUS APPROACH: ICD-10-PCS | Performed by: INTERNAL MEDICINE

## 2018-04-21 RX ADMIN — IPRATROPIUM BROMIDE AND ALBUTEROL SULFATE SCH ML: .5; 3 SOLUTION RESPIRATORY (INHALATION) at 19:45

## 2018-04-21 RX ADMIN — IPRATROPIUM BROMIDE AND ALBUTEROL SULFATE SCH ML: .5; 3 SOLUTION RESPIRATORY (INHALATION) at 13:43

## 2018-04-21 RX ADMIN — BUDESONIDE SCH MG: 0.5 SUSPENSION RESPIRATORY (INHALATION) at 19:45

## 2018-04-21 RX ADMIN — IPRATROPIUM BROMIDE AND ALBUTEROL SULFATE SCH ML: .5; 3 SOLUTION RESPIRATORY (INHALATION) at 07:41

## 2018-04-21 NOTE — PN
DATE:  04/21/2018



LOCATION:  The patient is seen in ICU bed 1.



SUBJECTIVE:  The patient was seen lying in the bed in the process of

getting ultrasound.  The patient is responsive, alert, awake, oriented x3. 

Overnight nurse's notes were reviewed.



PHYSICAL EXAMINATION:

GENERAL:  The patient is seen lying in the bed.  The patient is

comfortable, awake, responsive, oriented x3.

VITAL SIGNS:  T-max 95.4 to 96.1.  Telemetry shows now sinus rhythm, heart

rate is in the 60s, atrial fibrillation.  On the monitor, blood pressure

was 103/68.  On the monitor, O2 sat is 99% to 100%.  Respirations 11, 17,

and 20 per minute.  Blood pressure 111/37.

HEAD:  Normocephalic, atraumatic.

EENT:  Shows pinkish, pale conjunctivae, dry oral mucosa.

NECK:  No neck rigidity.

CHEST:  Kyphosis.

LUNGS:  Show decreased breath sounds at the bases.

CARDIOVASCULAR:  S1, S2, irregular rhythm.  Positive systolic murmur in

right second intercostal space, left second intercostal space, left sternal

border.

ABDOMEN:  Protuberant, soft, positive bowel sounds, distended.

GENITALIA:  Male.

EXTREMITIES:  Show positive SCDs, positive swelling of the lower

extremities.

MUSCULOSKELETAL:  Shows a body mass index of 37.4.



DIAGNOSTICS:  On 04/21/2018, WBC 13.8, which was gone from 11.5 to 21.8 to

31.8.  Hemoglobin and hematocrit are dropped to 9.9 and 32.5 to 8.6 and

29.9.  MCV 73.6, platelets are 662 from 773.  Granulocytes 87% segs, bands

are 9.  PT/INR has gone up to 28.9 and 2.49 from 19.4 and 1.68.  The

patient's ABG was reviewed.  Lactic acid is 10.3 and 13.  ABG shows pH of

7.2, pCO2 of 34, pO2 of 97, bicarb 13, saturation of 98.5.  Present

chemistry from 04/21/2018, overnight, the patient's potassium went up to

6.5.  Repeat chemistry this morning, sodium 138, potassium 5.3, chloride

96, CO2 of 13, anion gap is 31, BUN 31, creatinine 2.4, GFR 33, glucose 93,

calcium 8.9, phosphorus is 8.  Total bilirubin 1.6.  AST has gone up to

5440 from 390.  ALT has gone up to 4320 from 287.  Troponin is 0.04. 

Tylenol is less than 10 and less than 11.  Blood type is O+.  Chest x-ray

was reviewed which shows cardiomegaly.



IMPRESSION:

1.  Severe symptomatic bradycardia and hypotension.

2.  Questionable hypothermia.

3.  Hypoxemia.

4.  Abdominal distention, etiology undetermined.

5.  Questionable Tylenol overuse.

6.  Leukocytosis with granulocytosis and bandemia.

7.  Thrombocytosis.

8.  Coagulopathy.

9.  Lactic acidosis and increased anion gap metabolic acidosis.

10.  Non-hemolyzed hyperkalemia.

11.  Acute kidney injury and acute renal failure.

12.  Severe transaminitis versus shock liver versus hepatorenal failure.

13.  Hyperglycemia.

14.  Questionable shock liver versus questionable hepatorenal failure.

15.  Proteinuria.

16.  Anemia with decreasing hemoglobin and hematocrit.

17.  Normocytic anemia.

18.  History of coronary artery disease.

19.  Bilateral lower extremity venostasis.

20.  Chronic narcotic dependent pain syndrome.

21.  History of coronary angioplasty.

22.  Diastolic congestive heart failure.

23.  Type 2 diabetes mellitus.

24.  Insomnia.

25.  Depression.

26.  Dyslipidemia.

27.  Ulcerative colitis versus Crohn disease.

28.  Diabetic neuropathy.

29.  Hyperphosphatemia.

1.  Severe symptomatic bradycardia.

2.  Atrial fibrillation with slow ventricular response.

3.  Hypertension.

4.  Questionable lower gastrointestinal bleeding.

5.  Acute renal failure.

6.  Hypotension.

7.  History of Crohn disease and ulcerative colitis.

8.  Acute kidney injury.

9.  Bradycardia.

10.  Severe symptomatic hypotension.

11.  Leukocytosis with granulocytosis, thrombocytosis, microcytic anemia.

12.  Mild coagulopathy.

13.  Severe transaminitis, etiology undetermined.

14.  Proteinuria.

15.  History of anxiety, depression, insomnia.

16.  History of narcotic dependent pain syndrome.

17.  History of coronary artery disease and systolic, diastolic congestive

heart failure with possible ischemic cardiomyopathy.

18.  History of angioplasty and stent placement.

1.  Acute recurrent systolic congestive heart failure.

2.  Chronic atrial fibrillation.

3.  Coronary artery disease with history of angioplasty and stent placement

of the left anterior descending artery.

4.  History of hemorrhoidal rectal bleeding.

5.  History of nonsteroidal anti-inflammatory drug use.

6.  Ischemic dilated cardiomyopathy with ejection fraction of 45% to 50%.

7.  Bilateral lower extremity venous stasis (resolving).

8.  Status post angioplasty and stent placement of the 80% mid left

anterior descending stenosis with drug-eluting stent.

9.  Left ventricular ejection fraction of 45% with diffuse segmental wall

hypokinesis.

10.  Type 2 non-insulin-requiring diabetes mellitus with hyperglycemia.

11.  Hypertension.

12.  Morbid obesity.

13.  Moderate pulmonary hypertension with right ventricular systolic

pressure of 47 mmHg.

14.  Mild tricuspid regurgitation.

15.  Mildly thickened mitral valve with mild mitral regurgitation.

16  Normocytic and microcytic anemia.

17.  Granulocytosis.

18.  Hypokalemia.

19.  Dyslipidemia.

20.  Cardiomegaly.

21.  Right bundle-branch block.

22.  Insomnia.

23.  Depression.

24.  Chronic narcotic dependent pain syndrome.

25.  Constipation.

26.  Hypercholesteremia.

27.  Questionable colitis.

1.  Acute recurrent systolic versus diastolic congestive heart failure with

pulmonary arterial hypertension and elevated right ventricular systolic

pressure of 47 mmHg.

2.  Atrial fibrillation.

3.  Morbid obesity with body mass index of 36.5.

4.  Bilateral lower extremity venous stasis and pitting edema.

5.  Microcytic anemia.

6.  Granulocytosis.

7.  Hypokalemia.

8.  Non-insulin-requiring diabetes mellitus.

9.  Hyperglycemia.

10.  Insomnia.

11.  Constipation.

12.  Diabetic neuropathy.

13.  Hypokalemia.

14.  Depression.

15.  History of colitis.

16.  Chronic narcotic-dependent pain syndrome.

17.  Right bundle-branch block.



PLAN:  At this time, the patient has been ordered serial labs.  The patient

has been ordered repeat CMP, LFT, magnesium, and phosphorus.  Repeat

cardiac enzymes.  PTH ordered.  The patient will be continued on telemetry.

Consultations:  Cardiology, Gastroenterology, Infectious Disease,

Nephrology, and Surgery.  The patient has been ordered transfusion of PRBC.

The patient was started on bicarb drip, half normal saline 800 mL an hour. 

The patient has been started on DuoNeb nebulizer every 6 hours.  The

patient has been given hyperkalemia treatment protocol.  The patient was

given glucagon drip for bradycardia _____.  The patient is to be continued

on meropenem 500 IV every 12 hours.  The patient has been on IV proton pump

inhibitor.  The patient has been given vitamin K 10 mg IV.  Chest x-ray has

been ordered.  Ultrasound of the abdomen, CT of the abdomen and pelvis

ordered.  Oxygen 2 liters ordered.  Repeat EKG ordered.  SCDs and BETH

stockings ordered.  The patient has been ordered transfusion of 2 units of

PRBC.  Present, the patient's further management will be dependent upon the

patient's clinical condition, hemodynamic status, and as per the patient's

response to therapeutic intervention.  I have spoken to the patient's wife

at length and explained to the patient's wife about the patient's guarded

to poor prognosis and critical condition, which she acknowledged

understanding.  All questions concerned answered.



Time spent in the entire management more than 35 minutes.  Case discussed

with the medical resident.



Dictated and electronically signed, not read.







__________________________________________

Kody Pratt MD



DD:  04/21/2018 9:04:24

DT:  04/21/2018 10:57:16

Job # [id number]

MTDD

## 2018-04-21 NOTE — PN
DATE:  04/21/2018



INTENSIVIST NOTE



LOCATION:  [facility].



SUBJECTIVE:  The patient is awake and alert.  States that he is not really

sure what occurred and wanted more explanation as to what is going on as

far as his medical care.  The patient stated that he had some

lightheadedness and dizziness and that is what brought him to the emergency

room.  At this time, he is getting an ultrasound of his liver and kidneys

and the patient is on O2 via nasal cannula and is getting nebulizer

treatment at this time as well.  No complaints of increased shortness of

breath, cough or wheezing.  No chest pain or abdominal pain.  The patient

states that he needs to go to have a bowel movement, but he cannot use the

commode in bed.  Wanted to know if he could have portable potty next to his

bed.



PHYSICAL EXAMINATION:

VITAL SIGNS:  Physical exam note that his temperature is 96.1, his pulse is

63, respirations are 15 and BP is 95/63.

SKIN:  Warm and dry.

HEENT:  Head atraumatic, normocephalic.  Eyes reactive to light.  Ear, nose

and throat seemed to be within normal limits.

NECK:  Supple.  No JVD.  No thyroid enlargement.  No lymph nodes.

HEART:  Has irregular rate and rhythm.  Normal S1, S2.

LUNGS:  Reveal decreased breath sounds at the bases, but no wheezing or

rhonchi.

ABDOMEN:  Soft, but obese and slightly distended.

GENITALIA AND RECTAL:  Deferred.

MUSCULOSKELETAL:  No joint deformities.

EXTREMITIES:  Reveal trace lower extremity edema.

NEUROLOGICAL:  It seems to be grossly intact.



LABORATORY DATA:  As far as his laboratories are concerned, his white count

is 31.8, hemoglobin is 8.6 and hematocrit 29.9 with platelets of 662,000. 

His PT is 28.9, INR is 2.49 and his PTT is 25.5.  The patient's VBG reveals

a pH of 7.18, pCO2 of 45 and pO2 of 66.  Sodium is 138, potassium 5.3,

chloride 96.  CO2 of 17 with an anion gap of 31 and BUN of 31, creatinine

of 2.4, glucose of 85.  Note that the patient's AST is 5440 and his ALT is

4320.  As far as his chest x-ray, unofficially is poor quality.  Final

impression is pending.



IMPRESSION:  My impression is that this patient has lower gastrointestinal

bleed, presented with episodes of bradycardia and hypotension.  The patient

has leukocytosis, possible sepsis and is noted to have acute liver failure

with acute renal failure as well.  Note that the patient has a history of

chronic back pain and is on medications that have Tylenol in them.  Initial

Tylenol level is normal.  The patient has as stated above anemia and

thrombocytosis and has a history of coronary artery disease, atrial

fibrillation, diabetes, cardiomyopathy, Crohn's disease, arthritis and at

present is noted to have a coagulopathy and possible some pulmonary edema. 

The patient has a history of obesity as well and atrial fibrillation.  As

far as our plan, the patient has a Renal as well as GI consults at this

time.  He is getting DuoNeb as bronchodilator.  The patient is on meropenem

for antibiotics and Protonix.  He is getting IV normal saline for

hydration.  He will continue to get O2 via nasal cannula and we will

continue to follow closely and treat aggressively along with the other

consultants and the primary care doctor.





__________________________________________

Jean-Pierre Gonzalez MD

 



DD:  04/21/2018 8:30:32

DT:  04/21/2018 8:39:45

Job # [id number]

## 2018-04-21 NOTE — CON
DATE:  04/21/2018



CARDIOLOGY CONSULTATION



HISTORY:  The patient is a 67-year-old male who presents with abdominal

pain and abdominal distention.  He was noted that his creatinine as well as

liver functions have all been rising.



PAST MEDICAL HISTORY:  The patient's past medical history includes diabetes

mellitus, hypertension and hypercholesterolemia.  He has paroxysmal atrial

fibrillation, which he has remained in normal sinus rhythm.  His

anticoagulation was stopped due to his history of lower GI bleed.



The patient is status post PTCA and stent in _____ January of this year.



He denies chest pain.



SOCIAL HISTORY:  The patient is repeatedly noncompliant with medications

and medical advice.



REVIEW OF SYSTEMS:  A 14-point review of systems was reviewed.  No

additional symptoms other than the ones mentioned above.



PHYSICAL EXAMINATION:

VITAL SIGNS:  Blood pressure is 92/53 and heart rates in the 70s.

NECK:  Negative JVD.

LUNGS:  Without rales.

HEART:  Reveals S1, S2.

ABDOMEN:  Markedly distended, diffusely tender.

EXTREMITIES:  Without edema.



LABORATORY DATA:  Glucose is 84.  His LFTs are markedly elevated.  His

creatinine is rising from baseline of 0.09 up to 2.4 today.



IMPRESSION:

1.  Need to rule out acute abdomen.

2.  No evidence for acute coronary syndrome.

3.  Diabetes mellitus.

4.  Paroxysmal atrial fibrillation, which he has remained in normal sinus

rhythm.

5.  History of hypertension.



Given these findings, General Surgery is following the patient.  A CT scan

of the abdomen was performed.  From a cardiac perspective, his cardiac

function reveals an ejection fraction of 45%.  There are no acute cardiac

issues at this time.







__________________________________________

Olu Ramirez MD



DD:  04/21/2018 12:53:00

DT:  04/21/2018 12:56:23

Job # [id number]
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
## 2018-04-21 NOTE — RAD
HISTORY:

interval changes  



COMPARISON:

Comparison chest 04/21/2018 



FINDINGS:



LUNGS:

No active pulmonary disease.



PLEURA:

No significant pleural effusion identified, no pneumothorax apparent.



CARDIOVASCULAR:

Cardiomegaly



OSSEOUS STRUCTURES:

No significant abnormalities.



VISUALIZED UPPER ABDOMEN:

Normal.



OTHER FINDINGS:

None.



IMPRESSION:

No active disease. Cardiomegaly.
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
## 2018-04-21 NOTE — CON
DATE:  04/21/2018



LOCATION:  The patient is seen in , bed 1.



CHIEF COMPLAINT:  The patient is hypothermic and abdominal pain x1 to 2

days.



HISTORY OF PRESENT ILLNESS:  This is a 67-year-old male with past medical

history of hypertension, diabetes, coronary artery disease, Crohn disease,

depression, high cholesterol with recent hospitalization, with congestive

heart failure and GI bleed, now is admitted with hypotension, bradycardia,

and GI bleed.  The patient is awake and responsive.  He is weak.  He has

generalized aches and pains.  He has some abdominal pain, epigastric in

location.  There has been no nausea or vomiting.



REVIEW OF SYSTEMS:  A 12-point review of systems is performed.  There is no

dysuria or frequency.  No headaches or blurred vision.  No new back pain. 

No chest pain.  There is mild shortness of breath.  He has mild cough.  No

new rash or no new joint pain.



PAST MEDICAL HISTORY:  Significant for recently hospitalized and discharged

for GI bleed, hypertension, diabetes, coronary artery disease, Crohn

disease, depression, high cholesterol.



PAST SURGICAL HISTORY:  Significant for cardiac catheterization with stent

placement and the patient also has cholecystectomy.



ALLERGIES:  THE PATIENT HAS NO KNOWN ALLERGIES.



MEDICATIONS AT HOME:  Include Ambien, valsartan, Crestor, metoprolol,

Cymbalta, and Plavix.



PHYSICAL EXAMINATION:

GENERAL:  The patient is in bed, in critical condition.

VITAL SIGNS:  Temperature of 95.4; blood pressure is 111/70, was 70/54;

pulse of 58 with respiratory rate of 15 and 18; and pulse oximetry is 71%.

HEENT:  Unremarkable.

NECK:  Supple.

LUNGS:  Decreased breath sounds.

HEART:  Normal S1, S2.

ABDOMINAL:  Soft and nontender.  No organomegaly, no rebound, no guarding,

no masses.



LABORATORY EXAMINATION:  Reveals the patient to have a white count of

31,000.  Yesterday, the patient's white count was 11,000.  Hemoglobin of 9,

MCV is 71 with platelets of 773 with 74% granulocytosis.  The patient's

lactate is 10.3 and 13.  Chemistries reveal creatinine is 2.4, it was 0.9

on 04/18/2018.  LFTs:  AST is over 5000, ALT is over 4000.  Amylase is

normal.  Urinalysis is unremarkable.  Acetaminophen level is undetectable

and microbiology is pending.  The patient had a chest x-ray.  No results

are available.  Emergency room chart is reviewed.  Dr. Snow's consultation

is reviewed.  The patient's EKG from 04/16/2018, reveals a QTC of 509.



ASSESSMENT AND PLAN:  This is a 67-year-old male with recent

hospitalization, gastrointestinal bleed, hypertension, diabetes, coronary

artery disease, Crohn disease, depression, high cholesterol, now

hypotensive, had bradycardia, became hypotensive, hypothermia,

leukocytosis, and abdominal pain.



Severe sepsis with gastrointestinal sources with most probably ischemic

colitis and with acute kidney injury with creatinine changed from 0.9 to

2.4.  The patient already received a dose of vancomycin and we will add

meropenem.  We will review the chest x-ray, blood cultures, urine cultures,

sputum cultures, and nasal MRSA screen; and we will follow closely with you

and GI.  Surgery consultation.  We will also obtain a CAT scan of the

abdomen and pelvis.







__________________________________________

Arturo Lu MD



DD:  04/21/2018 7:58:55

DT:  04/21/2018 8:02:26

Job # [id number]

## 2018-04-21 NOTE — HP
HISTORY OF PRESENT ILLNESS:  The patient is a 67-year-old male who was

recently discharged from [facility], came to the emergency room

by the EMS ambulance and patient was brought into the [facility]

Room by BLS, complaining of bradycardia, hypertension and rectal bleeding. 

Patient also complained of abdominal pain, 8/10.  Patient was found to be

alert, awake, oriented x3.  According to the ER evaluation, patient was

recently discharged from the [facility] after Eliquis was

stopped.  Patient also complained of lightheaded and malaise.  Patient also

reports that the lightheaded being worse upon lying down.



Patient was in the ER, bed 8.



CODE STATUS:  Full code.



ALLERGIES:  NONE.



Height is 5 feet 8 inches.



BMI is 35.6.



HOME MEDICATIONS:  Metformin 500 twice a day, Cardizem  daily, Ambien

5 mg at bedtime p.r.n., Diovan  mg/25 mg daily, Crestor 10 mg daily,

K-Dur 20 mEq twice a day, Protonix 40 mg daily, Percocet 10/325 every 6

hours p.r.n., Lopressor 50 twice a day, Pentasa 1000 mg four times daily,

Lasix 40 twice a day, folic acid 1 mg daily, Lexapro 5 mg daily, Colace 100

mg three times a day, Cymbalta 60 mg daily and Plavix 75 mg daily.



SOCIAL HISTORY:  According to the [employer] information, denies substance

abuse, denies alcohol, denies smoking.



FAMILY HISTORY:  Not available.



OCCUPATIONAL HISTORY:  Disabled.



PAST MEDICAL AND SURGICAL HISTORY:  History of diabetes mellitus, history

of hypertension, history of coronary artery disease, history of coronary

angioplasty, history of ischemic cardiomyopathy, history of decreased

ejection fraction, history of pulmonary hypertension, history of atrial

fibrillation with rapid ventricular response, history of decreased left

ventricular ejection fraction of 53%, history of pulmonary hypertension

with right ventricular systolic pressure of 47 mmHg, history of right

bundle-branch block.  Patient's past medical history is also significant

for dyslipidemia, type 2 diabetes mellitus, degenerative joint disease of

the cervical, lumbar spine, history of chronic narcotic dependent pain

syndrome, history of insomnia, history of depression, history of

questionable Crohn disease versus ulcerative colitis, history of

diverticulitis, history diverticulosis, history of hemorrhoids, history of

hemorrhoidal bleeding, history of venous stasis of the lower extremity,

history of gait dysfunction, history of questionable narcotic abuse,

history of cervical and lumbar radiculopathy, history of cholecystectomy,

history of stress disorder.  Past medical history is significant for

congestive heart failure, history of hyperlipidemia, history of venous

stasis, history of Crohn disease, history of degenerative joint disease.



The patient was in bed 8 in the emergency room.  Patient's past medical

history is significant for history of hypokalemia, history of diabetic

neuropathy.



PHYSICAL EXAMINATION:

GENERAL:  Patient was in the bed 8.

VITAL SIGNS:  T-max 95.8, 98.2, 98.1.  Heart rate 44, 42.  During very

brief period, patient's heart rate went down to 20s while the patient was

in the process of being transferred from ER to the ICU.  Patient was given

calcium gluconate IV push and glucagon 5 mg IV push was given and glucagon

drip at 3 mg per hour was given to reverse the beta-blocker and calcium

channel effect.  Patient's blood pressure ranging from 99//76.  O2

sat 99%-100%.

HEENT:  Patient's head examination, normocephalic, atraumatic.  HEENT

examination shows pinkish pale conjunctivae.  Dry oral mucosa.

NECK:  No neck rigidity.  No jugular venous distention.

CHEST:  Kyphosis.

LUNGS:  Shows decreased breath sounds at the bases, left more than the

right.  Occasional rhonchi.

CARDIOVASCULAR:  S1 and S2, irregular rhythm.

ABDOMEN:  Protuberant.  Positive bowel sounds.  Surgical scar of

cholecystectomy noted.

GENITALIA:  Male.

RECTAL EXAMINATION:  Deferred.

EXTREMITIES:  Show swelling and pitting edema of the lower extremity.

PSYCHIATRIC AND NEUROLOGIC:  Patient is alert, awake, oriented x3.  Cranial

nerves II through XII limited.  Gait examination is not tested.

VASCULAR:  Palpable pulses.



DIAGNOSTICS:  WBC 11.5; hemoglobin and hematocrit 9.9 and 32.5; platelets

_____.  Granulocytes 74% segs.  PT/PTT 19.4, 25.2.  Sodium 135, potassium

4.5, chloride 94, CO2 of 28, anion gap 18, BUN 29, creatinine 1.6, GFR 53,

glucose 164.  , , acetaminophen level is less than 10.  Blood

type O+.



Chest x-ray shows cardiomegaly.



IMPRESSION AND PLAN:

1.  Severe symptomatic bradycardia.

2.  Atrial fibrillation with slow ventricular response.

3.  Hypertension.

4.  Questionable lower gastrointestinal bleeding.

5.  Acute renal failure.

6.  Hypotension.

7.  History of Crohn disease and ulcerative colitis.

8.  Acute kidney injury.

9.  Bradycardia.

10.  Severe symptomatic hypotension.

11.  Leukocytosis with granulocytosis, thrombocytosis, microcytic anemia.

12.  Mild coagulopathy.

13.  Severe transaminitis, etiology undetermined.

14.  Proteinuria.

15.  History of anxiety, depression, insomnia.

16.  History of narcotic dependent pain syndrome.

17.  History of coronary artery disease and systolic, diastolic congestive

heart failure with possible ischemic cardiomyopathy.

18.  History of angioplasty and stent placement.

1.  Acute recurrent systolic congestive heart failure.

2.  Chronic atrial fibrillation.

3.  Coronary artery disease with history of angioplasty and stent placement

of the left anterior descending artery.

4.  History of hemorrhoidal rectal bleeding.

5.  History of nonsteroidal anti-inflammatory drug use.

6.  Ischemic dilated cardiomyopathy with ejection fraction of 45% to 50%.

7.  Bilateral lower extremity venous stasis (resolving).

8.  Status post angioplasty and stent placement of the 80% mid left

anterior descending stenosis with drug-eluting stent.

9.  Left ventricular ejection fraction of 45% with diffuse segmental wall

hypokinesis.

10.  Type 2 non-insulin-requiring diabetes mellitus with hyperglycemia.

11.  Hypertension.

12.  Morbid obesity.

13.  Moderate pulmonary hypertension with right ventricular systolic

pressure of 47 mmHg.

14.  Mild tricuspid regurgitation.

15.  Mildly thickened mitral valve with mild mitral regurgitation.

16  Normocytic and microcytic anemia.

17.  Granulocytosis.

18.  Hypokalemia.

19.  Dyslipidemia.

20.  Cardiomegaly.

21.  Right bundle-branch block.

22.  Insomnia.

23.  Depression.

24.  Chronic narcotic dependent pain syndrome.

25.  Constipation.

26.  Hypercholesteremia.

27.  Questionable colitis.

1.  Acute recurrent systolic versus diastolic congestive heart failure with

pulmonary arterial hypertension and elevated right ventricular systolic

pressure of 47 mmHg.

2.  Atrial fibrillation.

3.  Morbid obesity with body mass index of 36.5.

4.  Bilateral lower extremity venous stasis and pitting edema.

5.  Microcytic anemia.

6.  Granulocytosis.

7.  Hypokalemia.

8.  Non-insulin-requiring diabetes mellitus.

9.  Hyperglycemia.

10.  Insomnia.

11.  Constipation.

12.  Diabetic neuropathy.

13.  Hypokalemia.

14.  Depression.

15.  History of colitis.

16.  Chronic narcotic-dependent pain syndrome.

17.  Right bundle-branch block.



At this time, patient will be admitted to Intensive Care Unit.  Patient's

calcium channel blockers and beta blockers have been stopped.  Patient has

been started on IV fluid hydration.  Patient was given glucagon drip to

reverse, and IV glucagon was given to reverse _____ effect of calcium

channel blockers and beta blockers.  Patient will be admitted to [facility] Intensive Care Unit.



Patient will be ordered serial repeat labs, serial CBC, CMP, PT/PTT, serial

troponins.  Blood and urine cultures ordered.



CONSULTATIONS:

1.  Cardiology.

2.  Gastroenterology.

3.  Infectious Disease.

4.  Nephrology.



The patient has been typed and crossmatched for packed red blood cells. 

Patient has been started on IV fluid.  Patient received IV fluid bolus in

the emergency room.  Patient was started on IV proton pump inhibitor. 

Patient will be considered for transfusion of PRBC if the patient's

hemoglobin drops.  Patient's further management will be dependent upon the

patient's clinical condition, hemodynamic status and as per response to

therapeutic intervention and as per Cardiology, Gastroenterology,

Infectious Disease and Nephrology recommendation.  Patient is in the

emergency room in bed 8.  Will be going to Intensive Care Unit.  Patient's

prognosis is guarded.



CONDITION:  Critical.



Dictated and electronically signed, not read.







__________________________________________

Kody Pratt MD



DD:  04/21/2018 6:27:51

DT:  04/21/2018 6:39:08

Job # [id number]

[email or web address]

## 2018-04-21 NOTE — RAD
HISTORY:

Status post NGT placement  



COMPARISON:

Comparison chest 04/21/2018 at 1313 hours. 



FINDINGS:

Interval placement NGT, the tip of which overlies left upper quadrant 

of the abdomen 



LUNGS:

Minor bibasilar atelectasis.



PLEURA:

No significant pleural effusion identified, no pneumothorax apparent.



CARDIOVASCULAR:

Heart remains enlarged.



OSSEOUS STRUCTURES:

No significant abnormalities.



VISUALIZED UPPER ABDOMEN:

Distended air-filled stomach



OTHER FINDINGS:

None.



IMPRESSION:

Interval placement NGT. Cardiomegaly. 



Minor bibasilar atelectasis. 



Distended and air-filled distended air-filled stomach.

## 2018-04-21 NOTE — US
HISTORY:

Worsening hepatic and renal function. 



COMPARISON:

Comparison made with prior CT scan abdomen pelvis 04/11/2018



TECHNIQUE:

Sonographic evaluation of the abdomen.



FINDINGS:



LIVER:

The liver measures approximately 15.5 cm in CC dimension however 

liver mentioned measured nearly 21 cm on prior CT scan. The latter 

measurement is more accurate. Liver demonstrates increased 

echotexture suggesting fatty infiltration however other infiltrative 

hepatic cellular disease process not excluded. The No gross 

intrahepatic bile duct dilatation.



GALLBLADDER:

Cholecystectomy



COMMON BILE DUCT:

Measures 7.2 mm. No stones. No dilatation.



PANCREAS:

Unremarkable as visualized. No mass. No ductal dilatation.



RIGHT KIDNEY:

Measures 11.5 x 4.7 x 5.1cm. Increased echogenicity. No calculus, 

mass, or hydronephrosis.



LEFT KIDNEY:

Measures 11.3 x 3.7 x 5.7cm. Increased echogenicity. No calculus, 

mass, or hydronephrosis.



SPLEEN:

Normal in size and contour. No mass.



AORTA:

Not visualized. 



IVC:

Not visualized 



OTHER FINDINGS:

None. 



IMPRESSION:

Hepatomegaly mild felt be present despite the current measurement as 

detailed above. Probable fatty infiltration however other 

infiltrative hepatocellular disease process not excluded. 



Borderline splenomegaly. 



Echogenic kidneys suggesting underlying medical renal disease.

## 2018-04-21 NOTE — CT
PROCEDURE:  CT Abdomen and Pelvis without intravenous contrast 

abdomen and pelvis dated for 01/20/2018 



HISTORY:

Acute liver failure. 



COMPARISON:

Comparison made with prior CT scan abdomen pelvis 04/11/2018



TECHNIQUE:

Contiguous axial images of the abdomen and pelvis performed without 

oral or intravenous contrast material.  Additional 2 dimensional 

sagittal and coronal reformats generated. 



Radiation dose:



Total exam DLP =  



This CT exam was performed using one or more of the following dose 

reduction techniques: Automated exposure control, adjustment of the 

mA and/or kV according to patient size, and/or use of iterative 

reconstruction technique. .



FINDINGS:



LOWER THORAX:

Minor bibasilar atelectasis. .  No evidence of basilar pneumothorax. 

Tiny hiatal hernia. 



Heart remains enlarged. No significant pericardial effusion.



LIVER:

Liver is enlarged measuring nearly 21 cm in CC dimension. No obvious 

hepatic mass collection or calcification.  The the the



GALLBLADDER AND BILE DUCTS:

Cholecystectomy. 



PANCREAS:

Pancreas is slightly atrophic and fatty replaced. No obvious 

pancreatic masses collections or calcifications. .



SPLEEN:

Spleen exhibits normal size and attenuation pattern. 



ADRENALS:

There are no adrenal lesions. 



KIDNEYS AND URETERS:

Kidneys demonstrate relatively symmetric size. No evidence of 

nephrolithiasis or hydronephrosis.  Minor infiltration changes seen 

in the perinephric fat more so on the left side.



BLADDER:

There is an in situ unclamped Barlow catheter around which the bladder 

is collapsed. Evaluation of the bladder is therefore limited.  

Bladder wall is thickened likely due to collapse.  Muscular 

hypertrophy presumably contribute.  No obvious intraluminal urinary 

bladder calculi seen. 



REPRODUCTIVE:

Unremarkable. 



APPENDIX:

The appendix is not seen with certainty however no obvious 

inflammatory changes right lower quadrant of the abdomen. 



BOWEL:

Evaluation of the bowel is limited due to the lack of oral contrast 

material.  Stomach is markedly distended with food debris liquid and 

air. Visualized loops of small bowel exhibit normal contour and 

caliber. No evidence of acute mechanical small bowel obstruction. 



There are scattered colonic diverticula the bulk of which arise from 

the sigmoid and distal descending colon. No radiographic evidence of 

acute diverticulitis. 



PERITONEUM:

Unremarkable. No fluid collection. No free air. Small fat containing 

umbilical hernia. Small fat containing bilateral inguinal hernias. 



LYMPH NODES:

Unremarkable. No enlarged lymph nodes. 



VASCULATURE:

Unremarkable. No aortic or iliac artery aneurysms. . 



BONES:

Chronic appearing compression fracture of the T8 segment again noted. 

Minor multilevel degenerative spondylosis of the lower thoracic and 

lumbar spine. Compression deformity 



OTHER FINDINGS:

None. 



IMPRESSION:

Hepatomegaly. Cholecystectomy. 



Diverticulosis without radiographic evidence of acute diverticulitis. 



Minor bibasilar atelectasis.



Cardiomegaly

## 2018-04-21 NOTE — CARD
--------------- APPROVED REPORT --------------





EKG Measurement

Heart [id number]

LA [id number]

[id number]

[id number]

[id number]



<Conclusion>

Sinus rhythm with 1st degree AV block

Right bundle branch block

Possible Inferior infarct, age undetermined

Abnormal ECG

## 2018-04-21 NOTE — RAD
HISTORY:

Leukocytosis, hypothermia, possible HCAP  



COMPARISON:

Comparison chest 04/17/2018 



FINDINGS:

Study is slightly limited due to apical lordotic patient positioning. 



LUNGS:

Poor inspiration with low lung volumes, crowded bronchovascular 

markings and mild right basilar atelectasis. Questionable small 

right-sided effusion.  Elevation right hemidiaphragm possibly due to 

eventration Left lung base is obscured by cardiac lead hardware



PLEURA:

As above.  No obvious pneumothorax apparent.



CARDIOVASCULAR:

Marked cardiomegaly unchanged



OSSEOUS STRUCTURES:

No significant abnormalities.



VISUALIZED UPPER ABDOMEN:

Normal.



OTHER FINDINGS:

None.



IMPRESSION:

Poor inspiration with low lung volumes, crowded bronchovascular 

markings and mild right basilar atelectasis. Questionable small 

right-sided effusion.  Elevation right hemidiaphragm possibly due to 

eventration Left lung base is obscured by cardiac lead hardware.



Marked cardiomegaly

## 2018-04-22 LAB
ALBUMIN SERPL-MCNC: 4 G/DL (ref 3–4.8)
ALBUMIN SERPL-MCNC: 4.1 G/DL (ref 3–4.8)
ALBUMIN/GLOB SERPL: 1.3 {RATIO} (ref 1.1–1.8)
ALBUMIN/GLOB SERPL: 1.3 {RATIO} (ref 1.1–1.8)
ALT SERPL-CCNC: 4645 U/L (ref 7–56)
AMYLASE SERPL-CCNC: 181 U/L (ref 35–125)
APTT BLD: 27.6 SECONDS (ref 25.1–36.5)
BASE EXCESS BLDV CALC-SCNC: -5.3 MMOL/L (ref 0–2)
BASE EXCESS BLDV CALC-SCNC: -7.3 MMOL/L (ref 0–2)
BASOPHILS # BLD AUTO: 0.03 K/MM3 (ref 0–2)
BASOPHILS # BLD AUTO: 0.06 K/MM3 (ref 0–2)
BASOPHILS NFR BLD: 0.1 % (ref 0–3)
BASOPHILS NFR BLD: 0.2 % (ref 0–3)
BILIRUB DIRECT SERPL-MCNC: 1 MG/DL (ref 0–0.4)
BUN SERPL-MCNC: 50 MG/DL (ref 7–21)
BUN SERPL-MCNC: 66 MG/DL (ref 7–21)
CALCIUM SERPL-MCNC: 8 MG/DL (ref 8.4–10.5)
CALCIUM SERPL-MCNC: 8.1 MG/DL (ref 8.4–10.5)
EOSINOPHIL # BLD: 0 10*3/UL (ref 0–0.7)
EOSINOPHIL # BLD: 0 10*3/UL (ref 0–0.7)
EOSINOPHIL NFR BLD: 0 % (ref 1.5–5)
EOSINOPHIL NFR BLD: 0 % (ref 1.5–5)
ERYTHROCYTE [DISTWIDTH] IN BLOOD BY AUTOMATED COUNT: 23.5 % (ref 11.5–14.5)
ERYTHROCYTE [DISTWIDTH] IN BLOOD BY AUTOMATED COUNT: 23.5 % (ref 11.5–14.5)
ERYTHROCYTE [DISTWIDTH] IN BLOOD BY AUTOMATED COUNT: 23.8 % (ref 11.5–14.5)
ERYTHROCYTE [DISTWIDTH] IN BLOOD BY AUTOMATED COUNT: 24.1 % (ref 11.5–14.5)
GFR NON-AFRICAN AMERICAN: 12
GFR NON-AFRICAN AMERICAN: 16
GRANULOCYTES # BLD: 25.19 10*3/UL (ref 1.4–6.5)
GRANULOCYTES # BLD: 27.43 10*3/UL (ref 1.4–6.5)
GRANULOCYTES NFR BLD: 90.2 % (ref 50–68)
GRANULOCYTES NFR BLD: 91 % (ref 50–68)
HGB BLD-MCNC: 10.1 G/DL (ref 14–18)
HGB BLD-MCNC: 10.5 G/DL (ref 14–18)
HGB BLD-MCNC: 10.7 G/DL (ref 14–18)
HGB BLD-MCNC: 10.9 G/DL (ref 14–18)
INR PPP: 2.72 (ref 0.93–1.08)
INR PPP: 3.07 (ref 0.93–1.08)
LIPASE SERPL-CCNC: 487 U/L (ref 23–300)
LYMPHOCYTES # BLD: 1.4 10*3/UL (ref 1.2–3.4)
LYMPHOCYTES # BLD: 1.4 10*3/UL (ref 1.2–3.4)
LYMPHOCYTES NFR BLD AUTO: 4.7 % (ref 22–35)
LYMPHOCYTES NFR BLD AUTO: 4.9 % (ref 22–35)
MCH RBC QN AUTO: 22.9 PG (ref 25–35)
MCH RBC QN AUTO: 23 PG (ref 25–35)
MCH RBC QN AUTO: 23.3 PG (ref 25–35)
MCH RBC QN AUTO: 23.4 PG (ref 25–35)
MCHC RBC AUTO-ENTMCNC: 30.6 G/DL (ref 31–37)
MCHC RBC AUTO-ENTMCNC: 31 G/DL (ref 31–37)
MCHC RBC AUTO-ENTMCNC: 31.1 G/DL (ref 31–37)
MCHC RBC AUTO-ENTMCNC: 32.4 G/DL (ref 31–37)
MCV RBC AUTO: 72.2 FL (ref 80–105)
MCV RBC AUTO: 73.8 FL (ref 80–105)
MCV RBC AUTO: 74.8 FL (ref 80–105)
MCV RBC AUTO: 75.2 FL (ref 80–105)
MONOCYTES # BLD AUTO: 1.1 10*3/UL (ref 0.1–0.6)
MONOCYTES # BLD AUTO: 1.5 10*3/UL (ref 0.1–0.6)
MONOCYTES NFR BLD: 3.9 % (ref 1–6)
MONOCYTES NFR BLD: 5 % (ref 1–6)
PH BLDV: 7.29 [PH] (ref 7.32–7.43)
PH BLDV: 7.29 [PH] (ref 7.32–7.43)
PLATELET # BLD: 408 10^3/UL (ref 120–450)
PLATELET # BLD: 410 10^3/UL (ref 120–450)
PLATELET # BLD: 447 10^3/UL (ref 120–450)
PLATELET # BLD: 469 10^3/UL (ref 120–450)
PMV BLD AUTO: 10.4 FL (ref 7–11)
PMV BLD AUTO: 10.7 FL (ref 7–11)
PMV BLD AUTO: 9.2 FL (ref 7–11)
PMV BLD AUTO: 9.9 FL (ref 7–11)
PROTHROMBIN TIME: 31.7 SECONDS (ref 9.4–12.5)
PROTHROMBIN TIME: 36.1 SECONDS (ref 9.4–12.5)
RBC # BLD AUTO: 4.41 10^6/UL (ref 3.5–6.1)
RBC # BLD AUTO: 4.51 10^6/UL (ref 3.5–6.1)
RBC # BLD AUTO: 4.57 10^6/UL (ref 3.5–6.1)
RBC # BLD AUTO: 4.74 10^6/UL (ref 3.5–6.1)
VENOUS BLOOD FIO2: 21 %
VENOUS BLOOD FIO2: 21 %
VENOUS BLOOD GAS PCO2: 39 (ref 40–60)
VENOUS BLOOD GAS PCO2: 44 (ref 40–60)
VENOUS BLOOD GAS PO2: 35 MM/HG (ref 30–55)
VENOUS BLOOD GAS PO2: 40 MM/HG (ref 30–55)
WBC # BLD AUTO: 23.7 10^3/UL (ref 4.5–11)
WBC # BLD AUTO: 27.7 10^3/UL (ref 4.5–11)
WBC # BLD AUTO: 28.4 10^3/UL (ref 4.5–11)
WBC # BLD AUTO: 30.4 10^3/UL (ref 4.5–11)

## 2018-04-22 RX ADMIN — PIPERACILLIN SODIUM AND TAZOBACTAM SODIUM SCH: .25; 2 INJECTION, POWDER, LYOPHILIZED, FOR SOLUTION INTRAVENOUS at 12:00

## 2018-04-22 RX ADMIN — IPRATROPIUM BROMIDE AND ALBUTEROL SULFATE SCH ML: .5; 3 SOLUTION RESPIRATORY (INHALATION) at 19:35

## 2018-04-22 RX ADMIN — IPRATROPIUM BROMIDE AND ALBUTEROL SULFATE SCH ML: .5; 3 SOLUTION RESPIRATORY (INHALATION) at 11:36

## 2018-04-22 RX ADMIN — IPRATROPIUM BROMIDE AND ALBUTEROL SULFATE SCH ML: .5; 3 SOLUTION RESPIRATORY (INHALATION) at 01:24

## 2018-04-22 RX ADMIN — IPRATROPIUM BROMIDE AND ALBUTEROL SULFATE SCH ML: .5; 3 SOLUTION RESPIRATORY (INHALATION) at 03:45

## 2018-04-22 RX ADMIN — IPRATROPIUM BROMIDE AND ALBUTEROL SULFATE SCH ML: .5; 3 SOLUTION RESPIRATORY (INHALATION) at 23:47

## 2018-04-22 RX ADMIN — IPRATROPIUM BROMIDE AND ALBUTEROL SULFATE SCH ML: .5; 3 SOLUTION RESPIRATORY (INHALATION) at 20:15

## 2018-04-22 RX ADMIN — BUDESONIDE SCH MG: 0.5 SUSPENSION RESPIRATORY (INHALATION) at 07:40

## 2018-04-22 RX ADMIN — PIPERACILLIN SODIUM AND TAZOBACTAM SODIUM SCH MLS/HR: .25; 2 INJECTION, POWDER, LYOPHILIZED, FOR SOLUTION INTRAVENOUS at 17:31

## 2018-04-22 RX ADMIN — IPRATROPIUM BROMIDE AND ALBUTEROL SULFATE SCH: .5; 3 SOLUTION RESPIRATORY (INHALATION) at 11:41

## 2018-04-22 RX ADMIN — OXYMETAZOLINE HYDROCHLORIDE SCH SPR: 0.05 SPRAY NASAL at 22:00

## 2018-04-22 RX ADMIN — MORPHINE SULFATE PRN MG: 4 INJECTION, SOLUTION INTRAMUSCULAR; INTRAVENOUS at 21:52

## 2018-04-22 RX ADMIN — IPRATROPIUM BROMIDE AND ALBUTEROL SULFATE SCH ML: .5; 3 SOLUTION RESPIRATORY (INHALATION) at 07:38

## 2018-04-22 RX ADMIN — BUDESONIDE SCH MG: 0.5 SUSPENSION RESPIRATORY (INHALATION) at 20:15

## 2018-04-22 RX ADMIN — IPRATROPIUM BROMIDE AND ALBUTEROL SULFATE SCH ML: .5; 3 SOLUTION RESPIRATORY (INHALATION) at 15:49

## 2018-04-22 NOTE — PN
DATE:



SUBJECTIVE:  Patient has no bleeding from the nasogastric tube.  He denies

any chest pain.  No reported atrial fibrillation.



PHYSICAL EXAMINATION:

VITAL SIGNS:  Blood pressure 128/72, heart rate 97, respirations 24,

temperature 98.

HEENT:  Pale conjunctivae.

CHEST:  Clear.

HEART:  S1 and S2 regular.

EXTREMITIES:  Trace leg edema.



LABORATORY DATA:  SMA-7:  Sodium 141, potassium 4.8, chloride 97, CO2 of

12, glucose 101; BUN 36, creatinine 2.7, that was yesterday; today's BUN

and creatinine are 50 and 3.8.  Today's hemoglobin and hematocrit 10.9 and

35, white count 28.4, platelet count 408,000.  Today's INR is 3.07, and PTT

is 36.1.



ASSESSMENT:

1.  Status post gastrointestinal bleeding.  Patient required packed red

blood cells transfusion as well as fresh frozen plasma.

2.  History of atrial fibrillation.

3.  Acute renal insufficiency.

4.  Liver failure.  Today's AST and ALT are 9104 and 6080 respectively.



RECOMMENDATIONS:  Continue current folic acid 1 mg daily, morphine sulfate

1 mg taken every 6 hours.  Plavix is on hold.  Continue Zosyn 2.25 g

intravenous every 6 hours.  I did review the chest x-ray which revealed

cardiomegaly with right lower lobe infiltrate.  The most recent cardiac

catheterization was in 01/2018, in which patient underwent PCI to the mid

LAD with drug-eluting stent, ejection fraction at that time was 45%. 

Future resumption of Plavix should be cleared by GI team.





__________________________________________

Riley Sepulveda MD



DD:  04/22/2018 16:11:32

DT:  04/22/2018 16:56:39

Job # [id number]

## 2018-04-22 NOTE — CARD
--------------- APPROVED REPORT --------------





EKG Measurement

Heart [id number]

VA [id number]

[id number]

[id number]

[id number]



<Conclusion>

Normal sinus rhythm

Right bundle branch block

Possible Inferior infarct, age undetermined

Abnormal ECG

## 2018-04-22 NOTE — CP.PCM.PN
Subjective





- Date & Time of Evaluation


Date of Evaluation: 04/22/18


Time of Evaluation: 14:18





- Subjective


Subjective: 








Renal Follow up





S: overnight w/ significant coffee ground emesis,  lactate improving,  pt feels 

mildly improve





PE: vs as below


gen: nad


sclera: anicteric


op: clear


neck: supple


lungs: reduced bs at bases, slight crackles


cv: +s1+s2 no rub


abd: distended, reduced bs


ext: trace edema


neuro: a+ox3


psych: nml affect


skin: no rash





labs and imaging reviewed








imp:  ARF / Lacitic Acidosis/ Hyperkalemia/ Acute Liver Injury / Anemia/ Shock/ 

Bradycardia/ hyperphosphatemia





plan:


BHARGAVI  -  likely 2/2 to ischemic ATN from hypoperfusion


-UOP is improved today


-Lactate is starting to improve as well


-Acid / base status much improved still on 1/2 w/ 1.5 amp of hco3 - will reduce 

to 50 cc/hr.  can likely d/c hco3 in solution by tomorrow


-F/u GI


-transfuse per primary team


-will hold RRT again as UOP is mildly improved


-will consider phos binder if starts eating





Thank you for this interesting consult





Objective





- Vital Signs/Intake and Output


Vital Signs (last 24 hours): 


 











Temp Pulse Resp BP Pulse Ox


 


 97.7 F   99 H  19   119/56 L  99 


 


 04/21/18 23:53  04/22/18 12:30  04/22/18 12:30  04/22/18 12:30  04/22/18 12:30








Intake and Output: 


 











 04/22/18 04/22/18





 06:59 18:59


 


Intake Total 2330 


 


Output Total 1650 


 


Balance 680 














- Medications


Medications: 


 Current Medications





Albuterol/Ipratropium (Duoneb 3 Mg/0.5 Mg (3 Ml) Ud)  3 ml IH [id number] [facility]


   Last Admin: 04/22/18 11:41 Dose:  Not Given


Budesonide (Pulmicort Respules)  0.5 mg IH [id number] [facility]


   Last Admin: 04/22/18 07:40 Dose:  0.5 mg


Clopidogrel Bisulfate (Plavix)  75 mg PO DAILY [facility]


   Last Admin: 04/21/18 12:20 Dose:  Not Given


Escitalopram Oxalate (Lexapro)  5 mg PO DAILY [facility]


   Last Admin: 04/22/18 09:54 Dose:  5 mg


Folic Acid (Folic Acid)  1 mg PO DAILY [facility]


   Last Admin: 04/22/18 09:54 Dose:  1 mg


Sodium Bicarbonate 75 meq/ (Sodium Chloride)  1,075 mls @ 100 mls/hr IV 

[id number] [facility]


   Last Admin: 04/22/18 11:38 Dose:  100 mls/hr


Piperacillin Sod/Tazobactam Sod (Zosyn 2.25 Gm In 0.9% 100 Ml)  2.25 gm in 100 

mls @ 100 mls/hr IVPB Q6 [id number]


   PRN Reason: Protocol


   Stop: 05/01/18 12:01


   Last Admin: 04/22/18 12:00 Dose:  Not Given


Lidocaine (Lidoderm)  1 ea TD DAILY [facility]


Morphine Sulfate (Morphine)  1 mg IVP Q6 PRN


   PRN Reason: PAIN, MODERATE [4-6]


Pantoprazole Sodium (Protonix Inj)  40 mg IVP Q12 [facility]


   Last Admin: 04/22/18 09:53 Dose:  40 mg











- Labs


Labs: 


 





 04/22/18 10:50 





 04/22/18 05:00 





 











PT  36.1 SECONDS (9.4-12.5)  H  04/22/18  13:00    


 


INR  3.07  (0.93-1.08)  H  04/22/18  13:00    


 


APTT  27.6 Seconds (25.1-36.5)   04/22/18  13:00

## 2018-04-22 NOTE — CON
DATE:  04/21/2018



REQUESTING PHYSICIAN:  Emily Ho MD



REASON FOR CONSULTATION AND HISTORY OF PRESENT ILLNESS:  I have been asked

to see this 67-year-old male with multiple comorbidities including coronary

artery disease, atrial fibrillation, type 2 diabetes mellitus,

hypertension, degenerative joint disease, history of Crohn's colitis;

history of chronic back pain, on multiple opiates and acetaminophen; just

recently discharged from the hospital approximately a week ago after being

admitted for GI bleeding, comes to the hospital with generalized weakness

and malaise.  The patient began feeling weak and lightheaded yesterday.  In

the emergency room, the patient was noted to have a mild bradycardia in the

40s to 50s.  On arrival to intensive care early this morning, his heart

rate had transiently dropped to the 20s and subsequently the 30s.  I have

been asked to see this patient for marked elevation in his AST and ALT to

the 1000's.  The patient had an episode of loose stools with scant rectal

bleeding on the day of admission.  He had a colonoscopy several weeks ago,

which revealed benign polyps and bleeding hemorrhoids as well as

diverticulosis.  An upper endoscopy showed gastritis.  CT scan of the

abdomen and pelvis was negative for any abdominal masses.



PAST MEDICAL HISTORY:  Is as above.  Again, he has a history of atrial

fibrillation, coronary artery disease; chronic low back pain, on opiates

and acetaminophen; type 2 diabetes mellitus, degenerative joint disease,

hyperlipidemia, hypertension, diverticulosis, Crohn disease, colon polyps,

erosive gastritis, anemia.



SOCIAL HISTORY:  He denies cigarette smoking or alcohol abuse.



PAST SURGICAL HISTORY:  Is unremarkable.  He did have a cardiac cath with

drug-eluting stent placement in 01/2018 and was placed on Plavix at that

time.



REVIEW OF SYSTEMS:  A 14-point review of systems is notable for generalized

weakness, dizziness, lightheadedness, diarrhea with scant rectal bleeding.



MEDICATIONS:  At home, include metoprolol 50 mg b.i.d., Cardizem 180 mg

daily, valsartan/hydrochlorothiazide 160/25, Lasix 40 mg p.o. b.i.d.,

Plavix 75 mg daily.



PHYSICAL EXAMINATION:

GENERAL:  Middle-aged male, lying in bed, appearing ill.  He does have some

upper airway congestion that is audible.  He is awake and alert.

VITAL SIGNS:  Reveal temperature of 97.5, blood pressure 92/53, heart rate

of 70.

HEENT:  Reveals sclerae to be white.  Conjunctivae pale.

NECK:  Supple.

CHEST:  Reveal distant breath sounds.

HEART:  Reveals an irregularly irregular rate.

ABDOMEN:  Protuberant, obese, soft, nontender.

EXTREMITY:  Show no edema.



LABORATORY DATA:  Reveal white blood cell count up to 34.9 from 11.5 at

admission to the hospital, hemoglobin 9.7, platelet count of 472,000. 

Coags reveal PT 28.9, INR of 2.49, PTT of 25.5.  Chemistries reveal total

bilirubin of 1.6, direct bilirubin of 1.1, alkaline phosphatase of 29, AST

of 5440, ALT of 4320 from 05:00 a.m. this morning.  At 01:45 this morning,

his AST was 2535 with an ALT of 2313, again with normal alkaline

phosphatase.  On admission to the hospital yesterday, his AST was 390, ALT

287.  CT scan of the abdomen and pelvis reveal hepatomegaly with no other

acute changes.  Ultrasound of the abdomen shows hepatomegaly.



IMPRESSION:

1.  Marked rapid rise in his liver enzymes.  I suspect that this is

secondary to passive congestion of the liver from a low cardiac output

secondary to his severe bradycardia.  His Tylenol level was less than 10. 

The sudden and rapid rise of his transaminases are not compatible with

acetaminophen toxicity.

2.  Coronary artery disease status post left anterior descending artery

drug-eluting stent.

3.  Atrial fibrillation.

4.  Elevated white blood cell count, rule out sepsis, rule out systemic

inflammatory response syndrome.

5.  Anemia.

6.  Type 2 diabetes mellitus.

7.  Chronic back pain.



RECOMMENDATIONS:

1.  Continue supportive care, his prognosis is extremely guarded.

2.  Continue full cycle of N-acetyl cysteine as there is no downside to

treatment for Tylenol toxicity although again I do not believe that this is

Tylenol toxicity, and most likely passive congestion of the liver.

3.  Hold Eliquis and Plavix for now.

4.  Continue broad-spectrum IV antibiotics.





__________________________________________

Julio César Madrid MD



DD:  04/21/2018 14:21:26

DT:  04/21/2018 14:31:30

Job # [id number]

## 2018-04-22 NOTE — PN
DATE:



SUBJECTIVE:  The patient is lying in bed in the ICU.  He appears more

comfortable.  He appears less congested and is breathing comfortably.  He

has an NG tube in his nose.



PHYSICAL EXAMINATION:

VITAL SIGNS:  Reveal temperature of 97.7, blood pressure 130/70, heart rate

of 92.

HEENT:  Reveal sclerae to be white.  Conjunctivae pink.

NECK:  Supple.

CHEST:  Reveal distant breath sounds.

HEART:  Reveals a regular rate and rhythm.  Abdomen is less distended, but

still protuberant.

EXTREMITIES:  Show trace pedal edema.



LABORATORY DATA:  Reveal white blood cell count 27.7, down from 35,000;

hemoglobin 10.1; platelet count 447,000.  Chemistries reveal chloride of

92, BUN 50, creatinine 3.8.  AST is 9104, ALT is 6080, total bilirubin is

1.6.  His AST yesterday afternoon actually came down to 2805 with an ALT of

2551; however, this morning it has increased again.



IMPRESSION:  A 67-year-old male with numerous comorbidities including

coronary artery disease, atrial fibrillation, recent gastrointestinal bleed

on prior hospitalization with admitted to the hospital with weakness,

bradycardia, subsequently found to have elevated AST, ALT, which yesterday

afternoon had trended downward, but this morning is up again.  The patient

also appears to be in acute renal failure with oliguria.  His BUN is up to

50 with a creatinine of 3.8.  He also has elevated white blood cell count

with systemic inflammatory response syndrome and possible sepsis.  He is

currently on meropenem, which has been known to have hepatotoxicity.  His

prognosis is extremely poor.  There is no indication for transfer to liver

transplant center at this time given all his other comorbidities.



RECOMMENDATIONS:

1.  I would stop meropenem.

2.  I will start the patient on Zosyn 3.375 g IV every 8 hours.

3.  Follow liver enzymes.

4.  Complete the course of the N-acetylcysteine although, I doubt that this

is acetaminophen toxicity.





__________________________________________

Julio César Madrid MD





DD:  04/22/2018 6:54:17

DT:  04/22/2018 6:56:39

Job # [id number]

## 2018-04-22 NOTE — PN
DATE:  04/22/2018



INTENSIVIST NOTE



SUBJECTIVE:  The patient is resting in bed with O2 via nasal cannula. 

Still complains of back pain, but it does feel a little better after

getting some morphine.  The patient has an NG tube to suction, did have

coffee grounds over the night, but hemoglobin is stable.  Urine output is

still significantly decreased, but the patient has less respiratory

complaints, less shortness of breath this morning and decreased chest

congestion.  The patient got another unit of packed red blood cells

overnight.



PHYSICAL EXAMINATION:

VITAL SIGNS:  Physical exam note that his temperature is 97.7, his pulse is

92, respirations of 20 and BP is 139/74, O2 saturation is 98%.

HEENT:  Head is atraumatic, normocephalic.  Eyes reactive to light.  Ear,

nose and throat seemed to be within normal limits.

NECK:  Supple.  No JVD.  No thyroid enlargement.  No lymph nodes.

HEART:  Regular rate and rhythm.  Normal S1, S2.

LUNGS:  Reveal rare rhonchi at the bases.

ABDOMEN:  Soft, but distended.  Decreased bowel sounds.

GENITALIA AND RECTAL:  Deferred.

MUSCULOSKELETAL:  No joint deformities.

EXTREMITIES:  Reveal positive lower extremity edema.

NEUROLOGICAL:  He seems to be grossly intact.



LABORATORY DATA:  As far as his laboratories are concerned, his white count

is 27.7, his hemoglobin is 10.1, hematocrit 33 with platelets of 447,000. 

The patient's PT is 24.1, INR is 2.08 and PTT is 18.9.  His VBG reveals pH

of 7.29, pCO2 of 44.  The patient's lactate had decreased to 8.6.  As far

as his sodium is 140, potassium 4.1, chloride 92, CO2 of 19 and BUN of 50,

creatinine of 3.8 with glucose of 161.  His AST has increased to 9104 and

6080.  Chest x-ray reveals decreased congestion, but this is the unofficial

reading.



IMPRESSION:  As far as my impression, the patient has gastrointestinal

bleed with coffee grounds from nasogastric tube and presents with shock

liver secondary to hypotension and bradycardia.  The patient also has a

history of Percocet and Tylenol use, possibility of Tylenol overdose is

there and is on the NAC protocol.  The patient has acute renal failure and

seems to have pulmonary edema as well.  He has a history of coronary artery

disease, atrial fibrillation, diabetes and cardiomyopathy as well as Crohn

disease, arthritis and presently is noted to have a coagulopathy as well as

anemia and thrombocytosis.



PLAN:  As far as our plan, we will continue with the N-acetylcysteine IV. 

The patient is being followed closely by Surgery GI, Renal and ID.  The

antibiotics will be as per ID.  We will continue with bronchodilators of

DuoNeb and continue with the Protonix as well as O2 via nasal cannula and

aggressive pulmonary toilet.  The patient's labs will be followed closely

and we will continue to treat along with the other consultants. 

Discussions have been made about possible transfer or attempt to transfer

the patient to [facility] for care of the liver failure. 

At this time, GI feels that it is shock liver and the indication for

transfer is not there at this time.  We will follow closely and continue to

treat aggressively.





__________________________________________

Jean-Pierre Gonzalez MD





DD:  04/22/2018 8:24:03

DT:  04/22/2018 8:30:29

Job # [id number]

## 2018-04-22 NOTE — PN
DATE:



SUBJECTIVE:  Patient is in bed, in no acute distress, was seen earlier this

morning in 129, bed 1.  He is awake.  He is responsive.  He is weak.  He

has had no fever.



PHYSICAL EXAMINATION:

VITAL SIGNS:  Temperature is 98, blood pressure is 130/70, respiratory rate

of 18, heart rate of 92, and 94% saturation.

HEENT:  Unremarkable.

NECK:  Supple.

LUNGS:  Decreased breath sounds.

HEART:  Normal S1, S2.

ABDOMEN:  Soft, nontender.



LABORATORY DATA:  Reveals the patient has white count of 20,400, hemoglobin

of 10, platelets of 408.  Coagulation is noted.  Chemistry reveals the

patient's BUN of 50, creatinine of 3.8, amylase is elevated, lipase is 487,

upper range is 300.  Procalcitonin is 0.22.  Blood cultures are negative. 

Urine cultures are negative.  Nares MRSA is negative.



. _____ progress note from today is reviewed.  Dr. Snow's progress note

is reviewed.  Patient had chest x-ray yesterday, which revealed

atelectasis.



ASSESSMENT AND PLAN:  A 67-year-old male now with severe sepsis with

gastrointestinal source, period of hypotension, now with elevated liver

function tests in 3947-2021 range most likely from the period of

hypotension probable with ischemic colitis from the hypotension with acute

kidney injury in a patient with diabetes, hypertension, coronary artery

disease, Crohn disease, depression, high cholesterol and recent

hospitalization with congestive heart failure, gastrointestinal bleed at

this time and currently initially started on meropenem and switched to

Zosyn now and CAT scan of the abdomen is noted.  We will follow closely

with you.





__________________________________________

Arturo Lu MD



DD:  04/22/2018 12:03:32

DT:  04/22/2018 12:47:12

Job # [id number]

## 2018-04-22 NOTE — RAD
HISTORY:

Status post NGT insertion



COMPARISON:

Comparison made with prior study 04/21/2018. 



FINDINGS:

Note that the study is limited the due to obscuration of the medial 

lung apices by overlying facial soft tissue and mandible artifact. 

Additionally, left CP angle has been excluded from the film and 

cannot be adequately evaluated



In situ NGT, the tip of does not appear to be included on this film 

though distal aspect does lie well below EG junction left upper/mid 

abdomen region. 



LUNGS:

Mild bibasilar atelectasis however note that the left CP angle is 

excluded from the exam and cannot be adequately evaluated.



PLEURA:

No significant pleural effusion identified, no pneumothorax apparent.



CARDIOVASCULAR:

Cardiomegaly.



OSSEOUS STRUCTURES:

No significant abnormalities.



VISUALIZED UPPER ABDOMEN:

Normal.



OTHER FINDINGS:

None.



IMPRESSION:

Limited study due to exclusion left CP angle .



Right basilar atelectasis. 



In situ NGT, the tip of does not appear to be included on this film 

though distal aspect does lie well below EG junction left upper/mid 

abdomen region.

## 2018-04-23 LAB
ALBUMIN SERPL-MCNC: 3.7 G/DL (ref 3–4.8)
ALBUMIN/GLOB SERPL: 1.2 {RATIO} (ref 1.1–1.8)
APTT BLD: 28.4 SECONDS (ref 25.1–36.5)
AST SERPL-CCNC: 3167 U/L (ref 17–59)
BASE EXCESS BLDV CALC-SCNC: 1.8 MMOL/L (ref 0–2)
BASOPHILS # BLD AUTO: 0.03 K/MM3 (ref 0–2)
BASOPHILS # BLD AUTO: 0.04 K/MM3 (ref 0–2)
BASOPHILS NFR BLD: 0.1 % (ref 0–3)
BASOPHILS NFR BLD: 0.2 % (ref 0–3)
BILIRUB DIRECT SERPL-MCNC: 1.2 MG/DL (ref 0–0.4)
BUN SERPL-MCNC: 76 MG/DL (ref 7–21)
CALCIUM SERPL-MCNC: 7.9 MG/DL (ref 8.4–10.5)
EOSINOPHIL # BLD: 0 10*3/UL (ref 0–0.7)
EOSINOPHIL # BLD: 0 10*3/UL (ref 0–0.7)
EOSINOPHIL NFR BLD: 0 % (ref 1.5–5)
EOSINOPHIL NFR BLD: 0.1 % (ref 1.5–5)
ERYTHROCYTE [DISTWIDTH] IN BLOOD BY AUTOMATED COUNT: 24.1 % (ref 11.5–14.5)
ERYTHROCYTE [DISTWIDTH] IN BLOOD BY AUTOMATED COUNT: 24.1 % (ref 11.5–14.5)
GFR NON-AFRICAN AMERICAN: 12
GRANULOCYTES # BLD: 19.03 10*3/UL (ref 1.4–6.5)
GRANULOCYTES # BLD: 19.26 10*3/UL (ref 1.4–6.5)
GRANULOCYTES NFR BLD: 90.9 % (ref 50–68)
GRANULOCYTES NFR BLD: 91.3 % (ref 50–68)
HEPATITIS A IGM: NEGATIVE
HEPATITIS B CORE AB: NEGATIVE
HEPATITIS C ANTIBODY: REACTIVE
HGB BLD-MCNC: 10.2 G/DL (ref 14–18)
HGB BLD-MCNC: 10.4 G/DL (ref 14–18)
INR PPP: 2.38 (ref 0.93–1.08)
LYMPHOCYTES # BLD: 0.9 10*3/UL (ref 1.2–3.4)
LYMPHOCYTES # BLD: 1 10*3/UL (ref 1.2–3.4)
LYMPHOCYTES NFR BLD AUTO: 4.3 % (ref 22–35)
LYMPHOCYTES NFR BLD AUTO: 4.6 % (ref 22–35)
MCH RBC QN AUTO: 23 PG (ref 25–35)
MCH RBC QN AUTO: 23.3 PG (ref 25–35)
MCHC RBC AUTO-ENTMCNC: 31.8 G/DL (ref 31–37)
MCHC RBC AUTO-ENTMCNC: 32.2 G/DL (ref 31–37)
MCV RBC AUTO: 72.4 FL (ref 80–105)
MCV RBC AUTO: 72.5 FL (ref 80–105)
MONOCYTES # BLD AUTO: 0.9 10*3/UL (ref 0.1–0.6)
MONOCYTES # BLD AUTO: 0.9 10*3/UL (ref 0.1–0.6)
MONOCYTES NFR BLD: 4.2 % (ref 1–6)
MONOCYTES NFR BLD: 4.3 % (ref 1–6)
PH BLDV: 7.34 [PH] (ref 7.32–7.43)
PLATELET # BLD: 376 10^3/UL (ref 120–450)
PLATELET # BLD: 384 10^3/UL (ref 120–450)
PMV BLD AUTO: 9.2 FL (ref 7–11)
PMV BLD AUTO: 9.4 FL (ref 7–11)
PROTHROMBIN TIME: 27.9 SECONDS (ref 9.4–12.5)
RBC # BLD AUTO: 4.43 10^6/UL (ref 3.5–6.1)
RBC # BLD AUTO: 4.46 10^6/UL (ref 3.5–6.1)
VENOUS BLOOD GAS PCO2: 53 (ref 40–60)
VENOUS BLOOD GAS PO2: 37 MM/HG (ref 30–55)
WBC # BLD AUTO: 20.9 10^3/UL (ref 4.5–11)
WBC # BLD AUTO: 21.1 10^3/UL (ref 4.5–11)

## 2018-04-23 RX ADMIN — MORPHINE SULFATE PRN MG: 4 INJECTION, SOLUTION INTRAMUSCULAR; INTRAVENOUS at 13:39

## 2018-04-23 RX ADMIN — IPRATROPIUM BROMIDE AND ALBUTEROL SULFATE SCH ML: .5; 3 SOLUTION RESPIRATORY (INHALATION) at 11:25

## 2018-04-23 RX ADMIN — METHYLPREDNISOLONE SODIUM SUCCINATE SCH MG: 40 INJECTION, POWDER, FOR SOLUTION INTRAMUSCULAR; INTRAVENOUS at 13:27

## 2018-04-23 RX ADMIN — PIPERACILLIN SODIUM AND TAZOBACTAM SODIUM SCH MLS/HR: .25; 2 INJECTION, POWDER, LYOPHILIZED, FOR SOLUTION INTRAVENOUS at 05:42

## 2018-04-23 RX ADMIN — PIPERACILLIN SODIUM AND TAZOBACTAM SODIUM SCH MLS/HR: .25; 2 INJECTION, POWDER, LYOPHILIZED, FOR SOLUTION INTRAVENOUS at 17:01

## 2018-04-23 RX ADMIN — METOPROLOL TARTRATE SCH: 5 INJECTION INTRAVENOUS at 16:35

## 2018-04-23 RX ADMIN — IPRATROPIUM BROMIDE AND ALBUTEROL SULFATE SCH ML: .5; 3 SOLUTION RESPIRATORY (INHALATION) at 20:22

## 2018-04-23 RX ADMIN — IPRATROPIUM BROMIDE AND ALBUTEROL SULFATE SCH: .5; 3 SOLUTION RESPIRATORY (INHALATION) at 04:32

## 2018-04-23 RX ADMIN — IPRATROPIUM BROMIDE AND ALBUTEROL SULFATE SCH ML: .5; 3 SOLUTION RESPIRATORY (INHALATION) at 15:47

## 2018-04-23 RX ADMIN — DILTIAZEM HYDROCHLORIDE PRN MLS/HR: 100 INJECTION, POWDER, LYOPHILIZED, FOR SOLUTION INTRAVENOUS at 19:25

## 2018-04-23 RX ADMIN — METOPROLOL TARTRATE SCH MG: 5 INJECTION INTRAVENOUS at 10:06

## 2018-04-23 RX ADMIN — PIPERACILLIN SODIUM AND TAZOBACTAM SODIUM SCH MLS/HR: .25; 2 INJECTION, POWDER, LYOPHILIZED, FOR SOLUTION INTRAVENOUS at 12:34

## 2018-04-23 RX ADMIN — BUDESONIDE SCH MG: 0.5 SUSPENSION RESPIRATORY (INHALATION) at 20:21

## 2018-04-23 RX ADMIN — PIPERACILLIN SODIUM AND TAZOBACTAM SODIUM SCH MLS/HR: .25; 2 INJECTION, POWDER, LYOPHILIZED, FOR SOLUTION INTRAVENOUS at 00:31

## 2018-04-23 RX ADMIN — DILTIAZEM HYDROCHLORIDE PRN MLS/HR: 100 INJECTION, POWDER, LYOPHILIZED, FOR SOLUTION INTRAVENOUS at 13:10

## 2018-04-23 RX ADMIN — BUDESONIDE SCH MG: 0.5 SUSPENSION RESPIRATORY (INHALATION) at 07:30

## 2018-04-23 RX ADMIN — OXYMETAZOLINE HYDROCHLORIDE SCH SPR: 0.05 SPRAY NASAL at 10:16

## 2018-04-23 RX ADMIN — IPRATROPIUM BROMIDE AND ALBUTEROL SULFATE SCH ML: .5; 3 SOLUTION RESPIRATORY (INHALATION) at 07:30

## 2018-04-23 RX ADMIN — METHYLPREDNISOLONE SODIUM SUCCINATE SCH MG: 40 INJECTION, POWDER, FOR SOLUTION INTRAMUSCULAR; INTRAVENOUS at 21:18

## 2018-04-23 RX ADMIN — IPRATROPIUM BROMIDE AND ALBUTEROL SULFATE SCH ML: .5; 3 SOLUTION RESPIRATORY (INHALATION) at 23:10

## 2018-04-23 RX ADMIN — OXYMETAZOLINE HYDROCHLORIDE SCH SPR: 0.05 SPRAY NASAL at 21:21

## 2018-04-23 RX ADMIN — MORPHINE SULFATE PRN MG: 4 INJECTION, SOLUTION INTRAMUSCULAR; INTRAVENOUS at 05:41

## 2018-04-23 NOTE — CARD
--------------- APPROVED REPORT --------------





EKG Measurement

Heart [id number]

[id number]

[id number]

[id number]



<Conclusion>

Atrial fibrillation with rapid ventricular response

Right bundle branch block

Inferior infarct, age undetermined

Abnormal ECG

## 2018-04-23 NOTE — CP.PCM.PN
Subjective





- Date & Time of Evaluation


Date of Evaluation: 04/23/18


Time of Evaluation: 15:27





- Subjective


Subjective: 


Follow up Nephrology Consultation Note





Assessment: critical


Acute Kidney Injury (N17.9) likely due to ATN, hypoperfusion now with fluid 

overload


shock, acute liver injury, lactic acidosis


hx of chronic Hep C


Anemia, Hyperphosphatemia (E83.39), HTN (I12.9), DM, morbid obesity


CHF, CAD s/p sent





Plan


may need renal replacement therapy soon. suggest to d/c IVF and start lasix 80 

mg bid IV and will also give metolazone 5 mg bid orally. if unable to diurese 

then consider dialysis as next option.


maintain hydrodynamics stable. No ACEI/ARB due to BHARGAVI


Monitor Input/Output, daily weights and renal function with basic metabolic 

panel


A fib rate control as per ICU. pt on cardizem drip





Dose meds/antibiotics for reduced GFR. Avoid fleets enema/magnesium based 

laxatives. Avoid nephrotoxins/NSAIDs/ iodinated contrast (unless needed 

emergently)


Glycemic control


Further work up for as per primary team


Thanks for allowing me to participate in care of your patient. Will follow 

patient with you. Please call if any Qs. d/w ICU and primary team


Dr Elver Florez


Office: [phone] Cell: [phone] Fax: [phone]





Subjective: Noted events overnight. Patients feels sleepy


Denies chest pain, palpitation, shortness of breath, c/o leg swelling. 


All other negative





Physical Examination: 


General Appearance: Comfortable, in no acute respiratory distress, co-operative 

. ill appearing, obese


Vitals reviewed and noted as below


Head; Atraumatic, normocephalic


ENT: no ulcers no thrush. Tongue is midline. Oropharynx: no rash or ulcers.


EYES: Pupils are equal, round and reactive to light accommodation. Eye muscles 

and extraocular movement intact. Sclera is anicteric.


Neck; supple no lymphadenopathy, no thyromegaly or bruit


Lungs: Normal respiratory rate/effort. Breath sounds bilateral equal and basal 

crackles


Heart: Increased rate. s1s2 normal. No rub or gallop. 


Extremities: 2+ edema. No varicose veins


Neurological: Patient is sleepy but oriented to person, place and time. No 

focal deficit. Strength bilateral appropriate and equal


Skin: Warm and dry. Normal turgor. No rash. Palpitation: Normal elasticity for 

age


Abdomen: Abdomen is soft. Bowel sounds +. There is no abdominal tenderness, no 

guarding/rigidity no organomegaly


Psych: normal insight and normal affect/mood


MSK: no joint tenderness or swelling. Digits and nails normal, no deformity


: kidney or bladder not palpable. has forde +





Labs/imaging reviewed.


Past medical history, past surgical history, family history, social history, 

allergy reviewed and noted as below


Family hx: no hx of CKD. Rest non-contributory 





workup: normal LVEF echo 2017 but cardiac cath 2018: LVEF 45%


UA 30 protein


Hep C +


renal imaging unremarkable





Objective





- Vital Signs/Intake and Output


Vital Signs (last 24 hours): 


 











Temp Pulse Resp BP Pulse Ox


 


 97.7 F   129 H  22   155/113 H  93 L


 


 04/21/18 23:53  04/23/18 13:24  04/23/18 10:06  04/23/18 13:24  04/23/18 08:00








Intake and Output: 


 











 04/23/18 04/23/18





 06:59 18:59


 


Intake Total  35


 


Balance  35














- Medications


Medications: 


 Current Medications





Albuterol/Ipratropium (Duoneb 3 Mg/0.5 Mg (3 Ml) Ud)  3 ml IH [id number] [facility]


   Last Admin: 04/23/18 11:25 Dose:  3 ml


Budesonide (Pulmicort Respules)  0.5 mg IH [id number] [facility]


   Last Admin: 04/23/18 07:30 Dose:  0.5 mg


Escitalopram Oxalate (Lexapro)  5 mg PO DAILY [facility]


   Last Admin: 04/23/18 10:08 Dose:  5 mg


Folic Acid (Folic Acid)  1 mg PO DAILY [facility]


   Last Admin: 04/23/18 10:08 Dose:  1 mg


Furosemide (Lasix)  80 mg IVP BID [facility]


Piperacillin Sod/Tazobactam Sod (Zosyn 2.25 Gm In 0.9% 100 Ml)  2.25 gm in 100 

mls @ 100 mls/hr IVPB Q6 [facility]


   PRN Reason: Protocol


   Stop: 05/01/18 12:01


   Last Admin: 04/23/18 12:34 Dose:  100 mls/hr


diltiaZEM IVPB 100mg in NS (Cardizem 100mg In Ns)  100 mls @ 5 mls/hr IV .Q20H 

PRN; Protocol; 5 MG/HR


   PRN Reason: TITRATE PER MD ORDER


   Last Titration: 04/23/18 14:15 Dose:  15 mg/hr, 15 mls/hr


Lidocaine (Lidoderm)  1 ea TD DAILY [facility]


   Last Admin: 04/23/18 10:08 Dose:  1 ea


Methylprednisolone (Solu-Medrol)  20 mg IVP Q8 [facility]


   Last Admin: 04/23/18 13:27 Dose:  20 mg


Metolazone (Zaroxolyn)  5 mg PO BID [facility]


   Stop: 04/26/18 15:31


Metoprolol Tartrate (Lopressor)  5 mg IVP Q6H [facility]


   Last Admin: 04/23/18 10:06 Dose:  5 mg


Morphine Sulfate (Morphine)  1 mg IVP Q6 [facility]


   PRN Reason: PAIN, MODERATE [4-6]


   Last Admin: 04/23/18 13:39 Dose:  1 mg


Oxymetazoline HCl (Afrin 0.05%)  0 ml NS Q12H [facility]


   Last Admin: 04/23/18 10:16 Dose:  2 spr


Pantoprazole Sodium (Protonix Inj)  40 mg IVP Q12 [facility]


   Last Admin: 04/23/18 10:09 Dose:  40 mg











- Labs


Labs: 


 





 04/23/18 05:40 





 04/23/18 05:40 





 











PT  27.9 SECONDS (9.4-12.5)  H  04/23/18  08:30    


 


INR  2.38  (0.93-1.08)  H  04/23/18  08:30    


 


APTT  28.4 Seconds (25.1-36.5)   04/23/18  08:30

## 2018-04-23 NOTE — [email or web address]
Addendum entered and electronically signed by Jay Arzola DO  04/23/18 13

:51: 





Discussed with ENT regarding patient's CT neck findings of asymmetrical 

retropharyngeal swelling left greater than right which extends to the 

supraglottic larynx. Considering patient has been started on steroids and 

antibiotics, and is currently not in critical condition i.e.; endorses sore 

throat and odynophagia, denies having shortness of breath or difficulty 

breathing, patient will be evaluated by ENT tomorrow. 





Original Note:








<Jay Arzola - Last Filed: 04/23/18 12:00>





CCU Subjective





- Physician Review


Subjective (Free Text): 





04/23/18 12:01


Patient seen and examined at bedside states his lightheadedness and dizziness 

he had upon admission have resolved. Denies Fevers, chills, nausea, vomiting, 

abdominal pain, headache, cough, shortness of breath, chest pain. 





CCU Objective





- Vital Signs / Intake & Output


Vital Signs (Last 4 hours): 


Vital Signs











  Pulse Resp BP


 


 04/23/18 11:22  111 H  


 


 04/23/18 10:06  113 H  22  128/85


 


 04/23/18 10:00  112 H  21 


 


 04/23/18 09:17    149/100 H


 


 04/23/18 09:16  126 H  24 











Intake and Output (Last 8hrs): 


 Intake & Output











 04/22/18 04/23/18 04/23/18





 22:59 06:59 14:59


 


Intake Total 2180  


 


Output Total 280  


 


Balance 1900  


 


Intake:   


 


  IV 1270  


 


    Right Antecubital 1000  


 


    Right Hand 270  


 


  Oral 300  


 


  Blood Product 610  


 


Output:   


 


  Gastric Amount 30  


 


    Right Nares 30  


 


  Urine 250  


 


    Urethral (Barlow) 250  


 


Other:   


 


  # Bowel Movements 0  














- Physical Exam


Head: Positive for: Atraumatic, Normocephalic


Pupils: Positive for: PERRL


Extroacular Muscles: Positive for: EOMI


Conjunctiva: Positive for: Icteric (Icteric scelera), Other (Conjuctival pallor.

)


Mouth: Positive for: Moist Mucous Membranes


Pharnyx: Positive for: Muffled/Hoarse Voice


Neck: Positive for: Normal Range of Motion.  Negative for: MIDLINE TENDERNESS, 

Paraspinal Tenderness, JVD


Respiratory/Chest: Positive for: Clear to Auscultation, Good Air Exchange.  

Negative for: Respiratory Distress, Accessory Muscle Use


Cardiovascular: Positive for: Normal S1, S2, Other (Regular but slow rhythm.).  

Negative for: Murmurs


Abdomen: Negative for: Tenderness, Distention, Peritoneal Signs


Rectal: Positive for: Other (Guaiac positive. medium brown stool.)


Back: Positive for: Normal Inspection


Upper Extremity: Positive for: Normal Inspection.  Negative for: Cyanosis, Edema


Lower Extremity: Positive for: Normal Inspection, Normal ROM.  Negative for: 

Edema, Cyanosis


Neurological: Positive for: GCS=15, CN II-XII Intact, Motor Func Grossly 

Intact.  Negative for: Speech Normal


Skin: Positive for: Warm, Dry, Normal Color.  Negative for: Rashes


Psychiatric: Positive for: Alert, Oriented x 3, Normal Insight, Normal 

Concentration





- Medications


Active Medications: 


Active Medications











Generic Name Dose Route Start Last Admin





  Trade Name Freq  PRN Reason Stop Dose Admin


 


Albuterol/Ipratropium  3 ml  04/21/18 20:45  04/23/18 07:30





  Duoneb 3 Mg/0.5 Mg (3 Ml) Ud  IH   3 ml





  [id number] HUMBERTO   Administration


 


Budesonide  0.5 mg  04/21/18 20:00  04/23/18 07:30





  Pulmicort Respules  IH   0.5 mg





  [id number] HUMBERTO   Administration


 


Clopidogrel Bisulfate  75 mg  04/21/18 10:00  04/21/18 12:20





  Plavix  PO   Not Given





  DAILY HUMBERTO   


 


Escitalopram Oxalate  5 mg  04/21/18 10:00  04/23/18 10:08





  Lexapro  PO   5 mg





  DAILY HUMBERTO   Administration


 


Folic Acid  1 mg  04/21/18 10:00  04/23/18 10:08





  Folic Acid  PO   1 mg





  DAILY HUMBERTO   Administration


 


Piperacillin Sod/Tazobactam Sod  2.25 gm in 100 mls @ 100 mls/hr  04/22/18 12:

00  04/23/18 05:42





  Zosyn 2.25 Gm In 0.9% 100 Ml  IVPB  05/01/18 12:01  100 mls/hr





  Q6 HUMBERTO   Administration





  Protocol   


 


Lactated Ringer's  1,000 mls @ 150 mls/hr  04/23/18 01:00  04/23/18 10:28





  Lactated Ringer's  IV   150 mls/hr





  .[id number] HUMBERTO   Administration


 


Lidocaine  1 ea  04/22/18 14:00  04/23/18 10:08





  Lidoderm  TD   1 ea





  DAILY HUMBERTO   Administration


 


Metoprolol Tartrate  5 mg  04/23/18 08:30  04/23/18 10:06





  Lopressor  IVP   5 mg





  Q6H HUMBERTO   Administration


 


Morphine Sulfate  1 mg  04/22/18 13:58  04/23/18 05:41





  Morphine  IVP   1 mg





  Q6 PRN   Administration





  PAIN, MODERATE [4-6]   


 


Oxymetazoline HCl  0 ml  04/22/18 22:00  04/23/18 10:16





  Afrin 0.05%  NS   2 spr





  Q12H HUMBERTO   Administration


 


Pantoprazole Sodium  40 mg  04/21/18 10:00  04/23/18 10:09





  Protonix Inj  IVP   40 mg





  Q12 HUMBERTO   Administration














- Patient Studies


Lab Studies: 


 Microbiology Studies











 04/21/18 03:20 Blood Culture - Preliminary





 Blood    NO GROWTH AFTER 48 HOURS


 


 04/21/18 02:45 Blood Culture - Preliminary





 Blood    NO GROWTH AFTER 48 HOURS


 


 04/21/18 01:30 MRSA Culture (Admit) - Final





 Naris    MRSA NOT DETECTED








 Lab Studies











  04/23/18 04/23/18 04/23/18 Range/Units





  11:15 08:30 08:30 


 


WBC     (4.5-11.0)  10^3/ul


 


RBC     (3.5-6.1)  10^6/uL


 


Hgb     (14.0-18.0)  g/dL


 


Hct     (42.0-52.0)  %


 


MCV     (80.0-105.0)  fl


 


MCH     (25.0-35.0)  pg


 


MCHC     (31.0-37.0)  g/dl


 


RDW     (11.5-14.5)  %


 


Plt Count     (120.0-450.0)  10^3/uL


 


MPV     (7.0-11.0)  fl


 


Gran %     (50.0-68.0)  %


 


Lymph % (Auto)     (22.0-35.0)  %


 


Mono % (Auto)     (1.0-6.0)  %


 


Eos % (Auto)     (1.5-5.0)  %


 


Baso % (Auto)     (0.0-3.0)  %


 


Gran #     (1.4-6.5)  


 


Lymph # (Auto)     (1.2-3.4)  


 


Mono # (Auto)     (0.1-0.6)  


 


Eos # (Auto)     (0.0-0.7)  


 


Baso # (Auto)     (0.0-2.0)  K/mm3


 


PT    27.9 H  (9.4-12.5)  SECONDS


 


INR    2.38 H  (0.93-1.08)  


 


APTT    28.4  (25.1-36.5)  Seconds


 


pO2   37   (30-55)  mm/Hg


 


VBG pH   7.34   (7.32-7.43)  


 


VBG pCO2   53.0   (40-60)  


 


VBG HCO3   28.6 H   (21-28)  mmol/l


 


VBG O2 Sat (Calc)   73.2 H   (40-65)  %


 


VBG Base Excess   1.8   (0.0-2.0)  mmol/L


 


Sodium     (132-148)  mmol/L


 


Potassium     (3.6-5.0)  mmol/L


 


Chloride     ()  mmol/L


 


Carbon Dioxide     (21-33)  mmol/L


 


Anion Gap     (10-20)  


 


BUN     (7-21)  mg/dL


 


Creatinine     (0.8-1.5)  mg/dl


 


Est GFR (African Amer)     


 


Est GFR (Non-Af Amer)     


 


POC Glucose (mg/dL)  114 H    ()  mg/dL


 


Random Glucose     ()  mg/dL


 


Lactic Acid     (0.7-2.1)  mmol/L


 


Calcium     (8.4-10.5)  mg/dL


 


Magnesium     (1.7-2.2)  mg/dL


 


Total Bilirubin     (0.2-1.3)  mg/dL


 


Direct Bilirubin     (0.0-0.4)  mg/dL


 


AST     (17-59)  U/L


 


ALT     (7-56)  U/L


 


Alkaline Phosphatase     ()  U/L


 


Total Protein     (5.8-8.3)  g/dL


 


Albumin     (3.0-4.8)  g/dL


 


Globulin     gm/dL


 


Albumin/Globulin Ratio     (1.1-1.8)  


 


Hepatitis A IgM Ab     (NEGATIVE)  


 


Hep Bs Antigen     (NEGATIVE)  


 


Hep B Core IgM Ab     (NEGATIVE)  


 


Hepatitis C Antibody     (NEGATIVE)  














  04/23/18 04/23/18 04/23/18 Range/Units





  05:40 05:40 05:40 


 


WBC    20.9 H  (4.5-11.0)  10^3/ul


 


RBC    4.43  (3.5-6.1)  10^6/uL


 


Hgb    10.2 L  (14.0-18.0)  g/dL


 


Hct    32.1 L  (42.0-52.0)  %


 


MCV    72.5 L  (80.0-105.0)  fl


 


MCH    23.0 L  (25.0-35.0)  pg


 


MCHC    31.8  (31.0-37.0)  g/dl


 


RDW    24.1 H  (11.5-14.5)  %


 


Plt Count    384  (120.0-450.0)  10^3/uL


 


MPV    9.2  (7.0-11.0)  fl


 


Gran %    90.9 H  (50.0-68.0)  %


 


Lymph % (Auto)    4.6 L  (22.0-35.0)  %


 


Mono % (Auto)    4.3  (1.0-6.0)  %


 


Eos % (Auto)    0.1 L  (1.5-5.0)  %


 


Baso % (Auto)    0.1  (0.0-3.0)  %


 


Gran #    19.03 H  (1.4-6.5)  


 


Lymph # (Auto)    1.0 L  (1.2-3.4)  


 


Mono # (Auto)    0.9 H  (0.1-0.6)  


 


Eos # (Auto)    0.0  (0.0-0.7)  


 


Baso # (Auto)    0.03  (0.0-2.0)  K/mm3


 


PT     (9.4-12.5)  SECONDS


 


INR     (0.93-1.08)  


 


APTT     (25.1-36.5)  Seconds


 


pO2     (30-55)  mm/Hg


 


VBG pH     (7.32-7.43)  


 


VBG pCO2     (40-60)  


 


VBG HCO3     (21-28)  mmol/l


 


VBG O2 Sat (Calc)     (40-65)  %


 


VBG Base Excess     (0.0-2.0)  mmol/L


 


Sodium   138   (132-148)  mmol/L


 


Potassium   3.8   (3.6-5.0)  mmol/L


 


Chloride   91 L   ()  mmol/L


 


Carbon Dioxide   26   (21-33)  mmol/L


 


Anion Gap   24 H   (10-20)  


 


BUN   76 H   (7-21)  mg/dL


 


Creatinine   4.9 H   (0.8-1.5)  mg/dl


 


Est GFR ( Amer)   14   


 


Est GFR (Non-Af Amer)   12   


 


POC Glucose (mg/dL)     ()  mg/dL


 


Random Glucose   135 H   ()  mg/dL


 


Lactic Acid  1.7    (0.7-2.1)  mmol/L


 


Calcium   7.9 L   (8.4-10.5)  mg/dL


 


Magnesium   1.9   (1.7-2.2)  mg/dL


 


Total Bilirubin   2.2 H   (0.2-1.3)  mg/dL


 


Direct Bilirubin   1.2 H   (0.0-0.4)  mg/dL


 


AST   3167 H   (17-59)  U/L


 


ALT   3719 H   (7-56)  U/L


 


Alkaline Phosphatase   125   ()  U/L


 


Total Protein   6.9   (5.8-8.3)  g/dL


 


Albumin   3.7   (3.0-4.8)  g/dL


 


Globulin   3.1   gm/dL


 


Albumin/Globulin Ratio   1.2   (1.1-1.8)  


 


Hepatitis A IgM Ab     (NEGATIVE)  


 


Hep Bs Antigen     (NEGATIVE)  


 


Hep B Core IgM Ab     (NEGATIVE)  


 


Hepatitis C Antibody     (NEGATIVE)  














  04/22/18 04/22/18 04/22/18 Range/Units





  23:49 22:14 22:14 


 


WBC     (4.5-11.0)  10^3/ul


 


RBC     (3.5-6.1)  10^6/uL


 


Hgb     (14.0-18.0)  g/dL


 


Hct     (42.0-52.0)  %


 


MCV     (80.0-105.0)  fl


 


MCH     (25.0-35.0)  pg


 


MCHC     (31.0-37.0)  g/dl


 


RDW     (11.5-14.5)  %


 


Plt Count     (120.0-450.0)  10^3/uL


 


MPV     (7.0-11.0)  fl


 


Gran %     (50.0-68.0)  %


 


Lymph % (Auto)     (22.0-35.0)  %


 


Mono % (Auto)     (1.0-6.0)  %


 


Eos % (Auto)     (1.5-5.0)  %


 


Baso % (Auto)     (0.0-3.0)  %


 


Gran #     (1.4-6.5)  


 


Lymph # (Auto)     (1.2-3.4)  


 


Mono # (Auto)     (0.1-0.6)  


 


Eos # (Auto)     (0.0-0.7)  


 


Baso # (Auto)     (0.0-2.0)  K/mm3


 


PT    31.7 H  (9.4-12.5)  SECONDS


 


INR    2.72 H  (0.93-1.08)  


 


APTT     (25.1-36.5)  Seconds


 


pO2     (30-55)  mm/Hg


 


VBG pH     (7.32-7.43)  


 


VBG pCO2     (40-60)  


 


VBG HCO3     (21-28)  mmol/l


 


VBG O2 Sat (Calc)     (40-65)  %


 


VBG Base Excess     (0.0-2.0)  mmol/L


 


Sodium   139   (132-148)  mmol/L


 


Potassium   3.9   (3.6-5.0)  mmol/L


 


Chloride   91 L   ()  mmol/L


 


Carbon Dioxide   27   (21-33)  mmol/L


 


Anion Gap   25 H   (10-20)  


 


BUN   66 H   (7-21)  mg/dL


 


Creatinine   4.8 H   (0.8-1.5)  mg/dl


 


Est GFR (African Amer)   15   


 


Est GFR (Non-Af Amer)   12   


 


POC Glucose (mg/dL)  139 H    ()  mg/dL


 


Random Glucose   135 H   ()  mg/dL


 


Lactic Acid     (0.7-2.1)  mmol/L


 


Calcium   8.1 L   (8.4-10.5)  mg/dL


 


Magnesium     (1.7-2.2)  mg/dL


 


Total Bilirubin   2.2 H   (0.2-1.3)  mg/dL


 


Direct Bilirubin     (0.0-0.4)  mg/dL


 


AST   5104 H   (17-59)  U/L


 


ALT   4645 H   (7-56)  U/L


 


Alkaline Phosphatase   142 H D   ()  U/L


 


Total Protein   7.1   (5.8-8.3)  g/dL


 


Albumin   4.0   (3.0-4.8)  g/dL


 


Globulin   3.1   gm/dL


 


Albumin/Globulin Ratio   1.3   (1.1-1.8)  


 


Hepatitis A IgM Ab     (NEGATIVE)  


 


Hep Bs Antigen     (NEGATIVE)  


 


Hep B Core IgM Ab     (NEGATIVE)  


 


Hepatitis C Antibody     (NEGATIVE)  














  04/22/18 04/22/18 04/22/18 Range/Units





  22:14 21:52 19:35 


 


WBC  23.7 H    (4.5-11.0)  10^3/ul


 


RBC  4.57    (3.5-6.1)  10^6/uL


 


Hgb  10.7 L    (14.0-18.0)  g/dL


 


Hct  33.0 L    (42.0-52.0)  %


 


MCV  72.2 L    (80.0-105.0)  fl


 


MCH  23.4 L    (25.0-35.0)  pg


 


MCHC  32.4    (31.0-37.0)  g/dl


 


RDW  24.1 H    (11.5-14.5)  %


 


Plt Count  410    (120.0-450.0)  10^3/uL


 


MPV  9.2    (7.0-11.0)  fl


 


Gran %     (50.0-68.0)  %


 


Lymph % (Auto)     (22.0-35.0)  %


 


Mono % (Auto)     (1.0-6.0)  %


 


Eos % (Auto)     (1.5-5.0)  %


 


Baso % (Auto)     (0.0-3.0)  %


 


Gran #     (1.4-6.5)  


 


Lymph # (Auto)     (1.2-3.4)  


 


Mono # (Auto)     (0.1-0.6)  


 


Eos # (Auto)     (0.0-0.7)  


 


Baso # (Auto)     (0.0-2.0)  K/mm3


 


PT     (9.4-12.5)  SECONDS


 


INR     (0.93-1.08)  


 


APTT     (25.1-36.5)  Seconds


 


pO2     (30-55)  mm/Hg


 


VBG pH     (7.32-7.43)  


 


VBG pCO2     (40-60)  


 


VBG HCO3     (21-28)  mmol/l


 


VBG O2 Sat (Calc)     (40-65)  %


 


VBG Base Excess     (0.0-2.0)  mmol/L


 


Sodium     (132-148)  mmol/L


 


Potassium     (3.6-5.0)  mmol/L


 


Chloride     ()  mmol/L


 


Carbon Dioxide     (21-33)  mmol/L


 


Anion Gap     (10-20)  


 


BUN     (7-21)  mg/dL


 


Creatinine     (0.8-1.5)  mg/dl


 


Est GFR (African Amer)     


 


Est GFR (Non-Af Amer)     


 


POC Glucose (mg/dL)   130 H  133 H  ()  mg/dL


 


Random Glucose     ()  mg/dL


 


Lactic Acid     (0.7-2.1)  mmol/L


 


Calcium     (8.4-10.5)  mg/dL


 


Magnesium     (1.7-2.2)  mg/dL


 


Total Bilirubin     (0.2-1.3)  mg/dL


 


Direct Bilirubin     (0.0-0.4)  mg/dL


 


AST     (17-59)  U/L


 


ALT     (7-56)  U/L


 


Alkaline Phosphatase     ()  U/L


 


Total Protein     (5.8-8.3)  g/dL


 


Albumin     (3.0-4.8)  g/dL


 


Globulin     gm/dL


 


Albumin/Globulin Ratio     (1.1-1.8)  


 


Hepatitis A IgM Ab     (NEGATIVE)  


 


Hep Bs Antigen     (NEGATIVE)  


 


Hep B Core IgM Ab     (NEGATIVE)  


 


Hepatitis C Antibody     (NEGATIVE)  














  04/22/18 04/22/18 04/22/18 Range/Units





  17:54 16:01 13:55 


 


WBC     (4.5-11.0)  10^3/ul


 


RBC     (3.5-6.1)  10^6/uL


 


Hgb     (14.0-18.0)  g/dL


 


Hct     (42.0-52.0)  %


 


MCV     (80.0-105.0)  fl


 


MCH     (25.0-35.0)  pg


 


MCHC     (31.0-37.0)  g/dl


 


RDW     (11.5-14.5)  %


 


Plt Count     (120.0-450.0)  10^3/uL


 


MPV     (7.0-11.0)  fl


 


Gran %     (50.0-68.0)  %


 


Lymph % (Auto)     (22.0-35.0)  %


 


Mono % (Auto)     (1.0-6.0)  %


 


Eos % (Auto)     (1.5-5.0)  %


 


Baso % (Auto)     (0.0-3.0)  %


 


Gran #     (1.4-6.5)  


 


Lymph # (Auto)     (1.2-3.4)  


 


Mono # (Auto)     (0.1-0.6)  


 


Eos # (Auto)     (0.0-0.7)  


 


Baso # (Auto)     (0.0-2.0)  K/mm3


 


PT     (9.4-12.5)  SECONDS


 


INR     (0.93-1.08)  


 


APTT     (25.1-36.5)  Seconds


 


pO2     (30-55)  mm/Hg


 


VBG pH     (7.32-7.43)  


 


VBG pCO2     (40-60)  


 


VBG HCO3     (21-28)  mmol/l


 


VBG O2 Sat (Calc)     (40-65)  %


 


VBG Base Excess     (0.0-2.0)  mmol/L


 


Sodium     (132-148)  mmol/L


 


Potassium     (3.6-5.0)  mmol/L


 


Chloride     ()  mmol/L


 


Carbon Dioxide     (21-33)  mmol/L


 


Anion Gap     (10-20)  


 


BUN     (7-21)  mg/dL


 


Creatinine     (0.8-1.5)  mg/dl


 


Est GFR (African Amer)     


 


Est GFR (Non-Af Amer)     


 


POC Glucose (mg/dL)  151 H  153 H  149 H  ()  mg/dL


 


Random Glucose     ()  mg/dL


 


Lactic Acid     (0.7-2.1)  mmol/L


 


Calcium     (8.4-10.5)  mg/dL


 


Magnesium     (1.7-2.2)  mg/dL


 


Total Bilirubin     (0.2-1.3)  mg/dL


 


Direct Bilirubin     (0.0-0.4)  mg/dL


 


AST     (17-59)  U/L


 


ALT     (7-56)  U/L


 


Alkaline Phosphatase     ()  U/L


 


Total Protein     (5.8-8.3)  g/dL


 


Albumin     (3.0-4.8)  g/dL


 


Globulin     gm/dL


 


Albumin/Globulin Ratio     (1.1-1.8)  


 


Hepatitis A IgM Ab     (NEGATIVE)  


 


Hep Bs Antigen     (NEGATIVE)  


 


Hep B Core IgM Ab     (NEGATIVE)  


 


Hepatitis C Antibody     (NEGATIVE)  














  04/22/18 04/21/18 Range/Units





  13:00 05:00 


 


WBC    (4.5-11.0)  10^3/ul


 


RBC    (3.5-6.1)  10^6/uL


 


Hgb    (14.0-18.0)  g/dL


 


Hct    (42.0-52.0)  %


 


MCV    (80.0-105.0)  fl


 


MCH    (25.0-35.0)  pg


 


MCHC    (31.0-37.0)  g/dl


 


RDW    (11.5-14.5)  %


 


Plt Count    (120.0-450.0)  10^3/uL


 


MPV    (7.0-11.0)  fl


 


Gran %    (50.0-68.0)  %


 


Lymph % (Auto)    (22.0-35.0)  %


 


Mono % (Auto)    (1.0-6.0)  %


 


Eos % (Auto)    (1.5-5.0)  %


 


Baso % (Auto)    (0.0-3.0)  %


 


Gran #    (1.4-6.5)  


 


Lymph # (Auto)    (1.2-3.4)  


 


Mono # (Auto)    (0.1-0.6)  


 


Eos # (Auto)    (0.0-0.7)  


 


Baso # (Auto)    (0.0-2.0)  K/mm3


 


PT  36.1 H   (9.4-12.5)  SECONDS


 


INR  3.07 H   (0.93-1.08)  


 


APTT  27.6   (25.1-36.5)  Seconds


 


pO2    (30-55)  mm/Hg


 


VBG pH    (7.32-7.43)  


 


VBG pCO2    (40-60)  


 


VBG HCO3    (21-28)  mmol/l


 


VBG O2 Sat (Calc)    (40-65)  %


 


VBG Base Excess    (0.0-2.0)  mmol/L


 


Sodium    (132-148)  mmol/L


 


Potassium    (3.6-5.0)  mmol/L


 


Chloride    ()  mmol/L


 


Carbon Dioxide    (21-33)  mmol/L


 


Anion Gap    (10-20)  


 


BUN    (7-21)  mg/dL


 


Creatinine    (0.8-1.5)  mg/dl


 


Est GFR (African Amer)    


 


Est GFR (Non-Af Amer)    


 


POC Glucose (mg/dL)    ()  mg/dL


 


Random Glucose    ()  mg/dL


 


Lactic Acid    (0.7-2.1)  mmol/L


 


Calcium    (8.4-10.5)  mg/dL


 


Magnesium    (1.7-2.2)  mg/dL


 


Total Bilirubin    (0.2-1.3)  mg/dL


 


Direct Bilirubin    (0.0-0.4)  mg/dL


 


AST    (17-59)  U/L


 


ALT    (7-56)  U/L


 


Alkaline Phosphatase    ()  U/L


 


Total Protein    (5.8-8.3)  g/dL


 


Albumin    (3.0-4.8)  g/dL


 


Globulin    gm/dL


 


Albumin/Globulin Ratio    (1.1-1.8)  


 


Hepatitis A IgM Ab   Negative  (NEGATIVE)  


 


Hep Bs Antigen   Negative  (NEGATIVE)  


 


Hep B Core IgM Ab   Negative  (NEGATIVE)  


 


Hepatitis C Antibody   Reactive  (NEGATIVE)  








 Laboratory Results - last 24 hr











  04/21/18 04/22/18 04/22/18





  05:00 13:00 13:55


 


WBC   


 


RBC   


 


Hgb   


 


Hct   


 


MCV   


 


MCH   


 


MCHC   


 


RDW   


 


Plt Count   


 


MPV   


 


Gran %   


 


Lymph % (Auto)   


 


Mono % (Auto)   


 


Eos % (Auto)   


 


Baso % (Auto)   


 


Gran #   


 


Lymph # (Auto)   


 


Mono # (Auto)   


 


Eos # (Auto)   


 


Baso # (Auto)   


 


PT   36.1 H 


 


INR   3.07 H 


 


APTT   27.6 


 


pO2   


 


VBG pH   


 


VBG pCO2   


 


VBG HCO3   


 


VBG O2 Sat (Calc)   


 


VBG Base Excess   


 


Sodium   


 


Potassium   


 


Chloride   


 


Carbon Dioxide   


 


Anion Gap   


 


BUN   


 


Creatinine   


 


Est GFR ( Amer)   


 


Est GFR (Non-Af Amer)   


 


POC Glucose (mg/dL)    149 H


 


Random Glucose   


 


Lactic Acid   


 


Calcium   


 


Magnesium   


 


Total Bilirubin   


 


Direct Bilirubin   


 


AST   


 


ALT   


 


Alkaline Phosphatase   


 


Total Protein   


 


Albumin   


 


Globulin   


 


Albumin/Globulin Ratio   


 


Hepatitis A IgM Ab  Negative  


 


Hep Bs Antigen  Negative  


 


Hep B Core IgM Ab  Negative  


 


Hepatitis C Antibody  Reactive  














  04/22/18 04/22/18 04/22/18





  16:01 17:54 19:35


 


WBC   


 


RBC   


 


Hgb   


 


Hct   


 


MCV   


 


MCH   


 


MCHC   


 


RDW   


 


Plt Count   


 


MPV   


 


Gran %   


 


Lymph % (Auto)   


 


Mono % (Auto)   


 


Eos % (Auto)   


 


Baso % (Auto)   


 


Gran #   


 


Lymph # (Auto)   


 


Mono # (Auto)   


 


Eos # (Auto)   


 


Baso # (Auto)   


 


PT   


 


INR   


 


APTT   


 


pO2   


 


VBG pH   


 


VBG pCO2   


 


VBG HCO3   


 


VBG O2 Sat (Calc)   


 


VBG Base Excess   


 


Sodium   


 


Potassium   


 


Chloride   


 


Carbon Dioxide   


 


Anion Gap   


 


BUN   


 


Creatinine   


 


Est GFR ( Amer)   


 


Est GFR (Non-Af Amer)   


 


POC Glucose (mg/dL)  153 H  151 H  133 H


 


Random Glucose   


 


Lactic Acid   


 


Calcium   


 


Magnesium   


 


Total Bilirubin   


 


Direct Bilirubin   


 


AST   


 


ALT   


 


Alkaline Phosphatase   


 


Total Protein   


 


Albumin   


 


Globulin   


 


Albumin/Globulin Ratio   


 


Hepatitis A IgM Ab   


 


Hep Bs Antigen   


 


Hep B Core IgM Ab   


 


Hepatitis C Antibody   














  04/22/18 04/22/18 04/22/18





  21:52 22:14 22:14


 


WBC   23.7 H 


 


RBC   4.57 


 


Hgb   10.7 L 


 


Hct   33.0 L 


 


MCV   72.2 L 


 


MCH   23.4 L 


 


MCHC   32.4 


 


RDW   24.1 H 


 


Plt Count   410 


 


MPV   9.2 


 


Gran %   


 


Lymph % (Auto)   


 


Mono % (Auto)   


 


Eos % (Auto)   


 


Baso % (Auto)   


 


Gran #   


 


Lymph # (Auto)   


 


Mono # (Auto)   


 


Eos # (Auto)   


 


Baso # (Auto)   


 


PT    31.7 H


 


INR    2.72 H


 


APTT   


 


pO2   


 


VBG pH   


 


VBG pCO2   


 


VBG HCO3   


 


VBG O2 Sat (Calc)   


 


VBG Base Excess   


 


Sodium   


 


Potassium   


 


Chloride   


 


Carbon Dioxide   


 


Anion Gap   


 


BUN   


 


Creatinine   


 


Est GFR ( Amer)   


 


Est GFR (Non-Af Amer)   


 


POC Glucose (mg/dL)  130 H  


 


Random Glucose   


 


Lactic Acid   


 


Calcium   


 


Magnesium   


 


Total Bilirubin   


 


Direct Bilirubin   


 


AST   


 


ALT   


 


Alkaline Phosphatase   


 


Total Protein   


 


Albumin   


 


Globulin   


 


Albumin/Globulin Ratio   


 


Hepatitis A IgM Ab   


 


Hep Bs Antigen   


 


Hep B Core IgM Ab   


 


Hepatitis C Antibody   














  04/22/18 04/22/18 04/23/18





  22:14 23:49 05:40


 


WBC    20.9 H


 


RBC    4.43


 


Hgb    10.2 L


 


Hct    32.1 L


 


MCV    72.5 L


 


MCH    23.0 L


 


MCHC    31.8


 


RDW    24.1 H


 


Plt Count    384


 


MPV    9.2


 


Gran %    90.9 H


 


Lymph % (Auto)    4.6 L


 


Mono % (Auto)    4.3


 


Eos % (Auto)    0.1 L


 


Baso % (Auto)    0.1


 


Gran #    19.03 H


 


Lymph # (Auto)    1.0 L


 


Mono # (Auto)    0.9 H


 


Eos # (Auto)    0.0


 


Baso # (Auto)    0.03


 


PT   


 


INR   


 


APTT   


 


pO2   


 


VBG pH   


 


VBG pCO2   


 


VBG HCO3   


 


VBG O2 Sat (Calc)   


 


VBG Base Excess   


 


Sodium  139  


 


Potassium  3.9  


 


Chloride  91 L  


 


Carbon Dioxide  27  


 


Anion Gap  25 H  


 


BUN  66 H  


 


Creatinine  4.8 H  


 


Est GFR ( Amer)  15  


 


Est GFR (Non-Af Amer)  12  


 


POC Glucose (mg/dL)   139 H 


 


Random Glucose  135 H  


 


Lactic Acid   


 


Calcium  8.1 L  


 


Magnesium   


 


Total Bilirubin  2.2 H  


 


Direct Bilirubin   


 


AST  5104 H  


 


ALT  4645 H  


 


Alkaline Phosphatase  142 H D  


 


Total Protein  7.1  


 


Albumin  4.0  


 


Globulin  3.1  


 


Albumin/Globulin Ratio  1.3  


 


Hepatitis A IgM Ab   


 


Hep Bs Antigen   


 


Hep B Core IgM Ab   


 


Hepatitis C Antibody   














  04/23/18 04/23/18 04/23/18





  05:40 05:40 08:30


 


WBC   


 


RBC   


 


Hgb   


 


Hct   


 


MCV   


 


MCH   


 


MCHC   


 


RDW   


 


Plt Count   


 


MPV   


 


Gran %   


 


Lymph % (Auto)   


 


Mono % (Auto)   


 


Eos % (Auto)   


 


Baso % (Auto)   


 


Gran #   


 


Lymph # (Auto)   


 


Mono # (Auto)   


 


Eos # (Auto)   


 


Baso # (Auto)   


 


PT    27.9 H


 


INR    2.38 H


 


APTT    28.4


 


pO2   


 


VBG pH   


 


VBG pCO2   


 


VBG HCO3   


 


VBG O2 Sat (Calc)   


 


VBG Base Excess   


 


Sodium  138  


 


Potassium  3.8  


 


Chloride  91 L  


 


Carbon Dioxide  26  


 


Anion Gap  24 H  


 


BUN  76 H  


 


Creatinine  4.9 H  


 


Est GFR ( Amer)  14  


 


Est GFR (Non-Af Amer)  12  


 


POC Glucose (mg/dL)   


 


Random Glucose  135 H  


 


Lactic Acid   1.7 


 


Calcium  7.9 L  


 


Magnesium  1.9  


 


Total Bilirubin  2.2 H  


 


Direct Bilirubin  1.2 H  


 


AST  3167 H  


 


ALT  3719 H  


 


Alkaline Phosphatase  125  


 


Total Protein  6.9  


 


Albumin  3.7  


 


Globulin  3.1  


 


Albumin/Globulin Ratio  1.2  


 


Hepatitis A IgM Ab   


 


Hep Bs Antigen   


 


Hep B Core IgM Ab   


 


Hepatitis C Antibody   














  04/23/18 04/23/18





  08:30 11:15


 


WBC  


 


RBC  


 


Hgb  


 


Hct  


 


MCV  


 


MCH  


 


MCHC  


 


RDW  


 


Plt Count  


 


MPV  


 


Gran %  


 


Lymph % (Auto)  


 


Mono % (Auto)  


 


Eos % (Auto)  


 


Baso % (Auto)  


 


Gran #  


 


Lymph # (Auto)  


 


Mono # (Auto)  


 


Eos # (Auto)  


 


Baso # (Auto)  


 


PT  


 


INR  


 


APTT  


 


pO2  37 


 


VBG pH  7.34 


 


VBG pCO2  53.0 


 


VBG HCO3  28.6 H 


 


VBG O2 Sat (Calc)  73.2 H 


 


VBG Base Excess  1.8 


 


Sodium  


 


Potassium  


 


Chloride  


 


Carbon Dioxide  


 


Anion Gap  


 


BUN  


 


Creatinine  


 


Est GFR ( Amer)  


 


Est GFR (Non-Af Amer)  


 


POC Glucose (mg/dL)   114 H


 


Random Glucose  


 


Lactic Acid  


 


Calcium  


 


Magnesium  


 


Total Bilirubin  


 


Direct Bilirubin  


 


AST  


 


ALT  


 


Alkaline Phosphatase  


 


Total Protein  


 


Albumin  


 


Globulin  


 


Albumin/Globulin Ratio  


 


Hepatitis A IgM Ab  


 


Hep Bs Antigen  


 


Hep B Core IgM Ab  


 


Hepatitis C Antibody  











EKG/Cardiology Studies: 


Cardiology / EKG Studies





04/23/18 07:00


EKG [ELECTROCARDIOGRAM] DAILY 


   Comment: 


   Reason For Exam: BRADYCARDIA





04/24/18 07:00


EKG [ELECTROCARDIOGRAM] DAILY 


   Comment: 


   Reason For Exam: BRADYCARDIA











Fingerstick Blood Sugar Results: 151





Review of Systems





- EENT


Eyes: absent: Blurred Vision, Change in Vision


Nose/Mouth/Throat: Dry Mouth, Hoarsness, Sore Throat.  absent: Dysphagia





- Cardiovascular


Cardiovascular: absent: Chest Pain, Dyspnea





- Respiratory


Respiratory: absent: Cough, Dyspnea





- Gastrointestinal


Gastrointestinal: absent: Diarrhea, Melena, Nausea, Vomiting





- Genitourinary


Genitourinary: absent: Dysuria





- Neurological


Neurological: absent: Dizziness, Numbness





- Psychiatric


Psychiatric: absent: Anxiety





- Endocrine


Endocrine: UNREMARKABLE





- Hematologic/Lymphatic


Hematologic: UNREMARKABLE





Critical Care Progress Note





- Nutrition


Nutrition: 


 Nutrition











 Category Date Time Status


 


 Liquid Diet [DIET] Diets  04/23/18 Lunch Ordered














Assessment/Plan





- Assessment and Plan (Free Text)


Assessment: 





This is a 67 year old male with a history of CAD s/p LAD stent 1/2018 for which 

patient was placed on plavix, paroxysmal atrial fibrillation originally on 

xarelto for anticoagulation (stopped on 4/17), DM II, chronic back pain, 

diverticulosis, crohn's disease who was recently admitted with possible GI 

bleed and atrial fibrillation with RVR then discharged who presented with 

complaints of dizziness, dark stool, and abdominal pain. Upon being admitted 

vitals revealed a blood pressure of 70/54 and HR of 44. Dip in vitals was 

believed to be due to taking both metoprolol tartrate,cardizem, HCZT/vasartan, 

and lasix. Patient was as a result started on a glucagon drip to reverse the 

effects of the beta blocker. 


Elevated liver enzymes indicating acute liver injury most likely secondary to 

hypoperfusion due to decreased cardiac output due to bradycardia. 





Neuro


-AAOx3





Cardiovascular


-Atrial fibrillation; follow up on rate control with metoprolol if this doesn't 

work patient will be started on cardizem drip and cardizem PO.


-CAD s/p LAD JOSEPH


-Maintain MAP>65


-Maintain normotensive


-Cardiac fxn reveals an ejection fraction of 45%








Pulmonary


-Maintain SpO2 >92%


-Continue with duonebs


-Follow up on CT neck





Gastrointestinal


-NPO


-NAC completed


-Continue to hold anticoagulants


-Monitor stools


-Continue Zosyn 


-Continue mesalamine for Crohn's


-Continue with Protonix


-GI consulted


-Avoid hepatotoxic drugs





Renal


-BHARGAVI likely 2/2 to ischemic ATN from hypoperfusion


-Monitor creatinine, continue with fluids


-Cr currently 4.9. On admission creatinine was 1.6


-Continue to give fluids and monitor electrolytes





Endocrine


-maintain euglycemia and normothermia





Infectious disease


-ID on consult


-Continue with Zosyn





Hematologic


-Hold anticoagulation


-DVT prophyaxis with SCDs





<Arturo Cabezas - Last Filed: 04/23/18 15:18>





CCU Objective





- Vital Signs / Intake & Output


Vital Signs (Last 4 hours): 


Vital Signs











  Pulse BP


 


 04/23/18 13:24  129 H  155/113 H


 


 04/23/18 11:22  111 H 











Intake and Output (Last 8hrs): 


 Intake & Output











 04/23/18 04/23/18 04/23/18





 06:59 14:59 22:59


 


Intake Total  35 


 


Balance  35 


 


Intake:   


 


  IV  35 














- Medications


Active Medications: 


Active Medications











Generic Name Dose Route Start Last Admin





  Trade Name Freq  PRN Reason Stop Dose Admin


 


Albuterol/Ipratropium  3 ml  04/21/18 20:45  04/23/18 11:25





  Duoneb 3 Mg/0.5 Mg (3 Ml) Ud  IH   3 ml





  [id number] HUMBERTO   Administration


 


Budesonide  0.5 mg  04/21/18 20:00  04/23/18 07:30





  Pulmicort Respules  IH   0.5 mg





  [id number] HUMBERTO   Administration


 


Escitalopram Oxalate  5 mg  04/21/18 10:00  04/23/18 10:08





  Lexapro  PO   5 mg





  DAILY HUMBERTO   Administration


 


Folic Acid  1 mg  04/21/18 10:00  04/23/18 10:08





  Folic Acid  PO   1 mg





  DAILY HUMBERTO   Administration


 


Piperacillin Sod/Tazobactam Sod  2.25 gm in 100 mls @ 100 mls/hr  04/22/18 12:

00  04/23/18 12:34





  Zosyn 2.25 Gm In 0.9% 100 Ml  IVPB  05/01/18 12:01  100 mls/hr





  Q6 HUMBERTO   Administration





  Protocol   


 


diltiaZEM IVPB 100mg in NS  100 mls @ 5 mls/hr  04/23/18 12:46  04/23/18 14:15





  Cardizem 100mg In Ns  IV   15 mg/hr





  .Q20H PRN   15 mls/hr





  TITRATE PER MD ORDER   Titration





  Protocol   





  5 MG/HR   


 


Lidocaine  1 ea  04/22/18 14:00  04/23/18 10:08





  Lidoderm  TD   1 ea





  DAILY HUMBERTO   Administration


 


Methylprednisolone  20 mg  04/23/18 13:17  04/23/18 13:27





  Solu-Medrol  IVP   20 mg





  Q8 HUMBERTO   Administration


 


Metoprolol Tartrate  5 mg  04/23/18 08:30  04/23/18 10:06





  Lopressor  IVP   5 mg





  Q6H HUMBERTO   Administration


 


Morphine Sulfate  1 mg  04/22/18 13:58  04/23/18 13:39





  Morphine  IVP   1 mg





  Q6 PRN   Administration





  PAIN, MODERATE [4-6]   


 


Oxymetazoline HCl  0 ml  04/22/18 22:00  04/23/18 10:16





  Afrin 0.05%  NS   2 spr





  Q12H HUMBERTO   Administration


 


Pantoprazole Sodium  40 mg  04/21/18 10:00  04/23/18 10:09





  Protonix Inj  IVP   40 mg





  Q12 HUMBERTO   Administration














- Patient Studies


Lab Studies: 


 Microbiology Studies











 04/21/18 03:20 Blood Culture - Preliminary





 Blood    NO GROWTH AFTER 48 HOURS


 


 04/21/18 02:45 Blood Culture - Preliminary





 Blood    NO GROWTH AFTER 48 HOURS








 Lab Studies











  04/23/18 04/23/18 04/23/18 Range/Units





  11:15 08:30 08:30 


 


WBC     (4.5-11.0)  10^3/ul


 


RBC     (3.5-6.1)  10^6/uL


 


Hgb     (14.0-18.0)  g/dL


 


Hct     (42.0-52.0)  %


 


MCV     (80.0-105.0)  fl


 


MCH     (25.0-35.0)  pg


 


MCHC     (31.0-37.0)  g/dl


 


RDW     (11.5-14.5)  %


 


Plt Count     (120.0-450.0)  10^3/uL


 


MPV     (7.0-11.0)  fl


 


Gran %     (50.0-68.0)  %


 


Lymph % (Auto)     (22.0-35.0)  %


 


Mono % (Auto)     (1.0-6.0)  %


 


Eos % (Auto)     (1.5-5.0)  %


 


Baso % (Auto)     (0.0-3.0)  %


 


Gran #     (1.4-6.5)  


 


Lymph # (Auto)     (1.2-3.4)  


 


Mono # (Auto)     (0.1-0.6)  


 


Eos # (Auto)     (0.0-0.7)  


 


Baso # (Auto)     (0.0-2.0)  K/mm3


 


PT    27.9 H  (9.4-12.5)  SECONDS


 


INR    2.38 H  (0.93-1.08)  


 


APTT    28.4  (25.1-36.5)  Seconds


 


pO2   37   (30-55)  mm/Hg


 


VBG pH   7.34   (7.32-7.43)  


 


VBG pCO2   53.0   (40-60)  


 


VBG HCO3   28.6 H   (21-28)  mmol/l


 


VBG O2 Sat (Calc)   73.2 H   (40-65)  %


 


VBG Base Excess   1.8   (0.0-2.0)  mmol/L


 


Sodium     (132-148)  mmol/L


 


Potassium     (3.6-5.0)  mmol/L


 


Chloride     ()  mmol/L


 


Carbon Dioxide     (21-33)  mmol/L


 


Anion Gap     (10-20)  


 


BUN     (7-21)  mg/dL


 


Creatinine     (0.8-1.5)  mg/dl


 


Est GFR (African Amer)     


 


Est GFR (Non-Af Amer)     


 


POC Glucose (mg/dL)  114 H    ()  mg/dL


 


Random Glucose     ()  mg/dL


 


Lactic Acid     (0.7-2.1)  mmol/L


 


Calcium     (8.4-10.5)  mg/dL


 


Magnesium     (1.7-2.2)  mg/dL


 


Total Bilirubin     (0.2-1.3)  mg/dL


 


Direct Bilirubin     (0.0-0.4)  mg/dL


 


AST     (17-59)  U/L


 


ALT     (7-56)  U/L


 


Alkaline Phosphatase     ()  U/L


 


Total Protein     (5.8-8.3)  g/dL


 


Albumin     (3.0-4.8)  g/dL


 


Globulin     gm/dL


 


Albumin/Globulin Ratio     (1.1-1.8)  


 


PTH Intact Whole Molec     (14-64)  pg/mL


 


Hepatitis A IgM Ab     (NEGATIVE)  


 


Hep Bs Antigen     (NEGATIVE)  


 


Hep B Core IgM Ab     (NEGATIVE)  


 


Hepatitis C Antibody     (NEGATIVE)  














  04/23/18 04/23/18 04/23/18 Range/Units





  05:40 05:40 05:40 


 


WBC    20.9 H  (4.5-11.0)  10^3/ul


 


RBC    4.43  (3.5-6.1)  10^6/uL


 


Hgb    10.2 L  (14.0-18.0)  g/dL


 


Hct    32.1 L  (42.0-52.0)  %


 


MCV    72.5 L  (80.0-105.0)  fl


 


MCH    23.0 L  (25.0-35.0)  pg


 


MCHC    31.8  (31.0-37.0)  g/dl


 


RDW    24.1 H  (11.5-14.5)  %


 


Plt Count    384  (120.0-450.0)  10^3/uL


 


MPV    9.2  (7.0-11.0)  fl


 


Gran %    90.9 H  (50.0-68.0)  %


 


Lymph % (Auto)    4.6 L  (22.0-35.0)  %


 


Mono % (Auto)    4.3  (1.0-6.0)  %


 


Eos % (Auto)    0.1 L  (1.5-5.0)  %


 


Baso % (Auto)    0.1  (0.0-3.0)  %


 


Gran #    19.03 H  (1.4-6.5)  


 


Lymph # (Auto)    1.0 L  (1.2-3.4)  


 


Mono # (Auto)    0.9 H  (0.1-0.6)  


 


Eos # (Auto)    0.0  (0.0-0.7)  


 


Baso # (Auto)    0.03  (0.0-2.0)  K/mm3


 


PT     (9.4-12.5)  SECONDS


 


INR     (0.93-1.08)  


 


APTT     (25.1-36.5)  Seconds


 


pO2     (30-55)  mm/Hg


 


VBG pH     (7.32-7.43)  


 


VBG pCO2     (40-60)  


 


VBG HCO3     (21-28)  mmol/l


 


VBG O2 Sat (Calc)     (40-65)  %


 


VBG Base Excess     (0.0-2.0)  mmol/L


 


Sodium   138   (132-148)  mmol/L


 


Potassium   3.8   (3.6-5.0)  mmol/L


 


Chloride   91 L   ()  mmol/L


 


Carbon Dioxide   26   (21-33)  mmol/L


 


Anion Gap   24 H   (10-20)  


 


BUN   76 H   (7-21)  mg/dL


 


Creatinine   4.9 H   (0.8-1.5)  mg/dl


 


Est GFR ( Amer)   14   


 


Est GFR (Non-Af Amer)   12   


 


POC Glucose (mg/dL)     ()  mg/dL


 


Random Glucose   135 H   ()  mg/dL


 


Lactic Acid  1.7    (0.7-2.1)  mmol/L


 


Calcium   7.9 L   (8.4-10.5)  mg/dL


 


Magnesium   1.9   (1.7-2.2)  mg/dL


 


Total Bilirubin   2.2 H   (0.2-1.3)  mg/dL


 


Direct Bilirubin   1.2 H   (0.0-0.4)  mg/dL


 


AST   3167 H   (17-59)  U/L


 


ALT   3719 H   (7-56)  U/L


 


Alkaline Phosphatase   125   ()  U/L


 


Total Protein   6.9   (5.8-8.3)  g/dL


 


Albumin   3.7   (3.0-4.8)  g/dL


 


Globulin   3.1   gm/dL


 


Albumin/Globulin Ratio   1.2   (1.1-1.8)  


 


PTH Intact Whole Molec     (14-64)  pg/mL


 


Hepatitis A IgM Ab     (NEGATIVE)  


 


Hep Bs Antigen     (NEGATIVE)  


 


Hep B Core IgM Ab     (NEGATIVE)  


 


Hepatitis C Antibody     (NEGATIVE)  














  04/22/18 04/22/18 04/22/18 Range/Units





  23:49 22:14 22:14 


 


WBC     (4.5-11.0)  10^3/ul


 


RBC     (3.5-6.1)  10^6/uL


 


Hgb     (14.0-18.0)  g/dL


 


Hct     (42.0-52.0)  %


 


MCV     (80.0-105.0)  fl


 


MCH     (25.0-35.0)  pg


 


MCHC     (31.0-37.0)  g/dl


 


RDW     (11.5-14.5)  %


 


Plt Count     (120.0-450.0)  10^3/uL


 


MPV     (7.0-11.0)  fl


 


Gran %     (50.0-68.0)  %


 


Lymph % (Auto)     (22.0-35.0)  %


 


Mono % (Auto)     (1.0-6.0)  %


 


Eos % (Auto)     (1.5-5.0)  %


 


Baso % (Auto)     (0.0-3.0)  %


 


Gran #     (1.4-6.5)  


 


Lymph # (Auto)     (1.2-3.4)  


 


Mono # (Auto)     (0.1-0.6)  


 


Eos # (Auto)     (0.0-0.7)  


 


Baso # (Auto)     (0.0-2.0)  K/mm3


 


PT    31.7 H  (9.4-12.5)  SECONDS


 


INR    2.72 H  (0.93-1.08)  


 


APTT     (25.1-36.5)  Seconds


 


pO2     (30-55)  mm/Hg


 


VBG pH     (7.32-7.43)  


 


VBG pCO2     (40-60)  


 


VBG HCO3     (21-28)  mmol/l


 


VBG O2 Sat (Calc)     (40-65)  %


 


VBG Base Excess     (0.0-2.0)  mmol/L


 


Sodium   139   (132-148)  mmol/L


 


Potassium   3.9   (3.6-5.0)  mmol/L


 


Chloride   91 L   ()  mmol/L


 


Carbon Dioxide   27   (21-33)  mmol/L


 


Anion Gap   25 H   (10-20)  


 


BUN   66 H   (7-21)  mg/dL


 


Creatinine   4.8 H   (0.8-1.5)  mg/dl


 


Est GFR (African Amer)   15   


 


Est GFR (Non-Af Amer)   12   


 


POC Glucose (mg/dL)  139 H    ()  mg/dL


 


Random Glucose   135 H   ()  mg/dL


 


Lactic Acid     (0.7-2.1)  mmol/L


 


Calcium   8.1 L   (8.4-10.5)  mg/dL


 


Magnesium     (1.7-2.2)  mg/dL


 


Total Bilirubin   2.2 H   (0.2-1.3)  mg/dL


 


Direct Bilirubin     (0.0-0.4)  mg/dL


 


AST   5104 H   (17-59)  U/L


 


ALT   4645 H   (7-56)  U/L


 


Alkaline Phosphatase   142 H D   ()  U/L


 


Total Protein   7.1   (5.8-8.3)  g/dL


 


Albumin   4.0   (3.0-4.8)  g/dL


 


Globulin   3.1   gm/dL


 


Albumin/Globulin Ratio   1.3   (1.1-1.8)  


 


PTH Intact Whole Molec     (14-64)  pg/mL


 


Hepatitis A IgM Ab     (NEGATIVE)  


 


Hep Bs Antigen     (NEGATIVE)  


 


Hep B Core IgM Ab     (NEGATIVE)  


 


Hepatitis C Antibody     (NEGATIVE)  














  04/22/18 04/22/18 04/22/18 Range/Units





  22:14 21:52 19:35 


 


WBC  23.7 H    (4.5-11.0)  10^3/ul


 


RBC  4.57    (3.5-6.1)  10^6/uL


 


Hgb  10.7 L    (14.0-18.0)  g/dL


 


Hct  33.0 L    (42.0-52.0)  %


 


MCV  72.2 L    (80.0-105.0)  fl


 


MCH  23.4 L    (25.0-35.0)  pg


 


MCHC  32.4    (31.0-37.0)  g/dl


 


RDW  24.1 H    (11.5-14.5)  %


 


Plt Count  410    (120.0-450.0)  10^3/uL


 


MPV  9.2    (7.0-11.0)  fl


 


Gran %     (50.0-68.0)  %


 


Lymph % (Auto)     (22.0-35.0)  %


 


Mono % (Auto)     (1.0-6.0)  %


 


Eos % (Auto)     (1.5-5.0)  %


 


Baso % (Auto)     (0.0-3.0)  %


 


Gran #     (1.4-6.5)  


 


Lymph # (Auto)     (1.2-3.4)  


 


Mono # (Auto)     (0.1-0.6)  


 


Eos # (Auto)     (0.0-0.7)  


 


Baso # (Auto)     (0.0-2.0)  K/mm3


 


PT     (9.4-12.5)  SECONDS


 


INR     (0.93-1.08)  


 


APTT     (25.1-36.5)  Seconds


 


pO2     (30-55)  mm/Hg


 


VBG pH     (7.32-7.43)  


 


VBG pCO2     (40-60)  


 


VBG HCO3     (21-28)  mmol/l


 


VBG O2 Sat (Calc)     (40-65)  %


 


VBG Base Excess     (0.0-2.0)  mmol/L


 


Sodium     (132-148)  mmol/L


 


Potassium     (3.6-5.0)  mmol/L


 


Chloride     ()  mmol/L


 


Carbon Dioxide     (21-33)  mmol/L


 


Anion Gap     (10-20)  


 


BUN     (7-21)  mg/dL


 


Creatinine     (0.8-1.5)  mg/dl


 


Est GFR (African Amer)     


 


Est GFR (Non-Af Amer)     


 


POC Glucose (mg/dL)   130 H  133 H  ()  mg/dL


 


Random Glucose     ()  mg/dL


 


Lactic Acid     (0.7-2.1)  mmol/L


 


Calcium     (8.4-10.5)  mg/dL


 


Magnesium     (1.7-2.2)  mg/dL


 


Total Bilirubin     (0.2-1.3)  mg/dL


 


Direct Bilirubin     (0.0-0.4)  mg/dL


 


AST     (17-59)  U/L


 


ALT     (7-56)  U/L


 


Alkaline Phosphatase     ()  U/L


 


Total Protein     (5.8-8.3)  g/dL


 


Albumin     (3.0-4.8)  g/dL


 


Globulin     gm/dL


 


Albumin/Globulin Ratio     (1.1-1.8)  


 


PTH Intact Whole Molec     (14-64)  pg/mL


 


Hepatitis A IgM Ab     (NEGATIVE)  


 


Hep Bs Antigen     (NEGATIVE)  


 


Hep B Core IgM Ab     (NEGATIVE)  


 


Hepatitis C Antibody     (NEGATIVE)  














  04/22/18 04/22/18 04/22/18 Range/Units





  17:54 16:01 13:55 


 


WBC     (4.5-11.0)  10^3/ul


 


RBC     (3.5-6.1)  10^6/uL


 


Hgb     (14.0-18.0)  g/dL


 


Hct     (42.0-52.0)  %


 


MCV     (80.0-105.0)  fl


 


MCH     (25.0-35.0)  pg


 


MCHC     (31.0-37.0)  g/dl


 


RDW     (11.5-14.5)  %


 


Plt Count     (120.0-450.0)  10^3/uL


 


MPV     (7.0-11.0)  fl


 


Gran %     (50.0-68.0)  %


 


Lymph % (Auto)     (22.0-35.0)  %


 


Mono % (Auto)     (1.0-6.0)  %


 


Eos % (Auto)     (1.5-5.0)  %


 


Baso % (Auto)     (0.0-3.0)  %


 


Gran #     (1.4-6.5)  


 


Lymph # (Auto)     (1.2-3.4)  


 


Mono # (Auto)     (0.1-0.6)  


 


Eos # (Auto)     (0.0-0.7)  


 


Baso # (Auto)     (0.0-2.0)  K/mm3


 


PT     (9.4-12.5)  SECONDS


 


INR     (0.93-1.08)  


 


APTT     (25.1-36.5)  Seconds


 


pO2     (30-55)  mm/Hg


 


VBG pH     (7.32-7.43)  


 


VBG pCO2     (40-60)  


 


VBG HCO3     (21-28)  mmol/l


 


VBG O2 Sat (Calc)     (40-65)  %


 


VBG Base Excess     (0.0-2.0)  mmol/L


 


Sodium     (132-148)  mmol/L


 


Potassium     (3.6-5.0)  mmol/L


 


Chloride     ()  mmol/L


 


Carbon Dioxide     (21-33)  mmol/L


 


Anion Gap     (10-20)  


 


BUN     (7-21)  mg/dL


 


Creatinine     (0.8-1.5)  mg/dl


 


Est GFR (African Amer)     


 


Est GFR (Non-Af Amer)     


 


POC Glucose (mg/dL)  151 H  153 H  149 H  ()  mg/dL


 


Random Glucose     ()  mg/dL


 


Lactic Acid     (0.7-2.1)  mmol/L


 


Calcium     (8.4-10.5)  mg/dL


 


Magnesium     (1.7-2.2)  mg/dL


 


Total Bilirubin     (0.2-1.3)  mg/dL


 


Direct Bilirubin     (0.0-0.4)  mg/dL


 


AST     (17-59)  U/L


 


ALT     (7-56)  U/L


 


Alkaline Phosphatase     ()  U/L


 


Total Protein     (5.8-8.3)  g/dL


 


Albumin     (3.0-4.8)  g/dL


 


Globulin     gm/dL


 


Albumin/Globulin Ratio     (1.1-1.8)  


 


PTH Intact Whole Molec     (14-64)  pg/mL


 


Hepatitis A IgM Ab     (NEGATIVE)  


 


Hep Bs Antigen     (NEGATIVE)  


 


Hep B Core IgM Ab     (NEGATIVE)  


 


Hepatitis C Antibody     (NEGATIVE)  














  04/21/18 04/21/18 Range/Units





  05:00 05:00 


 


WBC    (4.5-11.0)  10^3/ul


 


RBC    (3.5-6.1)  10^6/uL


 


Hgb    (14.0-18.0)  g/dL


 


Hct    (42.0-52.0)  %


 


MCV    (80.0-105.0)  fl


 


MCH    (25.0-35.0)  pg


 


MCHC    (31.0-37.0)  g/dl


 


RDW    (11.5-14.5)  %


 


Plt Count    (120.0-450.0)  10^3/uL


 


MPV    (7.0-11.0)  fl


 


Gran %    (50.0-68.0)  %


 


Lymph % (Auto)    (22.0-35.0)  %


 


Mono % (Auto)    (1.0-6.0)  %


 


Eos % (Auto)    (1.5-5.0)  %


 


Baso % (Auto)    (0.0-3.0)  %


 


Gran #    (1.4-6.5)  


 


Lymph # (Auto)    (1.2-3.4)  


 


Mono # (Auto)    (0.1-0.6)  


 


Eos # (Auto)    (0.0-0.7)  


 


Baso # (Auto)    (0.0-2.0)  K/mm3


 


PT    (9.4-12.5)  SECONDS


 


INR    (0.93-1.08)  


 


APTT    (25.1-36.5)  Seconds


 


pO2    (30-55)  mm/Hg


 


VBG pH    (7.32-7.43)  


 


VBG pCO2    (40-60)  


 


VBG HCO3    (21-28)  mmol/l


 


VBG O2 Sat (Calc)    (40-65)  %


 


VBG Base Excess    (0.0-2.0)  mmol/L


 


Sodium    (132-148)  mmol/L


 


Potassium    (3.6-5.0)  mmol/L


 


Chloride    ()  mmol/L


 


Carbon Dioxide    (21-33)  mmol/L


 


Anion Gap    (10-20)  


 


BUN    (7-21)  mg/dL


 


Creatinine    (0.8-1.5)  mg/dl


 


Est GFR (African Amer)    


 


Est GFR (Non-Af Amer)    


 


POC Glucose (mg/dL)    ()  mg/dL


 


Random Glucose    ()  mg/dL


 


Lactic Acid    (0.7-2.1)  mmol/L


 


Calcium    (8.4-10.5)  mg/dL


 


Magnesium    (1.7-2.2)  mg/dL


 


Total Bilirubin    (0.2-1.3)  mg/dL


 


Direct Bilirubin    (0.0-0.4)  mg/dL


 


AST    (17-59)  U/L


 


ALT    (7-56)  U/L


 


Alkaline Phosphatase    ()  U/L


 


Total Protein    (5.8-8.3)  g/dL


 


Albumin    (3.0-4.8)  g/dL


 


Globulin    gm/dL


 


Albumin/Globulin Ratio    (1.1-1.8)  


 


PTH Intact Whole Molec   237 H  (14-64)  pg/mL


 


Hepatitis A IgM Ab  Negative   (NEGATIVE)  


 


Hep Bs Antigen  Negative   (NEGATIVE)  


 


Hep B Core IgM Ab  Negative   (NEGATIVE)  


 


Hepatitis C Antibody  Reactive   (NEGATIVE)  








 Laboratory Results - last 24 hr











  04/21/18 04/21/18 04/22/18





  05:00 05:00 13:55


 


WBC   


 


RBC   


 


Hgb   


 


Hct   


 


MCV   


 


MCH   


 


MCHC   


 


RDW   


 


Plt Count   


 


MPV   


 


Gran %   


 


Lymph % (Auto)   


 


Mono % (Auto)   


 


Eos % (Auto)   


 


Baso % (Auto)   


 


Gran #   


 


Lymph # (Auto)   


 


Mono # (Auto)   


 


Eos # (Auto)   


 


Baso # (Auto)   


 


PT   


 


INR   


 


APTT   


 


pO2   


 


VBG pH   


 


VBG pCO2   


 


VBG HCO3   


 


VBG O2 Sat (Calc)   


 


VBG Base Excess   


 


Sodium   


 


Potassium   


 


Chloride   


 


Carbon Dioxide   


 


Anion Gap   


 


BUN   


 


Creatinine   


 


Est GFR ( Amer)   


 


Est GFR (Non-Af Amer)   


 


POC Glucose (mg/dL)    149 H


 


Random Glucose   


 


Lactic Acid   


 


Calcium   


 


Magnesium   


 


Total Bilirubin   


 


Direct Bilirubin   


 


AST   


 


ALT   


 


Alkaline Phosphatase   


 


Total Protein   


 


Albumin   


 


Globulin   


 


Albumin/Globulin Ratio   


 


PTH Intact Whole Molec  237 H  


 


Hepatitis A IgM Ab   Negative 


 


Hep Bs Antigen   Negative 


 


Hep B Core IgM Ab   Negative 


 


Hepatitis C Antibody   Reactive 














  04/22/18 04/22/18 04/22/18





  16:01 17:54 19:35


 


WBC   


 


RBC   


 


Hgb   


 


Hct   


 


MCV   


 


MCH   


 


MCHC   


 


RDW   


 


Plt Count   


 


MPV   


 


Gran %   


 


Lymph % (Auto)   


 


Mono % (Auto)   


 


Eos % (Auto)   


 


Baso % (Auto)   


 


Gran #   


 


Lymph # (Auto)   


 


Mono # (Auto)   


 


Eos # (Auto)   


 


Baso # (Auto)   


 


PT   


 


INR   


 


APTT   


 


pO2   


 


VBG pH   


 


VBG pCO2   


 


VBG HCO3   


 


VBG O2 Sat (Calc)   


 


VBG Base Excess   


 


Sodium   


 


Potassium   


 


Chloride   


 


Carbon Dioxide   


 


Anion Gap   


 


BUN   


 


Creatinine   


 


Est GFR ( Amer)   


 


Est GFR (Non-Af Amer)   


 


POC Glucose (mg/dL)  153 H  151 H  133 H


 


Random Glucose   


 


Lactic Acid   


 


Calcium   


 


Magnesium   


 


Total Bilirubin   


 


Direct Bilirubin   


 


AST   


 


ALT   


 


Alkaline Phosphatase   


 


Total Protein   


 


Albumin   


 


Globulin   


 


Albumin/Globulin Ratio   


 


PTH Intact Whole Molec   


 


Hepatitis A IgM Ab   


 


Hep Bs Antigen   


 


Hep B Core IgM Ab   


 


Hepatitis C Antibody   














  04/22/18 04/22/18 04/22/18





  21:52 22:14 22:14


 


WBC   23.7 H 


 


RBC   4.57 


 


Hgb   10.7 L 


 


Hct   33.0 L 


 


MCV   72.2 L 


 


MCH   23.4 L 


 


MCHC   32.4 


 


RDW   24.1 H 


 


Plt Count   410 


 


MPV   9.2 


 


Gran %   


 


Lymph % (Auto)   


 


Mono % (Auto)   


 


Eos % (Auto)   


 


Baso % (Auto)   


 


Gran #   


 


Lymph # (Auto)   


 


Mono # (Auto)   


 


Eos # (Auto)   


 


Baso # (Auto)   


 


PT    31.7 H


 


INR    2.72 H


 


APTT   


 


pO2   


 


VBG pH   


 


VBG pCO2   


 


VBG HCO3   


 


VBG O2 Sat (Calc)   


 


VBG Base Excess   


 


Sodium   


 


Potassium   


 


Chloride   


 


Carbon Dioxide   


 


Anion Gap   


 


BUN   


 


Creatinine   


 


Est GFR ( Amer)   


 


Est GFR (Non-Af Amer)   


 


POC Glucose (mg/dL)  130 H  


 


Random Glucose   


 


Lactic Acid   


 


Calcium   


 


Magnesium   


 


Total Bilirubin   


 


Direct Bilirubin   


 


AST   


 


ALT   


 


Alkaline Phosphatase   


 


Total Protein   


 


Albumin   


 


Globulin   


 


Albumin/Globulin Ratio   


 


PTH Intact Whole Molec   


 


Hepatitis A IgM Ab   


 


Hep Bs Antigen   


 


Hep B Core IgM Ab   


 


Hepatitis C Antibody   














  04/22/18 04/22/18 04/23/18





  22:14 23:49 05:40


 


WBC    20.9 H


 


RBC    4.43


 


Hgb    10.2 L


 


Hct    32.1 L


 


MCV    72.5 L


 


MCH    23.0 L


 


MCHC    31.8


 


RDW    24.1 H


 


Plt Count    384


 


MPV    9.2


 


Gran %    90.9 H


 


Lymph % (Auto)    4.6 L


 


Mono % (Auto)    4.3


 


Eos % (Auto)    0.1 L


 


Baso % (Auto)    0.1


 


Gran #    19.03 H


 


Lymph # (Auto)    1.0 L


 


Mono # (Auto)    0.9 H


 


Eos # (Auto)    0.0


 


Baso # (Auto)    0.03


 


PT   


 


INR   


 


APTT   


 


pO2   


 


VBG pH   


 


VBG pCO2   


 


VBG HCO3   


 


VBG O2 Sat (Calc)   


 


VBG Base Excess   


 


Sodium  139  


 


Potassium  3.9  


 


Chloride  91 L  


 


Carbon Dioxide  27  


 


Anion Gap  25 H  


 


BUN  66 H  


 


Creatinine  4.8 H  


 


Est GFR ( Amer)  15  


 


Est GFR (Non-Af Amer)  12  


 


POC Glucose (mg/dL)   139 H 


 


Random Glucose  135 H  


 


Lactic Acid   


 


Calcium  8.1 L  


 


Magnesium   


 


Total Bilirubin  2.2 H  


 


Direct Bilirubin   


 


AST  5104 H  


 


ALT  4645 H  


 


Alkaline Phosphatase  142 H D  


 


Total Protein  7.1  


 


Albumin  4.0  


 


Globulin  3.1  


 


Albumin/Globulin Ratio  1.3  


 


PTH Intact Whole Molec   


 


Hepatitis A IgM Ab   


 


Hep Bs Antigen   


 


Hep B Core IgM Ab   


 


Hepatitis C Antibody   














  04/23/18 04/23/18 04/23/18





  05:40 05:40 08:30


 


WBC   


 


RBC   


 


Hgb   


 


Hct   


 


MCV   


 


MCH   


 


MCHC   


 


RDW   


 


Plt Count   


 


MPV   


 


Gran %   


 


Lymph % (Auto)   


 


Mono % (Auto)   


 


Eos % (Auto)   


 


Baso % (Auto)   


 


Gran #   


 


Lymph # (Auto)   


 


Mono # (Auto)   


 


Eos # (Auto)   


 


Baso # (Auto)   


 


PT    27.9 H


 


INR    2.38 H


 


APTT    28.4


 


pO2   


 


VBG pH   


 


VBG pCO2   


 


VBG HCO3   


 


VBG O2 Sat (Calc)   


 


VBG Base Excess   


 


Sodium  138  


 


Potassium  3.8  


 


Chloride  91 L  


 


Carbon Dioxide  26  


 


Anion Gap  24 H  


 


BUN  76 H  


 


Creatinine  4.9 H  


 


Est GFR ( Amer)  14  


 


Est GFR (Non-Af Amer)  12  


 


POC Glucose (mg/dL)   


 


Random Glucose  135 H  


 


Lactic Acid   1.7 


 


Calcium  7.9 L  


 


Magnesium  1.9  


 


Total Bilirubin  2.2 H  


 


Direct Bilirubin  1.2 H  


 


AST  3167 H  


 


ALT  3719 H  


 


Alkaline Phosphatase  125  


 


Total Protein  6.9  


 


Albumin  3.7  


 


Globulin  3.1  


 


Albumin/Globulin Ratio  1.2  


 


PTH Intact Whole Molec   


 


Hepatitis A IgM Ab   


 


Hep Bs Antigen   


 


Hep B Core IgM Ab   


 


Hepatitis C Antibody   














  04/23/18 04/23/18





  08:30 11:15


 


WBC  


 


RBC  


 


Hgb  


 


Hct  


 


MCV  


 


MCH  


 


MCHC  


 


RDW  


 


Plt Count  


 


MPV  


 


Gran %  


 


Lymph % (Auto)  


 


Mono % (Auto)  


 


Eos % (Auto)  


 


Baso % (Auto)  


 


Gran #  


 


Lymph # (Auto)  


 


Mono # (Auto)  


 


Eos # (Auto)  


 


Baso # (Auto)  


 


PT  


 


INR  


 


APTT  


 


pO2  37 


 


VBG pH  7.34 


 


VBG pCO2  53.0 


 


VBG HCO3  28.6 H 


 


VBG O2 Sat (Calc)  73.2 H 


 


VBG Base Excess  1.8 


 


Sodium  


 


Potassium  


 


Chloride  


 


Carbon Dioxide  


 


Anion Gap  


 


BUN  


 


Creatinine  


 


Est GFR ( Amer)  


 


Est GFR (Non-Af Amer)  


 


POC Glucose (mg/dL)   114 H


 


Random Glucose  


 


Lactic Acid  


 


Calcium  


 


Magnesium  


 


Total Bilirubin  


 


Direct Bilirubin  


 


AST  


 


ALT  


 


Alkaline Phosphatase  


 


Total Protein  


 


Albumin  


 


Globulin  


 


Albumin/Globulin Ratio  


 


PTH Intact Whole Molec  


 


Hepatitis A IgM Ab  


 


Hep Bs Antigen  


 


Hep B Core IgM Ab  


 


Hepatitis C Antibody  











EKG/Cardiology Studies: 


Cardiology / EKG Studies





04/23/18 07:00


EKG [ELECTROCARDIOGRAM] DAILY 


   Comment: 


   Reason For Exam: BRADYCARDIA





04/24/18 07:00


EKG [ELECTROCARDIOGRAM] DAILY 


   Comment: 


   Reason For Exam: BRADYCARDIA














Critical Care Progress Note





- Nutrition


Nutrition: 


 Nutrition











 Category Date Time Status


 


 Liquid Diet [DIET] Diets  04/23/18 Lunch Ordered














Attending/Attestation





- Attestation


I have personally seen and examined this patient.: Yes


I have fully participated in the care of the patient.: Yes


I have reviewed all pertinent clinical information: Yes


Notes (Text): 





04/23/18 15:18


please see Dr. Cabezas note

## 2018-04-23 NOTE — RAD
HISTORY:

CHF PE



COMPARISON:

04/21/2018. 



FINDINGS:



LUNGS:

No active pulmonary disease.



PLEURA:

No significant pleural effusion identified, no pneumothorax apparent.



CARDIOVASCULAR:

Cardiomegaly.  No evidence of acute, significant cardiovascular 

disease. 



OSSEOUS STRUCTURES:

No significant abnormalities.



VISUALIZED UPPER ABDOMEN:

Normal.



OTHER FINDINGS:

Removal of support apparatus since the prior study: Nasogastric tube.



IMPRESSION:

No active disease.

## 2018-04-23 NOTE — CT
PROCEDURE:  CT NECK WITHOUT  CONTRAST



HISTORY:

difficulty swallowing, muffled voice



COMPARISON:

None.



TECHNIQUE:

CT of the neck without intravenous contrast. Coronal and sagittal 

reformats generated. 



Radiation dose:  mGy-cm



This CT exam was performed using one or more of the following dose 

reduction techniques: Automated exposure control, adjustment of the 

mA and/or kV according to patient size, and/or use of iterative 

reconstruction technique.



FINDINGS:



NASOPHARYNX:

Unremarkable.



SUPRAHYOID NECK:

There is asymmetrical retropharyngeal swelling left greater than 

right which begins in the oropharynx and extends to the supraglottic 

larynx. The findings could be infectious or inflammatory in etiology. 

 A neoplasm cannot be excluded. There is no evidence of a 

retropharyngeal abscess 



INFRAHYOID NECK:

As above. The vocal cords are unremarkable



MASS:

None.



GLANDS:

Parotid and submandibular glands unremarkable. Normal size thyroid 

gland, without nodule.



LYMPH NODES:

Normal. No lymphadenopathy.



CERVICAL SPINE:

No fracture or focal lesion. 



OTHER FINDINGS:

None.



IMPRESSION:

There is asymmetrical retropharyngeal swelling left greater than 

right which begins in the oropharynx and extends to the supraglottic 

larynx. The findings could be infectious or inflammatory in etiology. 

 A neoplasm cannot be excluded.

## 2018-04-23 NOTE — PN
DATE:  04/23/2018



SUBJECTIVE:  The patient is sitting in chair, comfortable.  He is breathing

comfortably.  He denies dyspnea at rest.  He denies rectal bleeding,

nausea, vomiting, abdominal pain.



PHYSICAL EXAMINATION

VITAL SIGNS:  Reveal temperature of 97.7, blood pressure 128/85, heart rate

of 111.

HEENT:  Revealed sclerae to be white.  Conjunctivae pink.

NECK:  Supple.

CHEST:  Reveals distant breath sounds.

HEART:  Reveals an irregularly irregular rate.

ABDOMEN:  Protuberant, soft, nontender.

EXTREMITIES:  Show trace pedal edema.



LABORATORY DATA:  Revealed white blood cell count 20.9, hemoglobin 10.2,

platelet count of 384,000.  Chemistries reveal total bilirubin of 2.2, AST

down to 3167 from over 9000, ALT down to 3719 from 5000, total bilirubin of

2.2, alkaline phosphatase of 125.  Hepatitis serology is negative.



IMPRESSION:

1.  Passive congestion of the liver due to a low output cardiac state from

severe bradycardia, now with probable acute tubular necrosis and acute

renal failure with oliguria.

2.  Coronary artery disease.

3.  Atrial fibrillation.

4.  Systemic inflammatory response syndrome, possible sepsis.



RECOMMENDATIONS:

1.  Continue to follow serial hematocrits.

2.  Follow urine output.

3.  Follow liver enzymes.

4.  Continue IV Zosyn.

5.  We will hold anticoagulants or antiplatelet agents given the high risk

of GI bleeding.





__________________________________________

Julio César Madrid MD



DD:  04/23/2018 12:11:26

DT:  04/23/2018 12:13:56

Job # [id number]

## 2018-04-23 NOTE — PN
DATE:  04/23/2018



SUBJECTIVE:  The patient is seen and examined at bedside.  He appears to be

comfortable, sitting in the chair.  However, he is somewhat somnolent which

he attributes to poor nighttime sleep. He is in paroxysmal AFib with heart rate

varying between 100 and 120.  He is also complaining on some difficulty

swallowing and some muffled voice.  He however not in respiratory distress

and does not appear to use accessory muscle to breathe.  No paradoxical

respiration as well.



PHYSICAL EXAMINATION:

VITAL SIGNS:  The patient is afebrile.  Heart rate from 100 to 120, oxygen

saturation 96% on 4 liters nasal cannula, respiratory rate 16, blood

pressure 140/74.

HEENT:  Head and neck atraumatic, however, slightly tender on palpation of

the neck.  No lymphadenopathy.

LUNGs: few crackles b/l

HEART:  Irregular rate and rhythm.  S1 and S2 distant.

ABDOMEN:  Soft, nontender, nondistended.

MUSCULOSKELETAL:  Trace bilateral pedal and ankle edema.

NEUROLOGIC:  The patient moves all extremities spontaneously.

SKIN:  Moist.

PSYCHIATRIC:  The patient is alert, awake and oriented x3.



LABORATORY DATA:  WBC 20.9, hemoglobin 10.2, platelet count 384.  Sodium

138, potassium 3.8, chloride 91, carbon dioxide 26, BUN 76, creatinine 4.9

up from 4.8, lactic acid normalized at 1.7, AST 3167, ALT 3719 (both LFTs

are trending down).  Total bilirubin is 2.2, stable.  Albumin 3.7.  VBG is

pending.  INR today 2.72 (of note, the patient is on Eliquis at home which

was held for about 3 days now).



MEDICATIONS:  The patient completed course of N-acetylcysteine, DuoNeb

every 4 hours, Pulmicort, Plavix, Lexapro, folic acid, lactated Ringer at

150 mL/hour, metoprolol 5 mg IV every 6 hours (just started), morphine

p.r.n., Protonix 40 mg IV every 12 hours, Zosyn.



ASSESSMENT:  This is a 66-year-old gentleman who initially presented with

episode of bradycardia and hypotension, which

was attributed to some form of distributive versus cardiogenic shock with

multiorgan system failure including "shock liver." The patient was fluid

resuscitated and Cardiology Service involved.  Of note, the patient had

echocardiogram done about 5 months ago which did not reveal left ventricular

systolic dysfunction, but show changes suggestive of mild LV diastolic

dysfunction.  Of note, several troponins that were checked upon admission

were negative.  The patient also has some abdominal pain which was

attributed to some diverticulitis in the setting of Crohn's disease.  CAT

scan of the abdomen did not reveal any acute pathology.  Of note, the

patient had colonoscopy and EGD  couple weeks ago which revealed some 
diverticula and gastritis.

The patient received 3 units of PRBC 2 days ago and even though he did not

appear to respond appropriately, his hemoglobin level stayed around 10. 

Apparently, multiorgan dysfunction syndrome included also acute kidney injury,

which was attributed to acute tubular necrosis due to initial

hypoperfusion.  Nephrology service is following the patient as well.  No

renal replacement therapy for now.  It appears that creatinine

plateaued/stabilized at present time and we will continue to follow that. 

We will maintain mean arterial pressure more than 65, avoid nephrotoxic

medication.  We will maintain slightly stricter euglycemia, but avoid

hypoglycemia. At present time patient appears to be on a "wet" side (crackles b/
l, third spacing)-->we will stop IVF, give dose of Lasix and control HR with 
cardizem drip to improve diastolic LV dysfunction (discussed with nephro--who 
agreed). The patient is on antibiotics.  He is afebrile and his WBC count going 
down.  So far, septic workup is negative, procalcitonin is

pending.  I am somewhat concerned about his muffled voice and painful

swallowing.  We will get CAT scan of the neck without contrast as well as

we will ask ENT service for evaluation.  Whether or not to repeat

echocardiogram will be deferred to Cardiology service who is on board and

closely following the patient.  Of note, the patient has history of

paroxysmal atrial fibrillation and I will start him on metoprolol 5 mg IV

every 6 hours at present time.  Due to concern for upper gastrointestinal

bleed on presentation (the patient has melanotic stool), I will not start

the patient on systemic steroid empirically, however if CT neck comes back with 
findings requiring systemic steroids--will start them.  The patient is on 
inhaled corticosteroids (Pulmicort) at the present time.  I will also touch base

with GI service about advancing his diet in light of no overt bleeding

anymore and stable hemoglobin.    The patient is on PPI b.i.d.

dose. We will continue to target euvolemia, euglycemia, normothermia and oxygen 
saturation more than 90%. 

We will continue with deep venous thrombosis and gastrointestinal

prophylaxis.  Liver function tests are trending down.  Bilirubin is stable.

Looks like it was ischemic acute pathology/"shock liver."



Addendum: CT neck showed some retropharyngeal swelling-->steroids started, abx 
continued, Dr. Harden (ENT) was called and notified.

HR remains uncontrolled despite metoprolol-->cardizem drip started. GI ok-ed to 
advance oral diet--will start cardizem PO. Patient is not in respiratory 
distress and protecting his airways currently, if anything changes, will re-
call Dr. Harden.



ccm time 40 min





__________________________________________

Arturo Cabezas MD



DD:  04/23/2018 8:43:56

DT:  04/23/2018 8:51:37

Job # [id number]

[email or web address]

## 2018-04-23 NOTE — CT
PROCEDURE:  CT Chest without contrast



HISTORY:

CHF/ARF/RETROPHARYNGEAL MASS



COMPARISON:

None.



TECHNIQUE:

Contiguous axial images were obtained through the chest without 

intravenous contrast enhancement. Sagittal and coronal 

reconstructions were performed.







Radiation dose (DLP): 723.58 mGy-cm. 



This CT exam was performed using one or more of the following dose 

reduction techniques: Automated exposure control, adjustment of the 

mA and/or kV according to patient size, and/or use of iterative 

reconstruction technique.



FINDINGS:



LUNGS:

Bilateral lower lobe infiltrate with air bronchograms suggestive of 

atelectasis or pneumonia. 



MEDIASTINUM:

Unremarkable thoracic aorta. No aneurysm. Cardiomegaly.  No evidence 

of acute, significant cardiovascular disease.  Dilated main pulmonary 

artery, findings consistent with pulmonary arterial hypertension. No 

lymphadenopathy.



PLEURA:

No pleural fluid. No pneumothorax.



BONES:

No fracture. No destructive lesion. 



UPPER ABDOMEN:

Grossly unremarkable.



OTHER FINDINGS:

None.



IMPRESSION:

Subsegmental dependent infiltrates/ atelectasis which are 

approximately symmetrical.



Additional benign and/or incidental findings described above.

## 2018-04-23 NOTE — PN
DATE:  04/22/2018



SUBJECTIVE:  The patient was seen today at ICU bed 1.  The patient is out

of bed to recliner.  The patient's wife is at bedside.  The patient is

great.  The patient has an NG tube placed, draining dark greenish to dark

material.  The patient is awake, responsive, complaining of neck pain, back

pain.  On gross examination, the patient's abdominal distention is

significantly less.



PHYSICAL EXAMINATION:

VITAL SIGNS:  T-max 99.4.  Telemetry shows atrial fibrillation, heart rate

is in the 80s, 90s, 70s beats per minute.  Bradycardia has resolved.  Blood

pressure 130/74 on the monitor right now, O2 sat is 97% on the monitor at

present.

GENERAL:  The patient is lying in the recliner.  The patient is alert,

awake, responsive.  Complaining of neck pain, back pain.

HEENT:  Head examination normocephalic, atraumatic.  HEENT examination

shows pinkish conjunctivae.  Anicteric sclerae.  No oropharyngeal lesion. 

No neck rigidity.

CHEST:  Kyphosis.

LUNGS:  Shows positive rhonchi bilaterally.  Decreased breath sound at the

bases, left more than the right.  CARDIOVASCULAR:  S1 and S2, irregular

rhythm.  Positive systolic murmur, left sternal border, right second

intercostal space, left second intercostal space.

ABDOMEN:  Less distended and protuberant.  No guarding.  No rigidity.  No

rebound tenderness.  Positive healed surgical scar of cholecystectomy.

GENITALIA:  Male.  Positive Barlow catheter.  Extremity shows positive

swelling of the lower extremity.

MUSCULOSKELETAL:  Shows an elevated body mass index for the height and

weight.

NEUROLOGIC:  The patient is alert, awake, responsive.  Oriented to person,

place and time.  The patient's wife at bedside.



DIAGNOSTICS:  Blood bank history, the patient received 4 units of PRBC and

3 units of FFP in the last 24 hours.  The patient's diagnostic data

including CBC, CMP, LFTs reviewed.  The patient's imaging studies, CAT

scan, ultrasound results reviewed.  Recommendation by Surgery,

Gastroenterology, Infectious Disease, Cardiology, Nephrology all reviewed

and explained to the patient and the patient's wife.



IMPRESSION AND PLAN:

1.  Severe sepsis.

2.  Questionable hypoxemia (resolved).

3.  Severe symptomatic bradycardia and severe hypotension with

hypoperfusion.

4.  Chronic narcotic-dependent pain syndrome.

5.  Cervical and lumbar spine disc disease.

6.  History of coronary artery disease, coronary angioplasty.

7.  History of atrial fibrillation with rapid ventricular response.

8.  Right bundle-branch block.

9.  Bibasilar atelectasis.

10.  Cardiomegaly.

11.  Systolic congestive heart failure with elevated BNP.

12.  Hepatomegaly versus shock liver versus questionable hepatorenal

syndrome.

13.  Acute renal failure and acute kidney injury secondary to hypotension,

hypoperfusion, bradycardia.

14.  Possible ischemic and acute tubular necrosis.

15.  Questionable abdominal compartment syndrome.

16.  Bilateral lower extremity venous stasis.

17.  Bibasilar atelectasis.

18.  Cardiomegaly.

19.  Hepatomegaly versus passive hepatic congestion and shock liver.

20.  Questionable ischemic colitis versus bowel ischemia.

21.  Acute tubular necrosis, possible ischemic acute tubular necrosis

secondary to hypotension, bradycardia, hypoperfusion.

22.  Questionable urinary retention.

23.  Leukocytosis with granulocytosis.

24.  Anemia with decreasing hemoglobin and hematocrit.

25.  Thrombocytosis.

26.  Bandemia.

27.  Granulocytosis.

28.  Coagulopathy.

29.  Status post packed red blood cell transfusion and fresh frozen plasma

transfusion.

30.  Increased anion gap metabolic acidosis and lactic acidosis.

31.  Severe lactic acidosis.

32.  Proteinuria.

33.  History of cholecystectomy.

34.  Degenerative joint disease of the cervical, thoracic and lumbar spine.

35.  History of spinal compression fracture with chronic narcotic-dependent

pain syndrome.

36.  Type 2 non-insulin-requiring diabetes mellitus.

37.  Congestive heart failure.

38.  Anemia.

39.  History of Crohn's disease versus ulcerative colitis.

40.  History of poor compliance.

41.  History of anxiety, depression, insomnia.

42.  Status post non-hemolyzed hyperkalemia.

43.  Severe transaminitis.

44.  Questionable pancreatitis with elevated amylase, lipase.

1.  Severe symptomatic bradycardia and hypotension.

2.  Questionable hypothermia.

3.  Hypoxemia.

4.  Abdominal distention, etiology undetermined.

5.  Questionable Tylenol overuse.

6.  Leukocytosis with granulocytosis and bandemia.

7.  Thrombocytosis.

8.  Coagulopathy.

9.  Lactic acidosis and increased anion gap metabolic acidosis.

10.  Non-hemolyzed hyperkalemia.

11.  Acute kidney injury and acute renal failure.

12.  Severe transaminitis versus shock liver versus hepatorenal failure.

13.  Hyperglycemia.

14.  Questionable shock liver versus questionable hepatorenal failure.

15.  Proteinuria.

16.  Anemia with decreasing hemoglobin and hematocrit.

17.  Normocytic anemia.

18.  History of coronary artery disease.

19.  Bilateral lower extremity venostasis.

20.  Chronic narcotic dependent pain syndrome.

21.  History of coronary angioplasty.

22.  Diastolic congestive heart failure.

23.  Type 2 diabetes mellitus.

24.  Insomnia.

25.  Depression.

26.  Dyslipidemia.

27.  Ulcerative colitis versus Crohn disease.

28.  Diabetic neuropathy.

29.  Hyperphosphatemia.

1.  Severe symptomatic bradycardia.

2.  Atrial fibrillation with slow ventricular response.

3.  Hypertension.

4.  Questionable lower gastrointestinal bleeding.

5.  Acute renal failure.

6.  Hypotension.

7.  History of Crohn disease and ulcerative colitis.

8.  Acute kidney injury.

9.  Bradycardia.

10.  Severe symptomatic hypotension.

11.  Leukocytosis with granulocytosis, thrombocytosis, microcytic anemia.

12.  Mild coagulopathy.

13.  Severe transaminitis, etiology undetermined.

14.  Proteinuria.

15.  History of anxiety, depression, insomnia.

16.  History of narcotic dependent pain syndrome.

17.  History of coronary artery disease and systolic, diastolic congestive

heart failure with possible ischemic cardiomyopathy.

18.  History of angioplasty and stent placement.

1.  Acute recurrent systolic congestive heart failure.

2.  Chronic atrial fibrillation.

3.  Coronary artery disease with history of angioplasty and stent placement

of the left anterior descending artery.

4.  History of hemorrhoidal rectal bleeding.

5.  History of nonsteroidal anti-inflammatory drug use.

6.  Ischemic dilated cardiomyopathy with ejection fraction of 45% to 50%.

7.  Bilateral lower extremity venous stasis (resolving).

8.  Status post angioplasty and stent placement of the 80% mid left

anterior descending stenosis with drug-eluting stent.

9.  Left ventricular ejection fraction of 45% with diffuse segmental wall

hypokinesis.

10.  Type 2 non-insulin-requiring diabetes mellitus with hyperglycemia.

11.  Hypertension.

12.  Morbid obesity.

13.  Moderate pulmonary hypertension with right ventricular systolic

pressure of 47 mmHg.

14.  Mild tricuspid regurgitation.

15.  Mildly thickened mitral valve with mild mitral regurgitation.

16  Normocytic and microcytic anemia.

17.  Granulocytosis.

18.  Hypokalemia.

19.  Dyslipidemia.

20.  Cardiomegaly.

21.  Right bundle-branch block.

22.  Insomnia.

23.  Depression.

24.  Chronic narcotic dependent pain syndrome.

25.  Constipation.

26.  Hypercholesteremia.

27.  Questionable colitis.

1.  Acute recurrent systolic versus diastolic congestive heart failure with

pulmonary arterial hypertension and elevated right ventricular systolic

pressure of 47 mmHg.

2.  Atrial fibrillation.

3.  Morbid obesity with body mass index of 36.5.

4.  Bilateral lower extremity venous stasis and pitting edema.

5.  Microcytic anemia.

6.  Granulocytosis.

7.  Hypokalemia.

8.  Non-insulin-requiring diabetes mellitus.

9.  Hyperglycemia.

10.  Insomnia.

11.  Constipation.

12.  Diabetic neuropathy.

13.  Hypokalemia.

14.  Depression.

15.  History of colitis.

16.  Chronic narcotic-dependent pain syndrome.

17.  Right bundle-branch block.





Plan at this time, the patient is to be continued on nasogastric tube

decompression.  The patient has been seen by Surgery, Gastroenterology,

Infectious Disease, Cardiology, Nephrology.  The patient will be started on

low-dose morphine intravenous and Lidoderm 5% patch.  The patient will be

continued on intravenous antibiotic, intravenous fluid with bicarb.  The

patient has been started by intensivist on Mucomyst or acetylcysteine drip.

The patient is on high-dose proton pump inhibitor.  The patient is on deep

venous thrombosis/gastrointestinal prophylaxis with sequential compression

devices.  The patient's serial labs are ordered.  The patient's repeat labs

are ordered, repeat x-rays are ordered.  The patient's microbiology

cultures will be reviewed.



I had a lengthy discussion again today with the patient's wife.  I have

explained to the patient's wife and the patient that the patient's

prognosis is guarded to poor.  Condition critical.  All questions concerned

answered to their satisfaction.



Dictated and electronically signed, not read.





__________________________________________

Kody Pratt MD







DD:  04/22/2018 14:27:23

DT:  04/22/2018 14:35:18

Job # [id number]



MTDD

## 2018-04-23 NOTE — CP.PCM.PN
Subjective





- Date & Time of Evaluation


Date of Evaluation: 04/23/18


Time of Evaluation: 09:00





- Subjective


Subjective: 





Comfortable in bed, not in distress. Afebrile.





Objective





- Vital Signs/Intake and Output


Vital Signs (last 24 hours): 


 











Temp Pulse Resp BP Pulse Ox


 


 98.3 F   121 H  16   135/83   89 L


 


 04/23/18 16:48  04/23/18 18:00  04/23/18 18:00  04/23/18 18:00  04/23/18 18:00








Intake and Output: 


 











 04/23/18 04/24/18





 18:59 06:59


 


Intake Total 1345 


 


Output Total 250 


 


Balance 1095 














- Medications


Medications: 


 Current Medications





Albuterol/Ipratropium (Duoneb 3 Mg/0.5 Mg (3 Ml) Ud)  3 ml IH [id number] [facility]


   Last Admin: 04/23/18 15:47 Dose:  3 ml


Budesonide (Pulmicort Respules)  0.5 mg IH [id number] [facility]


   Last Admin: 04/23/18 07:30 Dose:  0.5 mg


Escitalopram Oxalate (Lexapro)  5 mg PO DAILY [facility]


   Last Admin: 04/23/18 10:08 Dose:  5 mg


Folic Acid (Folic Acid)  1 mg PO DAILY [facility]


   Last Admin: 04/23/18 10:08 Dose:  1 mg


Furosemide (Lasix)  80 mg IVP BID [facility]


   Last Admin: 04/23/18 17:00 Dose:  80 mg


Piperacillin Sod/Tazobactam Sod (Zosyn 2.25 Gm In 0.9% 100 Ml)  2.25 gm in 100 

mls @ 100 mls/hr IVPB Q6 [facility]


   PRN Reason: Protocol


   Stop: 05/01/18 12:01


   Last Admin: 04/23/18 17:01 Dose:  100 mls/hr


diltiaZEM IVPB 100mg in NS (Cardizem 100mg In Ns)  100 mls @ 5 mls/hr IV .Q20H 

PRN; Protocol; 5 MG/HR


   PRN Reason: TITRATE PER MD ORDER


   Last Titration: 04/23/18 14:15 Dose:  15 mg/hr, 15 mls/hr


Lidocaine (Lidoderm)  1 ea TD DAILY [facility]


   Last Admin: 04/23/18 10:08 Dose:  1 ea


Methylprednisolone (Solu-Medrol)  20 mg IVP Q8 [facility]


   Last Admin: 04/23/18 13:27 Dose:  20 mg


Metolazone (Zaroxolyn)  5 mg PO BID [facility]


   Stop: 04/26/18 15:31


   Last Admin: 04/23/18 17:01 Dose:  5 mg


Morphine Sulfate (Morphine)  1 mg IVP Q6 [facility]


   PRN Reason: PAIN, MODERATE [4-6]


   Last Admin: 04/23/18 13:39 Dose:  1 mg


Oxymetazoline HCl (Afrin 0.05%)  0 ml NS Q12H [facility]


   Last Admin: 04/23/18 10:16 Dose:  2 spr


Pantoprazole Sodium (Protonix Inj)  40 mg IVP Q12 [facility]


   Last Admin: 04/23/18 10:09 Dose:  40 mg











- Labs


Labs: 


 





 04/23/18 16:24 





 04/23/18 05:40 





 











PT  27.9 SECONDS (9.4-12.5)  H  04/23/18  08:30    


 


INR  2.38  (0.93-1.08)  H  04/23/18  08:30    


 


APTT  28.4 Seconds (25.1-36.5)   04/23/18  08:30    














- Constitutional


Appears: Chronically Ill





- Head Exam


Head Exam: NORMAL INSPECTION





- ENT Exam


ENT Exam: Mucous Membranes Moist





- Neck Exam


Neck Exam: absent: Meningismus





- Respiratory Exam


Respiratory Exam: Decreased Breath Sounds





- Cardiovascular Exam


Cardiovascular Exam: +S1, +S2





- GI/Abdominal Exam


GI & Abdominal Exam: Soft.  absent: Tenderness





Assessment and Plan





- Assessment and Plan (Free Text)


Plan: 





Assessment


Severe sepsis probably with intra-abdominal infection with transaminitis


Crohn's disease


depression


dyslipidemia


chronic CHF


obesity with BMI 37





Plan


Continue Zosyn and will follow up further plans of GI 


will monitor clinically


cultures have been negative

## 2018-04-23 NOTE — PN
DATE:  04/23/2018



SUBJECTIVE:  The patient was seen in ICU, bed 1.  The patient is out of bed

to recliner.  The patient's wife is at bedside.  Overnight nurse's notes

were reviewed.  The patient has been complaining of back pain, neck pain. 

Has been constantly asking for pain medication.  The patient is lying in

the bed.



PHYSICAL EXAMINATION

GENERAL:  The patient is alert, awake, oriented x3 as per the nurses' note.

VITAL SIGNS:  T-max is 97.3; telemetry shows atrial fibrillation; heart

rate in low 100s; blood pressure 128/85, 149/100, 140/74; respirations 22;

O2 sat is 97% to 100%.

HEENT:  Head:  Normocephalic, atraumatic.  HEENT examination shows pinkish

pale conjunctivae.  Slightly icterus.  No oropharyngeal lesion.  No neck

rigidity.

CHEST:  Kyphosis.

LUNGS:  Shows decreased breath sound at the bases, occasional rhonchi in

the upper lung fields.

CARDIOVASCULAR:  S1 and S2, irregular rhythm.  Positive systolic murmur,

left sternal border, right second intercostal space, left second

intercostal space.

ABDOMEN:  Protuberant.  Positive bowel sounds.

GENITALIA:  Male.  Positive Barlow catheter.

EXTREMITIES:  Shows increasing pitting edema, increasing venous stasis. 

Positive SCDs.

MUSCULOSKELETAL:  Shows a body mass index of 37.4.

NEUROLOGIC:  The patient is alert, awake, and oriented x3.  Cranial nerve

II through XII limited.  Gait examination is not tested.

MUSCULOSKELETAL:  Shows a body mass index of 37.4.



DIAGNOSTICS:  04/23/2018:  WBC count is down to 21,000 from peak of 35,000,

hemoglobin and hematocrit 10.2 and 32.1, platelets 384, granulocytes 91. 

PT/PTT 27.9, INR 2.38.  Sodium 138, potassium 3.8, chloride 91, CO2 of 26,

anion gap 24.  BUN 76, gone up from 29 on admission; creatinine is 4.9,

gone up from 1.6 on admission.  Glucose 114, 135, 110.  Lactic acid is 1.7.

Calcium is 7.9, phosphorus is not done, magnesium 1.9.  Total bilirubin

2.2, direct bilirubin 1.2, AST is down to 3167 from a peak of 9104, ALT is

down to 3719 from a peak of 6080, alkaline phosphatase is down to 125,

ammonia level is negative.  Procalcitonin is negative.  Lipase is slightly

elevated, but does not meet the criteria for stool occult blood positive. 

Hepatitis A and B negative.  Hepatitis C is reactive.  The patient had a

soft tissue of the neck done.  CT of the neck was noted, which shows

asymmetric retropharyngeal swelling, left more than the right.  CAT scan of

the neck, soft tissue is noted.  CT of the neck and soft tissue noted.  EKG

from today shows atrial fibrillation, right bundle-branch block.



IMPRESSION:

1.  Severe sepsis.

2.  Hypothermia.

3.  Status post severe symptomatic bradycardia.

4.  Leukocytosis with granulocytosis and bandemia.

5.  Anemia with decreasing hemoglobin and hematocrit.

6.  Thrombocytosis.

7.  Coagulopathy.

8.  Increased anion gap metabolic acidosis and lactic acidosis.

9.  Transient respiratory _____.

10.  Severe transaminitis.

11.  Hyperbilirubinemia.

12.  Hyperphosphatemia.

13.  Nonhemolyzed hyperkalemia.

14.  Proteinuria.

15.  Stool occult blood positive.

16.  Urine drug screen positive for opiate.

17.  Hepatitis C reactive.

18.  Status post 3 units of packed red blood cell transfusion and fresh

frozen plasma transfusion.

19.  Asymmetrical retropharyngeal swelling, left more than the right

extending from oropharynx into the supraglottic larynx, questionable

infectious versus inflammatory.

20.  Atrial fibrillation with rapid ventricular response.

21.  Right bundle-branch block.

22.  Questionable gastrointestinal bleeding.

23.  Possible hepatorenal failure.

24.  Severe hepatorenal failure.

25.  Upper gastrointestinal bleeding with coffee ground.

26.  Severe lactic acidosis.

27.  Questionable septic shock.

28.  Acute renal failure, acute kidney injury secondary to ischemic acute

tubular necrosis from hypoperfusion and hypotension.

29.  Shock liver.

30.  Questionable bowel ischemia versus ischemic colitis.

31.  History of narcotic dependent pain syndrome.

32.  History of high-dose nonsteroidal antiinflammatory drug use.

33.  Coronary artery disease with coronary angioplasty.

34.  History of Crohn disease.

35.  Questionable abdominal compartment syndrome.

36.  Bibasilar atelectasis.

37.  Cardiomegaly.

38.  Hepatomegaly.

39.  Cholecystectomy.

40.  Pancreatic atrophy.

41.  Urinary bladder hypertrophy.

42.  Gastric distention.

43.  Colonic diverticulosis of the sigmoid and distal descending colon.

44.  Umbilical hernia.

45.  Bilateral small inguinal hernia.

46.  T8 chronic compression fracture and multilevel degenerative joint

disease of the thoracic and lumbar spine.

47.  Hepatic steatosis and fatty infiltration of the liver.

48.  Borderline splenomegaly.

49.  Medical renal disease with echogenic kidneys.

50.  Possible volume overload and fluid overload with bilateral lower

extremity venous stasis.



PLAN:  At this time, the patient has to be ordered serial labs.  Repeat

labs ordered.  The patient is to be continued on ICU.



CURRENT CONSULTATIONS:

1.  Cardiology.

2.  Gastroenterology.

3.  Infectious Disease.

4.  Nephrology.

5.  ENT.

6.  Surgery.



The patient is on DuoNeb nebulizer every 4 hours, folic acid 1 mg daily,

lactated Ringer at 150 mL an hour, Lexapro 5 mg daily, Lidoderm 5% patch to

the affected area, Lopressor 5 mg IV every 6 hours, morphine 1 mg IV every

6 hours p.r.n., Protonix 40 IV every 12 hours, Pulmicort nebulizer every 12

hours.  The patient is on Zosyn 2.25 g IV every 6 hours.  The patient has

been ordered a CT of the chest for evaluation of the retropharyngeal mass. 

The patient has been ordered BiPAP procedure at night, 12/6, FiO2 30%, rate

of 14.  The patient has been ordered BETH stocking, SCDs out of bed.  At

present, the patient's overall prognosis is guarded to poor.  The patient's

family including the wife explained about the patient's overall guarded to

poor prognosis, which she acknowledged and understand.  The patient was

also told about his overall guarded to poor prognosis, which he

acknowledged and understand.  All questions concerned answered.



Dictated and electronically signed, not read.







__________________________________________

Kody Pratt MD



DD:  04/23/2018 12:09:24

DT:  04/23/2018 14:42:49

Job # [id number]

## 2018-04-24 LAB
ALBUMIN SERPL-MCNC: 3.6 G/DL (ref 3–4.8)
ALBUMIN/GLOB SERPL: 1.1 {RATIO} (ref 1.1–1.8)
ANISOCYTOSIS BLD QL SMEAR: (no result)
APTT BLD: 27.8 SECONDS (ref 25.1–36.5)
AST SERPL-CCNC: 1181 U/L (ref 17–59)
BASOPHILS # BLD AUTO: 0.03 K/MM3 (ref 0–2)
BASOPHILS NFR BLD: 0.2 % (ref 0–3)
BILIRUB DIRECT SERPL-MCNC: 1.5 MG/DL (ref 0–0.4)
BUN SERPL-MCNC: 87 MG/DL (ref 7–21)
CALCIUM SERPL-MCNC: 8.2 MG/DL (ref 8.4–10.5)
EOSINOPHIL # BLD: 0 10*3/UL (ref 0–0.7)
EOSINOPHIL NFR BLD: 0 % (ref 1.5–5)
ERYTHROCYTE [DISTWIDTH] IN BLOOD BY AUTOMATED COUNT: 24 % (ref 11.5–14.5)
GAMMA GLUTAMYL TRANSPEPTIDASE: 93 U/L (ref 8–78)
GFR NON-AFRICAN AMERICAN: 8
GRANULOCYTES # BLD: 18.07 10*3/UL (ref 1.4–6.5)
GRANULOCYTES NFR BLD: 94.2 % (ref 50–68)
HGB BLD-MCNC: 10.3 G/DL (ref 14–18)
HYPOCHROMIA BLD QL SMEAR: (no result)
INR PPP: 1.77 (ref 0.93–1.08)
LG PLATELETS BLD QL SMEAR: PRESENT
LYMPHOCYTE: 6 % (ref 22–35)
LYMPHOCYTES # BLD: 0.6 10*3/UL (ref 1.2–3.4)
LYMPHOCYTES NFR BLD AUTO: 3.1 % (ref 22–35)
MCH RBC QN AUTO: 22.6 PG (ref 25–35)
MCHC RBC AUTO-ENTMCNC: 31.4 G/DL (ref 31–37)
MCV RBC AUTO: 71.9 FL (ref 80–105)
MICROCYTES BLD QL SMEAR: (no result)
MONOCYTE: 1 % (ref 1–6)
MONOCYTES # BLD AUTO: 0.5 10*3/UL (ref 0.1–0.6)
MONOCYTES NFR BLD: 2.5 % (ref 1–6)
NEUTROPHILS NFR BLD AUTO: 93 % (ref 50–70)
NRBC BLD AUTO-RTO: 1 %
OVALOCYTES BLD QL SMEAR: SLIGHT
PLATELET # BLD: 373 10^3/UL (ref 120–450)
PMV BLD AUTO: 9.4 FL (ref 7–11)
POIKILOCYTOSIS BLD QL SMEAR: (no result)
POLYCHROMASIA BLD QL SMEAR: SLIGHT
PROTHROMBIN TIME: 20.6 SECONDS (ref 9.4–12.5)
RBC # BLD AUTO: 4.56 10^6/UL (ref 3.5–6.1)
WBC # BLD AUTO: 19.2 10^3/UL (ref 4.5–11)

## 2018-04-24 PROCEDURE — B5131ZA FLUOROSCOPY OF RIGHT JUGULAR VEINS USING LOW OSMOLAR CONTRAST, GUIDANCE: ICD-10-PCS | Performed by: RADIOLOGY

## 2018-04-24 PROCEDURE — 05HM33Z INSERTION OF INFUSION DEVICE INTO RIGHT INTERNAL JUGULAR VEIN, PERCUTANEOUS APPROACH: ICD-10-PCS | Performed by: RADIOLOGY

## 2018-04-24 PROCEDURE — B543ZZA ULTRASONOGRAPHY OF RIGHT JUGULAR VEINS, GUIDANCE: ICD-10-PCS | Performed by: RADIOLOGY

## 2018-04-24 RX ADMIN — PIPERACILLIN SODIUM AND TAZOBACTAM SODIUM SCH MLS/HR: .25; 2 INJECTION, POWDER, LYOPHILIZED, FOR SOLUTION INTRAVENOUS at 13:38

## 2018-04-24 RX ADMIN — IPRATROPIUM BROMIDE AND ALBUTEROL SULFATE SCH ML: .5; 3 SOLUTION RESPIRATORY (INHALATION) at 19:50

## 2018-04-24 RX ADMIN — POLYETHYLENE GLYCOL 3350 SCH GM: 17 POWDER, FOR SOLUTION ORAL at 13:40

## 2018-04-24 RX ADMIN — IPRATROPIUM BROMIDE AND ALBUTEROL SULFATE SCH ML: .5; 3 SOLUTION RESPIRATORY (INHALATION) at 11:05

## 2018-04-24 RX ADMIN — INSULIN HUMAN SCH: 100 INJECTION, SOLUTION PARENTERAL at 18:02

## 2018-04-24 RX ADMIN — PIPERACILLIN SODIUM AND TAZOBACTAM SODIUM SCH MLS/HR: .25; 2 INJECTION, POWDER, LYOPHILIZED, FOR SOLUTION INTRAVENOUS at 18:06

## 2018-04-24 RX ADMIN — INSULIN HUMAN SCH: 100 INJECTION, SOLUTION PARENTERAL at 07:30

## 2018-04-24 RX ADMIN — INSULIN HUMAN SCH: 100 INJECTION, SOLUTION PARENTERAL at 22:02

## 2018-04-24 RX ADMIN — METHYLPREDNISOLONE SODIUM SUCCINATE SCH MG: 40 INJECTION, POWDER, FOR SOLUTION INTRAMUSCULAR; INTRAVENOUS at 13:40

## 2018-04-24 RX ADMIN — OXYMETAZOLINE HYDROCHLORIDE SCH SPR: 0.05 SPRAY NASAL at 09:48

## 2018-04-24 RX ADMIN — BUDESONIDE SCH MG: 0.5 SUSPENSION RESPIRATORY (INHALATION) at 19:50

## 2018-04-24 RX ADMIN — IPRATROPIUM BROMIDE AND ALBUTEROL SULFATE SCH ML: .5; 3 SOLUTION RESPIRATORY (INHALATION) at 04:55

## 2018-04-24 RX ADMIN — POLYETHYLENE GLYCOL 3350 SCH: 17 POWDER, FOR SOLUTION ORAL at 18:04

## 2018-04-24 RX ADMIN — PIPERACILLIN SODIUM AND TAZOBACTAM SODIUM SCH MLS/HR: .25; 2 INJECTION, POWDER, LYOPHILIZED, FOR SOLUTION INTRAVENOUS at 00:39

## 2018-04-24 RX ADMIN — MORPHINE SULFATE PRN MG: 4 INJECTION, SOLUTION INTRAMUSCULAR; INTRAVENOUS at 00:38

## 2018-04-24 RX ADMIN — MORPHINE SULFATE PRN MG: 4 INJECTION, SOLUTION INTRAMUSCULAR; INTRAVENOUS at 10:06

## 2018-04-24 RX ADMIN — METHYLPREDNISOLONE SODIUM SUCCINATE SCH MG: 40 INJECTION, POWDER, FOR SOLUTION INTRAMUSCULAR; INTRAVENOUS at 22:00

## 2018-04-24 RX ADMIN — METHYLPREDNISOLONE SODIUM SUCCINATE SCH MG: 40 INJECTION, POWDER, FOR SOLUTION INTRAMUSCULAR; INTRAVENOUS at 06:27

## 2018-04-24 RX ADMIN — BUDESONIDE SCH MG: 0.5 SUSPENSION RESPIRATORY (INHALATION) at 07:46

## 2018-04-24 RX ADMIN — MORPHINE SULFATE PRN MG: 4 INJECTION, SOLUTION INTRAMUSCULAR; INTRAVENOUS at 22:01

## 2018-04-24 RX ADMIN — IPRATROPIUM BROMIDE AND ALBUTEROL SULFATE SCH ML: .5; 3 SOLUTION RESPIRATORY (INHALATION) at 15:50

## 2018-04-24 RX ADMIN — PIPERACILLIN SODIUM AND TAZOBACTAM SODIUM SCH MLS/HR: .25; 2 INJECTION, POWDER, LYOPHILIZED, FOR SOLUTION INTRAVENOUS at 06:28

## 2018-04-24 RX ADMIN — INSULIN HUMAN SCH UNITS: 100 INJECTION, SOLUTION PARENTERAL at 12:31

## 2018-04-24 RX ADMIN — IPRATROPIUM BROMIDE AND ALBUTEROL SULFATE SCH ML: .5; 3 SOLUTION RESPIRATORY (INHALATION) at 07:46

## 2018-04-24 NOTE — PN
DATE:  04/24/2018



SUBJECTIVE:  The patient is sitting up in a chair.  He is comfortable.  He

denies any shortness of breath, chest pain, abdominal pain, nausea and

vomiting.  He remains oliguric with a urine output of only 300 mL over the

last 12 hours.



MEDICATIONS:  Currently include oxymetazoline 0.05% every 12 hours,

Cardizem, folic acid 1 mg daily, DuoNeb inhaler every 4 hours, Lasix 80 mg

IV every 12 hours, Lexapro 5 mg daily, MiraLax 17 g p.o. b.i.d., morphine

sulfate 1 g IV every 6 hours p.r.n. pain, pantoprazole 40 mg IV every 12

hours, Pulmicort 0.5 mg inhaled every 12 hours, Solu-Medrol 20 mg IV every

8 hours, Zaroxolyn 5 mg p.o. b.i.d., Zosyn 2.25 g IV every 6 hours.



PHYSICAL EXAMINATION

GENERAL:  Well-developed male, lying in bed, in no acute distress.

VITAL SIGNS:  Reveal temperature of 98, blood pressure 155/86, heart rate

of 98.

HEENT:  Reveals sclerae to be white.  Conjunctivae pink.

NECK:  Supple.

CHEST:  Revealed distant breath sounds.

HEART:  Reveals an irregularly irregular rate.

ABDOMEN:  Obese, soft, nontender.

EXTREMITIES:  Show trace pedal edema.



LABORATORY DATA:  Revealed PT 20.6, INR 1.77.  Chemistries reveal BUN 87,

creatinine 6.7, total bilirubin is 2.5.  AST is down to 1181, ALT is down

to 2634.  CBC reveals white blood cell count 19.2, hemoglobin 10.3,

platelet count 373,000.



IMPRESSION:

1.  Elevated liver enzymes secondary to shock liver from reduced cardiac

output secondary to transient bradycardia.

2.  Acute renal failure, most likely acute tubular necrosis again secondary

to decreased cardiac output from bradycardia.  The patient remains

oliguric.

3.  Chronic anemia.

4.  Atrial fibrillation.

5.  Coronary artery disease.



RECOMMENDATIONS:

1.  Continue supportive care.  The patient is receiving Lasix 80 mg IV

every 12 hours and Zaroxolyn 5 mg p.o. b.i.d.  If the patient remains

oliguric and continues to have rise in his creatinine, he may need

hemodialysis.

2.  I will increase the patient to a renal moderate consistent carbohydrate

diet.







__________________________________________

Julio César Madrid MD





DD:  04/24/2018 12:59:03

DT:  04/24/2018 13:03:45

[facility] # [id number]
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
## 2018-04-24 NOTE — CP.PCM.PN
Subjective





- Date & Time of Evaluation


Date of Evaluation: 04/24/18


Time of Evaluation: 16:27





- Subjective


Subjective: 





Follow up Nephrology Consultation Note





Assessment: critical


oliguric Acute Kidney Injury (N17.9) likely due to ATN, hypoperfusion now with 

fluid overload


shock, acute liver injury, lactic acidosis, coagulopathy


hx of chronic Hep C


Anemia, Hyperphosphatemia (E83.39), HTN (I12.9), DM, morbid obesity


CHF, CAD s/p sent





Plan


since BUN/cr rising and pt remains oligruc with fluid overloaded, refractory to 

diuretics, renal replacement therapy initiation indicated. please obtain 

temporary dialysis access and plan to do HD today and tomorrow as well


maintain hydrodynamics stable. No ACEI/ARB due to BHARGAVI


Monitor Input/Output, daily weights and renal function with basic metabolic 

panel


A fib rate control as per ICU.


add phos binders





Dose meds/antibiotics for reduced GFR. Avoid fleets enema/magnesium based 

laxatives. Avoid nephrotoxins/NSAIDs/ iodinated contrast (unless needed 

emergently)


Glycemic control


Further work up for as per primary team





Thanks for allowing me to participate in care of your patient. Will follow 

patient with you. Please call if any Qs. d/w ICU and primary team


Dr Elver Florez


Office: [phone] Cell: [phone] Fax: [phone]





Subjective: Noted events overnight. Patients feels okay


Denies chest pain, palpitation, shortness of breath, c/o leg swelling. 


All other negative 





Physical Examination: 


General Appearance: Comfortable, in no acute respiratory distress, co-operative 

. ill appearing, obese


Vitals reviewed and noted as below


Head; Atraumatic, normocephalic


ENT: no ulcers no thrush. Tongue is midline. Oropharynx: no rash or ulcers.


EYES: Pupils are equal, round and reactive to light accommodation. Eye muscles 

and extraocular movement intact. Sclera is anicteric.


Neck; supple no lymphadenopathy, no thyromegaly or bruit


Lungs: Normal respiratory rate/effort. Breath sounds bilateral equal and basal 

crackles


Heart: Increased rate. s1s2 normal. No rub or gallop. 


Extremities: 2+ edema. No varicose veins


Neurological: Patient is sleepy but oriented to person, place and time. No 

focal deficit. Strength bilateral appropriate and equal


Skin: Warm and dry. Normal turgor. No rash. Palpitation: Normal elasticity for 

age


Abdomen: Abdomen is soft. Bowel sounds +. There is no abdominal tenderness, no 

guarding/rigidity no organomegaly


Psych: normal insight and normal affect/mood


MSK: no joint tenderness or swelling. Digits and nails normal, no deformity


: kidney or bladder not palpable. has forde +





Labs/imaging reviewed.


Past medical history, past surgical history, family history, social history, 

allergy reviewed and noted as below


Family hx: no hx of CKD. Rest non-contributory 





workup: normal LVEF echo 2017 but cardiac cath 2018: LVEF 45%


UA 30 protein


Hep C +


renal imaging unremarkable





Objective





- Vital Signs/Intake and Output


Vital Signs (last 24 hours): 


 











Temp Pulse Resp BP Pulse Ox


 


 98.3 F   97 H  31 H  132/82   92 L


 


 04/23/18 16:48  04/24/18 14:00  04/24/18 14:00  04/24/18 14:00  04/24/18 14:00








Intake and Output: 


 











 04/24/18 04/24/18





 06:59 18:59


 


Intake Total 345 


 


Output Total 300 


 


Balance 45 














- Medications


Medications: 


 Current Medications





Albuterol/Ipratropium (Duoneb 3 Mg/0.5 Mg (3 Ml) Ud)  3 ml IH [id number] [facility]


   Last Admin: 04/24/18 15:50 Dose:  3 ml


Budesonide (Pulmicort Respules)  0.5 mg IH [id number] [facility]


   Last Admin: 04/24/18 07:46 Dose:  0.5 mg


Diltiazem HCl (Cardizem)  60 mg PO QID [facility]


   Last Admin: 04/24/18 13:40 Dose:  60 mg


Escitalopram Oxalate (Lexapro)  5 mg PO DAILY [facility]


   Last Admin: 04/24/18 10:03 Dose:  5 mg


Folic Acid (Folic Acid)  1 mg PO DAILY [facility]


   Last Admin: 04/24/18 10:03 Dose:  1 mg


Furosemide (Lasix)  80 mg IVP BID [facility]


   Last Admin: 04/24/18 10:03 Dose:  80 mg


Piperacillin Sod/Tazobactam Sod (Zosyn 2.25 Gm In 0.9% 100 Ml)  2.25 gm in 100 

mls @ 100 mls/hr IVPB Q6 [facility]


   PRN Reason: Protocol


   Stop: 05/01/18 12:01


   Last Admin: 04/24/18 13:38 Dose:  100 mls/hr


Insulin Human Regular (Humulin R Med)  0 units SC ACHS [facility]


   PRN Reason: Protocol


   Last Admin: 04/24/18 12:31 Dose:  5 units


Lidocaine (Lidoderm)  1 ea TD DAILY [facility]


   Last Admin: 04/24/18 10:05 Dose:  1 ea


Methylprednisolone (Solu-Medrol)  20 mg IVP Q8 [facility]


   Last Admin: 04/24/18 13:40 Dose:  20 mg


Metolazone (Zaroxolyn)  5 mg PO BID [facility]


   Stop: 04/26/18 15:31


   Last Admin: 04/24/18 10:03 Dose:  5 mg


Morphine Sulfate (Morphine)  1 mg IVP Q6 PRN


   PRN Reason: PAIN, MODERATE [4-6]


   Last Admin: 04/24/18 10:06 Dose:  1 mg


Oxymetazoline HCl (Afrin 0.05%)  0 ml NS Q12H [facility]


   Last Admin: 04/24/18 09:48 Dose:  1 spr


Pantoprazole Sodium (Protonix Inj)  40 mg IVP Q12 [facility]


   Last Admin: 04/24/18 10:02 Dose:  40 mg


Polyethylene Glycol (Miralax)  17 gm PO BID [facility]


   Last Admin: 04/24/18 13:40 Dose:  17 gm











- Labs


Labs: 


 





 04/24/18 05:30 





 04/24/18 05:30 





 











PT  20.6 SECONDS (9.4-12.5)  H  04/24/18  05:30    


 


INR  1.77  (0.93-1.08)  H  04/24/18  05:30    


 


APTT  27.8 Seconds (25.1-36.5)   04/24/18  05:30

## 2018-04-24 NOTE — PN
DATE:  04/24/2018



CARDIOLOGY FOLLOWUP



SUBJECTIVE:  The patient is in a chair without shortness of breath.



PHYSICAL EXAMINATION:

VITAL SIGNS:  Blood pressure is 132/82, heart rate is atrial fibrillation

in the 80s.

NECK:  Negative JVD.

LUNGS:  Without rales.

HEART:  S1, S2.

EXTREMITIES:  Without edema.



LABORATORY DATA:  Hemoglobin is 10.3, white count is down to 19.2..  BUN

and creatinine is 87 and 6.7 with a glucose of 190.



IMPRESSION:

1.  Atrial fibrillation.

2.  End-stage renal disease.

3.  History of coronary artery disease.

4.  Diabetes mellitus.

5.  Hypertension.



Given these findings, the patient is for a Shiley catheter to begin

dialysis.







__________________________________________

Olu Ramirez MD



DD:  04/24/2018 15:43:42

DT:  04/24/2018 15:49:02

Job # [id number]

## 2018-04-24 NOTE — PN
DATE:  04/24/2018



SUBJECTIVE:  The patient is seen in ICU bed 1.  The patient is seen out of

bed to chair and recliner.  The patient is alert, awake, responsive.  The

patient is off the IV fluids.



PHYSICAL EXAMINATION:

VITAL SIGNS:  T-max is 98.3.  Telemetry shows atrial fibrillation with the

heart rate 70s and 80s.  Blood pressure 155/86, _____, respirations 16, O2

sat 96, output in the last 24 hours is 750.

GENERAL:  The patient is alert, awake, responsive.

HEAD:  Normocephalic, atraumatic.

EENT:  Shows pinkish conjunctivae, slightly icteric sclerae.  No jugular

venous distention.

CHEST:  Kyphosis.

LUNGS:  Show positive rhonchi bilaterally.  Decreased breath sounds at the

bases.

CARDIOVASCULAR:  S1, S2, irregular rhythm.  Positive systolic murmur, left

sternal border, right second intercostal space, left second intercostal

space.

ABDOMEN:  Protuberant, decreased since yesterday.  The patient complains of

constipation.

GENITALIA:  Male.  Positive Barlow catheter.

EXTREMITY:  Shows positive SCDs, positive swelling of the lower extremity.

MUSCULOSKELETAL:  Shows a body mass index of 37.4.

NEUROLOGIC:  The patient is alert, awake, oriented x3.  Gait examination is

not tested.



DIAGNOSTICS:  On 04/24; WBC count is down to 19.2 from peak WBC of 35,000;

hemoglobin/hematocrit 10.3/32.8; platelets 373; granulocytes 94% segs.  PT

is 20.6, INR 1.7.  Sodium 137, potassium 3.9, chloride 91, CO2 of 25, anion

gap 25, BUN 87, creatinine 6.7, glucose 177 and 190, lactic acid 1.3,

calcium 8.2, phosphorus 7.0, magnesium 2.0, total bilirubin 2.5, direct

bilirubin 1.5, GGTP 93, AST down to 1181 from a peak of 9104, ALT is down

to 2634 from a peak 6080, alkaline phosphatase is normal.  Hepatitis C is

reactive.  Fingerstick blood sugar 198, 203, 114.  Blood, urine, sputum

cultures are negative.  The patient received 3 units of PRBC, 6 units of

FFP.



CT of the chest was done.  The results were noted.  EKG from today was

reviewed.



IMPRESSION:

1.  Status post hypotension, severe symptomatic hypotension and severe

symptomatic bradycardia.

2.  Atrial fibrillation with rapid ventricular response and right

bundle-branch block.

3.  Bilateral lower lobe pneumonia, infiltrate and bronchogram suggestive

of atelectasis and pneumonia.

4.  Pulmonary arterial hypertension with dilated main pulmonary artery.

5.  Transient hypothermia.

6.  Leukocytosis with granulocytosis and bandemia.

7.  Normocytic anemia.

8.  Status post packed red blood cell transfusion x3 and fresh frozen

plasma transfusion x6.

9.  Coagulopathy.

10.  Increased anion gap, metabolic acidosis, and lactic acidosis.

11.  Acute renal failure.

12.  Status post non-hemolyzed hyperkalemia.

13.  Hyperphosphatemia.

14.  Hyperbilirubinemia.

15.  Severe transaminitis with shock liver.

16.  Acute renal failure, probably secondary to ischemic acute tubular

necrosis secondary to hypoperfusion, hypotension, and bradycardia.

17.  Gastrointestinal bleeding with stool occult blood positive.

18.  Coffee-ground emesis.

19.  Hepatitis C reactive antibody.

20.  Asymmetrical retropharyngeal swelling, left more than the right,

extending from the oropharynx to the supraglottic larynx, etiology unclear.

21.  Passive hepatic congestion and shock liver secondary to low cardiac

output state with severe bradycardia and acute renal failure with acute

tubular necrosis.

22.  Volume and fluid overload.

23.  Severe sepsis.

24.  History of Crohn's disease and history of ulcerative colitis.

25.  Shock liver with possible ischemic colitis.

 

PLAN:  At this time, the patient has been strongly considered for immediate

urgent hemodialysis.  The patient has been ordered serial labs.  Hepatitis

C, RNA and PCR ordered.  Consultation with Interventional Radiology ordered

for placement of dialysis catheter.  Other consultation, ENT, Surgery,

Nephrology, Infectious Disease, Gastroenterology, and Cardiology.



CURRENT MEDICATIONS:  The patient is on Cardizem 60 four times a day,

Dulcolax suppository _____, DuoNeb nebulizer every 4 hours, folic acid 1 mg

daily, Humulin medium-dose sliding scale coverage before meals and at

bedtime, Lasix 80 mg IV twice a day by Nephrology, Lexapro 5 mg daily,

Lidoderm 5% patch, MiraLAX 17 g twice a day, morphine 1 mg IV every 6 hours

p.r.n., Protonix 40 IV every 12 hours, Pulmicort nebulizer 0.5 mg every 12

hours.  The patient is also started on Solu-Medrol 20 IV every 8 hours,

Zaroxolyn 5 mg twice a day by Nephrology, Zosyn 2.25 g IV every 6 hours.



The patient's overall prognosis is guarded to poor.  The patient and the

family explained about above in length and all questions concerned

answered.



Time spent is more than 35 minutes.



Dictated and electronically signed, not read.





__________________________________________

Kody Pratt MD



DD:  04/24/2018 11:09:56

DT:  04/24/2018 11:16:07

Job # [id number]

## 2018-04-24 NOTE — CP.[email or web address]
<Jay Arzola - Last Filed: 04/24/18 10:04>





CCU Subjective





- Physician Review


Subjective (Free Text): 


Patient seen and examined at bedside in no acute distress. States he feels much 

better than yesterday. States odynophagia and sore throat has completely 

resolved 100%. Admits to chronic back pain. Denies shortness of breath, 

palpitations, chest pain, nausea, vomiting, abdominal pain, fevers, chills, 

cough.





Critical Care Time Spent (in minutes): 40





CCU Objective





- Vital Signs / Intake & Output


Vital Signs (Last 4 hours): 


Vital Signs











  Pulse Resp BP Pulse Ox


 


 04/24/18 05:50  72   


 


 04/24/18 05:00  70  16  122/70  96


 


 04/24/18 04:56  69   











Intake and Output (Last 8hrs): 


 Intake & Output











 04/23/18 04/24/18 04/24/18





 22:59 06:59 14:59


 


Intake Total 1375 280 


 


Output Total 250 300 


 


Balance 1125 -20 


 


Intake:   


 


   280 


 


    Right Antecubital  280 


 


    Right Hand 710  


 


  Oral 600  


 


Output:   


 


  Urine 250 300 


 


    Urethral (Barlow) 250 300 














- Physical Exam


Head: Positive for: Atraumatic, Normocephalic


Pupils: Positive for: PERRL


Extroacular Muscles: Positive for: EOMI


Conjunctiva: Positive for: Normal, Other


Mouth: Positive for: Moist Mucous Membranes


Pharnyx: Negative for: Muffled/Hoarse Voice, Strider


Neck: Positive for: Normal Range of Motion.  Negative for: MIDLINE TENDERNESS, 

Paraspinal Tenderness, JVD


Respiratory/Chest: Positive for: Good Air Exchange, Rhonchi (in upper lobes b/l)

.  Negative for: Respiratory Distress, Accessory Muscle Use


Cardiovascular: Positive for: Normal S1, S2, Other (Regular but slow rhythm.).  

Negative for: Murmurs


Abdomen: Negative for: Tenderness, Distention, Peritoneal Signs


Back: Positive for: Normal Inspection


Upper Extremity: Positive for: Normal Inspection.  Negative for: Cyanosis, Edema


Lower Extremity: Positive for: Normal Inspection, Normal ROM.  Negative for: 

Edema, Cyanosis


Neurological: Positive for: GCS=15, CN II-XII Intact, Motor Func Grossly 

Intact.  Negative for: Speech Normal


Skin: Positive for: Warm, Dry, Normal Color.  Negative for: Rashes


Psychiatric: Positive for: Alert, Oriented x 3, Normal Insight, Normal 

Concentration





- Medications


Active Medications: 


Active Medications











Generic Name Dose Route Start Last Admin





  Trade Name Freq  PRN Reason Stop Dose Admin


 


Albuterol/Ipratropium  3 ml  04/21/18 20:45  04/24/18 07:46





  Duoneb 3 Mg/0.5 Mg (3 Ml) Ud  IH   3 ml





  [id number] HUMBERTO   Administration


 


Budesonide  0.5 mg  04/21/18 20:00  04/24/18 07:46





  Pulmicort Respules  IH   0.5 mg





  [id number] HUMBERTO   Administration


 


Diltiazem HCl  90 mg  04/24/18 10:00  





  Cardizem  PO   





  QID HUMBERTO   


 


Escitalopram Oxalate  5 mg  04/21/18 10:00  04/23/18 10:08





  Lexapro  PO   5 mg





  DAILY HUMBERTO   Administration


 


Folic Acid  1 mg  04/21/18 10:00  04/23/18 10:08





  Folic Acid  PO   1 mg





  DAILY HUMBERTO   Administration


 


Furosemide  80 mg  04/23/18 18:00  04/23/18 17:00





  Lasix  IVP   80 mg





  BID HUMBERTO   Administration


 


Piperacillin Sod/Tazobactam Sod  2.25 gm in 100 mls @ 100 mls/hr  04/22/18 12:

00  04/24/18 06:28





  Zosyn 2.25 Gm In 0.9% 100 Ml  IVPB  05/01/18 12:01  100 mls/hr





  Q6 HUMBERTO   Administration





  Protocol   


 


Insulin Human Regular  0 units  04/24/18 07:30  





  Humulin R Med  SC   





  [facility]   





  Protocol   


 


Lidocaine  1 ea  04/22/18 14:00  04/23/18 10:08





  Lidoderm  TD   1 ea





  DAILY HUMBERTO   Administration


 


Methylprednisolone  20 mg  04/23/18 13:17  04/24/18 06:27





  Solu-Medrol  IVP   20 mg





  Q8 HUMBERTO   Administration


 


Metolazone  5 mg  04/23/18 15:30  04/23/18 17:01





  Zaroxolyn  PO  04/26/18 15:31  5 mg





  BID HUMBERTO   Administration


 


Morphine Sulfate  1 mg  04/22/18 13:58  04/24/18 00:38





  Morphine  IVP   1 mg





  Q6 PRN   Administration





  PAIN, MODERATE [4-6]   


 


Oxymetazoline HCl  0 ml  04/22/18 22:00  04/23/18 21:21





  Afrin 0.05%  NS   2 spr





  Q12H HUMBERTO   Administration


 


Pantoprazole Sodium  40 mg  04/21/18 10:00  04/23/18 21:18





  Protonix Inj  IVP   40 mg





  Q12 HUMBERTO   Administration














- Patient Studies


Lab Studies: 


 Microbiology Studies











 04/21/18 03:20 Blood Culture - Preliminary





 Blood    NO GROWTH AFTER 3 DAYS


 


 04/21/18 02:45 Blood Culture - Preliminary





 Blood    NO GROWTH AFTER 3 DAYS


 


 04/23/18 17:00 Gram Stain - Final





 Sputum 








 Lab Studies











  04/24/18 04/24/18 04/24/18 Range/Units





  06:25 05:30 05:30 


 


WBC     (4.5-11.0)  10^3/ul


 


RBC     (3.5-6.1)  10^6/uL


 


Hgb     (14.0-18.0)  g/dL


 


Hct     (42.0-52.0)  %


 


MCV     (80.0-105.0)  fl


 


MCH     (25.0-35.0)  pg


 


MCHC     (31.0-37.0)  g/dl


 


RDW     (11.5-14.5)  %


 


Plt Count     (120.0-450.0)  10^3/uL


 


MPV     (7.0-11.0)  fl


 


Gran %     (50.0-68.0)  %


 


Lymph % (Auto)     (22.0-35.0)  %


 


Mono % (Auto)     (1.0-6.0)  %


 


Eos % (Auto)     (1.5-5.0)  %


 


Baso % (Auto)     (0.0-3.0)  %


 


Gran #     (1.4-6.5)  


 


Lymph # (Auto)     (1.2-3.4)  


 


Mono # (Auto)     (0.1-0.6)  


 


Eos # (Auto)     (0.0-0.7)  


 


Baso # (Auto)     (0.0-2.0)  K/mm3


 


PT   20.6 H   (9.4-12.5)  SECONDS


 


INR   1.77 H   (0.93-1.08)  


 


APTT   27.8   (25.1-36.5)  Seconds


 


pO2     (30-55)  mm/Hg


 


VBG pH     (7.32-7.43)  


 


VBG pCO2     (40-60)  


 


VBG HCO3     (21-28)  mmol/l


 


VBG O2 Sat (Calc)     (40-65)  %


 


VBG Base Excess     (0.0-2.0)  mmol/L


 


Sodium     (132-148)  mmol/L


 


Potassium     (3.6-5.0)  mmol/L


 


Chloride     ()  mmol/L


 


Carbon Dioxide     (21-33)  mmol/L


 


Anion Gap     (10-20)  


 


BUN     (7-21)  mg/dL


 


Creatinine     (0.8-1.5)  mg/dl


 


Est GFR (African Amer)     


 


Est GFR (Non-Af Amer)     


 


POC Glucose (mg/dL)  177 H    ()  mg/dL


 


Random Glucose     ()  mg/dL


 


Lactic Acid    1.3  (0.7-2.1)  mmol/L


 


Calcium     (8.4-10.5)  mg/dL


 


Phosphorus     (2.5-4.5)  mg/dL


 


Magnesium     (1.7-2.2)  mg/dL


 


Total Bilirubin     (0.2-1.3)  mg/dL


 


Direct Bilirubin     (0.0-0.4)  mg/dL


 


GGT     (8-78)  U/L


 


AST     (17-59)  U/L


 


Alkaline Phosphatase     ()  U/L


 


Total Protein     (5.8-8.3)  g/dL


 


Albumin     (3.0-4.8)  g/dL


 


Globulin     gm/dL


 


Albumin/Globulin Ratio     (1.1-1.8)  


 


PTH Intact Whole Molec     (14-64)  pg/mL


 


Hepatitis A IgM Ab     (NEGATIVE)  


 


Hep Bs Antigen     (NEGATIVE)  


 


Hep B Core IgM Ab     (NEGATIVE)  


 


Hepatitis C Antibody     (NEGATIVE)  














  04/24/18 04/24/18 04/24/18 Range/Units





  05:30 05:30 00:02 


 


WBC   19.2 H   (4.5-11.0)  10^3/ul


 


RBC   4.56   (3.5-6.1)  10^6/uL


 


Hgb   10.3 L   (14.0-18.0)  g/dL


 


Hct   32.8 L   (42.0-52.0)  %


 


MCV   71.9 L   (80.0-105.0)  fl


 


MCH   22.6 L   (25.0-35.0)  pg


 


MCHC   31.4   (31.0-37.0)  g/dl


 


RDW   24.0 H   (11.5-14.5)  %


 


Plt Count   373   (120.0-450.0)  10^3/uL


 


MPV   9.4   (7.0-11.0)  fl


 


Gran %   94.2 H   (50.0-68.0)  %


 


Lymph % (Auto)   3.1 L   (22.0-35.0)  %


 


Mono % (Auto)   2.5   (1.0-6.0)  %


 


Eos % (Auto)   0.0 L   (1.5-5.0)  %


 


Baso % (Auto)   0.2   (0.0-3.0)  %


 


Gran #   18.07 H   (1.4-6.5)  


 


Lymph # (Auto)   0.6 L   (1.2-3.4)  


 


Mono # (Auto)   0.5   (0.1-0.6)  


 


Eos # (Auto)   0.0   (0.0-0.7)  


 


Baso # (Auto)   0.03   (0.0-2.0)  K/mm3


 


PT     (9.4-12.5)  SECONDS


 


INR     (0.93-1.08)  


 


APTT     (25.1-36.5)  Seconds


 


pO2     (30-55)  mm/Hg


 


VBG pH     (7.32-7.43)  


 


VBG pCO2     (40-60)  


 


VBG HCO3     (21-28)  mmol/l


 


VBG O2 Sat (Calc)     (40-65)  %


 


VBG Base Excess     (0.0-2.0)  mmol/L


 


Sodium  137    (132-148)  mmol/L


 


Potassium  3.9    (3.6-5.0)  mmol/L


 


Chloride  91 L    ()  mmol/L


 


Carbon Dioxide  25    (21-33)  mmol/L


 


Anion Gap  25 H    (10-20)  


 


BUN  87 H    (7-21)  mg/dL


 


Creatinine  6.7 H    (0.8-1.5)  mg/dl


 


Est GFR ( Amer)  10    


 


Est GFR (Non-Af Amer)  8    


 


POC Glucose (mg/dL)    198 H  ()  mg/dL


 


Random Glucose  190 H    ()  mg/dL


 


Lactic Acid     (0.7-2.1)  mmol/L


 


Calcium  8.2 L    (8.4-10.5)  mg/dL


 


Phosphorus  7.0 H    (2.5-4.5)  mg/dL


 


Magnesium  2.0    (1.7-2.2)  mg/dL


 


Total Bilirubin  2.5 H    (0.2-1.3)  mg/dL


 


Direct Bilirubin  1.5 H    (0.0-0.4)  mg/dL


 


GGT  93 H    (8-78)  U/L


 


AST  1181 H    (17-59)  U/L


 


Alkaline Phosphatase  113    ()  U/L


 


Total Protein  6.8    (5.8-8.3)  g/dL


 


Albumin  3.6    (3.0-4.8)  g/dL


 


Globulin  3.2    gm/dL


 


Albumin/Globulin Ratio  1.1    (1.1-1.8)  


 


PTH Intact Whole Molec     (14-64)  pg/mL


 


Hepatitis A IgM Ab     (NEGATIVE)  


 


Hep Bs Antigen     (NEGATIVE)  


 


Hep B Core IgM Ab     (NEGATIVE)  


 


Hepatitis C Antibody     (NEGATIVE)  














  04/23/18 04/23/18 04/23/18 Range/Units





  17:32 16:24 11:15 


 


WBC   21.1 H   (4.5-11.0)  10^3/ul


 


RBC   4.46   (3.5-6.1)  10^6/uL


 


Hgb   10.4 L   (14.0-18.0)  g/dL


 


Hct   32.3 L   (42.0-52.0)  %


 


MCV   72.4 L   (80.0-105.0)  fl


 


MCH   23.3 L   (25.0-35.0)  pg


 


MCHC   32.2   (31.0-37.0)  g/dl


 


RDW   24.1 H   (11.5-14.5)  %


 


Plt Count   376   (120.0-450.0)  10^3/uL


 


MPV   9.4   (7.0-11.0)  fl


 


Gran %   91.3 H   (50.0-68.0)  %


 


Lymph % (Auto)   4.3 L   (22.0-35.0)  %


 


Mono % (Auto)   4.2   (1.0-6.0)  %


 


Eos % (Auto)   0.0 L   (1.5-5.0)  %


 


Baso % (Auto)   0.2   (0.0-3.0)  %


 


Gran #   19.26 H   (1.4-6.5)  


 


Lymph # (Auto)   0.9 L   (1.2-3.4)  


 


Mono # (Auto)   0.9 H   (0.1-0.6)  


 


Eos # (Auto)   0.0   (0.0-0.7)  


 


Baso # (Auto)   0.04   (0.0-2.0)  K/mm3


 


PT     (9.4-12.5)  SECONDS


 


INR     (0.93-1.08)  


 


APTT     (25.1-36.5)  Seconds


 


pO2     (30-55)  mm/Hg


 


VBG pH     (7.32-7.43)  


 


VBG pCO2     (40-60)  


 


VBG HCO3     (21-28)  mmol/l


 


VBG O2 Sat (Calc)     (40-65)  %


 


VBG Base Excess     (0.0-2.0)  mmol/L


 


Sodium     (132-148)  mmol/L


 


Potassium     (3.6-5.0)  mmol/L


 


Chloride     ()  mmol/L


 


Carbon Dioxide     (21-33)  mmol/L


 


Anion Gap     (10-20)  


 


BUN     (7-21)  mg/dL


 


Creatinine     (0.8-1.5)  mg/dl


 


Est GFR (African Amer)     


 


Est GFR (Non-Af Amer)     


 


POC Glucose (mg/dL)  203 H   114 H  ()  mg/dL


 


Random Glucose     ()  mg/dL


 


Lactic Acid     (0.7-2.1)  mmol/L


 


Calcium     (8.4-10.5)  mg/dL


 


Phosphorus     (2.5-4.5)  mg/dL


 


Magnesium     (1.7-2.2)  mg/dL


 


Total Bilirubin     (0.2-1.3)  mg/dL


 


Direct Bilirubin     (0.0-0.4)  mg/dL


 


GGT     (8-78)  U/L


 


AST     (17-59)  U/L


 


Alkaline Phosphatase     ()  U/L


 


Total Protein     (5.8-8.3)  g/dL


 


Albumin     (3.0-4.8)  g/dL


 


Globulin     gm/dL


 


Albumin/Globulin Ratio     (1.1-1.8)  


 


PTH Intact Whole Molec     (14-64)  pg/mL


 


Hepatitis A IgM Ab     (NEGATIVE)  


 


Hep Bs Antigen     (NEGATIVE)  


 


Hep B Core IgM Ab     (NEGATIVE)  


 


Hepatitis C Antibody     (NEGATIVE)  














  04/23/18 04/23/18 04/21/18 Range/Units





  08:30 08:30 05:00 


 


WBC     (4.5-11.0)  10^3/ul


 


RBC     (3.5-6.1)  10^6/uL


 


Hgb     (14.0-18.0)  g/dL


 


Hct     (42.0-52.0)  %


 


MCV     (80.0-105.0)  fl


 


MCH     (25.0-35.0)  pg


 


MCHC     (31.0-37.0)  g/dl


 


RDW     (11.5-14.5)  %


 


Plt Count     (120.0-450.0)  10^3/uL


 


MPV     (7.0-11.0)  fl


 


Gran %     (50.0-68.0)  %


 


Lymph % (Auto)     (22.0-35.0)  %


 


Mono % (Auto)     (1.0-6.0)  %


 


Eos % (Auto)     (1.5-5.0)  %


 


Baso % (Auto)     (0.0-3.0)  %


 


Gran #     (1.4-6.5)  


 


Lymph # (Auto)     (1.2-3.4)  


 


Mono # (Auto)     (0.1-0.6)  


 


Eos # (Auto)     (0.0-0.7)  


 


Baso # (Auto)     (0.0-2.0)  K/mm3


 


PT   27.9 H   (9.4-12.5)  SECONDS


 


INR   2.38 H   (0.93-1.08)  


 


APTT   28.4   (25.1-36.5)  Seconds


 


pO2  37    (30-55)  mm/Hg


 


VBG pH  7.34    (7.32-7.43)  


 


VBG pCO2  53.0    (40-60)  


 


VBG HCO3  28.6 H    (21-28)  mmol/l


 


VBG O2 Sat (Calc)  73.2 H    (40-65)  %


 


VBG Base Excess  1.8    (0.0-2.0)  mmol/L


 


Sodium     (132-148)  mmol/L


 


Potassium     (3.6-5.0)  mmol/L


 


Chloride     ()  mmol/L


 


Carbon Dioxide     (21-33)  mmol/L


 


Anion Gap     (10-20)  


 


BUN     (7-21)  mg/dL


 


Creatinine     (0.8-1.5)  mg/dl


 


Est GFR (African Amer)     


 


Est GFR (Non-Af Amer)     


 


POC Glucose (mg/dL)     ()  mg/dL


 


Random Glucose     ()  mg/dL


 


Lactic Acid     (0.7-2.1)  mmol/L


 


Calcium     (8.4-10.5)  mg/dL


 


Phosphorus     (2.5-4.5)  mg/dL


 


Magnesium     (1.7-2.2)  mg/dL


 


Total Bilirubin     (0.2-1.3)  mg/dL


 


Direct Bilirubin     (0.0-0.4)  mg/dL


 


GGT     (8-78)  U/L


 


AST     (17-59)  U/L


 


Alkaline Phosphatase     ()  U/L


 


Total Protein     (5.8-8.3)  g/dL


 


Albumin     (3.0-4.8)  g/dL


 


Globulin     gm/dL


 


Albumin/Globulin Ratio     (1.1-1.8)  


 


PTH Intact Whole Molec     (14-64)  pg/mL


 


Hepatitis A IgM Ab    Negative  (NEGATIVE)  


 


Hep Bs Antigen    Negative  (NEGATIVE)  


 


Hep B Core IgM Ab    Negative  (NEGATIVE)  


 


Hepatitis C Antibody    Reactive  (NEGATIVE)  














  04/21/18 Range/Units





  05:00 


 


WBC   (4.5-11.0)  10^3/ul


 


RBC   (3.5-6.1)  10^6/uL


 


Hgb   (14.0-18.0)  g/dL


 


Hct   (42.0-52.0)  %


 


MCV   (80.0-105.0)  fl


 


MCH   (25.0-35.0)  pg


 


MCHC   (31.0-37.0)  g/dl


 


RDW   (11.5-14.5)  %


 


Plt Count   (120.0-450.0)  10^3/uL


 


MPV   (7.0-11.0)  fl


 


Gran %   (50.0-68.0)  %


 


Lymph % (Auto)   (22.0-35.0)  %


 


Mono % (Auto)   (1.0-6.0)  %


 


Eos % (Auto)   (1.5-5.0)  %


 


Baso % (Auto)   (0.0-3.0)  %


 


Gran #   (1.4-6.5)  


 


Lymph # (Auto)   (1.2-3.4)  


 


Mono # (Auto)   (0.1-0.6)  


 


Eos # (Auto)   (0.0-0.7)  


 


Baso # (Auto)   (0.0-2.0)  K/mm3


 


PT   (9.4-12.5)  SECONDS


 


INR   (0.93-1.08)  


 


APTT   (25.1-36.5)  Seconds


 


pO2   (30-55)  mm/Hg


 


VBG pH   (7.32-7.43)  


 


VBG pCO2   (40-60)  


 


VBG HCO3   (21-28)  mmol/l


 


VBG O2 Sat (Calc)   (40-65)  %


 


VBG Base Excess   (0.0-2.0)  mmol/L


 


Sodium   (132-148)  mmol/L


 


Potassium   (3.6-5.0)  mmol/L


 


Chloride   ()  mmol/L


 


Carbon Dioxide   (21-33)  mmol/L


 


Anion Gap   (10-20)  


 


BUN   (7-21)  mg/dL


 


Creatinine   (0.8-1.5)  mg/dl


 


Est GFR (African Amer)   


 


Est GFR (Non-Af Amer)   


 


POC Glucose (mg/dL)   ()  mg/dL


 


Random Glucose   ()  mg/dL


 


Lactic Acid   (0.7-2.1)  mmol/L


 


Calcium   (8.4-10.5)  mg/dL


 


Phosphorus   (2.5-4.5)  mg/dL


 


Magnesium   (1.7-2.2)  mg/dL


 


Total Bilirubin   (0.2-1.3)  mg/dL


 


Direct Bilirubin   (0.0-0.4)  mg/dL


 


GGT   (8-78)  U/L


 


AST   (17-59)  U/L


 


Alkaline Phosphatase   ()  U/L


 


Total Protein   (5.8-8.3)  g/dL


 


Albumin   (3.0-4.8)  g/dL


 


Globulin   gm/dL


 


Albumin/Globulin Ratio   (1.1-1.8)  


 


PTH Intact Whole Molec  237 H  (14-64)  pg/mL


 


Hepatitis A IgM Ab   (NEGATIVE)  


 


Hep Bs Antigen   (NEGATIVE)  


 


Hep B Core IgM Ab   (NEGATIVE)  


 


Hepatitis C Antibody   (NEGATIVE)  








 Laboratory Results - last 24 hr











  04/21/18 04/21/18 04/23/18





  05:00 05:00 08:30


 


WBC   


 


RBC   


 


Hgb   


 


Hct   


 


MCV   


 


MCH   


 


MCHC   


 


RDW   


 


Plt Count   


 


MPV   


 


Gran %   


 


Lymph % (Auto)   


 


Mono % (Auto)   


 


Eos % (Auto)   


 


Baso % (Auto)   


 


Gran #   


 


Lymph # (Auto)   


 


Mono # (Auto)   


 


Eos # (Auto)   


 


Baso # (Auto)   


 


PT    27.9 H


 


INR    2.38 H


 


APTT    28.4


 


pO2   


 


VBG pH   


 


VBG pCO2   


 


VBG HCO3   


 


VBG O2 Sat (Calc)   


 


VBG Base Excess   


 


Sodium   


 


Potassium   


 


Chloride   


 


Carbon Dioxide   


 


Anion Gap   


 


BUN   


 


Creatinine   


 


Est GFR ( Amer)   


 


Est GFR (Non-Af Amer)   


 


POC Glucose (mg/dL)   


 


Random Glucose   


 


Lactic Acid   


 


Calcium   


 


Phosphorus   


 


Magnesium   


 


Total Bilirubin   


 


Direct Bilirubin   


 


GGT   


 


AST   


 


Alkaline Phosphatase   


 


Total Protein   


 


Albumin   


 


Globulin   


 


Albumin/Globulin Ratio   


 


PTH Intact Whole Molec  237 H  


 


Hepatitis A IgM Ab   Negative 


 


Hep Bs Antigen   Negative 


 


Hep B Core IgM Ab   Negative 


 


Hepatitis C Antibody   Reactive 














  04/23/18 04/23/18 04/23/18





  08:30 11:15 16:24


 


WBC    21.1 H


 


RBC    4.46


 


Hgb    10.4 L


 


Hct    32.3 L


 


MCV    72.4 L


 


MCH    23.3 L


 


MCHC    32.2


 


RDW    24.1 H


 


Plt Count    376


 


MPV    9.4


 


Gran %    91.3 H


 


Lymph % (Auto)    4.3 L


 


Mono % (Auto)    4.2


 


Eos % (Auto)    0.0 L


 


Baso % (Auto)    0.2


 


Gran #    19.26 H


 


Lymph # (Auto)    0.9 L


 


Mono # (Auto)    0.9 H


 


Eos # (Auto)    0.0


 


Baso # (Auto)    0.04


 


PT   


 


INR   


 


APTT   


 


pO2  37  


 


VBG pH  7.34  


 


VBG pCO2  53.0  


 


VBG HCO3  28.6 H  


 


VBG O2 Sat (Calc)  73.2 H  


 


VBG Base Excess  1.8  


 


Sodium   


 


Potassium   


 


Chloride   


 


Carbon Dioxide   


 


Anion Gap   


 


BUN   


 


Creatinine   


 


Est GFR ( Amer)   


 


Est GFR (Non-Af Amer)   


 


POC Glucose (mg/dL)   114 H 


 


Random Glucose   


 


Lactic Acid   


 


Calcium   


 


Phosphorus   


 


Magnesium   


 


Total Bilirubin   


 


Direct Bilirubin   


 


GGT   


 


AST   


 


Alkaline Phosphatase   


 


Total Protein   


 


Albumin   


 


Globulin   


 


Albumin/Globulin Ratio   


 


PTH Intact Whole Molec   


 


Hepatitis A IgM Ab   


 


Hep Bs Antigen   


 


Hep B Core IgM Ab   


 


Hepatitis C Antibody   














  04/23/18 04/24/18 04/24/18





  17:32 00:02 05:30


 


WBC    19.2 H


 


RBC    4.56


 


Hgb    10.3 L


 


Hct    32.8 L


 


MCV    71.9 L


 


MCH    22.6 L


 


MCHC    31.4


 


RDW    24.0 H


 


Plt Count    373


 


MPV    9.4


 


Gran %    94.2 H


 


Lymph % (Auto)    3.1 L


 


Mono % (Auto)    2.5


 


Eos % (Auto)    0.0 L


 


Baso % (Auto)    0.2


 


Gran #    18.07 H


 


Lymph # (Auto)    0.6 L


 


Mono # (Auto)    0.5


 


Eos # (Auto)    0.0


 


Baso # (Auto)    0.03


 


PT   


 


INR   


 


APTT   


 


pO2   


 


VBG pH   


 


VBG pCO2   


 


VBG HCO3   


 


VBG O2 Sat (Calc)   


 


VBG Base Excess   


 


Sodium   


 


Potassium   


 


Chloride   


 


Carbon Dioxide   


 


Anion Gap   


 


BUN   


 


Creatinine   


 


Est GFR ( Amer)   


 


Est GFR (Non-Af Amer)   


 


POC Glucose (mg/dL)  203 H  198 H 


 


Random Glucose   


 


Lactic Acid   


 


Calcium   


 


Phosphorus   


 


Magnesium   


 


Total Bilirubin   


 


Direct Bilirubin   


 


GGT   


 


AST   


 


Alkaline Phosphatase   


 


Total Protein   


 


Albumin   


 


Globulin   


 


Albumin/Globulin Ratio   


 


PTH Intact Whole Molec   


 


Hepatitis A IgM Ab   


 


Hep Bs Antigen   


 


Hep B Core IgM Ab   


 


Hepatitis C Antibody   














  04/24/18 04/24/18 04/24/18





  05:30 05:30 05:30


 


WBC   


 


RBC   


 


Hgb   


 


Hct   


 


MCV   


 


MCH   


 


MCHC   


 


RDW   


 


Plt Count   


 


MPV   


 


Gran %   


 


Lymph % (Auto)   


 


Mono % (Auto)   


 


Eos % (Auto)   


 


Baso % (Auto)   


 


Gran #   


 


Lymph # (Auto)   


 


Mono # (Auto)   


 


Eos # (Auto)   


 


Baso # (Auto)   


 


PT    20.6 H


 


INR    1.77 H


 


APTT    27.8


 


pO2   


 


VBG pH   


 


VBG pCO2   


 


VBG HCO3   


 


VBG O2 Sat (Calc)   


 


VBG Base Excess   


 


Sodium  137  


 


Potassium  3.9  


 


Chloride  91 L  


 


Carbon Dioxide  25  


 


Anion Gap  25 H  


 


BUN  87 H  


 


Creatinine  6.7 H  


 


Est GFR ( Amer)  10  


 


Est GFR (Non-Af Amer)  8  


 


POC Glucose (mg/dL)   


 


Random Glucose  190 H  


 


Lactic Acid   1.3 


 


Calcium  8.2 L  


 


Phosphorus  7.0 H  


 


Magnesium  2.0  


 


Total Bilirubin  2.5 H  


 


Direct Bilirubin  1.5 H  


 


GGT  93 H  


 


AST  1181 H  


 


Alkaline Phosphatase  113  


 


Total Protein  6.8  


 


Albumin  3.6  


 


Globulin  3.2  


 


Albumin/Globulin Ratio  1.1  


 


PTH Intact Whole Molec   


 


Hepatitis A IgM Ab   


 


Hep Bs Antigen   


 


Hep B Core IgM Ab   


 


Hepatitis C Antibody   














  04/24/18





  06:25


 


WBC 


 


RBC 


 


Hgb 


 


Hct 


 


MCV 


 


MCH 


 


MCHC 


 


RDW 


 


Plt Count 


 


MPV 


 


Gran % 


 


Lymph % (Auto) 


 


Mono % (Auto) 


 


Eos % (Auto) 


 


Baso % (Auto) 


 


Gran # 


 


Lymph # (Auto) 


 


Mono # (Auto) 


 


Eos # (Auto) 


 


Baso # (Auto) 


 


PT 


 


INR 


 


APTT 


 


pO2 


 


VBG pH 


 


VBG pCO2 


 


VBG HCO3 


 


VBG O2 Sat (Calc) 


 


VBG Base Excess 


 


Sodium 


 


Potassium 


 


Chloride 


 


Carbon Dioxide 


 


Anion Gap 


 


BUN 


 


Creatinine 


 


Est GFR ( Amer) 


 


Est GFR (Non-Af Amer) 


 


POC Glucose (mg/dL)  177 H


 


Random Glucose 


 


Lactic Acid 


 


Calcium 


 


Phosphorus 


 


Magnesium 


 


Total Bilirubin 


 


Direct Bilirubin 


 


GGT 


 


AST 


 


Alkaline Phosphatase 


 


Total Protein 


 


Albumin 


 


Globulin 


 


Albumin/Globulin Ratio 


 


PTH Intact Whole Molec 


 


Hepatitis A IgM Ab 


 


Hep Bs Antigen 


 


Hep B Core IgM Ab 


 


Hepatitis C Antibody 











EKG/Cardiology Studies: 


Cardiology / EKG Studies





04/24/18 07:00


EKG [ELECTROCARDIOGRAM] DAILY 


   Comment: 


   Reason For Exam: BRADYCARDIA











Fingerstick Blood Sugar Results: 151





Review of Systems





- Constitutional


Constitutional: absent: Fever, Chills, Sweats





- EENT


Eyes: absent: Change in Vision


Nose/Mouth/Throat: absent: Dry Mouth, Hoarsness, Sore Throat





- Cardiovascular


Cardiovascular: absent: Chest Pain, Dyspnea





- Respiratory


Respiratory: absent: Cough, Dyspnea, Wheezing, Stridor





- Gastrointestinal


Gastrointestinal: absent: Abdominal Pain, Dysphagia, Nausea, Vomiting





- Genitourinary


Genitourinary: absent: Dysuria





- Neurological


Neurological: absent: Dizziness, Numbness, Weakness





- Psychiatric


Psychiatric: absent: Anxiety





- Endocrine


Endocrine: absent: Fatigue, UNREMARKABLE





- Hematologic/Lymphatic


Hematologic: absent: UNREMARKABLE





Critical Care Progress Note





- Nutrition


Nutrition: 


 Nutrition











 Category Date Time Status


 


 Liquid Diet [DIET] Diets  04/23/18 Lunch Ordered














Assessment/Plan





- Assessment and Plan (Free Text)


Assessment: 


This is a 67 year old male with a history of CAD s/p LAD stent 1/2018 for which 

patient was placed on plavix, paroxysmal atrial fibrillation originally on 

xarelto for anticoagulation (stopped on 4/17), DM II, chronic back pain, 

diverticulosis, crohn's disease who was recently admitted with possible GI 

bleed and atrial fibrillation with RVR then discharged who presented with 

complaints of dizziness, dark stool, and abdominal pain. Upon being admitted 

vitals revealed a blood pressure of 70/54 and HR of 44. Decrease in vitals was 

believed to be due to taking both metoprolol tartrate,cardizem, HCZT/vasartan, 

and lasix. Patient was as a result started on a glucagon drip to reverse the 

effects of potential the beta blocker intoxication. 


Elevated liver enzymes indicating acute liver injury most likely secondary to 

hypoperfusion due to decreased cardiac output due to bradycardia. During course 

of ICU visit patient was found to be in atrial fibrillation for which lopressor 

was given; due to unsuccessful rate control, cardizem drip was started; rate 

control was then achieved. 





Neuro


-AAO x3


-Lidocaine


-Morphine PRN





Cardiovascular


-Maintain MAP>65


-Keep normotensive


-Convert from Cardizem drip to PO Cardizem for rate control of atrial 

fibrillation


-Continue to hold anticoagulants





Renal


-Nephrology on consult


-Patient currently receiving Lasix 80 mg BID and 5 mg metazolone however patient

's CR has increased to 6.7 from 4.9 yesterday. Next step as per renal is for 

dialysis


-Continue to maintain euvolemia


-Monitor I&O. Overnight patient had a total output of 300; which is after 

receiving about 100 mg total of lasix.


-Continue to monitor electrolytes: potassium is normal at 3.9, hyperphosphatemia

, corrected calcium is 8.5, magnesium normal at 2.0





Pulmonary


-BiPAP procedures nightly


-Maintain SpO2>92%


-Continue to duonebs


-Continue with Solu-medrol





Gastrointestinal


-Monitor liver enzymes; continue to downtrend


-Liquid diet


-Continue with protonix for prophylaxis


-Hold anticoagulants due to history of GI bleed as per GI





Hematological


-Continue to monitor INR


-SCDs for DVT prophylaxis


-Hold anticoagulants due to history of GI bleed as per GI


-INR 1.77





Endocrine


-Maintain euglycemia


-ISS-med


-Continue with accuchecks





Infectious disease


-ID consulted


-WBC continues to trend down; elevated but keep in mind patient is on steroids 

which is contributing to leukocytosis


-Continue with Zosyn as per ID recs


-Lactic acid 1.3




















<Shashi Garza - Last Filed: 04/24/18 10:38>





CCU Objective





- Vital Signs / Intake & Output


Vital Signs (Last 4 hours): 


Vital Signs











  Pulse BP


 


 04/24/18 10:03   155/86 H


 


 04/24/18 09:47  98 H  155/86 H











Intake and Output (Last 8hrs): 


 Intake & Output











 04/23/18 04/24/18 04/24/18





 22:59 06:59 14:59


 


Intake Total 1375 280 


 


Output Total 250 300 


 


Balance 1125 -20 


 


Intake:   


 


   280 


 


    Right Antecubital  280 


 


    Right Hand 710  


 


  Oral 600  


 


Output:   


 


  Urine 250 300 


 


    Urethral (Barlow) 250 300 














- Medications


Active Medications: 


Active Medications











Generic Name Dose Route Start Last Admin





  Trade Name Freq  PRN Reason Stop Dose Admin


 


Albuterol/Ipratropium  3 ml  04/21/18 20:45  04/24/18 07:46





  Duoneb 3 Mg/0.5 Mg (3 Ml) Ud  IH   3 ml





  [id number] HUMBERTO   Administration


 


Budesonide  0.5 mg  04/21/18 20:00  04/24/18 07:46





  Pulmicort Respules  IH   0.5 mg





  [id number] HUMBERTO   Administration


 


Diltiazem HCl  60 mg  04/24/18 10:00  04/24/18 09:48





  Cardizem  PO   Not Given





  QID HUMBERTO   


 


Escitalopram Oxalate  5 mg  04/21/18 10:00  04/24/18 10:03





  Lexapro  PO   5 mg





  DAILY HUMBERTO   Administration


 


Folic Acid  1 mg  04/21/18 10:00  04/24/18 10:03





  Folic Acid  PO   1 mg





  DAILY HUMBERTO   Administration


 


Furosemide  80 mg  04/23/18 18:00  04/24/18 10:03





  Lasix  IVP   80 mg





  BID HUMBERTO   Administration


 


Piperacillin Sod/Tazobactam Sod  2.25 gm in 100 mls @ 100 mls/hr  04/22/18 12:

00  04/24/18 06:28





  Zosyn 2.25 Gm In 0.9% 100 Ml  IVPB  05/01/18 12:01  100 mls/hr





  Q6 HUMBERTO   Administration





  Protocol   


 


Insulin Human Regular  0 units  04/24/18 07:30  





  Humulin R Med  SC   





  [facility]   





  Protocol   


 


Lidocaine  1 ea  04/22/18 14:00  04/24/18 10:05





  Lidoderm  TD   1 ea





  DAILY HUMBERTO   Administration


 


Methylprednisolone  20 mg  04/23/18 13:17  04/24/18 06:27





  Solu-Medrol  IVP   20 mg





  Q8 HUMBERTO   Administration


 


Metolazone  5 mg  04/23/18 15:30  04/24/18 10:03





  Zaroxolyn  PO  04/26/18 15:31  5 mg





  BID HUMBERTO   Administration


 


Morphine Sulfate  1 mg  04/22/18 13:58  04/24/18 10:06





  Morphine  IVP   1 mg





  Q6 PRN   Administration





  PAIN, MODERATE [4-6]   


 


Oxymetazoline HCl  0 ml  04/22/18 22:00  04/24/18 09:48





  Afrin 0.05%  NS   1 spr





  Q12H HUMBERTO   Administration


 


Pantoprazole Sodium  40 mg  04/21/18 10:00  04/24/18 10:02





  Protonix Inj  IVP   40 mg





  Q12 HUMBERTO   Administration


 


Polyethylene Glycol  17 gm  04/24/18 10:00  





  Miralax  PO   





  BID HUMBERTO   














- Patient Studies


Lab Studies: 


 Microbiology Studies











 04/21/18 03:20 Blood Culture - Preliminary





 Blood    NO GROWTH AFTER 3 DAYS


 


 04/21/18 02:45 Blood Culture - Preliminary





 Blood    NO GROWTH AFTER 3 DAYS


 


 04/23/18 17:00 Gram Stain - Final





 Sputum 








 Lab Studies











  04/24/18 04/24/18 04/24/18 Range/Units





  06:25 05:30 05:30 


 


WBC     (4.5-11.0)  10^3/ul


 


RBC     (3.5-6.1)  10^6/uL


 


Hgb     (14.0-18.0)  g/dL


 


Hct     (42.0-52.0)  %


 


MCV     (80.0-105.0)  fl


 


MCH     (25.0-35.0)  pg


 


MCHC     (31.0-37.0)  g/dl


 


RDW     (11.5-14.5)  %


 


Plt Count     (120.0-450.0)  10^3/uL


 


MPV     (7.0-11.0)  fl


 


Gran %     (50.0-68.0)  %


 


Lymph % (Auto)     (22.0-35.0)  %


 


Mono % (Auto)     (1.0-6.0)  %


 


Eos % (Auto)     (1.5-5.0)  %


 


Baso % (Auto)     (0.0-3.0)  %


 


Gran #     (1.4-6.5)  


 


Lymph # (Auto)     (1.2-3.4)  


 


Mono # (Auto)     (0.1-0.6)  


 


Eos # (Auto)     (0.0-0.7)  


 


Baso # (Auto)     (0.0-2.0)  K/mm3


 


Neutrophils % (Manual)     (50.0-70.0)  %


 


Lymphocytes % (Manual)     (22.0-35.0)  %


 


Monocytes % (Manual)     (1.0-6.0)  %


 


Nucleated RBC %     %


 


Large Platelets     


 


Polychromasia     


 


Hypochromasia     


 


Poikilocytosis (manual     


 


Anisocytosis (manual)     


 


Microcytosis (manual)     


 


Ovalocytes     


 


PT   20.6 H   (9.4-12.5)  SECONDS


 


INR   1.77 H   (0.93-1.08)  


 


APTT   27.8   (25.1-36.5)  Seconds


 


Sodium     (132-148)  mmol/L


 


Potassium     (3.6-5.0)  mmol/L


 


Chloride     ()  mmol/L


 


Carbon Dioxide     (21-33)  mmol/L


 


Anion Gap     (10-20)  


 


BUN     (7-21)  mg/dL


 


Creatinine     (0.8-1.5)  mg/dl


 


Est GFR (African Amer)     


 


Est GFR (Non-Af Amer)     


 


POC Glucose (mg/dL)  177 H    ()  mg/dL


 


Random Glucose     ()  mg/dL


 


Lactic Acid    1.3  (0.7-2.1)  mmol/L


 


Calcium     (8.4-10.5)  mg/dL


 


Phosphorus     (2.5-4.5)  mg/dL


 


Magnesium     (1.7-2.2)  mg/dL


 


Total Bilirubin     (0.2-1.3)  mg/dL


 


Direct Bilirubin     (0.0-0.4)  mg/dL


 


GGT     (8-78)  U/L


 


AST     (17-59)  U/L


 


ALT     (7-56)  U/L


 


Alkaline Phosphatase     ()  U/L


 


Total Protein     (5.8-8.3)  g/dL


 


Albumin     (3.0-4.8)  g/dL


 


Globulin     gm/dL


 


Albumin/Globulin Ratio     (1.1-1.8)  


 


PTH Intact Whole Molec     (14-64)  pg/mL


 


Hepatitis C Antibody     (NEGATIVE)  














  04/24/18 04/24/18 04/24/18 Range/Units





  05:30 05:30 00:02 


 


WBC   19.2 H   (4.5-11.0)  10^3/ul


 


RBC   4.56   (3.5-6.1)  10^6/uL


 


Hgb   10.3 L   (14.0-18.0)  g/dL


 


Hct   32.8 L   (42.0-52.0)  %


 


MCV   71.9 L   (80.0-105.0)  fl


 


MCH   22.6 L   (25.0-35.0)  pg


 


MCHC   31.4   (31.0-37.0)  g/dl


 


RDW   24.0 H   (11.5-14.5)  %


 


Plt Count   373   (120.0-450.0)  10^3/uL


 


MPV   9.4   (7.0-11.0)  fl


 


Gran %   94.2 H   (50.0-68.0)  %


 


Lymph % (Auto)   3.1 L   (22.0-35.0)  %


 


Mono % (Auto)   2.5   (1.0-6.0)  %


 


Eos % (Auto)   0.0 L   (1.5-5.0)  %


 


Baso % (Auto)   0.2   (0.0-3.0)  %


 


Gran #   18.07 H   (1.4-6.5)  


 


Lymph # (Auto)   0.6 L   (1.2-3.4)  


 


Mono # (Auto)   0.5   (0.1-0.6)  


 


Eos # (Auto)   0.0   (0.0-0.7)  


 


Baso # (Auto)   0.03   (0.0-2.0)  K/mm3


 


Neutrophils % (Manual)   93 H   (50.0-70.0)  %


 


Lymphocytes % (Manual)   6 L   (22.0-35.0)  %


 


Monocytes % (Manual)   1   (1.0-6.0)  %


 


Nucleated RBC %   1   %


 


Large Platelets   Present   


 


Polychromasia   Slight   


 


Hypochromasia   1+   


 


Poikilocytosis (manual   1+   


 


Anisocytosis (manual)   1+   


 


Microcytosis (manual)   1+   


 


Ovalocytes   Slight   


 


PT     (9.4-12.5)  SECONDS


 


INR     (0.93-1.08)  


 


APTT     (25.1-36.5)  Seconds


 


Sodium  137    (132-148)  mmol/L


 


Potassium  3.9    (3.6-5.0)  mmol/L


 


Chloride  91 L    ()  mmol/L


 


Carbon Dioxide  25    (21-33)  mmol/L


 


Anion Gap  25 H    (10-20)  


 


BUN  87 H    (7-21)  mg/dL


 


Creatinine  6.7 H    (0.8-1.5)  mg/dl


 


Est GFR ( Amer)  10    


 


Est GFR (Non-Af Amer)  8    


 


POC Glucose (mg/dL)    198 H  ()  mg/dL


 


Random Glucose  190 H    ()  mg/dL


 


Lactic Acid     (0.7-2.1)  mmol/L


 


Calcium  8.2 L    (8.4-10.5)  mg/dL


 


Phosphorus  7.0 H    (2.5-4.5)  mg/dL


 


Magnesium  2.0    (1.7-2.2)  mg/dL


 


Total Bilirubin  2.5 H    (0.2-1.3)  mg/dL


 


Direct Bilirubin  1.5 H    (0.0-0.4)  mg/dL


 


GGT  93 H    (8-78)  U/L


 


AST  1181 H    (17-59)  U/L


 


ALT  2634 H    (7-56)  U/L


 


Alkaline Phosphatase  113    ()  U/L


 


Total Protein  6.8    (5.8-8.3)  g/dL


 


Albumin  3.6    (3.0-4.8)  g/dL


 


Globulin  3.2    gm/dL


 


Albumin/Globulin Ratio  1.1    (1.1-1.8)  


 


PTH Intact Whole Molec     (14-64)  pg/mL


 


Hepatitis C Antibody     (NEGATIVE)  














  04/23/18 04/23/18 04/23/18 Range/Units





  17:32 16:24 11:15 


 


WBC   21.1 H   (4.5-11.0)  10^3/ul


 


RBC   4.46   (3.5-6.1)  10^6/uL


 


Hgb   10.4 L   (14.0-18.0)  g/dL


 


Hct   32.3 L   (42.0-52.0)  %


 


MCV   72.4 L   (80.0-105.0)  fl


 


MCH   23.3 L   (25.0-35.0)  pg


 


MCHC   32.2   (31.0-37.0)  g/dl


 


RDW   24.1 H   (11.5-14.5)  %


 


Plt Count   376   (120.0-450.0)  10^3/uL


 


MPV   9.4   (7.0-11.0)  fl


 


Gran %   91.3 H   (50.0-68.0)  %


 


Lymph % (Auto)   4.3 L   (22.0-35.0)  %


 


Mono % (Auto)   4.2   (1.0-6.0)  %


 


Eos % (Auto)   0.0 L   (1.5-5.0)  %


 


Baso % (Auto)   0.2   (0.0-3.0)  %


 


Gran #   19.26 H   (1.4-6.5)  


 


Lymph # (Auto)   0.9 L   (1.2-3.4)  


 


Mono # (Auto)   0.9 H   (0.1-0.6)  


 


Eos # (Auto)   0.0   (0.0-0.7)  


 


Baso # (Auto)   0.04   (0.0-2.0)  K/mm3


 


Neutrophils % (Manual)     (50.0-70.0)  %


 


Lymphocytes % (Manual)     (22.0-35.0)  %


 


Monocytes % (Manual)     (1.0-6.0)  %


 


Nucleated RBC %     %


 


Large Platelets     


 


Polychromasia     


 


Hypochromasia     


 


Poikilocytosis (manual     


 


Anisocytosis (manual)     


 


Microcytosis (manual)     


 


Ovalocytes     


 


PT     (9.4-12.5)  SECONDS


 


INR     (0.93-1.08)  


 


APTT     (25.1-36.5)  Seconds


 


Sodium     (132-148)  mmol/L


 


Potassium     (3.6-5.0)  mmol/L


 


Chloride     ()  mmol/L


 


Carbon Dioxide     (21-33)  mmol/L


 


Anion Gap     (10-20)  


 


BUN     (7-21)  mg/dL


 


Creatinine     (0.8-1.5)  mg/dl


 


Est GFR (African Amer)     


 


Est GFR (Non-Af Amer)     


 


POC Glucose (mg/dL)  203 H   114 H  ()  mg/dL


 


Random Glucose     ()  mg/dL


 


Lactic Acid     (0.7-2.1)  mmol/L


 


Calcium     (8.4-10.5)  mg/dL


 


Phosphorus     (2.5-4.5)  mg/dL


 


Magnesium     (1.7-2.2)  mg/dL


 


Total Bilirubin     (0.2-1.3)  mg/dL


 


Direct Bilirubin     (0.0-0.4)  mg/dL


 


GGT     (8-78)  U/L


 


AST     (17-59)  U/L


 


ALT     (7-56)  U/L


 


Alkaline Phosphatase     ()  U/L


 


Total Protein     (5.8-8.3)  g/dL


 


Albumin     (3.0-4.8)  g/dL


 


Globulin     gm/dL


 


Albumin/Globulin Ratio     (1.1-1.8)  


 


PTH Intact Whole Molec     (14-64)  pg/mL


 


Hepatitis C Antibody     (NEGATIVE)  














  04/21/18 04/21/18 Range/Units





  05:00 05:00 


 


WBC    (4.5-11.0)  10^3/ul


 


RBC    (3.5-6.1)  10^6/uL


 


Hgb    (14.0-18.0)  g/dL


 


Hct    (42.0-52.0)  %


 


MCV    (80.0-105.0)  fl


 


MCH    (25.0-35.0)  pg


 


MCHC    (31.0-37.0)  g/dl


 


RDW    (11.5-14.5)  %


 


Plt Count    (120.0-450.0)  10^3/uL


 


MPV    (7.0-11.0)  fl


 


Gran %    (50.0-68.0)  %


 


Lymph % (Auto)    (22.0-35.0)  %


 


Mono % (Auto)    (1.0-6.0)  %


 


Eos % (Auto)    (1.5-5.0)  %


 


Baso % (Auto)    (0.0-3.0)  %


 


Gran #    (1.4-6.5)  


 


Lymph # (Auto)    (1.2-3.4)  


 


Mono # (Auto)    (0.1-0.6)  


 


Eos # (Auto)    (0.0-0.7)  


 


Baso # (Auto)    (0.0-2.0)  K/mm3


 


Neutrophils % (Manual)    (50.0-70.0)  %


 


Lymphocytes % (Manual)    (22.0-35.0)  %


 


Monocytes % (Manual)    (1.0-6.0)  %


 


Nucleated RBC %    %


 


Large Platelets    


 


Polychromasia    


 


Hypochromasia    


 


Poikilocytosis (manual    


 


Anisocytosis (manual)    


 


Microcytosis (manual)    


 


Ovalocytes    


 


PT    (9.4-12.5)  SECONDS


 


INR    (0.93-1.08)  


 


APTT    (25.1-36.5)  Seconds


 


Sodium    (132-148)  mmol/L


 


Potassium    (3.6-5.0)  mmol/L


 


Chloride    ()  mmol/L


 


Carbon Dioxide    (21-33)  mmol/L


 


Anion Gap    (10-20)  


 


BUN    (7-21)  mg/dL


 


Creatinine    (0.8-1.5)  mg/dl


 


Est GFR (African Amer)    


 


Est GFR (Non-Af Amer)    


 


POC Glucose (mg/dL)    ()  mg/dL


 


Random Glucose    ()  mg/dL


 


Lactic Acid    (0.7-2.1)  mmol/L


 


Calcium    (8.4-10.5)  mg/dL


 


Phosphorus    (2.5-4.5)  mg/dL


 


Magnesium    (1.7-2.2)  mg/dL


 


Total Bilirubin    (0.2-1.3)  mg/dL


 


Direct Bilirubin    (0.0-0.4)  mg/dL


 


GGT    (8-78)  U/L


 


AST    (17-59)  U/L


 


ALT    (7-56)  U/L


 


Alkaline Phosphatase    ()  U/L


 


Total Protein    (5.8-8.3)  g/dL


 


Albumin    (3.0-4.8)  g/dL


 


Globulin    gm/dL


 


Albumin/Globulin Ratio    (1.1-1.8)  


 


PTH Intact Whole Molec   237 H  (14-64)  pg/mL


 


Hepatitis C Antibody  Reactive   (NEGATIVE)  








 Laboratory Results - last 24 hr











  04/21/18 04/21/18 04/23/18





  05:00 05:00 11:15


 


WBC   


 


RBC   


 


Hgb   


 


Hct   


 


MCV   


 


MCH   


 


MCHC   


 


RDW   


 


Plt Count   


 


MPV   


 


Gran %   


 


Lymph % (Auto)   


 


Mono % (Auto)   


 


Eos % (Auto)   


 


Baso % (Auto)   


 


Gran #   


 


Lymph # (Auto)   


 


Mono # (Auto)   


 


Eos # (Auto)   


 


Baso # (Auto)   


 


Neutrophils % (Manual)   


 


Lymphocytes % (Manual)   


 


Monocytes % (Manual)   


 


Nucleated RBC %   


 


Large Platelets   


 


Polychromasia   


 


Hypochromasia   


 


Poikilocytosis (manual   


 


Anisocytosis (manual)   


 


Microcytosis (manual)   


 


Ovalocytes   


 


PT   


 


INR   


 


APTT   


 


Sodium   


 


Potassium   


 


Chloride   


 


Carbon Dioxide   


 


Anion Gap   


 


BUN   


 


Creatinine   


 


Est GFR ( Amer)   


 


Est GFR (Non-Af Amer)   


 


POC Glucose (mg/dL)    114 H


 


Random Glucose   


 


Lactic Acid   


 


Calcium   


 


Phosphorus   


 


Magnesium   


 


Total Bilirubin   


 


Direct Bilirubin   


 


GGT   


 


AST   


 


ALT   


 


Alkaline Phosphatase   


 


Total Protein   


 


Albumin   


 


Globulin   


 


Albumin/Globulin Ratio   


 


PTH Intact Whole Molec  237 H  


 


Hepatitis C Antibody   Reactive 














  04/23/18 04/23/18 04/24/18





  16:24 17:32 00:02


 


WBC  21.1 H  


 


RBC  4.46  


 


Hgb  10.4 L  


 


Hct  32.3 L  


 


MCV  72.4 L  


 


MCH  23.3 L  


 


MCHC  32.2  


 


RDW  24.1 H  


 


Plt Count  376  


 


MPV  9.4  


 


Gran %  91.3 H  


 


Lymph % (Auto)  4.3 L  


 


Mono % (Auto)  4.2  


 


Eos % (Auto)  0.0 L  


 


Baso % (Auto)  0.2  


 


Gran #  19.26 H  


 


Lymph # (Auto)  0.9 L  


 


Mono # (Auto)  0.9 H  


 


Eos # (Auto)  0.0  


 


Baso # (Auto)  0.04  


 


Neutrophils % (Manual)   


 


Lymphocytes % (Manual)   


 


Monocytes % (Manual)   


 


Nucleated RBC %   


 


Large Platelets   


 


Polychromasia   


 


Hypochromasia   


 


Poikilocytosis (manual   


 


Anisocytosis (manual)   


 


Microcytosis (manual)   


 


Ovalocytes   


 


PT   


 


INR   


 


APTT   


 


Sodium   


 


Potassium   


 


Chloride   


 


Carbon Dioxide   


 


Anion Gap   


 


BUN   


 


Creatinine   


 


Est GFR ( Amer)   


 


Est GFR (Non-Af Amer)   


 


POC Glucose (mg/dL)   203 H  198 H


 


Random Glucose   


 


Lactic Acid   


 


Calcium   


 


Phosphorus   


 


Magnesium   


 


Total Bilirubin   


 


Direct Bilirubin   


 


GGT   


 


AST   


 


ALT   


 


Alkaline Phosphatase   


 


Total Protein   


 


Albumin   


 


Globulin   


 


Albumin/Globulin Ratio   


 


PTH Intact Whole Molec   


 


Hepatitis C Antibody   














  04/24/18 04/24/18 04/24/18





  05:30 05:30 05:30


 


WBC  19.2 H  


 


RBC  4.56  


 


Hgb  10.3 L  


 


Hct  32.8 L  


 


MCV  71.9 L  


 


MCH  22.6 L  


 


MCHC  31.4  


 


RDW  24.0 H  


 


Plt Count  373  


 


MPV  9.4  


 


Gran %  94.2 H  


 


Lymph % (Auto)  3.1 L  


 


Mono % (Auto)  2.5  


 


Eos % (Auto)  0.0 L  


 


Baso % (Auto)  0.2  


 


Gran #  18.07 H  


 


Lymph # (Auto)  0.6 L  


 


Mono # (Auto)  0.5  


 


Eos # (Auto)  0.0  


 


Baso # (Auto)  0.03  


 


Neutrophils % (Manual)  93 H  


 


Lymphocytes % (Manual)  6 L  


 


Monocytes % (Manual)  1  


 


Nucleated RBC %  1  


 


Large Platelets  Present  


 


Polychromasia  Slight  


 


Hypochromasia  1+  


 


Poikilocytosis (manual  1+  


 


Anisocytosis (manual)  1+  


 


Microcytosis (manual)  1+  


 


Ovalocytes  Slight  


 


PT   


 


INR   


 


APTT   


 


Sodium   137 


 


Potassium   3.9 


 


Chloride   91 L 


 


Carbon Dioxide   25 


 


Anion Gap   25 H 


 


BUN   87 H 


 


Creatinine   6.7 H 


 


Est GFR ( Amer)   10 


 


Est GFR (Non-Af Amer)   8 


 


POC Glucose (mg/dL)   


 


Random Glucose   190 H 


 


Lactic Acid    1.3


 


Calcium   8.2 L 


 


Phosphorus   7.0 H 


 


Magnesium   2.0 


 


Total Bilirubin   2.5 H 


 


Direct Bilirubin   1.5 H 


 


GGT   93 H 


 


AST   1181 H 


 


ALT   2634 H 


 


Alkaline Phosphatase   113 


 


Total Protein   6.8 


 


Albumin   3.6 


 


Globulin   3.2 


 


Albumin/Globulin Ratio   1.1 


 


PTH Intact Whole Molec   


 


Hepatitis C Antibody   














  04/24/18 04/24/18





  05:30 06:25


 


WBC  


 


RBC  


 


Hgb  


 


Hct  


 


MCV  


 


MCH  


 


MCHC  


 


RDW  


 


Plt Count  


 


MPV  


 


Gran %  


 


Lymph % (Auto)  


 


Mono % (Auto)  


 


Eos % (Auto)  


 


Baso % (Auto)  


 


Gran #  


 


Lymph # (Auto)  


 


Mono # (Auto)  


 


Eos # (Auto)  


 


Baso # (Auto)  


 


Neutrophils % (Manual)  


 


Lymphocytes % (Manual)  


 


Monocytes % (Manual)  


 


Nucleated RBC %  


 


Large Platelets  


 


Polychromasia  


 


Hypochromasia  


 


Poikilocytosis (manual  


 


Anisocytosis (manual)  


 


Microcytosis (manual)  


 


Ovalocytes  


 


PT  20.6 H 


 


INR  1.77 H 


 


APTT  27.8 


 


Sodium  


 


Potassium  


 


Chloride  


 


Carbon Dioxide  


 


Anion Gap  


 


BUN  


 


Creatinine  


 


Est GFR ( Amer)  


 


Est GFR (Non-Af Amer)  


 


POC Glucose (mg/dL)   177 H


 


Random Glucose  


 


Lactic Acid  


 


Calcium  


 


Phosphorus  


 


Magnesium  


 


Total Bilirubin  


 


Direct Bilirubin  


 


GGT  


 


AST  


 


ALT  


 


Alkaline Phosphatase  


 


Total Protein  


 


Albumin  


 


Globulin  


 


Albumin/Globulin Ratio  


 


PTH Intact Whole Molec  


 


Hepatitis C Antibody  











EKG/Cardiology Studies: 


Cardiology / EKG Studies





04/24/18 07:00


EKG [ELECTROCARDIOGRAM] DAILY 


   Comment: 


   Reason For Exam: BRADYCARDIA














Critical Care Progress Note





- Nutrition


Nutrition: 


 Nutrition











 Category Date Time Status


 


 Liquid Diet [DIET] Diets  04/23/18 Lunch Ordered














Assessment/Plan





- Assessment and Plan (Free Text)


Assessment: 


Patient seen and examined on rounds with resident, agree with note with 

following additions/exceptions:


Patient is a 67 year old male with a history of CAD s/p LAD on plavix, PAF on 

xarelto, DM II, chronic back pain, diverticulosis, crohn's disease who 

presented with complaints of dizziness, dark stool, and abdominal pain. Found 

to be hypotensive, 70/54 and HR of 44. Patient was as a result started on a 

glucagon drip to reverse the effects of potential the beta blocker 

intoxication. Currently afebrile, HD stable, comfortable on cardizem drip which 

is being titrated off, switched to PO cardizem. Renal function worsening, with 

minimal UOP, will need HD as per renal, shiley placement. 





Shock, resolved


Afib


BHARGAVI


Anemia


Abnormal LFTs





Recommend:


- supp o2 as needed


- follow up cultures, cont with Abx as per ID


- titrate off cardizem drip, switch to PO cardizem


- hold A/C for now


- ECHO


- shiley placement by IR


- HD as per renal


- follow up renal


- monitor HH


- BIPAP at night


- GI ppx


- DVT ppx


- Monitor in MICU

## 2018-04-24 NOTE — CP.PCM.PN
Subjective





- Date & Time of Evaluation


Date of Evaluation: 04/24/18


Time of Evaluation: 07:48





- Subjective


Subjective: 





Surgery: Dr. Huerta





Pt seen and examined. No acute events overnight. Continues to have neck and 

back pain. No abd pain. No further episodes of bloody BM.





Objective





- Vital Signs/Intake and Output


Vital Signs (last 24 hours): 


 











Temp Pulse Resp BP Pulse Ox


 


 98.3 F   72   16   122/70   96 


 


 04/23/18 16:48  04/24/18 05:50  04/24/18 05:00  04/24/18 05:00  04/24/18 05:00








Intake and Output: 


 











 04/24/18 04/24/18





 06:59 18:59


 


Intake Total 345 


 


Output Total 300 


 


Balance 45 














- Medications


Medications: 


 Current Medications





Albuterol/Ipratropium (Duoneb 3 Mg/0.5 Mg (3 Ml) Ud)  3 ml IH [id number] [facility]


   Last Admin: 04/24/18 07:46 Dose:  3 ml


Budesonide (Pulmicort Respules)  0.5 mg IH [id number] [facility]


   Last Admin: 04/24/18 07:46 Dose:  0.5 mg


Escitalopram Oxalate (Lexapro)  5 mg PO DAILY [facility]


   Last Admin: 04/23/18 10:08 Dose:  5 mg


Folic Acid (Folic Acid)  1 mg PO DAILY [facility]


   Last Admin: 04/23/18 10:08 Dose:  1 mg


Furosemide (Lasix)  80 mg IVP BID [facility]


   Last Admin: 04/23/18 17:00 Dose:  80 mg


Piperacillin Sod/Tazobactam Sod (Zosyn 2.25 Gm In 0.9% 100 Ml)  2.25 gm in 100 

mls @ 100 mls/hr IVPB Q6 HUMBERTO


   PRN Reason: Protocol


   Stop: 05/01/18 12:01


   Last Admin: 04/24/18 06:28 Dose:  100 mls/hr


diltiaZEM IVPB 100mg in NS (Cardizem 100mg In Ns)  100 mls @ 5 mls/hr IV .Q20H 

PRN; Protocol; 5 MG/HR


   PRN Reason: TITRATE PER MD ORDER


   Last Admin: 04/23/18 19:25 Dose:  15 mg/hr, 15 mls/hr


Insulin Human Regular (Humulin R Med)  0 units SC ACHS [facility]


   PRN Reason: Protocol


Lidocaine (Lidoderm)  1 ea TD DAILY [facility]


   Last Admin: 04/23/18 10:08 Dose:  1 ea


Methylprednisolone (Solu-Medrol)  20 mg IVP Q8 [facility]


   Last Admin: 04/24/18 06:27 Dose:  20 mg


Metolazone (Zaroxolyn)  5 mg PO BID [facility]


   Stop: 04/26/18 15:31


   Last Admin: 04/23/18 17:01 Dose:  5 mg


Morphine Sulfate (Morphine)  1 mg IVP Q6 PRN


   PRN Reason: PAIN, MODERATE [4-6]


   Last Admin: 04/24/18 00:38 Dose:  1 mg


Oxymetazoline HCl (Afrin 0.05%)  0 ml NS Q12H [facility]


   Last Admin: 04/23/18 21:21 Dose:  2 spr


Pantoprazole Sodium (Protonix Inj)  40 mg IVP Q12 [facility]


   Last Admin: 04/23/18 21:18 Dose:  40 mg











- Labs


Labs: 


 





 04/24/18 05:30 





 04/24/18 05:30 





 











PT  20.6 SECONDS (9.4-12.5)  H  04/24/18  05:30    


 


INR  1.77  (0.93-1.08)  H  04/24/18  05:30    


 


APTT  27.8 Seconds (25.1-36.5)   04/24/18  05:30    














- Constitutional


Appears: Non-toxic, No Acute Distress





- Head Exam


Head Exam: ATRAUMATIC, NORMOCEPHALIC





- Eye Exam


Eye Exam: EOMI





- ENT Exam


ENT Exam: Mucous Membranes Moist





- Neck Exam


Neck Exam: Full ROM





- Respiratory Exam


Respiratory Exam: NORMAL BREATHING PATTERN.  absent: Accessory Muscle Use, 

Respiratory Distress





- Cardiovascular Exam


Cardiovascular Exam: Irregular Rhythm





- GI/Abdominal Exam


GI & Abdominal Exam: Distended, Firm.  absent: Guarding, Rigid, Soft, Tenderness

, Hernia, Rebound





- Extremities Exam


Extremities Exam: absent: Calf Tenderness, Pedal Edema





- Neurological Exam


Neurological Exam: Alert, Awake, Oriented x3





Assessment and Plan





- Assessment and Plan (Free Text)


Assessment: 





67M w. Shock liver, BHARGAVI, GI bleed and concerns for ischemic colitis 2/2 to 

hypotension/bradycardia


Plan: 





-H/H stable, continue to monitor, transfuse PRN keep hgb >9


-c/w CLD


-serial abd exams


-continue to monitor for bloody BM


-d/w attending





[facility] [id number]

## 2018-04-24 NOTE — CARD
--------------- APPROVED REPORT --------------





EKG Measurement

Heart [id number]

[id number]

[id number]

[id number]



<Conclusion>

Atrial fibrillation

Right bundle branch block

Inferior infarct, age undetermined

Abnormal ECG

## 2018-04-24 NOTE — PN
DATE:  04/24/2018



SUBJECTIVE:  Patient is in bed, in no acute distress, nontoxic.



PHYSICAL EXAMINATION:

VITAL SIGNS:  Temperature is 98, blood pressure is 132/80, respiratory rate

of 31, heart rate of 85.

HEENT:  Unremarkable.

NECK:  Supple.

LUNGS:  Have decreased breath sounds.

HEART:  Normal S1, S2.

ABDOMEN:  Soft, nontender.



LABORATORY EXAMINATION:  Reveals the patient's white count of 19,200,

hemoglobin of 10, platelets of 373.  BUN of 87, creatinine of 6.7,

procalcitonin 0.22.  Urinalysis is noted.  Serology is noted.  Microbiology

reveals the blood cultures are negative, naris is negative, urine cultures

are negative, and sputum cultures are pending.  Review of orders reveals

the patient to be on Zosyn and Solu-Medrol.  Of note is the patient's

procalcitonin is 0.22.  Dr. Oren Harden's consultation is reviewed.  Dr. Florez's progress note is appreciated.  Dr. Pratt's progress note is

reviewed.  Patient's LFTs are improving.



ASSESSMENT AND PLAN:  This is a 67-year-old male who was seen early this

morning in 129, bed 1, doing well, responsive, awake and alert with severe

sepsis, probably intra-abdominal infection with transaminitis, Crohn's

disease, depression, dyslipidemia, chronic congestive heart failure,

obesity, BMI of 37.  Continue with Zosyn.  All cultures are negative. 

Procalcitonin is negative.  We will complete a short course of Zosyn; today

is day number 3.







__________________________________________

Arturo Lu MD



DD:  04/24/2018 16:59:19

DT:  04/24/2018 17:04:44

Job # [id number]

## 2018-04-25 LAB
ALBUMIN SERPL-MCNC: 3.7 G/DL (ref 3–4.8)
ALBUMIN/GLOB SERPL: 1.1 {RATIO} (ref 1.1–1.8)
ALT SERPL-CCNC: 1802 U/L (ref 7–56)
APTT BLD: 26.7 SECONDS (ref 25.1–36.5)
AST SERPL-CCNC: 422 U/L (ref 17–59)
BASOPHILS # BLD AUTO: 0.02 K/MM3 (ref 0–2)
BASOPHILS NFR BLD: 0.1 % (ref 0–3)
BILIRUB DIRECT SERPL-MCNC: 1.2 MG/DL (ref 0–0.4)
BUN SERPL-MCNC: 78 MG/DL (ref 7–21)
CALCIUM SERPL-MCNC: 8.5 MG/DL (ref 8.4–10.5)
EOSINOPHIL # BLD: 0 10*3/UL (ref 0–0.7)
EOSINOPHIL NFR BLD: 0 % (ref 1.5–5)
ERYTHROCYTE [DISTWIDTH] IN BLOOD BY AUTOMATED COUNT: 24.6 % (ref 11.5–14.5)
GAMMA GLUTAMYL TRANSPEPTIDASE: 90 U/L (ref 8–78)
GFR NON-AFRICAN AMERICAN: 8
GRANULOCYTES # BLD: 16.85 10*3/UL (ref 1.4–6.5)
GRANULOCYTES NFR BLD: 92.2 % (ref 50–68)
HGB BLD-MCNC: 10.5 G/DL (ref 14–18)
INR PPP: 1.58 (ref 0.93–1.08)
LYMPHOCYTES # BLD: 0.5 10*3/UL (ref 1.2–3.4)
LYMPHOCYTES NFR BLD AUTO: 2.6 % (ref 22–35)
MCH RBC QN AUTO: 22.7 PG (ref 25–35)
MCHC RBC AUTO-ENTMCNC: 31.9 G/DL (ref 31–37)
MCV RBC AUTO: 71.1 FL (ref 80–105)
MONOCYTES # BLD AUTO: 0.9 10*3/UL (ref 0.1–0.6)
MONOCYTES NFR BLD: 5.1 % (ref 1–6)
PLATELET # BLD: 399 10^3/UL (ref 120–450)
PMV BLD AUTO: 9.4 FL (ref 7–11)
PROTHROMBIN TIME: 18.3 SECONDS (ref 9.4–12.5)
RBC # BLD AUTO: 4.63 10^6/UL (ref 3.5–6.1)
WBC # BLD AUTO: 18.3 10^3/UL (ref 4.5–11)

## 2018-04-25 PROCEDURE — 5A1D70Z PERFORMANCE OF URINARY FILTRATION, INTERMITTENT, LESS THAN 6 HOURS PER DAY: ICD-10-PCS

## 2018-04-25 RX ADMIN — BUDESONIDE SCH MG: 0.5 SUSPENSION RESPIRATORY (INHALATION) at 20:30

## 2018-04-25 RX ADMIN — INSULIN HUMAN SCH: 100 INJECTION, SOLUTION PARENTERAL at 22:26

## 2018-04-25 RX ADMIN — PIPERACILLIN SODIUM AND TAZOBACTAM SODIUM SCH MLS/HR: .25; 2 INJECTION, POWDER, LYOPHILIZED, FOR SOLUTION INTRAVENOUS at 07:47

## 2018-04-25 RX ADMIN — METHYLPREDNISOLONE SODIUM SUCCINATE SCH MG: 40 INJECTION, POWDER, FOR SOLUTION INTRAMUSCULAR; INTRAVENOUS at 07:47

## 2018-04-25 RX ADMIN — METHYLPREDNISOLONE SODIUM SUCCINATE SCH MG: 40 INJECTION, POWDER, FOR SOLUTION INTRAMUSCULAR; INTRAVENOUS at 13:00

## 2018-04-25 RX ADMIN — OXYMETAZOLINE HYDROCHLORIDE SCH SPR: 0.05 SPRAY NASAL at 00:12

## 2018-04-25 RX ADMIN — IPRATROPIUM BROMIDE AND ALBUTEROL SULFATE SCH ML: .5; 3 SOLUTION RESPIRATORY (INHALATION) at 07:19

## 2018-04-25 RX ADMIN — PIPERACILLIN SODIUM AND TAZOBACTAM SODIUM SCH MLS/HR: .25; 2 INJECTION, POWDER, LYOPHILIZED, FOR SOLUTION INTRAVENOUS at 02:11

## 2018-04-25 RX ADMIN — MORPHINE SULFATE PRN MG: 4 INJECTION, SOLUTION INTRAMUSCULAR; INTRAVENOUS at 23:35

## 2018-04-25 RX ADMIN — INSULIN HUMAN SCH UNITS: 100 INJECTION, SOLUTION PARENTERAL at 12:38

## 2018-04-25 RX ADMIN — PIPERACILLIN SODIUM AND TAZOBACTAM SODIUM SCH MLS/HR: .25; 2 INJECTION, POWDER, LYOPHILIZED, FOR SOLUTION INTRAVENOUS at 23:07

## 2018-04-25 RX ADMIN — MORPHINE SULFATE PRN MG: 4 INJECTION, SOLUTION INTRAMUSCULAR; INTRAVENOUS at 10:03

## 2018-04-25 RX ADMIN — IPRATROPIUM BROMIDE AND ALBUTEROL SULFATE SCH ML: .5; 3 SOLUTION RESPIRATORY (INHALATION) at 20:30

## 2018-04-25 RX ADMIN — POLYETHYLENE GLYCOL 3350 SCH: 17 POWDER, FOR SOLUTION ORAL at 09:57

## 2018-04-25 RX ADMIN — MORPHINE SULFATE PRN MG: 4 INJECTION, SOLUTION INTRAMUSCULAR; INTRAVENOUS at 18:28

## 2018-04-25 RX ADMIN — INSULIN HUMAN SCH UNITS: 100 INJECTION, SOLUTION PARENTERAL at 18:27

## 2018-04-25 RX ADMIN — METHYLPREDNISOLONE SODIUM SUCCINATE SCH MG: 40 INJECTION, POWDER, FOR SOLUTION INTRAMUSCULAR; INTRAVENOUS at 22:20

## 2018-04-25 RX ADMIN — PIPERACILLIN SODIUM AND TAZOBACTAM SODIUM SCH MLS/HR: .25; 2 INJECTION, POWDER, LYOPHILIZED, FOR SOLUTION INTRAVENOUS at 18:11

## 2018-04-25 RX ADMIN — BUDESONIDE SCH MG: 0.5 SUSPENSION RESPIRATORY (INHALATION) at 07:19

## 2018-04-25 RX ADMIN — PIPERACILLIN SODIUM AND TAZOBACTAM SODIUM SCH MLS/HR: .25; 2 INJECTION, POWDER, LYOPHILIZED, FOR SOLUTION INTRAVENOUS at 11:57

## 2018-04-25 RX ADMIN — POLYETHYLENE GLYCOL 3350 SCH GM: 17 POWDER, FOR SOLUTION ORAL at 18:11

## 2018-04-25 RX ADMIN — IPRATROPIUM BROMIDE AND ALBUTEROL SULFATE SCH ML: .5; 3 SOLUTION RESPIRATORY (INHALATION) at 16:30

## 2018-04-25 RX ADMIN — IPRATROPIUM BROMIDE AND ALBUTEROL SULFATE SCH ML: .5; 3 SOLUTION RESPIRATORY (INHALATION) at 04:15

## 2018-04-25 RX ADMIN — IPRATROPIUM BROMIDE AND ALBUTEROL SULFATE SCH ML: .5; 3 SOLUTION RESPIRATORY (INHALATION) at 00:00

## 2018-04-25 RX ADMIN — OXYMETAZOLINE HYDROCHLORIDE SCH SPR: 0.05 SPRAY NASAL at 09:50

## 2018-04-25 RX ADMIN — IPRATROPIUM BROMIDE AND ALBUTEROL SULFATE SCH ML: .5; 3 SOLUTION RESPIRATORY (INHALATION) at 11:46

## 2018-04-25 RX ADMIN — Medication SCH TAB: at 09:51

## 2018-04-25 RX ADMIN — INSULIN HUMAN SCH: 100 INJECTION, SOLUTION PARENTERAL at 07:30

## 2018-04-25 RX ADMIN — OXYMETAZOLINE HYDROCHLORIDE SCH SPR: 0.05 SPRAY NASAL at 22:24

## 2018-04-25 NOTE — VASCULAR
PROCEDURE:  Ultrasound and fluoroscopic tunneled right IJ dialysis 

catheter.



CLINICAL HISTORY:  Acute renal failure. Needs dialysis access.



PHYSICIAN(S):  Olu Morales M.D.



TECHNIQUE:

The relative risks and indications for the procedure were explained 

to the patient and informed written consent obtained. The patient was 

placed supine on the arteriography table and the right neck/chest was 

prepped and draped in the usual sterile fashion. 1% Xylocaine was 

used to anesthetize the skin and soft tissues at the puncture site. 

Conscious sedation and monitoring were provided throughout the 

procedure by a nurse.



Under direct ultrasound guidance, the rightinternal jugular vein was 

punctured with a micropuncture set. A 0.035 Glidewire was advanced 

into the IVC. Sequential dilatation was performed with subsequent 

placement of a 28cmNextgen catheter with its tip in the right atrium. 

A retrograde tunnel below the right clavicle was performed. The 

catheter was trimmed and the hub attached. Both ports aspirate and 

inject easily. The catheter was secured and a dressing applied. The 

patient tolerated the procedure well. 



IMPRESSION:

1. Ultrasound and fluoroscopically placed right IJ tunneled dialysis 

catheter.

## 2018-04-25 NOTE — CP.PCM.PN
Subjective





- Date & Time of Evaluation


Date of Evaluation: 04/25/18


Time of Evaluation: 10:47





- Subjective


Subjective: 





Surgery: Dr. Huerta





Pt seen and examined. No acute events overnight. Throat pain improved. No 

bloody BM, tolerating diet.





Objective





- Vital Signs/Intake and Output


Vital Signs (last 24 hours): 


 











Temp Pulse Resp BP Pulse Ox


 


 98.2 F   88   15   138/88   99 


 


 04/24/18 18:08  04/25/18 09:51  04/25/18 08:00  04/25/18 09:51  04/25/18 08:00











- Medications


Medications: 


 Current Medications





Albuterol/Ipratropium (Duoneb 3 Mg/0.5 Mg (3 Ml) Ud)  3 ml IH [id number] [facility]


   Last Admin: 04/25/18 07:19 Dose:  3 ml


Budesonide (Pulmicort Respules)  0.5 mg IH [id number] [facility]


   Last Admin: 04/25/18 07:19 Dose:  0.5 mg


Diltiazem HCl (Cardizem)  60 mg PO QID [facility]


   Last Admin: 04/25/18 09:51 Dose:  60 mg


Escitalopram Oxalate (Lexapro)  5 mg PO DAILY [facility]


   Last Admin: 04/25/18 09:51 Dose:  5 mg


Folic Acid (Folic Acid)  1 mg PO DAILY [facility]


   Last Admin: 04/25/18 09:51 Dose:  1 mg


Piperacillin Sod/Tazobactam Sod (Zosyn 2.25 Gm In 0.9% 100 Ml)  2.25 gm in 100 

mls @ 100 mls/hr IVPB Q6 [facility]


   PRN Reason: Protocol


   Stop: 05/01/18 12:01


   Last Admin: 04/25/18 07:47 Dose:  100 mls/hr


Insulin Human Regular (Humulin R Med)  0 units SC ACHS [facility]


   PRN Reason: Protocol


   Last Admin: 04/25/18 07:30 Dose:  Not Given


Lidocaine (Lidoderm)  1 ea TD DAILY [facility]


   Last Admin: 04/25/18 09:50 Dose:  1 ea


Methylprednisolone (Solu-Medrol)  20 mg IVP Q8 [facility]


   Last Admin: 04/25/18 07:47 Dose:  20 mg


Morphine Sulfate (Morphine)  1 mg IVP Q6 [facility]


   PRN Reason: PAIN, MODERATE [4-6]


   Last Admin: 04/25/18 10:03 Dose:  1 mg


Oxymetazoline HCl (Afrin 0.05%)  0 ml NS Q12H [facility]


   Last Admin: 04/25/18 09:50 Dose:  2 spr


Pantoprazole Sodium (Protonix Inj)  40 mg IVP Q12 [facility]


   Last Admin: 04/25/18 09:50 Dose:  40 mg


Polyethylene Glycol (Miralax)  17 gm PO BID [facility]


   Last Admin: 04/25/18 09:57 Dose:  Not Given


Sevelamer HCl (Renagel)  800 mg PO TID [facility]


   Last Admin: 04/25/18 09:51 Dose:  800 mg


Vitamin B Complex/Vit C/Folic Acid (Nephro-Javier)  1 tab PO 0800 [facility]


   Last Admin: 04/25/18 09:51 Dose:  1 tab











- Labs


Labs: 


 





 04/25/18 06:15 





 04/25/18 06:15 





 











PT  18.3 SECONDS (9.4-12.5)  H  04/25/18  06:15    


 


INR  1.58  (0.93-1.08)  H  04/25/18  06:15    


 


APTT  26.7 Seconds (25.1-36.5)   04/25/18  06:15    














- Constitutional


Appears: Non-toxic, No Acute Distress





- Eye Exam


Eye Exam: EOMI





- ENT Exam


ENT Exam: Mucous Membranes Moist





- Respiratory Exam


Respiratory Exam: NORMAL BREATHING PATTERN.  absent: Accessory Muscle Use, 

Respiratory Distress





- GI/Abdominal Exam


GI & Abdominal Exam: Distended, Firm.  absent: Guarding, Rigid, Soft, Tenderness

, Rebound





- Extremities Exam


Extremities Exam: absent: Calf Tenderness





- Neurological Exam


Neurological Exam: Alert, Awake, Oriented x3





Assessment and Plan





- Assessment and Plan (Free Text)


Assessment: 





67M w. Shock liver, BHARGAVI, GI bleed and concerns for ischemic colitis 2/2 to 

hypotension/bradycardia


Plan: 





-c/w renal diet


-serial abd exams


-monitor for bloody BM


-monitor H/H, tranfuse PRN, keep hgb >9


-no plans for surgery at this time


-d/w attending





[facility] PGY3

## 2018-04-25 NOTE — CP.PCM.PN
Subjective





- Date & Time of Evaluation


Date of Evaluation: 04/25/18


Time of Evaluation: 09:50





- Subjective


Subjective: 





Patient is comfortable on a chair, no fevers. Currently no abdominal pain.





Objective





- Vital Signs/Intake and Output


Vital Signs (last 24 hours): 


 











Temp Pulse Resp BP Pulse Ox


 


 98.2 F   89   15   155/85 H  97 


 


 04/24/18 18:08  04/25/18 13:30  04/25/18 13:30  04/25/18 13:30  04/25/18 13:30











- Medications


Medications: 


 Current Medications





Albuterol/Ipratropium (Duoneb 3 Mg/0.5 Mg (3 Ml) Ud)  3 ml IH [id number] [facility]


   Last Admin: 04/25/18 11:46 Dose:  3 ml


Budesonide (Pulmicort Respules)  0.5 mg IH [id number] [facility]


   Last Admin: 04/25/18 07:19 Dose:  0.5 mg


Diltiazem HCl (Cardizem)  90 mg PO QID [facility]


   Last Admin: 04/25/18 13:02 Dose:  90 mg


Escitalopram Oxalate (Lexapro)  5 mg PO DAILY [facility]


   Last Admin: 04/25/18 09:51 Dose:  5 mg


Folic Acid (Folic Acid)  1 mg PO DAILY [facility]


   Last Admin: 04/25/18 09:51 Dose:  1 mg


Piperacillin Sod/Tazobactam Sod (Zosyn 2.25 Gm In 0.9% 100 Ml)  2.25 gm in 100 

mls @ 100 mls/hr IVPB Q6 [facility]


   PRN Reason: Protocol


   Stop: 05/01/18 12:01


   Last Admin: 04/25/18 11:57 Dose:  100 mls/hr


Insulin Human Regular (Humulin R Med)  0 units SC ACHS [facility]


   PRN Reason: Protocol


   Last Admin: 04/25/18 12:38 Dose:  1 units


Lidocaine (Lidoderm)  1 ea TD DAILY [facility]


   Last Admin: 04/25/18 09:50 Dose:  1 ea


Methylprednisolone (Solu-Medrol)  20 mg IVP Q8 [facility]


   Last Admin: 04/25/18 13:00 Dose:  20 mg


Morphine Sulfate (Morphine)  1 mg IVP Q6 [facility]


   PRN Reason: PAIN, MODERATE [4-6]


   Last Admin: 04/25/18 10:03 Dose:  1 mg


Oxymetazoline HCl (Afrin 0.05%)  0 ml NS Q12H [facility]


   Last Admin: 04/25/18 09:50 Dose:  2 spr


Pantoprazole Sodium (Protonix Inj)  40 mg IVP Q12 [facility]


   Last Admin: 04/25/18 09:50 Dose:  40 mg


Polyethylene Glycol (Miralax)  17 gm PO BID [facility]


   Last Admin: 04/25/18 09:57 Dose:  Not Given


Sevelamer HCl (Renagel)  800 mg PO TID [facility]


Vitamin B Complex/Vit C/Folic Acid (Nephro-Javier)  1 tab PO 0800 [facility]


   Last Admin: 04/25/18 09:51 Dose:  1 tab











- Labs


Labs: 


 





 04/25/18 06:15 





 04/25/18 06:15 





 











PT  18.3 SECONDS (9.4-12.5)  H  04/25/18  06:15    


 


INR  1.58  (0.93-1.08)  H  04/25/18  06:15    


 


APTT  26.7 Seconds (25.1-36.5)   04/25/18  06:15    














- Constitutional


Appears: Chronically Ill





- Head Exam


Head Exam: NORMAL INSPECTION





- Respiratory Exam


Respiratory Exam: Decreased Breath Sounds





- Cardiovascular Exam


Cardiovascular Exam: +S1, +S2





- GI/Abdominal Exam


GI & Abdominal Exam: Soft.  absent: Tenderness





Assessment and Plan





- Assessment and Plan (Free Text)


Plan: 


Assessment


Severe sepsis probably with intra-abdominal infection with transaminitis, 

slowly improving


Crohn's disease


depression


dyslipidemia


chronic CHF


obesity with BMI 37





Plan


Continue Zosyn day 5 and will follow up further plans of GI; will continue to 

monitor AST, ALT, Alk Phos


will monitor clinically


cultures have been negative

## 2018-04-25 NOTE — PN
DATE:  04/25/2018



SUBJECTIVE:  The patient is still in ICU bed 1.  The patient is seen in ICU

bed 1.  Overnight events for the last 12 to 24 hours reviewed.  The patient

had to have dialysis catheter placement and the patient received dialysis

yesterday, which he tolerated.  The patient still has some intermittent

complaint of the pain.



PHYSICAL EXAMINATION

VITAL SIGNS:  T-max 97.8, 98.4.  Telemetry shows atrial fibrillation, heart

rate in 80s, 90s, low 100.  Blood pressure is averaging around 130 and 140

systolic, diastolic in 70s and 80s.  Intake and output as per dialysis.  O2

sat mid 90% to high 90%.

HEAD:  Normocephalic, atraumatic.

EENT:  Shows pinkish pale conjunctivae.  Slight icterus noted.  No neck

rigidity.

CHEST:  Kyphosis.

LUNGS:  Shows positive rhonchi bilaterally.  Decreased breath sound at the

bases, left more than the right.  CARDIOVASCULAR:  Shows S1, S2, irregular

rhythm.  Positive systolic murmur at left sternal border, right second

intercostal space, left second intercostal space.

ABDOMEN:  Protuberant, obese.  Positive bowel sound.

GENITALIA:  Male.  Positive Barlow catheter.

EXTREMITY:  Shows positive swelling of the lower extremity.  Positive SCDs.

Positive dialysis catheter noted.  MUSCULOSKELETAL:  Shows an elevated body

mass index.

VASCULAR:  Could not be assessed.

NEUROLOGIC:  The patient is alert, awake, responsive.  He is able to move

upper and lower extremities without assistance.  Gait examination could not

be tested.



LABORATORY DATA:  Diagnostics from 04/25 reviewed.  WBC count is _____

hemoglobin 10.7 g, hematocrit 32 plus.  Platelets are within normal limit. 

Chemistry, CMP, LFTs reviewed.  The patient still has elevated BUN and

creatinine.  LFTs are trending downwards.  PT/PTT is also trending down.



IMPRESSION:

1.  Acute renal failure, hemodialysis requiring.

2.  Questionable and possible hepatorenal syndrome versus failure.

3.  Status post hypothermia.

4.  Status post severe symptomatic hypotension and bradycardia.

5.  Status post atrial fibrillation with slow ventricular response.

6.  Ischemic acute tubular necrosis secondary to hypoperfusion, hypotension

and bradycardia.

7.  Leukocytosis with granulocytosis and bandemia.

8.  Severe sepsis.

9.  Severe transaminitis secondary to shock liver secondary to

hypoperfusion, hypotension and bradycardia.

10.  Gastrointestinal bleeding, questionable upper gastrointestinal

bleeding with coffee-ground and stool guaiac positive.

11.  Atrial fibrillation with rapid ventricular response.

12.  Retropharyngeal asymmetry, left more than the right, extending from

oropharynx inferiorly.

13.  Bibasilar atelectasis.

14.  Coronary artery disease status post coronary angioplasty.

15.  Pulmonary arterial hypertension.

16.  Morbid obesity.

17.  Narcotic requiring and dependent chronic pain syndrome.

18.  Non-insulin requiring diabetes mellitus.

19.  Acute renal failure and acute kidney injury.

20.  Increased anion gap metabolic acidosis and lactic acidosis.

21.  History of anxiety, depression, insomnia.

22.  History of dyslipidemia.

23.  Severe gait dysfunction.

24.  Deconditioning.

25.  Bilateral lower extremity venous stasis.



PLAN AT THIS TIME:  The patient is scheduled for a second dialysis today. 

The patient will undergo second dialysis today.  The patient will be

continued on IV antibiotics as per Infectious Disease.  Current

consultation; Cardiology, Nephrology, Infectious Disease, Critical Care. 

The patient will be continued on bronchodilators.  The patient will be

continued on GI and DVT prophylaxis.  The patient will be continued on

therapeutic interventions as per the MAR, which is reviewed.  The patient

will be continued to be followed by all the subspecialty.  At present, the

patient's prognosis is guarded, condition critical.  The patient's wife and

the patient and the family aware of the above.  All questions concerned

answered.



Time spent more than 35 minutes.



Dictated and electronically signed, not read.





__________________________________________

Kody Pratt MD



DD:  04/25/2018 10:21:18

DT:  04/25/2018 10:27:32

Job # [id number]

## 2018-04-25 NOTE — CP.CCUPN
<Jay Arzola - Last Filed: 04/25/18 09:05>





CCU Subjective





- Physician Review


Subjective (Free Text): 


Patient seen and examined at bedside in no acute distress. Admits to slight 

sore throat which is unchanged from yesterday as well as insomnia due to 

chronic pain for which he normally takes percocet for. Patient denies shortness 

of breath, chest pain, palpitations, abdominal pain, nausea, vomiting, diarrhea

, fevers, chills. 


Critical Care Time Spent (in minutes): 30





CCU Objective





- Vital Signs / Intake & Output


Vital Signs (Last 4 hours): 


Vital Signs











  Pulse Resp BP Pulse Ox


 


 04/25/18 08:00  91 H  15  135/76  99


 


 04/25/18 07:45  97 H  13  153/70 H  100


 


 04/25/18 07:30  93 H  14  145/76  100


 


 04/25/18 07:15  101 H  14  134/84  99


 


 04/25/18 07:00  91 H  13  131/70  99


 


 04/25/18 06:45  87  13  121/88  97


 


 04/25/18 06:30  92 H  13  142/68  98


 


 04/25/18 06:16  95 H  14  129/71  95


 


 04/25/18 06:00  95 H  22  144/75  97


 


 04/25/18 05:45  89  14  122/74  98


 


 04/25/18 05:30  101 H  20  162/91 H  97


 


 04/25/18 05:15  93 H  15  131/69  97


 


 04/25/18 05:00  90  19  125/66  99











Intake and Output (Last 8hrs): 


 Intake & Output











 04/24/18 04/25/18 04/25/18





 22:59 06:59 14:59


 


Intake Total 700  


 


Output Total 150  


 


Balance 550  


 


Intake:   


 


    


 


    Right Forearm 200  


 


  Oral 500  


 


Output:   


 


  Urine 150  


 


    Urethral (Barlow) 150  


 


Other:   


 


  # Bowel Movements 1  














- Physical Exam


Head: Positive for: Atraumatic, Normocephalic


Pupils: Positive for: PERRL


Extroacular Muscles: Positive for: EOMI


Conjunctiva: Positive for: Normal, Other


Mouth: Positive for: Moist Mucous Membranes


Pharnyx: Negative for: Muffled/Hoarse Voice, Strider


Neck: Positive for: Normal Range of Motion.  Negative for: MIDLINE TENDERNESS, 

Paraspinal Tenderness, JVD


Respiratory/Chest: Positive for: Clear to Auscultation, Good Air Exchange.  

Negative for: Respiratory Distress, Accessory Muscle Use


Cardiovascular: Positive for: Normal S1, S2, Tachycardic, Other (Regular but 

slow rhythm.).  Negative for: Murmurs


Abdomen: Negative for: Tenderness, Distention, Peritoneal Signs


Rectal: Positive for: Other (Guaiac positive. medium brown stool.)


Back: Positive for: Normal Inspection


Upper Extremity: Positive for: Normal Inspection.  Negative for: Cyanosis, Edema


Lower Extremity: Positive for: Normal Inspection, Normal ROM.  Negative for: 

Edema, Cyanosis


Neurological: Positive for: GCS=15, CN II-XII Intact, Motor Func Grossly 

Intact.  Negative for: Speech Normal


Skin: Positive for: Warm, Dry, Normal Color.  Negative for: Rashes


Psychiatric: Positive for: Alert, Oriented x 3, Normal Insight, Normal 

Concentration





- Medications


Active Medications: 


Active Medications











Generic Name Dose Route Start Last Admin





  Trade Name Freq  PRN Reason Stop Dose Admin


 


Albuterol/Ipratropium  3 ml  04/21/18 20:45  04/25/18 07:19





  Duoneb 3 Mg/0.5 Mg (3 Ml) Ud  IH   3 ml





  [id number] HUMBERTO   Administration


 


Budesonide  0.5 mg  04/21/18 20:00  04/25/18 07:19





  Pulmicort Respules  IH   0.5 mg





  [id number] HUMBERTO   Administration


 


Diltiazem HCl  60 mg  04/24/18 10:00  04/24/18 22:01





  Cardizem  PO   60 mg





  QID HUMBERTO   Administration


 


Escitalopram Oxalate  5 mg  04/21/18 10:00  04/24/18 10:03





  Lexapro  PO   5 mg





  DAILY HUMBERTO   Administration


 


Folic Acid  1 mg  04/21/18 10:00  04/24/18 10:03





  Folic Acid  PO   1 mg





  DAILY HUMBERTO   Administration


 


Piperacillin Sod/Tazobactam Sod  2.25 gm in 100 mls @ 100 mls/hr  04/22/18 12:

00  04/25/18 07:47





  Zosyn 2.25 Gm In 0.9% 100 Ml  IVPB  05/01/18 12:01  100 mls/hr





  Q6 HUMBERTO   Administration





  Protocol   


 


Insulin Human Regular  0 units  04/24/18 07:30  04/24/18 22:02





  Humulin R Med  SC   Not Given





  [facility] HUMBERTO   





  Protocol   


 


Lidocaine  1 ea  04/22/18 14:00  04/24/18 10:05





  Lidoderm  TD   1 ea





  DAILY HUMBERTO   Administration


 


Methylprednisolone  20 mg  04/23/18 13:17  04/25/18 07:47





  Solu-Medrol  IVP   20 mg





  Q8 HUMBERTO   Administration


 


Morphine Sulfate  1 mg  04/22/18 13:58  04/24/18 22:01





  Morphine  IVP   1 mg





  Q6 PRN   Administration





  PAIN, MODERATE [4-6]   


 


Oxymetazoline HCl  0 ml  04/22/18 22:00  04/25/18 00:12





  Afrin 0.05%  NS   1 spr





  Q12H HUMBERTO   Administration


 


Pantoprazole Sodium  40 mg  04/21/18 10:00  04/24/18 22:00





  Protonix Inj  IVP   40 mg





  Q12 HUMBERTO   Administration


 


Polyethylene Glycol  17 gm  04/24/18 10:00  04/24/18 18:04





  Miralax  PO   Not Given





  BID [facility]   


 


Sevelamer HCl  800 mg  04/24/18 18:00  04/24/18 18:06





  Renagel  PO   Not Given





  TID [facility]   


 


Vitamin B Complex/Vit C/Folic Acid  1 tab  04/25/18 08:00  





  Nephro-Javier  PO   





  0800 [facility]   














- Patient Studies


Lab Studies: 


 Microbiology Studies











 04/21/18 03:20 Blood Culture - Preliminary





 Blood    NO GROWTH AFTER 4 DAYS


 


 04/21/18 02:45 Blood Culture - Preliminary





 Blood    NO GROWTH AFTER 4 DAYS








 Lab Studies











  04/25/18 04/25/18 04/25/18 Range/Units





  07:29 06:15 06:15 


 


WBC     (4.5-11.0)  10^3/ul


 


RBC     (3.5-6.1)  10^6/uL


 


Hgb     (14.0-18.0)  g/dL


 


Hct     (42.0-52.0)  %


 


MCV     (80.0-105.0)  fl


 


MCH     (25.0-35.0)  pg


 


MCHC     (31.0-37.0)  g/dl


 


RDW     (11.5-14.5)  %


 


Plt Count     (120.0-450.0)  10^3/uL


 


MPV     (7.0-11.0)  fl


 


Gran %     (50.0-68.0)  %


 


Lymph % (Auto)     (22.0-35.0)  %


 


Mono % (Auto)     (1.0-6.0)  %


 


Eos % (Auto)     (1.5-5.0)  %


 


Baso % (Auto)     (0.0-3.0)  %


 


Gran #     (1.4-6.5)  


 


Lymph # (Auto)     (1.2-3.4)  


 


Mono # (Auto)     (0.1-0.6)  


 


Eos # (Auto)     (0.0-0.7)  


 


Baso # (Auto)     (0.0-2.0)  K/mm3


 


Neutrophils % (Manual)     (50.0-70.0)  %


 


Lymphocytes % (Manual)     (22.0-35.0)  %


 


Monocytes % (Manual)     (1.0-6.0)  %


 


Nucleated RBC %     %


 


Large Platelets     


 


Polychromasia     


 


Hypochromasia     


 


Poikilocytosis (manual     


 


Anisocytosis (manual)     


 


Microcytosis (manual)     


 


Ovalocytes     


 


PT   18.3 H   (9.4-12.5)  SECONDS


 


INR   1.58 H   (0.93-1.08)  


 


APTT   26.7   (25.1-36.5)  Seconds


 


Sodium     (132-148)  mmol/L


 


Potassium     (3.6-5.0)  mmol/L


 


Chloride     ()  mmol/L


 


Carbon Dioxide     (21-33)  mmol/L


 


Anion Gap     (10-20)  


 


BUN     (7-21)  mg/dL


 


Creatinine     (0.8-1.5)  mg/dl


 


Est GFR (African Amer)     


 


Est GFR (Non-Af Amer)     


 


POC Glucose (mg/dL)  202 H    ()  mg/dL


 


Random Glucose     ()  mg/dL


 


Lactic Acid    2.1  (0.7-2.1)  mmol/L


 


Calcium     (8.4-10.5)  mg/dL


 


Phosphorus     (2.5-4.5)  mg/dL


 


Magnesium     (1.7-2.2)  mg/dL


 


Total Bilirubin     (0.2-1.3)  mg/dL


 


Direct Bilirubin     (0.0-0.4)  mg/dL


 


GGT     (8-78)  U/L


 


AST     (17-59)  U/L


 


ALT     (7-56)  U/L


 


Alkaline Phosphatase     ()  U/L


 


Total Protein     (5.8-8.3)  g/dL


 


Albumin     (3.0-4.8)  g/dL


 


Globulin     gm/dL


 


Albumin/Globulin Ratio     (1.1-1.8)  














  04/25/18 04/25/18 04/24/18 Range/Units





  06:15 06:15 21:41 


 


WBC   18.3 H   (4.5-11.0)  10^3/ul


 


RBC   4.63   (3.5-6.1)  10^6/uL


 


Hgb   10.5 L   (14.0-18.0)  g/dL


 


Hct   32.9 L   (42.0-52.0)  %


 


MCV   71.1 L   (80.0-105.0)  fl


 


MCH   22.7 L   (25.0-35.0)  pg


 


MCHC   31.9   (31.0-37.0)  g/dl


 


RDW   24.6 H   (11.5-14.5)  %


 


Plt Count   399   (120.0-450.0)  10^3/uL


 


MPV   9.4   (7.0-11.0)  fl


 


Gran %   92.2 H   (50.0-68.0)  %


 


Lymph % (Auto)   2.6 L   (22.0-35.0)  %


 


Mono % (Auto)   5.1   (1.0-6.0)  %


 


Eos % (Auto)   0.0 L   (1.5-5.0)  %


 


Baso % (Auto)   0.1   (0.0-3.0)  %


 


Gran #   16.85 H   (1.4-6.5)  


 


Lymph # (Auto)   0.5 L   (1.2-3.4)  


 


Mono # (Auto)   0.9 H   (0.1-0.6)  


 


Eos # (Auto)   0.0   (0.0-0.7)  


 


Baso # (Auto)   0.02   (0.0-2.0)  K/mm3


 


Neutrophils % (Manual)     (50.0-70.0)  %


 


Lymphocytes % (Manual)     (22.0-35.0)  %


 


Monocytes % (Manual)     (1.0-6.0)  %


 


Nucleated RBC %     %


 


Large Platelets     


 


Polychromasia     


 


Hypochromasia     


 


Poikilocytosis (manual     


 


Anisocytosis (manual)     


 


Microcytosis (manual)     


 


Ovalocytes     


 


PT     (9.4-12.5)  SECONDS


 


INR     (0.93-1.08)  


 


APTT     (25.1-36.5)  Seconds


 


Sodium  138    (132-148)  mmol/L


 


Potassium  3.7    (3.6-5.0)  mmol/L


 


Chloride  91 L    ()  mmol/L


 


Carbon Dioxide  26    (21-33)  mmol/L


 


Anion Gap  24 H    (10-20)  


 


BUN  78 H    (7-21)  mg/dL


 


Creatinine  6.6 H    (0.8-1.5)  mg/dl


 


Est GFR ( Amer)  10    


 


Est GFR (Non-Af Amer)  8    


 


POC Glucose (mg/dL)    179 H  ()  mg/dL


 


Random Glucose  225 H    ()  mg/dL


 


Lactic Acid     (0.7-2.1)  mmol/L


 


Calcium  8.5    (8.4-10.5)  mg/dL


 


Phosphorus  6.5 H    (2.5-4.5)  mg/dL


 


Magnesium  2.1    (1.7-2.2)  mg/dL


 


Total Bilirubin  1.9 H    (0.2-1.3)  mg/dL


 


Direct Bilirubin  1.2 H    (0.0-0.4)  mg/dL


 


GGT  90 H    (8-78)  U/L


 


AST  422 H D    (17-59)  U/L


 


ALT  1802 H    (7-56)  U/L


 


Alkaline Phosphatase  110    ()  U/L


 


Total Protein  7.0    (5.8-8.3)  g/dL


 


Albumin  3.7    (3.0-4.8)  g/dL


 


Globulin  3.3    gm/dL


 


Albumin/Globulin Ratio  1.1    (1.1-1.8)  














  04/24/18 04/24/18 04/24/18 Range/Units





  11:44 05:30 05:30 


 


WBC     (4.5-11.0)  10^3/ul


 


RBC     (3.5-6.1)  10^6/uL


 


Hgb     (14.0-18.0)  g/dL


 


Hct     (42.0-52.0)  %


 


MCV     (80.0-105.0)  fl


 


MCH     (25.0-35.0)  pg


 


MCHC     (31.0-37.0)  g/dl


 


RDW     (11.5-14.5)  %


 


Plt Count     (120.0-450.0)  10^3/uL


 


MPV     (7.0-11.0)  fl


 


Gran %     (50.0-68.0)  %


 


Lymph % (Auto)     (22.0-35.0)  %


 


Mono % (Auto)     (1.0-6.0)  %


 


Eos % (Auto)     (1.5-5.0)  %


 


Baso % (Auto)     (0.0-3.0)  %


 


Gran #     (1.4-6.5)  


 


Lymph # (Auto)     (1.2-3.4)  


 


Mono # (Auto)     (0.1-0.6)  


 


Eos # (Auto)     (0.0-0.7)  


 


Baso # (Auto)     (0.0-2.0)  K/mm3


 


Neutrophils % (Manual)    93 H  (50.0-70.0)  %


 


Lymphocytes % (Manual)    6 L  (22.0-35.0)  %


 


Monocytes % (Manual)    1  (1.0-6.0)  %


 


Nucleated RBC %    1  %


 


Large Platelets    Present  


 


Polychromasia    Slight  


 


Hypochromasia    1+  


 


Poikilocytosis (manual    1+  


 


Anisocytosis (manual)    1+  


 


Microcytosis (manual)    1+  


 


Ovalocytes    Slight  


 


PT     (9.4-12.5)  SECONDS


 


INR     (0.93-1.08)  


 


APTT     (25.1-36.5)  Seconds


 


Sodium     (132-148)  mmol/L


 


Potassium     (3.6-5.0)  mmol/L


 


Chloride     ()  mmol/L


 


Carbon Dioxide     (21-33)  mmol/L


 


Anion Gap     (10-20)  


 


BUN     (7-21)  mg/dL


 


Creatinine     (0.8-1.5)  mg/dl


 


Est GFR (African Amer)     


 


Est GFR (Non-Af Amer)     


 


POC Glucose (mg/dL)  275 H    ()  mg/dL


 


Random Glucose     ()  mg/dL


 


Lactic Acid     (0.7-2.1)  mmol/L


 


Calcium     (8.4-10.5)  mg/dL


 


Phosphorus     (2.5-4.5)  mg/dL


 


Magnesium     (1.7-2.2)  mg/dL


 


Total Bilirubin     (0.2-1.3)  mg/dL


 


Direct Bilirubin     (0.0-0.4)  mg/dL


 


GGT     (8-78)  U/L


 


AST     (17-59)  U/L


 


ALT   2634 H   (7-56)  U/L


 


Alkaline Phosphatase     ()  U/L


 


Total Protein     (5.8-8.3)  g/dL


 


Albumin     (3.0-4.8)  g/dL


 


Globulin     gm/dL


 


Albumin/Globulin Ratio     (1.1-1.8)  








 Laboratory Results - last 24 hr











  04/24/18 04/24/18 04/24/18





  05:30 05:30 11:44


 


WBC   


 


RBC   


 


Hgb   


 


Hct   


 


MCV   


 


MCH   


 


MCHC   


 


RDW   


 


Plt Count   


 


MPV   


 


Gran %   


 


Lymph % (Auto)   


 


Mono % (Auto)   


 


Eos % (Auto)   


 


Baso % (Auto)   


 


Gran #   


 


Lymph # (Auto)   


 


Mono # (Auto)   


 


Eos # (Auto)   


 


Baso # (Auto)   


 


Neutrophils % (Manual)  93 H  


 


Lymphocytes % (Manual)  6 L  


 


Monocytes % (Manual)  1  


 


Nucleated RBC %  1  


 


Large Platelets  Present  


 


Polychromasia  Slight  


 


Hypochromasia  1+  


 


Poikilocytosis (manual  1+  


 


Anisocytosis (manual)  1+  


 


Microcytosis (manual)  1+  


 


Ovalocytes  Slight  


 


PT   


 


INR   


 


APTT   


 


Sodium   


 


Potassium   


 


Chloride   


 


Carbon Dioxide   


 


Anion Gap   


 


BUN   


 


Creatinine   


 


Est GFR ( Amer)   


 


Est GFR (Non-Af Amer)   


 


POC Glucose (mg/dL)    275 H


 


Random Glucose   


 


Lactic Acid   


 


Calcium   


 


Phosphorus   


 


Magnesium   


 


Total Bilirubin   


 


Direct Bilirubin   


 


GGT   


 


AST   


 


ALT   2634 H 


 


Alkaline Phosphatase   


 


Total Protein   


 


Albumin   


 


Globulin   


 


Albumin/Globulin Ratio   














  04/24/18 04/25/18 04/25/18





  21:41 06:15 06:15


 


WBC   18.3 H 


 


RBC   4.63 


 


Hgb   10.5 L 


 


Hct   32.9 L 


 


MCV   71.1 L 


 


MCH   22.7 L 


 


MCHC   31.9 


 


RDW   24.6 H 


 


Plt Count   399 


 


MPV   9.4 


 


Gran %   92.2 H 


 


Lymph % (Auto)   2.6 L 


 


Mono % (Auto)   5.1 


 


Eos % (Auto)   0.0 L 


 


Baso % (Auto)   0.1 


 


Gran #   16.85 H 


 


Lymph # (Auto)   0.5 L 


 


Mono # (Auto)   0.9 H 


 


Eos # (Auto)   0.0 


 


Baso # (Auto)   0.02 


 


Neutrophils % (Manual)   


 


Lymphocytes % (Manual)   


 


Monocytes % (Manual)   


 


Nucleated RBC %   


 


Large Platelets   


 


Polychromasia   


 


Hypochromasia   


 


Poikilocytosis (manual   


 


Anisocytosis (manual)   


 


Microcytosis (manual)   


 


Ovalocytes   


 


PT   


 


INR   


 


APTT   


 


Sodium    138


 


Potassium    3.7


 


Chloride    91 L


 


Carbon Dioxide    26


 


Anion Gap    24 H


 


BUN    78 H


 


Creatinine    6.6 H


 


Est GFR ( Amer)    10


 


Est GFR (Non-Af Amer)    8


 


POC Glucose (mg/dL)  179 H  


 


Random Glucose    225 H


 


Lactic Acid   


 


Calcium    8.5


 


Phosphorus    6.5 H


 


Magnesium    2.1


 


Total Bilirubin    1.9 H


 


Direct Bilirubin    1.2 H


 


GGT    90 H


 


AST    422 H D


 


ALT    1802 H


 


Alkaline Phosphatase    110


 


Total Protein    7.0


 


Albumin    3.7


 


Globulin    3.3


 


Albumin/Globulin Ratio    1.1














  04/25/18 04/25/18 04/25/18





  06:15 06:15 07:29


 


WBC   


 


RBC   


 


Hgb   


 


Hct   


 


MCV   


 


MCH   


 


MCHC   


 


RDW   


 


Plt Count   


 


MPV   


 


Gran %   


 


Lymph % (Auto)   


 


Mono % (Auto)   


 


Eos % (Auto)   


 


Baso % (Auto)   


 


Gran #   


 


Lymph # (Auto)   


 


Mono # (Auto)   


 


Eos # (Auto)   


 


Baso # (Auto)   


 


Neutrophils % (Manual)   


 


Lymphocytes % (Manual)   


 


Monocytes % (Manual)   


 


Nucleated RBC %   


 


Large Platelets   


 


Polychromasia   


 


Hypochromasia   


 


Poikilocytosis (manual   


 


Anisocytosis (manual)   


 


Microcytosis (manual)   


 


Ovalocytes   


 


PT   18.3 H 


 


INR   1.58 H 


 


APTT   26.7 


 


Sodium   


 


Potassium   


 


Chloride   


 


Carbon Dioxide   


 


Anion Gap   


 


BUN   


 


Creatinine   


 


Est GFR ( Amer)   


 


Est GFR (Non-Af Amer)   


 


POC Glucose (mg/dL)    202 H


 


Random Glucose   


 


Lactic Acid  2.1  


 


Calcium   


 


Phosphorus   


 


Magnesium   


 


Total Bilirubin   


 


Direct Bilirubin   


 


GGT   


 


AST   


 


ALT   


 


Alkaline Phosphatase   


 


Total Protein   


 


Albumin   


 


Globulin   


 


Albumin/Globulin Ratio   











Fingerstick Blood Sugar Results: 175





Review of Systems





- Constitutional


Constitutional: absent: Fever, Chills, Sweats





- EENT


Eyes: absent: Change in Vision





- Cardiovascular


Cardiovascular: absent: Chest Pain, Dyspnea, Palpitations





- Respiratory


Respiratory: absent: Cough, Dyspnea





- Gastrointestinal


Gastrointestinal: absent: Diarrhea, Melena, Nausea, Vomiting





- Genitourinary


Genitourinary: absent: Dysuria





- Neurological


Neurological: absent: Confusion, Dizziness, Numbness





- Psychiatric


Psychiatric: absent: Anxiety





- Endocrine


Endocrine: UNREMARKABLE





- Hematologic/Lymphatic


Hematologic: UNREMARKABLE





Critical Care Progress Note





- Nutrition


Nutrition: 


 Nutrition











 Category Date Time Status


 


 Renal Diet [DIET] Diets  04/24/18 Lunch Ordered














Assessment/Plan





- Assessment and Plan (Free Text)


Assessment: 





This is a 67 year old male with a history of CAD s/p LAD stent 1/2018 for which 

patient was placed on plavix, paroxysmal atrial fibrillation originally on 

xarelto for anticoagulation (stopped on 4/17), DM II, chronic back pain, 

diverticulosis, crohn's disease who was recently admitted with possible GI 

bleed and atrial fibrillation with RVR then discharged who presented with 

complaints of dizziness, dark stool, and abdominal pain. Upon being admitted 

vitals revealed a blood pressure of 70/54 and HR of 44. Decrease in vitals was 

believed to be due to taking both metoprolol tartrate,cardizem, HCZT/vasartan, 

and lasix. Patient was as a result started on a glucagon drip to reverse the 

effects of potential the beta blocker intoxication. Elevated liver enzymes 

indicating acute liver injury most likely secondary to hypoperfusion due to 

decreased cardiac output due to bradycardia. During course of ICU visit patient 

was found to be in atrial fibrillation for which lopressor was given; due to 

unsuccessful rate control, cardizem drip was started; rate control was then 

achieved. Patient converted from cardizem drip to PO. 





Neuro


-AAO x3


-Lidocaine


-Morphine PRN





Cardiovascular


-Maintain MAP>65


-Keep normotensive


-Cardizem PO for atrial fibrillation


-Continue to hold anticoagulants





Renal


-Nephrology on consult


-Temp [facility] cath place, patient went for dialysis last night


-Monitor I&O


-Continue to monitor electrolytes and replete as needed





Pulmonary


-BiPAP procedures nightly


-Maintain SpO2>92%


-Continue to duonebs


-Continue with Solu-medrol


-Continue with pulmicort





Gastrointestinal


-Monitor liver enzymes; continue to downtrend


-Renal diet


-Continue PPIs for GI prophylaxis


-Hold anticoagulants due to history of GI bleed as per GI


-Continue with dulcolax and miralax





Hematological


-Continue to monitor INR


-SCDs for DVT prophylaxis


-Hold anticoagulants due to history of GI bleed as per GI


-INR 1.58





Endocrine


-Maintain euglycemia


-ISS-med


-Continue with accuchecks





Infectious disease


-ID consulted


-Continue with recs as per ID


-Continue Zosyn





Disposition: Transfer to telemetry








<Shashi Garza - Last Filed: 04/25/18 11:39>





CCU Objective





- Vital Signs / Intake & Output


Vital Signs (Last 4 hours): 


Vital Signs











  Pulse Resp BP Pulse Ox


 


 04/25/18 09:51  88   138/88 


 


 04/25/18 08:00  91 H  15  135/76  99


 


 04/25/18 07:45  97 H  13  153/70 H  100











Intake and Output (Last 8hrs): 


 Intake & Output











 04/24/18 04/25/18 04/25/18





 22:59 06:59 14:59


 


Intake Total 700  


 


Output Total 150  


 


Balance 550  


 


Intake:   


 


    


 


    Right Forearm 200  


 


  Oral 500  


 


Output:   


 


  Urine 150  


 


    Urethral (Barlow) 150  


 


Other:   


 


  # Bowel Movements 1  














- Medications


Active Medications: 


Active Medications











Generic Name Dose Route Start Last Admin





  Trade Name Freq  PRN Reason Stop Dose Admin


 


Albuterol/Ipratropium  3 ml  04/21/18 20:45  04/25/18 07:19





  Duoneb 3 Mg/0.5 Mg (3 Ml) Ud  IH   3 ml





  [id number] HUMBERTO   Administration


 


Budesonide  0.5 mg  04/21/18 20:00  04/25/18 07:19





  Pulmicort Respules  IH   0.5 mg





  [id number] HUMBERTO   Administration


 


Diltiazem HCl  60 mg  04/24/18 10:00  04/25/18 09:51





  Cardizem  PO   60 mg





  QID HUMBERTO   Administration


 


Escitalopram Oxalate  5 mg  04/21/18 10:00  04/25/18 09:51





  Lexapro  PO   5 mg





  DAILY HUMBERTO   Administration


 


Folic Acid  1 mg  04/21/18 10:00  04/25/18 09:51





  Folic Acid  PO   1 mg





  DAILY HUMBERTO   Administration


 


Piperacillin Sod/Tazobactam Sod  2.25 gm in 100 mls @ 100 mls/hr  04/22/18 12:

00  04/25/18 07:47





  Zosyn 2.25 Gm In 0.9% 100 Ml  IVPB  05/01/18 12:01  100 mls/hr





  Q6 HUMBERTO   Administration





  Protocol   


 


Insulin Human Regular  0 units  04/24/18 07:30  04/25/18 07:30





  Humulin R Med  SC   Not Given





  [facility] HUMBERTO   





  Protocol   


 


Lidocaine  1 ea  04/22/18 14:00  04/25/18 09:50





  Lidoderm  TD   1 ea





  DAILY HUMBERTO   Administration


 


Methylprednisolone  20 mg  04/23/18 13:17  04/25/18 07:47





  Solu-Medrol  IVP   20 mg





  Q8 HUMBERTO   Administration


 


Morphine Sulfate  1 mg  04/22/18 13:58  04/25/18 10:03





  Morphine  IVP   1 mg





  Q6 PRN   Administration





  PAIN, MODERATE [4-6]   


 


Oxymetazoline HCl  0 ml  04/22/18 22:00  04/25/18 09:50





  Afrin 0.05%  NS   2 spr





  Q12H HUMBERTO   Administration


 


Pantoprazole Sodium  40 mg  04/21/18 10:00  04/25/18 09:50





  Protonix Inj  IVP   40 mg





  Q12 HUMBERTO   Administration


 


Polyethylene Glycol  17 gm  04/24/18 10:00  04/25/18 09:57





  Miralax  PO   Not Given





  BID HUMBERTO   


 


Sevelamer HCl  800 mg  04/24/18 18:00  04/25/18 09:51





  Renagel  PO   800 mg





  TID HUMBERTO   Administration


 


Vitamin B Complex/Vit C/Folic Acid  1 tab  04/25/18 08:00  04/25/18 09:51





  Nephro-Javier  PO   1 tab





  0800 HUMBERTO   Administration














- Patient Studies


Lab Studies: 


 Microbiology Studies











 04/21/18 03:20 Blood Culture - Preliminary





 Blood    NO GROWTH AFTER 4 DAYS


 


 04/21/18 02:45 Blood Culture - Preliminary





 Blood    NO GROWTH AFTER 4 DAYS








 Lab Studies











  04/25/18 04/25/18 04/25/18 Range/Units





  07:29 06:15 06:15 


 


WBC     (4.5-11.0)  10^3/ul


 


RBC     (3.5-6.1)  10^6/uL


 


Hgb     (14.0-18.0)  g/dL


 


Hct     (42.0-52.0)  %


 


MCV     (80.0-105.0)  fl


 


MCH     (25.0-35.0)  pg


 


MCHC     (31.0-37.0)  g/dl


 


RDW     (11.5-14.5)  %


 


Plt Count     (120.0-450.0)  10^3/uL


 


MPV     (7.0-11.0)  fl


 


Gran %     (50.0-68.0)  %


 


Lymph % (Auto)     (22.0-35.0)  %


 


Mono % (Auto)     (1.0-6.0)  %


 


Eos % (Auto)     (1.5-5.0)  %


 


Baso % (Auto)     (0.0-3.0)  %


 


Gran #     (1.4-6.5)  


 


Lymph # (Auto)     (1.2-3.4)  


 


Mono # (Auto)     (0.1-0.6)  


 


Eos # (Auto)     (0.0-0.7)  


 


Baso # (Auto)     (0.0-2.0)  K/mm3


 


PT   18.3 H   (9.4-12.5)  SECONDS


 


INR   1.58 H   (0.93-1.08)  


 


APTT   26.7   (25.1-36.5)  Seconds


 


Sodium     (132-148)  mmol/L


 


Potassium     (3.6-5.0)  mmol/L


 


Chloride     ()  mmol/L


 


Carbon Dioxide     (21-33)  mmol/L


 


Anion Gap     (10-20)  


 


BUN     (7-21)  mg/dL


 


Creatinine     (0.8-1.5)  mg/dl


 


Est GFR (African Amer)     


 


Est GFR (Non-Af Amer)     


 


POC Glucose (mg/dL)  202 H    ()  mg/dL


 


Random Glucose     ()  mg/dL


 


Lactic Acid    2.1  (0.7-2.1)  mmol/L


 


Calcium     (8.4-10.5)  mg/dL


 


Phosphorus     (2.5-4.5)  mg/dL


 


Magnesium     (1.7-2.2)  mg/dL


 


Total Bilirubin     (0.2-1.3)  mg/dL


 


Direct Bilirubin     (0.0-0.4)  mg/dL


 


GGT     (8-78)  U/L


 


AST     (17-59)  U/L


 


ALT     (7-56)  U/L


 


Alkaline Phosphatase     ()  U/L


 


Total Protein     (5.8-8.3)  g/dL


 


Albumin     (3.0-4.8)  g/dL


 


Globulin     gm/dL


 


Albumin/Globulin Ratio     (1.1-1.8)  














  04/25/18 04/25/18 04/24/18 Range/Units





  06:15 06:15 21:41 


 


WBC   18.3 H   (4.5-11.0)  10^3/ul


 


RBC   4.63   (3.5-6.1)  10^6/uL


 


Hgb   10.5 L   (14.0-18.0)  g/dL


 


Hct   32.9 L   (42.0-52.0)  %


 


MCV   71.1 L   (80.0-105.0)  fl


 


MCH   22.7 L   (25.0-35.0)  pg


 


MCHC   31.9   (31.0-37.0)  g/dl


 


RDW   24.6 H   (11.5-14.5)  %


 


Plt Count   399   (120.0-450.0)  10^3/uL


 


MPV   9.4   (7.0-11.0)  fl


 


Gran %   92.2 H   (50.0-68.0)  %


 


Lymph % (Auto)   2.6 L   (22.0-35.0)  %


 


Mono % (Auto)   5.1   (1.0-6.0)  %


 


Eos % (Auto)   0.0 L   (1.5-5.0)  %


 


Baso % (Auto)   0.1   (0.0-3.0)  %


 


Gran #   16.85 H   (1.4-6.5)  


 


Lymph # (Auto)   0.5 L   (1.2-3.4)  


 


Mono # (Auto)   0.9 H   (0.1-0.6)  


 


Eos # (Auto)   0.0   (0.0-0.7)  


 


Baso # (Auto)   0.02   (0.0-2.0)  K/mm3


 


PT     (9.4-12.5)  SECONDS


 


INR     (0.93-1.08)  


 


APTT     (25.1-36.5)  Seconds


 


Sodium  138    (132-148)  mmol/L


 


Potassium  3.7    (3.6-5.0)  mmol/L


 


Chloride  91 L    ()  mmol/L


 


Carbon Dioxide  26    (21-33)  mmol/L


 


Anion Gap  24 H    (10-20)  


 


BUN  78 H    (7-21)  mg/dL


 


Creatinine  6.6 H    (0.8-1.5)  mg/dl


 


Est GFR ( Amer)  10    


 


Est GFR (Non-Af Amer)  8    


 


POC Glucose (mg/dL)    179 H  ()  mg/dL


 


Random Glucose  225 H    ()  mg/dL


 


Lactic Acid     (0.7-2.1)  mmol/L


 


Calcium  8.5    (8.4-10.5)  mg/dL


 


Phosphorus  6.5 H    (2.5-4.5)  mg/dL


 


Magnesium  2.1    (1.7-2.2)  mg/dL


 


Total Bilirubin  1.9 H    (0.2-1.3)  mg/dL


 


Direct Bilirubin  1.2 H    (0.0-0.4)  mg/dL


 


GGT  90 H    (8-78)  U/L


 


AST  422 H D    (17-59)  U/L


 


ALT  1802 H    (7-56)  U/L


 


Alkaline Phosphatase  110    ()  U/L


 


Total Protein  7.0    (5.8-8.3)  g/dL


 


Albumin  3.7    (3.0-4.8)  g/dL


 


Globulin  3.3    gm/dL


 


Albumin/Globulin Ratio  1.1    (1.1-1.8)  














  04/24/18 Range/Units





  11:44 


 


WBC   (4.5-11.0)  10^3/ul


 


RBC   (3.5-6.1)  10^6/uL


 


Hgb   (14.0-18.0)  g/dL


 


Hct   (42.0-52.0)  %


 


MCV   (80.0-105.0)  fl


 


MCH   (25.0-35.0)  pg


 


MCHC   (31.0-37.0)  g/dl


 


RDW   (11.5-14.5)  %


 


Plt Count   (120.0-450.0)  10^3/uL


 


MPV   (7.0-11.0)  fl


 


Gran %   (50.0-68.0)  %


 


Lymph % (Auto)   (22.0-35.0)  %


 


Mono % (Auto)   (1.0-6.0)  %


 


Eos % (Auto)   (1.5-5.0)  %


 


Baso % (Auto)   (0.0-3.0)  %


 


Gran #   (1.4-6.5)  


 


Lymph # (Auto)   (1.2-3.4)  


 


Mono # (Auto)   (0.1-0.6)  


 


Eos # (Auto)   (0.0-0.7)  


 


Baso # (Auto)   (0.0-2.0)  K/mm3


 


PT   (9.4-12.5)  SECONDS


 


INR   (0.93-1.08)  


 


APTT   (25.1-36.5)  Seconds


 


Sodium   (132-148)  mmol/L


 


Potassium   (3.6-5.0)  mmol/L


 


Chloride   ()  mmol/L


 


Carbon Dioxide   (21-33)  mmol/L


 


Anion Gap   (10-20)  


 


BUN   (7-21)  mg/dL


 


Creatinine   (0.8-1.5)  mg/dl


 


Est GFR (African Amer)   


 


Est GFR (Non-Af Amer)   


 


POC Glucose (mg/dL)  275 H  ()  mg/dL


 


Random Glucose   ()  mg/dL


 


Lactic Acid   (0.7-2.1)  mmol/L


 


Calcium   (8.4-10.5)  mg/dL


 


Phosphorus   (2.5-4.5)  mg/dL


 


Magnesium   (1.7-2.2)  mg/dL


 


Total Bilirubin   (0.2-1.3)  mg/dL


 


Direct Bilirubin   (0.0-0.4)  mg/dL


 


GGT   (8-78)  U/L


 


AST   (17-59)  U/L


 


ALT   (7-56)  U/L


 


Alkaline Phosphatase   ()  U/L


 


Total Protein   (5.8-8.3)  g/dL


 


Albumin   (3.0-4.8)  g/dL


 


Globulin   gm/dL


 


Albumin/Globulin Ratio   (1.1-1.8)  








 Laboratory Results - last 24 hr











  04/24/18 04/24/18 04/25/18





  11:44 21:41 06:15


 


WBC    18.3 H


 


RBC    4.63


 


Hgb    10.5 L


 


Hct    32.9 L


 


MCV    71.1 L


 


MCH    22.7 L


 


MCHC    31.9


 


RDW    24.6 H


 


Plt Count    399


 


MPV    9.4


 


Gran %    92.2 H


 


Lymph % (Auto)    2.6 L


 


Mono % (Auto)    5.1


 


Eos % (Auto)    0.0 L


 


Baso % (Auto)    0.1


 


Gran #    16.85 H


 


Lymph # (Auto)    0.5 L


 


Mono # (Auto)    0.9 H


 


Eos # (Auto)    0.0


 


Baso # (Auto)    0.02


 


PT   


 


INR   


 


APTT   


 


Sodium   


 


Potassium   


 


Chloride   


 


Carbon Dioxide   


 


Anion Gap   


 


BUN   


 


Creatinine   


 


Est GFR ( Amer)   


 


Est GFR (Non-Af Amer)   


 


POC Glucose (mg/dL)  275 H  179 H 


 


Random Glucose   


 


Lactic Acid   


 


Calcium   


 


Phosphorus   


 


Magnesium   


 


Total Bilirubin   


 


Direct Bilirubin   


 


GGT   


 


AST   


 


ALT   


 


Alkaline Phosphatase   


 


Total Protein   


 


Albumin   


 


Globulin   


 


Albumin/Globulin Ratio   














  04/25/18 04/25/18 04/25/18





  06:15 06:15 06:15


 


WBC   


 


RBC   


 


Hgb   


 


Hct   


 


MCV   


 


MCH   


 


MCHC   


 


RDW   


 


Plt Count   


 


MPV   


 


Gran %   


 


Lymph % (Auto)   


 


Mono % (Auto)   


 


Eos % (Auto)   


 


Baso % (Auto)   


 


Gran #   


 


Lymph # (Auto)   


 


Mono # (Auto)   


 


Eos # (Auto)   


 


Baso # (Auto)   


 


PT    18.3 H


 


INR    1.58 H


 


APTT    26.7


 


Sodium  138  


 


Potassium  3.7  


 


Chloride  91 L  


 


Carbon Dioxide  26  


 


Anion Gap  24 H  


 


BUN  78 H  


 


Creatinine  6.6 H  


 


Est GFR ( Amer)  10  


 


Est GFR (Non-Af Amer)  8  


 


POC Glucose (mg/dL)   


 


Random Glucose  225 H  


 


Lactic Acid   2.1 


 


Calcium  8.5  


 


Phosphorus  6.5 H  


 


Magnesium  2.1  


 


Total Bilirubin  1.9 H  


 


Direct Bilirubin  1.2 H  


 


GGT  90 H  


 


AST  422 H D  


 


ALT  1802 H  


 


Alkaline Phosphatase  110  


 


Total Protein  7.0  


 


Albumin  3.7  


 


Globulin  3.3  


 


Albumin/Globulin Ratio  1.1  














  04/25/18





  07:29


 


WBC 


 


RBC 


 


Hgb 


 


Hct 


 


MCV 


 


MCH 


 


MCHC 


 


RDW 


 


Plt Count 


 


MPV 


 


Gran % 


 


Lymph % (Auto) 


 


Mono % (Auto) 


 


Eos % (Auto) 


 


Baso % (Auto) 


 


Gran # 


 


Lymph # (Auto) 


 


Mono # (Auto) 


 


Eos # (Auto) 


 


Baso # (Auto) 


 


PT 


 


INR 


 


APTT 


 


Sodium 


 


Potassium 


 


Chloride 


 


Carbon Dioxide 


 


Anion Gap 


 


BUN 


 


Creatinine 


 


Est GFR ( Amer) 


 


Est GFR (Non-Af Amer) 


 


POC Glucose (mg/dL)  202 H


 


Random Glucose 


 


Lactic Acid 


 


Calcium 


 


Phosphorus 


 


Magnesium 


 


Total Bilirubin 


 


Direct Bilirubin 


 


GGT 


 


AST 


 


ALT 


 


Alkaline Phosphatase 


 


Total Protein 


 


Albumin 


 


Globulin 


 


Albumin/Globulin Ratio 














Critical Care Progress Note





- Nutrition


Nutrition: 


 Nutrition











 Category Date Time Status


 


 Renal Diet [DIET] Diets  04/24/18 Lunch Ordered














Assessment/Plan





- Assessment and Plan (Free Text)


Assessment: 


Patient seen and examined on rounds with resident, agree with note with 

following additions/exceptions:


Patient is a 67 year old male with a history of CAD s/p LAD on plavix, PAF on 

xarelto, DM II, chronic back pain, diverticulosis, crohn's disease who 

presented with complaints of dizziness, dark stool, and abdominal pain, found 

to be in distributive shock.


Currently afebrile, HD stable, comfortable OFF cardizem drip, switched to PO 

cardizem.


Renal function worsening, started on HD yesterday, tolerated well


No major complaints





Shock, resolved


Afib


BHARGAVI on HD


Anemia


Abnormal LFTs, improving





Recommend:


- supp o2 as needed


- follow up cultures, cont with Abx as per ID


- PO cardizem


- A/C as per cardio


- ECHO


- HD as per renal


- follow up renal


- monitor HH


- BIPAP at night


- GI ppx


- DVT ppx


- stable, transfer to telemetry

## 2018-04-25 NOTE — PN
DATE:  04/25/2018



CARDIOLOGY FOLLOWUP



SUBJECTIVE:  The patient is status post Shiley placement.



PHYSICAL EXAMINATION

GENERAL:  He is awake.

VITAL SIGNS:  Blood pressure is 145/66, heart rate is 105, sinus

tachycardia.

NECK:  Negative JVD.

LUNGS:  Decreased breath sounds.

HEART:  Reveals S1, S2.

EXTREMITIES:  Without edema.



LABORATORY DATA:  Hemoglobin is 10.5.  Chemistries:  BUN and creatinine 78

and 6.6.



IMPRESSION:

1.  End-stage renal disease.

2.  Coronary artery disease.

3.  Congestive heart failure.

4.  Diabetes mellitus.

5.  Obesity.

6.  Hypertension.



PLAN:  Given these findings, the patient will need dialysis today.





__________________________________________

Olu Ramirez MD



DD:  04/25/2018 7:47:37

DT:  04/25/2018 7:51:12

Job # [id number]

## 2018-04-25 NOTE — PN
DATE:  04/25/2018



SUBJECTIVE:  The patient is sitting in a chair.  He is eating his

breakfast.  He is tolerating solid foods.  He denies any abdominal pain,

nausea, vomiting or rectal bleeding.  He had an Uldall catheter placed

yesterday with his first dialysis session yesterday.  He remains oliguric.



PHYSICAL EXAMINATION

VITAL SIGNS:  Reveal temperature afebrile.  Blood pressure 138/88, heart

rate of 88.

HEENT:  Reveals sclerae to be white.  Conjunctivae pink.

NECK:  Supple.

CHEST:  Reveals distant breath sounds.

HEART:  Reveals an irregularly irregular rate.

ABDOMEN:  Obese, soft, nontender.

EXTREMITIES:  Show no edema.



LABORATORY DATA:  Reveals white blood cell count 18.3, hemoglobin 10.5. 

Chemistries reveal BUN 78, creatinine is 6.6, total bilirubin 1.9, ,

ALT 1802, alkaline phosphatase of 110.



IMPRESSION:

1.  Acute renal failure secondary to acute tubular necrosis secondary to

decreased cardiac output from bradycardia.

2.  Elevated liver enzymes trending towards normal, status post passive

congestion of the liver, again from a low cardiac output state from severe

bradycardia.

3.  Coronary artery disease.

4.  Atrial fibrillation.

5.  History of congestive heart failure.



RECOMMENDATIONS:

1.  Continue broad-spectrum IV antibiotics for systemic inflammatory

response syndrome versus sepsis.

2.  Follow liver enzymes.

3.  Hemodialysis as per Renal.  Hopefully, the patient will start making

urine.





__________________________________________

Julio César Madrid MD



DD:  04/25/2018 10:51:50

DT:  04/25/2018 10:54:18

Job # [id number]

## 2018-04-25 NOTE — CP.PCM.PN
Subjective





- Date & Time of Evaluation


Date of Evaluation: 04/25/18


Time of Evaluation: 14:52





- Subjective


Subjective: 





Follow up Nephrology Consultation Note





Assessment: stable


oliguric Acute Kidney Injury (N17.9) likely due to ATN, hypoperfusion now with 

fluid overload


shock, acute liver injury, lactic acidosis, coagulopathy


hx of chronic Hep C


Anemia, Hyperphosphatemia (E83.39), HTN (I12.9), DM, morbid obesity


CHF, CAD s/p sent





Plan


HD today as 2nd session and next HD tomorrow. can d/c forde catheter


maintain hydrodynamics stable. No ACEI/ARB due to BHARGAVI


Monitor Input/Output, daily weights and renal function with basic metabolic 

panel


A fib rate control as per ICU.


added phos binders





Dose meds/antibiotics for reduced GFR. Avoid fleets enema/magnesium based 

laxatives. Avoid nephrotoxins/NSAIDs/ iodinated contrast (unless needed 

emergently)


Glycemic control


Further work up for as per primary team





Thanks for allowing me to participate in care of your patient. Will follow 

patient with you. Please call if any Qs. d/w ICU and primary team


Dr Elver Florez


Office: [phone] Cell: [phone] Fax: [phone]





Subjective: Noted events overnight. Patients feels okay


Denies chest pain, palpitation, shortness of breath, c/o leg swelling. 


All other negative 





Physical Examination: 


General Appearance: Comfortable, in no acute respiratory distress, co-operative 

. ill appearing, obese


Vitals reviewed and noted as below


Head; Atraumatic, normocephalic


ENT: no ulcers no thrush. Tongue is midline. Oropharynx: no rash or ulcers.


EYES: Pupils are equal, round and reactive to light accommodation. Eye muscles 

and extraocular movement intact. Sclera is anicteric.


Neck; supple no lymphadenopathy, no thyromegaly or bruit


Lungs: Normal respiratory rate/effort. Breath sounds bilateral equal and basal 

crackles


Heart: Increased rate. s1s2 normal. No rub or gallop. 


Extremities: 2+ edema. No varicose veins


Neurological: Patient is sleepy but oriented to person, place and time. No 

focal deficit. Strength bilateral appropriate and equal


Skin: Warm and dry. Normal turgor. No rash. Palpitation: Normal elasticity for 

age


Abdomen: Abdomen is soft. Bowel sounds +. There is no abdominal tenderness, no 

guarding/rigidity no organomegaly


Psych: normal insight and normal affect/mood


MSK: no joint tenderness or swelling. Digits and nails normal, no deformity


: kidney or bladder not palpable. has forde +


access: Rt IJ tunnelled catheter





Labs/imaging reviewed.


Past medical history, past surgical history, family history, social history, 

allergy reviewed and noted as below


Family hx: no hx of CKD. Rest non-contributory 





workup: normal LVEF echo 2017 but cardiac cath 2018: LVEF 45%


UA 30 protein


Hep C +


renal imaging unremarkable








Objective





- Vital Signs/Intake and Output


Vital Signs (last 24 hours): 


 











Temp Pulse Resp BP Pulse Ox


 


 98.2 F   89   15   155/85 H  97 


 


 04/24/18 18:08  04/25/18 13:30  04/25/18 13:30  04/25/18 13:30  04/25/18 13:30











- Medications


Medications: 


 Current Medications





Albuterol/Ipratropium (Duoneb 3 Mg/0.5 Mg (3 Ml) Ud)  3 ml IH [id number] [facility]


   Last Admin: 04/25/18 11:46 Dose:  3 ml


Budesonide (Pulmicort Respules)  0.5 mg IH [id number] [facility]


   Last Admin: 04/25/18 07:19 Dose:  0.5 mg


Diltiazem HCl (Cardizem)  90 mg PO QID [facility]


   Last Admin: 04/25/18 13:02 Dose:  90 mg


Escitalopram Oxalate (Lexapro)  5 mg PO DAILY [facility]


   Last Admin: 04/25/18 09:51 Dose:  5 mg


Folic Acid (Folic Acid)  1 mg PO DAILY [facility]


   Last Admin: 04/25/18 09:51 Dose:  1 mg


Piperacillin Sod/Tazobactam Sod (Zosyn 2.25 Gm In 0.9% 100 Ml)  2.25 gm in 100 

mls @ 100 mls/hr IVPB Q6 [facility]


   PRN Reason: Protocol


   Stop: 05/01/18 12:01


   Last Admin: 04/25/18 11:57 Dose:  100 mls/hr


Insulin Human Regular (Humulin R Med)  0 units SC ACHS [facility]


   PRN Reason: Protocol


   Last Admin: 04/25/18 12:38 Dose:  1 units


Lidocaine (Lidoderm)  1 ea TD DAILY [facility]


   Last Admin: 04/25/18 09:50 Dose:  1 ea


Methylprednisolone (Solu-Medrol)  20 mg IVP Q8 [facility]


   Last Admin: 04/25/18 13:00 Dose:  20 mg


Morphine Sulfate (Morphine)  1 mg IVP Q6 [facility]


   PRN Reason: PAIN, MODERATE [4-6]


   Last Admin: 04/25/18 10:03 Dose:  1 mg


Oxymetazoline HCl (Afrin 0.05%)  0 ml NS Q12H [facility]


   Last Admin: 04/25/18 09:50 Dose:  2 spr


Pantoprazole Sodium (Protonix Inj)  40 mg IVP Q12 [facility]


   Last Admin: 04/25/18 09:50 Dose:  40 mg


Polyethylene Glycol (Miralax)  17 gm PO BID [facility]


   Last Admin: 04/25/18 09:57 Dose:  Not Given


Sevelamer HCl (Renagel)  800 mg PO TID [facility]


Vitamin B Complex/Vit C/Folic Acid (Nephro-Javier)  1 tab PO 0800 [facility]


   Last Admin: 04/25/18 09:51 Dose:  1 tab











- Labs


Labs: 


 





 04/25/18 06:15 





 04/25/18 06:15 





 











PT  18.3 SECONDS (9.4-12.5)  H  04/25/18  06:15    


 


INR  1.58  (0.93-1.08)  H  04/25/18  06:15    


 


APTT  26.7 Seconds (25.1-36.5)   04/25/18  06:15

## 2018-04-26 LAB
ALBUMIN SERPL-MCNC: 3.7 G/DL (ref 3–4.8)
ALBUMIN/GLOB SERPL: 1.1 {RATIO} (ref 1.1–1.8)
ALT SERPL-CCNC: 1373 U/L (ref 7–56)
APTT BLD: 26.6 SECONDS (ref 25.1–36.5)
AST SERPL-CCNC: 188 U/L (ref 17–59)
BASOPHILS # BLD AUTO: 0.02 K/MM3 (ref 0–2)
BASOPHILS NFR BLD: 0.1 % (ref 0–3)
BILIRUB DIRECT SERPL-MCNC: 1.1 MG/DL (ref 0–0.4)
BUN SERPL-MCNC: 73 MG/DL (ref 7–21)
CALCIUM SERPL-MCNC: 8.7 MG/DL (ref 8.4–10.5)
EOSINOPHIL # BLD: 0 10*3/UL (ref 0–0.7)
EOSINOPHIL NFR BLD: 0 % (ref 1.5–5)
ERYTHROCYTE [DISTWIDTH] IN BLOOD BY AUTOMATED COUNT: 24.8 % (ref 11.5–14.5)
GAMMA GLUTAMYL TRANSPEPTIDASE: 93 U/L (ref 8–78)
GFR NON-AFRICAN AMERICAN: 9
GRANULOCYTES # BLD: 17.85 10*3/UL (ref 1.4–6.5)
GRANULOCYTES NFR BLD: 90.6 % (ref 50–68)
HGB BLD-MCNC: 10.4 G/DL (ref 14–18)
INR PPP: 1.48 (ref 0.93–1.08)
LYMPHOCYTES # BLD: 1.1 10*3/UL (ref 1.2–3.4)
LYMPHOCYTES NFR BLD AUTO: 5.4 % (ref 22–35)
MCH RBC QN AUTO: 22.5 PG (ref 25–35)
MCHC RBC AUTO-ENTMCNC: 31.6 G/DL (ref 31–37)
MCV RBC AUTO: 71.1 FL (ref 80–105)
MONOCYTES # BLD AUTO: 0.8 10*3/UL (ref 0.1–0.6)
MONOCYTES NFR BLD: 3.9 % (ref 1–6)
PLATELET # BLD: 396 10^3/UL (ref 120–450)
PMV BLD AUTO: 9.3 FL (ref 7–11)
PROTHROMBIN TIME: 17.2 SECONDS (ref 9.4–12.5)
RBC # BLD AUTO: 4.63 10^6/UL (ref 3.5–6.1)
WBC # BLD AUTO: 19.7 10^3/UL (ref 4.5–11)

## 2018-04-26 RX ADMIN — Medication PRN MG: at 21:35

## 2018-04-26 RX ADMIN — IPRATROPIUM BROMIDE AND ALBUTEROL SULFATE SCH ML: .5; 3 SOLUTION RESPIRATORY (INHALATION) at 00:20

## 2018-04-26 RX ADMIN — POLYETHYLENE GLYCOL 3350 SCH GM: 17 POWDER, FOR SOLUTION ORAL at 09:40

## 2018-04-26 RX ADMIN — Medication SCH TAB: at 08:31

## 2018-04-26 RX ADMIN — PIPERACILLIN SODIUM AND TAZOBACTAM SODIUM SCH MLS/HR: .25; 2 INJECTION, POWDER, LYOPHILIZED, FOR SOLUTION INTRAVENOUS at 05:24

## 2018-04-26 RX ADMIN — OXYMETAZOLINE HYDROCHLORIDE SCH SPR: 0.05 SPRAY NASAL at 22:00

## 2018-04-26 RX ADMIN — METHYLPREDNISOLONE SODIUM SUCCINATE SCH MG: 40 INJECTION, POWDER, FOR SOLUTION INTRAMUSCULAR; INTRAVENOUS at 21:43

## 2018-04-26 RX ADMIN — INSULIN HUMAN SCH: 100 INJECTION, SOLUTION PARENTERAL at 22:00

## 2018-04-26 RX ADMIN — IPRATROPIUM BROMIDE AND ALBUTEROL SULFATE SCH ML: .5; 3 SOLUTION RESPIRATORY (INHALATION) at 07:37

## 2018-04-26 RX ADMIN — POLYETHYLENE GLYCOL 3350 SCH GM: 17 POWDER, FOR SOLUTION ORAL at 18:06

## 2018-04-26 RX ADMIN — OXYMETAZOLINE HYDROCHLORIDE SCH SPR: 0.05 SPRAY NASAL at 12:11

## 2018-04-26 RX ADMIN — BUDESONIDE SCH MG: 0.5 SUSPENSION RESPIRATORY (INHALATION) at 07:37

## 2018-04-26 RX ADMIN — Medication PRN MG: at 15:23

## 2018-04-26 RX ADMIN — IPRATROPIUM BROMIDE AND ALBUTEROL SULFATE SCH ML: .5; 3 SOLUTION RESPIRATORY (INHALATION) at 12:40

## 2018-04-26 RX ADMIN — INSULIN HUMAN SCH UNITS: 100 INJECTION, SOLUTION PARENTERAL at 08:31

## 2018-04-26 RX ADMIN — IPRATROPIUM BROMIDE AND ALBUTEROL SULFATE SCH ML: .5; 3 SOLUTION RESPIRATORY (INHALATION) at 20:39

## 2018-04-26 RX ADMIN — IPRATROPIUM BROMIDE AND ALBUTEROL SULFATE SCH ML: .5; 3 SOLUTION RESPIRATORY (INHALATION) at 03:55

## 2018-04-26 RX ADMIN — MORPHINE SULFATE PRN MG: 4 INJECTION, SOLUTION INTRAMUSCULAR; INTRAVENOUS at 09:41

## 2018-04-26 RX ADMIN — BUDESONIDE SCH MG: 0.5 SUSPENSION RESPIRATORY (INHALATION) at 20:39

## 2018-04-26 RX ADMIN — INSULIN HUMAN SCH UNITS: 100 INJECTION, SOLUTION PARENTERAL at 18:01

## 2018-04-26 RX ADMIN — IPRATROPIUM BROMIDE AND ALBUTEROL SULFATE SCH ML: .5; 3 SOLUTION RESPIRATORY (INHALATION) at 16:44

## 2018-04-26 RX ADMIN — INSULIN HUMAN SCH UNITS: 100 INJECTION, SOLUTION PARENTERAL at 12:02

## 2018-04-26 RX ADMIN — PIPERACILLIN SODIUM AND TAZOBACTAM SODIUM SCH MLS/HR: .25; 2 INJECTION, POWDER, LYOPHILIZED, FOR SOLUTION INTRAVENOUS at 18:00

## 2018-04-26 RX ADMIN — METHYLPREDNISOLONE SODIUM SUCCINATE SCH MG: 40 INJECTION, POWDER, FOR SOLUTION INTRAMUSCULAR; INTRAVENOUS at 05:23

## 2018-04-26 RX ADMIN — METHYLPREDNISOLONE SODIUM SUCCINATE SCH MG: 40 INJECTION, POWDER, FOR SOLUTION INTRAMUSCULAR; INTRAVENOUS at 13:59

## 2018-04-26 RX ADMIN — PIPERACILLIN SODIUM AND TAZOBACTAM SODIUM SCH MLS/HR: .25; 2 INJECTION, POWDER, LYOPHILIZED, FOR SOLUTION INTRAVENOUS at 11:07

## 2018-04-26 NOTE — CP.PCM.PN
Subjective





- Date & Time of Evaluation


Date of Evaluation: 04/26/18


Time of Evaluation: 11:48





- Subjective


Subjective: 


Follow up Nephrology Consultation Note





Assessment: stable


oliguric Acute Kidney Injury (N17.9) likely due to ATN, hypoperfusion now with 

fluid overload


shock, acute liver injury, lactic acidosis, coagulopathy


hx of chronic Hep C


Anemia, Hyperphosphatemia (E83.39), HTN (I12.9), DM, morbid obesity


CHF, CAD s/p sent





Plan


HD today as 3rd session as ordered. continue to monitor for spontaneous renal 

recovery. no evidence of renal recovery at present. 


maintain hydrodynamics stable. No ACEI/ARB due to BHARGAVI


Monitor Input/Output, daily weights and renal function with basic metabolic 

panel


A fib rate control as per primary team.


added phos binders





Dose meds/antibiotics for reduced GFR. Avoid fleets enema/magnesium based 

laxatives. Avoid nephrotoxins/NSAIDs/ iodinated contrast (unless needed 

emergently)


Glycemic control


Further work up for as per primary team





Thanks for allowing me to participate in care of your patient. Will follow 

patient with you. Please call if any Qs. d/w ICU and primary team, wife


 Elver Florez


Office: [phone] Cell: [phone] Fax: [phone]





Subjective: Noted events overnight. Patients feels okay. has some itching/

discomfort around dressing over permacath


Denies chest pain, palpitation, shortness of breath, c/o leg swelling. 


All other negative 





Physical Examination: 


General Appearance: Comfortable, in no acute respiratory distress, co-operative 

. ill appearing, obese


Vitals reviewed and noted as below


Head; Atraumatic, normocephalic


ENT: no ulcers no thrush. Tongue is midline. Oropharynx: no rash or ulcers.


EYES: Pupils are equal, round and reactive to light accommodation. Eye muscles 

and extraocular movement intact. Sclera is anicteric.


Neck; supple no lymphadenopathy, no thyromegaly or bruit


Lungs: Normal respiratory rate/effort. Breath sounds bilateral equal and basal 

crackles


Heart: Increased rate. s1s2 normal. No rub or gallop. 


Extremities: 2+ edema. No varicose veins


Neurological: Patient is sleepy but oriented to person, place and time. No 

focal deficit. Strength bilateral appropriate and equal


Skin: Warm and dry. Normal turgor. No rash. Palpitation: Normal elasticity for 

age


Abdomen: Abdomen is soft. Bowel sounds +. There is no abdominal tenderness, no 

guarding/rigidity no organomegaly


Psych: normal insight and normal affect/mood


MSK: no joint tenderness or swelling. Digits and nails normal, no deformity


: kidney or bladder not palpable.


access: Rt IJ tunnelled catheter





Labs/imaging reviewed.


Past medical history, past surgical history, family history, social history, 

allergy reviewed and noted as below


Family hx: no hx of CKD. Rest non-contributory 





workup: normal LVEF echo 2017 but cardiac cath 2018: LVEF 45%


UA 30 protein


Hep C +


renal imaging unremarkable











Objective





- Vital Signs/Intake and Output


Vital Signs (last 24 hours): 


 











Temp Pulse Resp BP Pulse Ox


 


 98.1 F   96 H  15   101/64   99 


 


 04/26/18 04:00  04/26/18 09:38  04/26/18 07:00  04/26/18 09:38  04/26/18 07:00








Intake and Output: 


 











 04/26/18 04/26/18





 06:59 18:59


 


Intake Total 600 


 


Balance 600 














- Medications


Medications: 


 Current Medications





Albuterol/Ipratropium (Duoneb 3 Mg/0.5 Mg (3 Ml) Ud)  3 ml IH [id number] [facility]


   Last Admin: 04/26/18 07:37 Dose:  3 ml


Budesonide (Pulmicort Respules)  0.5 mg IH [id number] [facility]


   Last Admin: 04/26/18 07:37 Dose:  0.5 mg


Calcium Acetate (Phoslo)  667 mg PO WM [facility]


Diltiazem HCl (Cardizem)  90 mg PO QID [facility]


   Last Admin: 04/26/18 09:38 Dose:  90 mg


Escitalopram Oxalate (Lexapro)  5 mg PO DAILY [facility]


   Last Admin: 04/26/18 11:08 Dose:  5 mg


Folic Acid (Folic Acid)  1 mg PO DAILY [facility]


   Last Admin: 04/26/18 09:38 Dose:  1 mg


Piperacillin Sod/Tazobactam Sod (Zosyn 2.25 Gm In 0.9% 100 Ml)  2.25 gm in 100 

mls @ 100 mls/hr IVPB Q6 [facility]


   PRN Reason: Protocol


   Stop: 05/01/18 12:01


   Last Admin: 04/26/18 11:07 Dose:  100 mls/hr


Insulin Human Regular (Humulin R Med)  0 units SC ACHS [facility]


   PRN Reason: Protocol


   Last Admin: 04/26/18 08:31 Dose:  3 units


Lidocaine (Lidoderm)  1 ea TD DAILY [facility]


   Last Admin: 04/26/18 09:40 Dose:  1 ea


Methylprednisolone (Solu-Medrol)  20 mg IVP Q8 [facility]


   Last Admin: 04/26/18 05:23 Dose:  20 mg


Morphine Sulfate (Morphine)  1 mg IVP Q6 [facility]


   PRN Reason: PAIN, MODERATE [4-6]


   Last Admin: 04/26/18 09:41 Dose:  1 mg


Oxymetazoline HCl (Afrin 0.05%)  0 ml NS Q12H [facility]


   Last Admin: 04/25/18 22:24 Dose:  2 spr


Pantoprazole Sodium (Protonix Inj)  40 mg IVP Q12 [facility]


   Last Admin: 04/26/18 09:39 Dose:  40 mg


Polyethylene Glycol (Miralax)  17 gm PO BID [facility]


   Last Admin: 04/26/18 09:40 Dose:  17 gm


Sevelamer HCl (Renagel)  800 mg PO TID [facility]


   Last Admin: 04/26/18 09:40 Dose:  800 mg


Vitamin B Complex/Vit C/Folic Acid (Nephro-Javier)  1 tab PO 0800 [facility]


   Last Admin: 04/26/18 08:31 Dose:  1 tab











- Labs


Labs: 


 





 04/26/18 06:00 





 04/26/18 06:00 





 











PT  17.2 SECONDS (9.4-12.5)  H  04/26/18  06:00    


 


INR  1.48  (0.93-1.08)  H  04/26/18  06:00    


 


APTT  26.6 Seconds (25.1-36.5)   04/26/18  06:00

## 2018-04-26 NOTE — PN
DATE:  04/26/2018



SUBJECTIVE:  The patient is in bed, in no acute distress, nontoxic.  The

patient seen earlier in [facility], bed 1.



PHYSICAL EXAMINATION:

VITAL SIGNS:  Temperature is 98, blood pressure is 101/60, respiratory rate

of 18, heart rate of 84.

HEENT:  Unremarkable.

NECK:  Supple.

LUNGS:  Decreased breath sounds.

HEART:  Normal S1, S2.

ABDOMEN:  Soft, nontender.



LABORATORY EXAMINATION:  Reveals a white count of 19,700, hemoglobin of 10,

and platelets of 396.  Chemistries reveal a BUN of 73, creatinine of 6.5. 

LFTs are elevated.  Urinalysis is noted and serology is reviewed. 

Microbiology is reviewed.  Medications and orders reveal the patient to be

on Zosyn and also on Solu-Medrol.



ASSESSMENT AND PLAN:  A 67-year-old with severe sepsis with intra-abdominal

infection, transaminitis, slowly improving in a patient with Crohn disease,

depression, dyslipidemia, chronic congestive heart failure, and obesity

with body mass index of 37, today is day #6 of Zosyn and patient overall is

improving.







__________________________________________

Arturo Lu MD



DD:  04/26/2018 11:57:43

DT:  04/26/2018 12:04:13

Job # [id number]

## 2018-04-26 NOTE — PN
DATE:  04/26/2018



SUBJECTIVE:  The patient is sitting in a chair.  He remains

oliguric/anuric.  He has occasional oropharyngeal dysphagia.  He denies

abdominal pain, rectal bleeding, nausea, vomiting.



PHYSICAL EXAMINATION:

VITAL SIGNS:  Reveal temperature of 98, blood pressure 101/64, heart rate

of 96.

HEENT:  Revealed sclerae to be white.  Conjunctivae pink.

NECK:  Supple.

CHEST:  Revealed lungs to be clear.

HEART:  Reveals a regular rate and rhythm.

ABDOMEN:  Soft, obese, nontender.

EXTREMITIES:  Show no edema.



LABORATORY DATA:  Reveal white blood cell count 19.7, hemoglobin 10.4,

platelet count of 396,000.  Laboratory data reveal BUN 73, creatinine 6.5,

AST down to 188, ALT 1373, alkaline phosphatase of 131, total bilirubin

1.4.



IMPRESSION:

1.  Status post passive congestion of the liver secondary to low cardiac

output secondary to bradycardia with markedly elevated liver enzymes, now

trending to normal.

2.  Acute renal failure, most likely secondary to acute tubular necrosis

secondary to low cardiac output.

3.  Coronary artery disease.

4.  Systemic inflammatory response syndrome versus sepsis.



RECOMMENDATIONS:

1.  Continue IV Zosyn.

2.  Follow liver enzymes.





__________________________________________

Julio César Madrid MD





DD:  04/26/2018 10:29:55

DT:  04/26/2018 10:32:06

Job # [id number]

## 2018-04-26 NOTE — PN
DATE:  04/26/2018



SUBJECTIVE:  The patient was seen in ICU, bed 1.  The patient is out of bed

to recliner.  The patient is alert, awake, responsive.  Overnight nurses'

notes were reviewed.  The patient refused BiPAP overnight.



PHYSICAL EXAMINATION:

VITAL SIGNS:  T-max 98.7.  Telemetry shows atrial fibrillation; heart rate

in the 80-96; blood pressure 153/80,162/95; respirations 16-18, O2 sat

97%-99%.

HEENT:  Head examination normocephalic, atraumatic.  HEENT examination

shows pinkish conjunctivae, anicteric sclerae.  No oropharyngeal lesion.

NECK:  No neck rigidity.

CHEST:  Positive right upper chest dialysis catheter.

CARDIOVASCULAR:  S1, S2.  Irregular rhythm.  Positive systolic murmur in

the left sternal border, right second intercostal space, left second

intercostal space.

ABDOMEN:  Protuberant.  Positive bowel sounds.

GENITALIA:  Male.

RECTAL:  Deferred.

EXTREMITIES:  Shows positive pitting edema of the lower extremities.

MUSCULOSKELETAL:  Shows body mass index of 37.4.

NEUROLOGIC:  The patient is alert, awake, oriented x3.  Cranial nerves

II-XII limited.  Gait examination could not be tested.  Vascular

examination could not be tested.



DIAGNOSTICS:  On 04/26, WBC 19.7, hemoglobin and hematocrit 10.4 and 32.9,

MCV is 71, platelets 396, granulocytes 91%.  Sodium 136, potassium 3.9,

chloride 94, CO2 of 24, anion gap 21, BUN 73, creatinine 6.5.  Glucose 223,

234.  Lactic acid 1.3, phosphorus 6.8.  Total bili 1.4, 1.1;, direct bili

_____ 93.  AST is down to 188, ALT is down to 1373, alk phos 131, ammonia

14.  Hepatitis C reactive antibody is reactive with a titer of 19.8. 

Patient received PRBC of 3 units and FFP of 6.



IMPRESSION:

1.  Acute renal failure and acute kidney injury, hemodialysis requiring.

2.  Oliguric acute kidney injury secondary to acute tubular necrosis

secondary to hypotension, hypoperfusion.

3.  Volume and fluid overload.

4.  Acute hepatic injury versus hepatic shock liver.

5.  Coagulopathy.

6.  Hyperphosphatemia.

7.  Non-insulin requiring diabetes mellitus.

8.  Morbid obesity.

9.  Hypertension.

10.  Leukocytosis with granulocytosis.

11.  Bandemia.

12.  Increased anion gap metabolic acidosis and lactic acidosis.

13.  Hyperbilirubinemia.

14.  Severe transaminitis.

15.  Questionable secondary hyperparathyroidism of renal origin.

16.  Non-hemolyzed hyperkalemia.

17.  Proteinuria.

18.  Urine drug screen positive for opiates.

19.  Reactive hepatitis C.

20.  Questionable yeast in the sputum.

21.  Status post 3 units of packed red blood cells and 6 units of fresh

frozen plasma transfusion.

22.  Cardiomegaly.

23.  Bilateral lower lobe infiltrate pneumonia, bronchogram and

atelectasis.

24.  Pulmonary hypertension with dilated main pulmonary artery.

25.  Status post right internal jugular dialysis catheter placement.

26.  Severe sepsis.

27.  History of Crohn's disease.

28.  Atrial fibrillation with rapid ventricular response.



PLAN:  At this time, the patient will be considered for transfer out of the

ICU to Telemetry in front of the nursing station.  The patient has been

ordered serial labs.



CURRENT CONSULTATIONS:

1.  Cardiology.

2.  Gastroenterology.

3.  Infectious Disease.

4.  Nephrology.

5.  Ear, Nose, Throat.

6.  Surgery.

7.  Interventional Radiology.



CURRENT MEDICATIONS:  Cardizem 90 mg 4 times a day ordered by the ICU,

DuoNeb nebulizer every 4 hours, folic acid 1 mg daily, insulin Regular

medium dose before meals and at bedtime, Lexapro 5 mg daily, Lidoderm 5%

patch to the affected area, MiraLax 17 g twice a day, morphine 1 mg IV

every 6 hours p.r.n., Nephro-Javier 1 tablet daily, PhosLo 667 mg with meals,

Protonix 40 IV every 12 hours, Pulmicort nebulizer 0.5 mg every 12 hours,

Renagel 800 mg 3 times a day, Solu-Medrol 20 mg IV every 8 hours, Zosyn

2.25 g IV every 6 hours.



Patient is on BiPAP, renal diet, out of bed, stockings, SCDs which are not

placed on.  Overall, patient's prognosis is guarded.



CONDITION:  Critical, but improving.



The patient will be transferred out of the ICU to telemetry.



TIME SPENT:  35 minutes.



Dictated and electronically signed, not read.







__________________________________________

Kody Pratt MD



DD:  04/26/2018 10:24:03

DT:  04/26/2018 12:47:11

Job # [id number]

## 2018-04-27 LAB
ALBUMIN SERPL-MCNC: 4.3 G/DL (ref 3–4.8)
ALBUMIN/GLOB SERPL: 1.2 {RATIO} (ref 1.1–1.8)
ALT SERPL-CCNC: 1146 U/L (ref 7–56)
ANISOCYTOSIS BLD QL SMEAR: (no result)
APTT BLD: 23 SECONDS (ref 25.1–36.5)
AST SERPL-CCNC: 119 U/L (ref 17–59)
BASOPHILS # BLD AUTO: 0.02 K/MM3 (ref 0–2)
BASOPHILS NFR BLD: 0.1 % (ref 0–3)
BILIRUB DIRECT SERPL-MCNC: 0.9 MG/DL (ref 0–0.4)
BUN SERPL-MCNC: 65 MG/DL (ref 7–21)
CALCIUM SERPL-MCNC: 9.2 MG/DL (ref 8.4–10.5)
EOSINOPHIL # BLD: 0 10*3/UL (ref 0–0.7)
EOSINOPHIL NFR BLD: 0 % (ref 1.5–5)
ERYTHROCYTE [DISTWIDTH] IN BLOOD BY AUTOMATED COUNT: 25.3 % (ref 11.5–14.5)
GAMMA GLUTAMYL TRANSPEPTIDASE: 107 U/L (ref 8–78)
GFR NON-AFRICAN AMERICAN: 9
GRANULOCYTES # BLD: 19.81 10*3/UL (ref 1.4–6.5)
GRANULOCYTES NFR BLD: 91.2 % (ref 50–68)
HGB BLD-MCNC: 11.4 G/DL (ref 14–18)
HYPOCHROMIA BLD QL SMEAR: (no result)
INR PPP: 1.37 (ref 0.93–1.08)
LYMPHOCYTE: 4 % (ref 22–35)
LYMPHOCYTES # BLD: 1.4 10*3/UL (ref 1.2–3.4)
LYMPHOCYTES NFR BLD AUTO: 6.3 % (ref 22–35)
MCH RBC QN AUTO: 22.7 PG (ref 25–35)
MCHC RBC AUTO-ENTMCNC: 31.8 G/DL (ref 31–37)
MCV RBC AUTO: 71.2 FL (ref 80–105)
METAMYELOCYTES NFR BLD: 1 %
MICROCYTES BLD QL SMEAR: SLIGHT
MONOCYTE: 4 % (ref 1–6)
MONOCYTES # BLD AUTO: 0.5 10*3/UL (ref 0.1–0.6)
MONOCYTES NFR BLD: 2.4 % (ref 1–6)
NEUTROPHILS NFR BLD AUTO: 88 % (ref 50–70)
OVALOCYTES BLD QL SMEAR: (no result)
PLATELET # BLD EST: (no result) 10*3/UL
PLATELET # BLD: 480 10^3/UL (ref 120–450)
PMV BLD AUTO: 9.6 FL (ref 7–11)
POIKILOCYTOSIS BLD QL SMEAR: (no result)
PROTHROMBIN TIME: 15.9 SECONDS (ref 9.4–12.5)
RBC # BLD AUTO: 5.03 10^6/UL (ref 3.5–6.1)
SCHISTOCYTES BLD QL SMEAR: SLIGHT
VARIANT LYMPHS NFR BLD MANUAL: 3 % (ref 0–0)
WBC # BLD AUTO: 21.7 10^3/UL (ref 4.5–11)

## 2018-04-27 RX ADMIN — PIPERACILLIN SODIUM AND TAZOBACTAM SODIUM SCH MLS/HR: .25; 2 INJECTION, POWDER, LYOPHILIZED, FOR SOLUTION INTRAVENOUS at 01:00

## 2018-04-27 RX ADMIN — Medication PRN MG: at 17:03

## 2018-04-27 RX ADMIN — IPRATROPIUM BROMIDE AND ALBUTEROL SULFATE SCH ML: .5; 3 SOLUTION RESPIRATORY (INHALATION) at 00:06

## 2018-04-27 RX ADMIN — PIPERACILLIN SODIUM AND TAZOBACTAM SODIUM SCH MLS/HR: .25; 2 INJECTION, POWDER, LYOPHILIZED, FOR SOLUTION INTRAVENOUS at 23:35

## 2018-04-27 RX ADMIN — Medication PRN MG: at 03:01

## 2018-04-27 RX ADMIN — IPRATROPIUM BROMIDE AND ALBUTEROL SULFATE SCH ML: .5; 3 SOLUTION RESPIRATORY (INHALATION) at 07:34

## 2018-04-27 RX ADMIN — Medication PRN MG: at 10:43

## 2018-04-27 RX ADMIN — IPRATROPIUM BROMIDE AND ALBUTEROL SULFATE SCH ML: .5; 3 SOLUTION RESPIRATORY (INHALATION) at 20:37

## 2018-04-27 RX ADMIN — BUDESONIDE SCH MG: 0.5 SUSPENSION RESPIRATORY (INHALATION) at 20:37

## 2018-04-27 RX ADMIN — IPRATROPIUM BROMIDE AND ALBUTEROL SULFATE SCH ML: .5; 3 SOLUTION RESPIRATORY (INHALATION) at 10:46

## 2018-04-27 RX ADMIN — PIPERACILLIN SODIUM AND TAZOBACTAM SODIUM SCH MLS/HR: .25; 2 INJECTION, POWDER, LYOPHILIZED, FOR SOLUTION INTRAVENOUS at 18:39

## 2018-04-27 RX ADMIN — METHYLPREDNISOLONE SODIUM SUCCINATE SCH MG: 40 INJECTION, POWDER, FOR SOLUTION INTRAMUSCULAR; INTRAVENOUS at 06:55

## 2018-04-27 RX ADMIN — OXYMETAZOLINE HYDROCHLORIDE SCH SPR: 0.05 SPRAY NASAL at 23:32

## 2018-04-27 RX ADMIN — Medication PRN MG: at 19:55

## 2018-04-27 RX ADMIN — INSULIN HUMAN SCH UNITS: 100 INJECTION, SOLUTION PARENTERAL at 08:17

## 2018-04-27 RX ADMIN — INSULIN HUMAN SCH UNITS: 100 INJECTION, SOLUTION PARENTERAL at 13:34

## 2018-04-27 RX ADMIN — PIPERACILLIN SODIUM AND TAZOBACTAM SODIUM SCH MLS/HR: .25; 2 INJECTION, POWDER, LYOPHILIZED, FOR SOLUTION INTRAVENOUS at 13:43

## 2018-04-27 RX ADMIN — INSULIN HUMAN SCH UNITS: 100 INJECTION, SOLUTION PARENTERAL at 17:01

## 2018-04-27 RX ADMIN — OXYMETAZOLINE HYDROCHLORIDE SCH SPR: 0.05 SPRAY NASAL at 10:11

## 2018-04-27 RX ADMIN — POLYETHYLENE GLYCOL 3350 SCH GM: 17 POWDER, FOR SOLUTION ORAL at 10:14

## 2018-04-27 RX ADMIN — METHYLPREDNISOLONE SODIUM SUCCINATE SCH MG: 40 INJECTION, POWDER, FOR SOLUTION INTRAMUSCULAR; INTRAVENOUS at 16:58

## 2018-04-27 RX ADMIN — PIPERACILLIN SODIUM AND TAZOBACTAM SODIUM SCH MLS/HR: .25; 2 INJECTION, POWDER, LYOPHILIZED, FOR SOLUTION INTRAVENOUS at 06:55

## 2018-04-27 RX ADMIN — POLYETHYLENE GLYCOL 3350 SCH GM: 17 POWDER, FOR SOLUTION ORAL at 16:59

## 2018-04-27 RX ADMIN — Medication SCH TAB: at 08:22

## 2018-04-27 RX ADMIN — INSULIN HUMAN SCH: 100 INJECTION, SOLUTION PARENTERAL at 23:34

## 2018-04-27 RX ADMIN — METHYLPREDNISOLONE SODIUM SUCCINATE SCH MG: 40 INJECTION, POWDER, FOR SOLUTION INTRAMUSCULAR; INTRAVENOUS at 23:53

## 2018-04-27 RX ADMIN — IPRATROPIUM BROMIDE AND ALBUTEROL SULFATE SCH ML: .5; 3 SOLUTION RESPIRATORY (INHALATION) at 03:55

## 2018-04-27 RX ADMIN — BUDESONIDE SCH MG: 0.5 SUSPENSION RESPIRATORY (INHALATION) at 07:34

## 2018-04-27 RX ADMIN — IPRATROPIUM BROMIDE AND ALBUTEROL SULFATE SCH: .5; 3 SOLUTION RESPIRATORY (INHALATION) at 15:00

## 2018-04-27 NOTE — PN
DATE:  04/27/2018



SUBJECTIVE:  The patient is in the critical care unit.  He is 67-year-old

male.  He was admitted with GI bleeding.  The patient had renal

insufficiency.  The patient has positive history of pulmonary artery

disease, chronic lung disease, hypertension.  The patient has history of

sepsis in the past.  The patient is currently getting dialysis with a

Tenckhoff catheter in the right side of his neck.  The patient has also

been treated for his cardiac condition and infection.



PHYSICAL EXAMINATION:

VITAL SIGNS:  This morning, his pulse is 95, his blood pressure is 120/70,

his respirations are 21, O2 sat is 96% on oxygen 2 L.

LUNGS:  Bilateral rhonchi and occasional crepitations bilaterally.

HEART:  Normal sinus rhythm, atrial fibrillation.  The patient was on

anticoagulation, but it is suspended at this time because of the GI bleed.



MEDICATIONS:  Consist of heparin.  The patient is getting Cardizem 90 mg 4

times a day, Albuterol DuoNeb every 6 hours, folic acid 1 mg daily, insulin

coverage for diabetes.  The patient is on Lexapro 5 mg daily.  The patient

is on MiraLax for constipation, morphine sulfate for pain.  The patient is

on PhosLo for renal failure, pantoprazole for gastritis and GI bleeding. 

The patient is on Pulmicort for chronic lung disease.



LABORATORY DATA:  The patient's blood work in the hospital now, his

hemoglobin is 10.4, patient's white count is 19,700.  His chemistry, the

patient's renal functions, the creatinine is 6.1.  The patient's BUN is 65.

He is in renal failure.  His lactic acid is 2.2, which suggest that he has

sepsis.  The patient's liver enzymes are markedly elevated.  His SGPT, SGOT

are elevated.  His ALT is 1146.  The patient is in acute care facility at

this time.  We will continue current management.  The patient has

Infectious Disease consultation, cardiac consultation and the patient also

has care in the critical care unit with the critical care specialist.  His

overall prognosis is guarded.



CONDITION:  Acute.





__________________________________________

Myron Lange MD



DD:  04/27/2018 7:44:08

DT:  04/27/2018 8:20:06

Job # [id number]

## 2018-04-27 NOTE — CP.PCM.PN
Subjective





- Date & Time of Evaluation


Date of Evaluation: 04/27/18


Time of Evaluation: 13:37





- Subjective


Subjective: 


Patient seen and examined, feeling better, eating as regular, throat pain 

improved








Objective





- Vital Signs/Intake and Output


Vital Signs (last 24 hours): 


 











Temp Pulse Resp BP Pulse Ox


 


 97.9 F   94 H  20   157/79 H  95 


 


 04/27/18 04:00  04/27/18 10:11  04/27/18 06:00  04/27/18 10:11  04/27/18 06:00








Intake and Output: 


 











 04/27/18 04/27/18





 06:59 18:59


 


Intake Total 380 


 


Balance 380 














- Medications


Medications: 


 Current Medications





Albuterol/Ipratropium (Duoneb 3 Mg/0.5 Mg (3 Ml) Ud)  3 ml IH [id number] [facility]


   Last Admin: 04/27/18 10:46 Dose:  3 ml


Budesonide (Pulmicort Respules)  0.5 mg IH [id number] [facility]


   Last Admin: 04/27/18 07:34 Dose:  0.5 mg


Diltiazem HCl (Cardizem)  90 mg PO QID [facility]


   Last Admin: 04/27/18 10:11 Dose:  90 mg


Escitalopram Oxalate (Lexapro)  5 mg PO DAILY [facility]


   Last Admin: 04/27/18 10:13 Dose:  5 mg


Folic Acid (Folic Acid)  1 mg PO DAILY [facility]


   Last Admin: 04/27/18 10:13 Dose:  1 mg


Piperacillin Sod/Tazobactam Sod (Zosyn 2.25 Gm In 0.9% 100 Ml)  2.25 gm in 100 

mls @ 100 mls/hr IVPB Q6 [facility]


   PRN Reason: Protocol


   Stop: 04/30/18 18:01


Insulin Human Regular (Humulin R Med)  0 units SC ACHS [facility]


   PRN Reason: Protocol


   Last Admin: 04/27/18 13:34 Dose:  3 units


Lidocaine (Lidoderm)  1 ea TD DAILY [facility]


   Last Admin: 04/27/18 10:15 Dose:  1 ea


Methylprednisolone (Solu-Medrol)  20 mg IVP Q8 [facility]


   Last Admin: 04/27/18 06:55 Dose:  20 mg


Morphine Sulfate (Morphine)  1 mg IVP Q6 [facility]


   PRN Reason: PAIN, MODERATE [4-6]


   Last Admin: 04/27/18 10:43 Dose:  1 mg


Oxymetazoline HCl (Afrin 0.05%)  0 ml NS Q12H [facility]


   Last Admin: 04/27/18 10:11 Dose:  2 spr


Pantoprazole Sodium (Protonix Inj)  40 mg IVP Q12 [facility]


   Last Admin: 04/27/18 10:19 Dose:  40 mg


Polyethylene Glycol (Miralax)  17 gm PO BID [facility]


   Last Admin: 04/27/18 10:14 Dose:  17 gm


Sevelamer HCl (Renagel)  1,600 mg PO TID [facility]


Vitamin B Complex/Vit C/Folic Acid (Nephro-Javier)  1 tab PO 0800 [facility]


   Last Admin: 04/27/18 08:22 Dose:  1 tab


Zolpidem Tartrate (Ambien)  5 mg PO HS PRN; Protocol


   PRN Reason: Insomnia











- Labs


Labs: 


 





 04/27/18 06:20 





 04/27/18 06:20 





 











PT  15.9 SECONDS (9.4-12.5)  H  04/27/18  06:20    


 


INR  1.37  (0.93-1.08)  H  04/27/18  06:20    


 


APTT  23.0 Seconds (25.1-36.5)  L  04/27/18  06:20    














- Head Exam


Head Exam: ATRAUMATIC, NORMAL INSPECTION, NORMOCEPHALIC





- Eye Exam


Eye Exam: EOMI, Normal appearance


Pupil Exam: NORMAL ACCOMODATION





- ENT Exam


ENT Exam: Mucous Membranes Moist


Additional comments: 





thrush noted on palate





- Neck Exam


Neck Exam: Full ROM





- Neurological Exam


Neurological Exam: Alert, Normal Gait, Oriented x3





- Psychiatric Exam


Psychiatric exam: Normal Affect, Normal Mood





Assessment and Plan


(1) GI bleed


Status: Acute   





(2) Bronchitis


Status: Acute   





(3) CHF (congestive heart failure)


Status: Acute   





(4) Dyspnea


Status: Acute   





(5) New onset a-fib


Status: Acute   





(6) Rapid atrial fibrillation


Status: Acute   





(7) Dysphonia


Status: Acute   





(8) Oropharyngeal dysphagia


Status: Acute   





(9) Glottic edema


Status: Acute   





(10) Oral thrush


Status: Acute   





- Assessment and Plan (Free Text)


Plan: 


-pt to be treated for thrush, will follow, also f/u as out patient

## 2018-04-27 NOTE — CP.PCM.PN
Subjective





- Date & Time of Evaluation


Date of Evaluation: 04/27/18


Time of Evaluation: 14:56





- Subjective


Subjective: 


Follow up Nephrology Consultation Note





Assessment: stable


oliguric Acute Kidney Injury (N17.9) likely due to ATN, hypoperfusion now with 

fluid overload


shock, acute liver injury, lactic acidosis, coagulopathy


hx of chronic Hep C


Anemia, Hyperphosphatemia (E83.39), HTN (I12.9), DM, morbid obesity


CHF, CAD s/p sent





Plan


HD tomorrow as 4th session as ordered. 


continue to monitor for spontaneous renal recovery. no evidence of renal 

recovery at present. 


maintain hydrodynamics stable. No ACEI/ARB due to BHARGAVI


Monitor Input/Output, daily weights and renal function with basic metabolic 

panel


A fib rate control as per primary team.


added phos binders as renagel 1600 mg TID with meals


AV access placement NOT indicated at this time as pt is expected to have some 

renal recovery





Dose meds/antibiotics for reduced GFR/dialysis status. Avoid fleets enema/

magnesium based laxatives. Avoid nephrotoxins/NSAIDs/ iodinated contrast (

unless needed emergently)


Glycemic control


Further work up for as per primary team





Thanks for allowing me to participate in care of your patient. Will follow 

patient with you. Please call if any Qs.


Dr Elver Florez


Office: [phone] Cell: [phone] Fax: [phone]





Subjective: Noted events overnight. Patients feels okay. 


Denies chest pain, palpitation, shortness of breath, 


All other negative 





Physical Examination: 


General Appearance: Comfortable, in no acute respiratory distress, co-operative 

.better appearing, obese


Vitals reviewed and noted as below


Head; Atraumatic, normocephalic


ENT: no ulcers no thrush. Tongue is midline. Oropharynx: no rash or ulcers.


EYES: Pupils are equal, round and reactive to light accommodation. Eye muscles 

and extraocular movement intact. Sclera is anicteric.


Neck; supple no lymphadenopathy, no thyromegaly or bruit


Lungs: Normal respiratory rate/effort. Breath sounds bilateral equal and clearer


Heart: normal rate. s1s2 normal. No rub or gallop. 


Extremities: 2+ edema. No varicose veins


Neurological: Patient is sleepy but oriented to person, place and time. No 

focal deficit. Strength bilateral appropriate and equal


Skin: Warm and dry. Normal turgor. No rash. Palpitation: Normal elasticity for 

age


Abdomen: Abdomen is soft. Bowel sounds +. There is no abdominal tenderness, no 

guarding/rigidity no organomegaly


Psych: normal insight and normal affect/mood


MSK: no joint tenderness or swelling. Digits and nails normal, no deformity


: kidney or bladder not palpable.


access: Rt IJ tunnelled catheter





Labs/imaging reviewed.


Past medical history, past surgical history, family history, social history, 

allergy reviewed and noted as below


Family hx: no hx of CKD. Rest non-contributory 





workup: normal LVEF echo 2017 but cardiac cath 2018: LVEF 45%


UA 30 protein


Hep C +


renal imaging unremarkable











Objective





- Vital Signs/Intake and Output


Vital Signs (last 24 hours): 


 











Temp Pulse Resp BP Pulse Ox


 


 97.9 F   94 H  20   157/79 H  95 


 


 04/27/18 04:00  04/27/18 10:11  04/27/18 06:00  04/27/18 10:11  04/27/18 06:00








Intake and Output: 


 











 04/27/18 04/27/18





 06:59 18:59


 


Intake Total 380 


 


Balance 380 














- Medications


Medications: 


 Current Medications





Albuterol/Ipratropium (Duoneb 3 Mg/0.5 Mg (3 Ml) Ud)  3 ml IH [id number] [facility]


   Last Admin: 04/27/18 10:46 Dose:  3 ml


Budesonide (Pulmicort Respules)  0.5 mg IH [id number] [facility]


   Last Admin: 04/27/18 07:34 Dose:  0.5 mg


Clotrimazole (Mycelex Lana)  10 mg MT 5XD [facility]


Diltiazem HCl (Cardizem)  90 mg PO QID [facility]


   Last Admin: 04/27/18 10:11 Dose:  90 mg


Escitalopram Oxalate (Lexapro)  5 mg PO DAILY [facility]


   Last Admin: 04/27/18 10:13 Dose:  5 mg


Folic Acid (Folic Acid)  1 mg PO DAILY [facility]


   Last Admin: 04/27/18 10:13 Dose:  1 mg


Piperacillin Sod/Tazobactam Sod (Zosyn 2.25 Gm In 0.9% 100 Ml)  2.25 gm in 100 

mls @ 100 mls/hr IVPB Q6 [facility]


   PRN Reason: Protocol


   Stop: 04/30/18 18:01


Insulin Human Regular (Humulin R Med)  0 units SC ACHS [facility]


   PRN Reason: Protocol


   Last Admin: 04/27/18 13:34 Dose:  3 units


Lidocaine (Lidoderm)  1 ea TD DAILY [facility]


   Last Admin: 04/27/18 10:15 Dose:  1 ea


Methylprednisolone (Solu-Medrol)  20 mg IVP Q8 [facility]


   Last Admin: 04/27/18 06:55 Dose:  20 mg


Morphine Sulfate (Morphine)  1 mg IVP Q6 [facility]


   PRN Reason: PAIN, MODERATE [4-6]


   Last Admin: 04/27/18 10:43 Dose:  1 mg


Oxymetazoline HCl (Afrin 0.05%)  0 ml NS Q12H [facility]


   Last Admin: 04/27/18 10:11 Dose:  2 spr


Pantoprazole Sodium (Protonix Inj)  40 mg IVP Q12 [facility]


   Last Admin: 04/27/18 10:19 Dose:  40 mg


Polyethylene Glycol (Miralax)  17 gm PO BID [facility]


   Last Admin: 04/27/18 10:14 Dose:  17 gm


Sevelamer HCl (Renagel)  1,600 mg PO TID [facility]


Vitamin B Complex/Vit C/Folic Acid (Nephro-Javier)  1 tab PO 0800 [facility]


   Last Admin: 04/27/18 08:22 Dose:  1 tab


Zolpidem Tartrate (Ambien)  5 mg PO HS PRN; Protocol


   PRN Reason: Insomnia











- Labs


Labs: 


 





 04/27/18 06:20 





 04/27/18 06:20 





 











PT  15.9 SECONDS (9.4-12.5)  H  04/27/18  06:20    


 


INR  1.37  (0.93-1.08)  H  04/27/18  06:20    


 


APTT  23.0 Seconds (25.1-36.5)  L  04/27/18  06:20

## 2018-04-27 NOTE — PN
DATE:  04/27/2018



SUBJECTIVE:  The patient is lying in bed.  He complains of less

oropharyngeal dysphagia.  Urine output remains poor.  He denies any

abdominal pain, nausea, vomiting, fevers or chills.  He denies shortness of

breath or chest pain.



PHYSICAL EXAMINATION:

VITAL SIGNS:  Reveal temperature of 98, blood pressure of 140/65, heart

rate of 91.

HEENT:  Reveals sclerae to be white.  Conjunctivae pink.

NECK:  Supple.

CHEST:  Reveal lungs to be clear.

HEART:  Reveals an irregularly irregular rate.

ABDOMEN:  Obese, soft, nontender.

EXTREMITIES:  Show trace pedal edema.



LABORATORY DATA:  Reveal white blood cell count 21.7, hemoglobin 11.4. 

Chemistries reveal AST down to 119, , alkaline phosphatase of 155. 

Coags reveal PT 15.9, INR 1.37.  White blood cell count 21.7, hemoglobin

11.4.



IMPRESSION:

1.  Elevated liver enzymes secondary to passive congestion of the liver

from low cardiac output from bradycardia.

2.  Acute renal failure secondary to acute tubular necrosis secondary to

low cardiac output secondary to bradycardia.

3.  History of coronary artery disease.

4.  Congestive heart failure.

5.  Systemic inflammatory response syndrome.

6.  Type 2 diabetes mellitus.



RECOMMENDATIONS:

1.  Continue broad-spectrum IV antibiotics.

2.  Follow liver enzymes.

3.  Hold Eliquis and Plavix for now given high risk for gastrointestinal

bleeding.

4.  Continue dialysis as per renal consultant.





__________________________________________

Julio César Madrid MD





DD:  04/27/2018 9:25:21

DT:  04/27/2018 9:28:50

Job # [id number]

## 2018-04-27 NOTE — CP.PCM.PN
Subjective





- Date & Time of Evaluation


Date of Evaluation: 04/27/18


Time of Evaluation: 07:55





- Subjective


Subjective: 





Surgery: Dr. Hureta





Pt seen and examined. No acute events overnight. States that he has been unable 

to sleep and typically takes ambien at home. Tolerating diet. No N/V. Normal 

BMs.





Objective





- Vital Signs/Intake and Output


Vital Signs (last 24 hours): 


 











Temp Pulse Resp BP Pulse Ox


 


 97.9 F   98 H  20   140/65   95 


 


 04/27/18 04:00  04/27/18 06:00  04/27/18 06:00  04/27/18 03:00  04/27/18 06:00








Intake and Output: 


 











 04/27/18 04/27/18





 06:59 18:59


 


Intake Total 380 


 


Balance 380 














- Medications


Medications: 


 Current Medications





Albuterol/Ipratropium (Duoneb 3 Mg/0.5 Mg (3 Ml) Ud)  3 ml IH [id number] [facility]


   Last Admin: 04/27/18 07:34 Dose:  3 ml


Budesonide (Pulmicort Respules)  0.5 mg IH [id number] [facility]


   Last Admin: 04/27/18 07:34 Dose:  0.5 mg


Calcium Acetate (Phoslo)  667 mg PO WM [facility]


   Last Admin: 04/26/18 18:03 Dose:  667 mg


Diltiazem HCl (Cardizem)  90 mg PO QID [facility]


   Last Admin: 04/26/18 21:48 Dose:  90 mg


Escitalopram Oxalate (Lexapro)  5 mg PO DAILY [facility]


   Last Admin: 04/26/18 11:08 Dose:  5 mg


Folic Acid (Folic Acid)  1 mg PO DAILY [facility]


   Last Admin: 04/26/18 09:38 Dose:  1 mg


Piperacillin Sod/Tazobactam Sod (Zosyn 2.25 Gm In 0.9% 100 Ml)  2.25 gm in 100 

mls @ 100 mls/hr IVPB Q6 [facility]


   PRN Reason: Protocol


   Stop: 05/01/18 12:01


   Last Admin: 04/27/18 06:55 Dose:  100 mls/hr


Insulin Human Regular (Humulin R Med)  0 units SC ACHS [facility]


   PRN Reason: Protocol


   Last Admin: 04/26/18 22:00 Dose:  Not Given


Lidocaine (Lidoderm)  1 ea TD DAILY [facility]


   Last Admin: 04/26/18 09:40 Dose:  1 ea


Methylprednisolone (Solu-Medrol)  20 mg IVP Q8 [facility]


   Last Admin: 04/27/18 06:55 Dose:  20 mg


Morphine Sulfate (Morphine)  1 mg IVP Q6 [facility]


   PRN Reason: PAIN, MODERATE [4-6]


   Last Admin: 04/27/18 03:01 Dose:  1 mg


Oxymetazoline HCl (Afrin 0.05%)  0 ml NS Q12H [facility]


   Last Admin: 04/26/18 22:00 Dose:  2 spr


Pantoprazole Sodium (Protonix Inj)  40 mg IVP Q12 [facility]


   Last Admin: 04/26/18 21:43 Dose:  40 mg


Polyethylene Glycol (Miralax)  17 gm PO BID [facility]


   Last Admin: 04/26/18 18:06 Dose:  17 gm


Sevelamer HCl (Renagel)  800 mg PO TID [facility]


   Last Admin: 04/26/18 18:02 Dose:  800 mg


Vitamin B Complex/Vit C/Folic Acid (Nephro-Javier)  1 tab PO 0800 [facility]


   Last Admin: 04/26/18 08:31 Dose:  1 tab


Zolpidem Tartrate (Ambien)  5 mg PO HS PRN; Protocol


   PRN Reason: Insomnia











- Labs


Labs: 


 





 04/27/18 06:20 





 04/27/18 06:20 





 











PT  15.9 SECONDS (9.4-12.5)  H  04/27/18  06:20    


 


INR  1.37  (0.93-1.08)  H  04/27/18  06:20    


 


APTT  23.0 Seconds (25.1-36.5)  L  04/27/18  06:20    














- Constitutional


Appears: Non-toxic, No Acute Distress





- Head Exam


Head Exam: ATRAUMATIC, NORMOCEPHALIC





- Eye Exam


Eye Exam: EOMI


Pupil Exam: NORMAL ACCOMODATION





- ENT Exam


ENT Exam: Mucous Membranes Moist





- Neck Exam


Neck Exam: Full ROM





- Respiratory Exam


Respiratory Exam: NORMAL BREATHING PATTERN.  absent: Accessory Muscle Use, 

Respiratory Distress





- GI/Abdominal Exam


GI & Abdominal Exam: Firm.  absent: Distended, Guarding, Rigid, Tenderness, 

Rebound





- Extremities Exam


Extremities Exam: absent: Calf Tenderness, Pedal Edema





- Neurological Exam


Neurological Exam: Alert, Awake, Oriented x3





Assessment and Plan





- Assessment and Plan (Free Text)


Assessment: 





67M w. LGIB, resolved, and BHARGAVI


-vein mapping ordered


-L arm precautions ordered


-c/w current medical management


-d/w attending





[facility] PGY3

## 2018-04-27 NOTE — CP.PCM.PN
Subjective





- Date & Time of Evaluation


Date of Evaluation: 04/27/18


Time of Evaluation: 10:05





- Subjective


Subjective: 





Patient is feeling better, no fevers, not in distress, no abdominal pain 

currently, stools are soft but not watery.





Objective





- Vital Signs/Intake and Output


Vital Signs (last 24 hours): 


 











Temp Pulse Resp BP Pulse Ox


 


 97.9 F   98 H  20   140/65   95 


 


 04/27/18 04:00  04/27/18 06:00  04/27/18 06:00  04/27/18 03:00  04/27/18 06:00








Intake and Output: 


 











 04/27/18 04/27/18





 06:59 18:59


 


Intake Total 380 


 


Balance 380 














- Medications


Medications: 


 Current Medications





Albuterol/Ipratropium (Duoneb 3 Mg/0.5 Mg (3 Ml) Ud)  3 ml IH [id number] [facility]


   Last Admin: 04/27/18 07:34 Dose:  3 ml


Budesonide (Pulmicort Respules)  0.5 mg IH [id number] [facility]


   Last Admin: 04/27/18 07:34 Dose:  0.5 mg


Calcium Acetate (Phoslo)  667 mg PO WM [facility]


   Last Admin: 04/27/18 08:22 Dose:  667 mg


Diltiazem HCl (Cardizem)  90 mg PO QID [facility]


   Last Admin: 04/26/18 21:48 Dose:  90 mg


Escitalopram Oxalate (Lexapro)  5 mg PO DAILY [facility]


   Last Admin: 04/26/18 11:08 Dose:  5 mg


Folic Acid (Folic Acid)  1 mg PO DAILY [facility]


   Last Admin: 04/26/18 09:38 Dose:  1 mg


Piperacillin Sod/Tazobactam Sod (Zosyn 2.25 Gm In 0.9% 100 Ml)  2.25 gm in 100 

mls @ 100 mls/hr IVPB Q6 [facility]


   PRN Reason: Protocol


   Stop: 05/01/18 12:01


   Last Admin: 04/27/18 06:55 Dose:  100 mls/hr


Insulin Human Regular (Humulin R Med)  0 units SC ACHS [facility]


   PRN Reason: Protocol


   Last Admin: 04/27/18 08:17 Dose:  3 units


Lidocaine (Lidoderm)  1 ea TD DAILY [facility]


   Last Admin: 04/26/18 09:40 Dose:  1 ea


Methylprednisolone (Solu-Medrol)  20 mg IVP Q8 [facility]


   Last Admin: 04/27/18 06:55 Dose:  20 mg


Morphine Sulfate (Morphine)  1 mg IVP Q6 PRN


   PRN Reason: PAIN, MODERATE [4-6]


   Last Admin: 04/27/18 03:01 Dose:  1 mg


Oxymetazoline HCl (Afrin 0.05%)  0 ml NS Q12H [facility]


   Last Admin: 04/26/18 22:00 Dose:  2 spr


Pantoprazole Sodium (Protonix Inj)  40 mg IVP Q12 [facility]


   Last Admin: 04/26/18 21:43 Dose:  40 mg


Polyethylene Glycol (Miralax)  17 gm PO BID [facility]


   Last Admin: 04/26/18 18:06 Dose:  17 gm


Sevelamer HCl (Renagel)  800 mg PO TID [facility]


   Last Admin: 04/26/18 18:02 Dose:  800 mg


Vitamin B Complex/Vit C/Folic Acid (Nephro-Javier)  1 tab PO 0800 [facility]


   Last Admin: 04/27/18 08:22 Dose:  1 tab


Zolpidem Tartrate (Ambien)  5 mg PO HS PRN; Protocol


   PRN Reason: Insomnia











- Labs


Labs: 


 





 04/27/18 06:20 





 04/27/18 06:20 





 











PT  15.9 SECONDS (9.4-12.5)  H  04/27/18  06:20    


 


INR  1.37  (0.93-1.08)  H  04/27/18  06:20    


 


APTT  23.0 Seconds (25.1-36.5)  L  04/27/18  06:20    














- Constitutional


Appears: Non-toxic, Chronically Ill





- Head Exam


Head Exam: NORMAL INSPECTION





- ENT Exam


ENT Exam: Mucous Membranes Moist





- Neck Exam


Neck Exam: absent: Lymphadenopathy, Meningismus





- Respiratory Exam


Respiratory Exam: Decreased Breath Sounds.  absent: Rales





- Cardiovascular Exam


Cardiovascular Exam: +S1, +S2





- GI/Abdominal Exam


GI & Abdominal Exam: Soft.  absent: Tenderness





Assessment and Plan





- Assessment and Plan (Free Text)


Plan: 





Assessment


Severe sepsis probably with intra-abdominal infection with transaminitis, 

slowly improving


Crohn's disease


depression


dyslipidemia


chronic CHF


obesity with BMI 37





Plan


Continue Zosyn day 7 for 7-10 days and will follow up further plans of GI; will 

continue to monitor AST, ALT, Alk Phos, which are slowly improving


will continue to monitor clinically


cultures have been negative

## 2018-04-27 NOTE — PN
DATE:  04/27/2018



CARDIOLOGY FOLLOWUP



SUBJECTIVE:  The patient tolerated dialysis x3.



He is discouraged, thus kidney function has not improved.



PHYSICAL EXAMINATION

VITAL SIGNS:  Blood pressure is 140/65, the heart rate is in the 90s.

NECK:  Negative JVD.

LUNGS:  Without rales.

HEART:  Reveals S1, S2.

EXTREMITIES:  Without edema.



LABORATORY DATA:  Hemoglobin is 11.4.  Chemistries:  BUN and creatinine 65

and 6.1 with a potassium of 4.



IMPRESSION:

1.  End-stage renal disease.

2.  Improvement of dyspnea.

3.  Coronary artery disease.

4.  Diabetes mellitus.

5.  Obesity.

6.  Hypertension.



PLAN:  Given these findings, the patient is tolerating dialysis.  We will

transfer the patient to a Med-Surg floor.





__________________________________________

Olu Ramirez MD



DD:  04/27/2018 10:21:31

DT:  04/27/2018 10:24:33

Job # [id number]

## 2018-04-28 LAB
ALBUMIN SERPL-MCNC: 3.7 G/DL (ref 3–4.8)
ALBUMIN/GLOB SERPL: 1.2 {RATIO} (ref 1.1–1.8)
ALT SERPL-CCNC: 738 U/L (ref 7–56)
APTT BLD: 27.1 SECONDS (ref 25.1–36.5)
AST SERPL-CCNC: 74 U/L (ref 17–59)
BASOPHILS # BLD AUTO: 0.02 K/MM3 (ref 0–2)
BASOPHILS NFR BLD: 0.1 % (ref 0–3)
BILIRUB DIRECT SERPL-MCNC: 1 MG/DL (ref 0–0.4)
BUN SERPL-MCNC: 87 MG/DL (ref 7–21)
CALCIUM SERPL-MCNC: 8.6 MG/DL (ref 8.4–10.5)
EOSINOPHIL # BLD: 0 10*3/UL (ref 0–0.7)
EOSINOPHIL NFR BLD: 0 % (ref 1.5–5)
ERYTHROCYTE [DISTWIDTH] IN BLOOD BY AUTOMATED COUNT: 25.2 % (ref 11.5–14.5)
GAMMA GLUTAMYL TRANSPEPTIDASE: 93 U/L (ref 8–78)
GFR NON-AFRICAN AMERICAN: 7
GRANULOCYTES # BLD: 21.99 10*3/UL (ref 1.4–6.5)
GRANULOCYTES NFR BLD: 93.6 % (ref 50–68)
HGB BLD-MCNC: 11 G/DL (ref 14–18)
INR PPP: 1.42 (ref 0.93–1.08)
LYMPHOCYTES # BLD: 0.9 10*3/UL (ref 1.2–3.4)
LYMPHOCYTES NFR BLD AUTO: 3.7 % (ref 22–35)
MCH RBC QN AUTO: 23.4 PG (ref 25–35)
MCHC RBC AUTO-ENTMCNC: 32.9 G/DL (ref 31–37)
MCV RBC AUTO: 70.9 FL (ref 80–105)
MONOCYTES # BLD AUTO: 0.6 10*3/UL (ref 0.1–0.6)
MONOCYTES NFR BLD: 2.6 % (ref 1–6)
PLATELET # BLD: 395 10^3/UL (ref 120–450)
PMV BLD AUTO: 9.2 FL (ref 7–11)
PROTHROMBIN TIME: 16.4 SECONDS (ref 9.4–12.5)
RBC # BLD AUTO: 4.71 10^6/UL (ref 3.5–6.1)
WBC # BLD AUTO: 23.5 10^3/UL (ref 4.5–11)

## 2018-04-28 RX ADMIN — BUDESONIDE SCH MG: 0.5 SUSPENSION RESPIRATORY (INHALATION) at 07:38

## 2018-04-28 RX ADMIN — POLYETHYLENE GLYCOL 3350 SCH GM: 17 POWDER, FOR SOLUTION ORAL at 10:33

## 2018-04-28 RX ADMIN — PIPERACILLIN SODIUM AND TAZOBACTAM SODIUM SCH MLS/HR: .25; 2 INJECTION, POWDER, LYOPHILIZED, FOR SOLUTION INTRAVENOUS at 18:56

## 2018-04-28 RX ADMIN — INSULIN HUMAN SCH UNITS: 100 INJECTION, SOLUTION PARENTERAL at 08:22

## 2018-04-28 RX ADMIN — OXYMETAZOLINE HYDROCHLORIDE SCH SPR: 0.05 SPRAY NASAL at 10:35

## 2018-04-28 RX ADMIN — Medication PRN MG: at 20:26

## 2018-04-28 RX ADMIN — INSULIN HUMAN SCH: 100 INJECTION, SOLUTION PARENTERAL at 18:29

## 2018-04-28 RX ADMIN — INSULIN HUMAN SCH: 100 INJECTION, SOLUTION PARENTERAL at 22:14

## 2018-04-28 RX ADMIN — Medication PRN MG: at 00:13

## 2018-04-28 RX ADMIN — OXYMETAZOLINE HYDROCHLORIDE SCH SPR: 0.05 SPRAY NASAL at 22:23

## 2018-04-28 RX ADMIN — IPRATROPIUM BROMIDE AND ALBUTEROL SULFATE SCH: .5; 3 SOLUTION RESPIRATORY (INHALATION) at 03:27

## 2018-04-28 RX ADMIN — METHYLPREDNISOLONE SODIUM SUCCINATE SCH MG: 40 INJECTION, POWDER, FOR SOLUTION INTRAMUSCULAR; INTRAVENOUS at 22:30

## 2018-04-28 RX ADMIN — PIPERACILLIN SODIUM AND TAZOBACTAM SODIUM SCH MLS/HR: .25; 2 INJECTION, POWDER, LYOPHILIZED, FOR SOLUTION INTRAVENOUS at 23:18

## 2018-04-28 RX ADMIN — IPRATROPIUM BROMIDE AND ALBUTEROL SULFATE SCH ML: .5; 3 SOLUTION RESPIRATORY (INHALATION) at 00:00

## 2018-04-28 RX ADMIN — POLYETHYLENE GLYCOL 3350 SCH: 17 POWDER, FOR SOLUTION ORAL at 18:30

## 2018-04-28 RX ADMIN — IPRATROPIUM BROMIDE AND ALBUTEROL SULFATE SCH: .5; 3 SOLUTION RESPIRATORY (INHALATION) at 15:57

## 2018-04-28 RX ADMIN — IPRATROPIUM BROMIDE AND ALBUTEROL SULFATE SCH ML: .5; 3 SOLUTION RESPIRATORY (INHALATION) at 07:38

## 2018-04-28 RX ADMIN — IPRATROPIUM BROMIDE AND ALBUTEROL SULFATE SCH ML: .5; 3 SOLUTION RESPIRATORY (INHALATION) at 14:03

## 2018-04-28 RX ADMIN — Medication PRN MG: at 10:34

## 2018-04-28 RX ADMIN — PIPERACILLIN SODIUM AND TAZOBACTAM SODIUM SCH MLS/HR: .25; 2 INJECTION, POWDER, LYOPHILIZED, FOR SOLUTION INTRAVENOUS at 12:25

## 2018-04-28 RX ADMIN — METHYLPREDNISOLONE SODIUM SUCCINATE SCH MG: 40 INJECTION, POWDER, FOR SOLUTION INTRAMUSCULAR; INTRAVENOUS at 05:55

## 2018-04-28 RX ADMIN — BUDESONIDE SCH MG: 0.5 SUSPENSION RESPIRATORY (INHALATION) at 20:23

## 2018-04-28 RX ADMIN — METHYLPREDNISOLONE SODIUM SUCCINATE SCH MG: 40 INJECTION, POWDER, FOR SOLUTION INTRAMUSCULAR; INTRAVENOUS at 14:06

## 2018-04-28 RX ADMIN — IPRATROPIUM BROMIDE AND ALBUTEROL SULFATE SCH: .5; 3 SOLUTION RESPIRATORY (INHALATION) at 11:19

## 2018-04-28 RX ADMIN — Medication SCH TAB: at 08:24

## 2018-04-28 RX ADMIN — PIPERACILLIN SODIUM AND TAZOBACTAM SODIUM SCH MLS/HR: .25; 2 INJECTION, POWDER, LYOPHILIZED, FOR SOLUTION INTRAVENOUS at 06:25

## 2018-04-28 RX ADMIN — INSULIN HUMAN SCH UNITS: 100 INJECTION, SOLUTION PARENTERAL at 12:21

## 2018-04-28 RX ADMIN — IPRATROPIUM BROMIDE AND ALBUTEROL SULFATE SCH ML: .5; 3 SOLUTION RESPIRATORY (INHALATION) at 19:38

## 2018-04-28 NOTE — CP.PCM.PN
Subjective





- Date & Time of Evaluation


Date of Evaluation: 04/28/18


Time of Evaluation: 13:00





- Subjective


Subjective: 





renal follow up note


seen on dialysis 





Assessment: stable


oliguric Acute Kidney Injury (N17.9) likely due to ATN, hypoperfusion now with 

fluid overload


shock, acute liver injury, lactic acidosis, coagulopathy


hx of chronic Hep C


Anemia, Hyperphosphatemia (E83.39), HTN (I12.9), DM, morbid obesity


CHF, CAD s/p sent





Plan


seen on hd today, tolerating well


continue to monitor for spontaneous renal recovery. no evidence of renal 

recovery at present. 


maintain hydrodynamics stable. 


A fib rate control as per primary team.


continue phos binders as renagel 1600 mg TID with meals


AV access placement NOT indicated at this time as pt is expected to have some 

renal recovery








Physical Examination: 


General Appearance: Comfortable, in no acute respiratory distress, co-operative 

.better appearing, obese


Vitals reviewed 


Head; Atraumatic, normocephalic


ENT: no ulcers Oropharynx: no rash or ulcers.


EYES:Eye muscles and extraocular movement intact. Sclera is anicteric.


Neck; supple no lymphadenopathy, no thyromegaly or bruit


Lungs: Normal respiratory rate/effort. Breath sounds bilateral equal and clearer


Heart: normal rate. s1s2 normal. No rub or gallop. 


Extremities: 2+ edema. No varicose veins


Neurological: Patient is sleepy but oriented to person, place and time. No 

focal deficit. Strength bilateral appropriate and equal


Skin: Warm and dry. Normal turgor. No rash. Palpitation: Normal elasticity for 

age


Abdomen: Abdomen is soft. Bowel sounds +. There is no abdominal tenderness


Psych: normal insight and normal affect/mood


MSK: no joint tenderness or swelling.





Objective





- Vital Signs/Intake and Output


Vital Signs (last 24 hours): 


 











Temp Pulse Resp BP Pulse Ox


 


 97.8 F   96 H  19   155/88 H  95 


 


 04/27/18 17:56  04/28/18 10:34  04/27/18 17:56  04/28/18 10:34  04/27/18 17:56











- Medications


Medications: 


 Current Medications





Albuterol/Ipratropium (Duoneb 3 Mg/0.5 Mg (3 Ml) Ud)  3 ml IH [id number] [facility]


   Last Admin: 04/28/18 19:38 Dose:  3 ml


Budesonide (Pulmicort Respules)  0.5 mg IH [id number] [facility]


   Last Admin: 04/28/18 20:23 Dose:  0.5 mg


Clotrimazole (Mycelex Lana)  10 mg MT 5XD [facility]


   Last Admin: 04/28/18 18:30 Dose:  Not Given


Diltiazem HCl (Cardizem)  90 mg PO QID [facility]


   Last Admin: 04/28/18 18:29 Dose:  Not Given


Escitalopram Oxalate (Lexapro)  5 mg PO DAILY [facility]


   Last Admin: 04/28/18 10:33 Dose:  5 mg


Folic Acid (Folic Acid)  1 mg PO DAILY [facility]


   Last Admin: 04/28/18 10:34 Dose:  1 mg


Heparin Sodium (Porcine) (Heparin)  2,100 units [facility]


   Last Admin: 04/28/18 18:04 Dose:  2,100 units


Heparin Sodium (Porcine) (Heparin)  1,700 units ICV [facility]


   Last Admin: 04/28/18 18:03 Dose:  1,700 units


Piperacillin Sod/Tazobactam Sod (Zosyn 2.25 Gm In 0.9% 100 Ml)  2.25 gm in 100 

mls @ 100 mls/hr IVPB Q6 [facility]


   PRN Reason: Protocol


   Stop: 04/30/18 18:01


   Last Admin: 04/28/18 18:56 Dose:  100 mls/hr


Insulin Human Regular (Humulin R Med)  0 units SC ACHS [facility]


   PRN Reason: Protocol


   Last Admin: 04/28/18 18:29 Dose:  Not Given


Lidocaine (Lidoderm)  1 ea TD DAILY [facility]


   Last Admin: 04/28/18 10:29 Dose:  1 ea


Methylprednisolone (Solu-Medrol)  20 mg IVP Q8 [facility]


   Last Admin: 04/28/18 14:06 Dose:  20 mg


Morphine Sulfate (Morphine)  1 mg IVP Q6 [facility]


   PRN Reason: PAIN, MODERATE [4-6]


   Last Admin: 04/27/18 17:03 Dose:  1 mg


Morphine Sulfate (Morphine)  2 mg IVP Q4H [facility]


   PRN Reason: severe pain 


   Last Admin: 04/28/18 20:26 Dose:  2 mg


Oxymetazoline HCl (Afrin 0.05%)  0 ml NS Q12H [facility]


   Last Admin: 04/28/18 10:35 Dose:  2 spr


Pantoprazole Sodium (Protonix Inj)  40 mg IVP Q12 [facility]


   Last Admin: 04/28/18 10:33 Dose:  40 mg


Polyethylene Glycol (Miralax)  17 gm PO BID [facility]


   Last Admin: 04/28/18 18:30 Dose:  Not Given


Sevelamer HCl (Renagel)  1,600 mg PO TID [facility]


   Last Admin: 04/28/18 18:30 Dose:  Not Given


Vitamin B Complex/Vit C/Folic Acid (Nephro-Javier)  1 tab PO 0800 [facility]


   Last Admin: 04/28/18 08:24 Dose:  1 tab


Zolpidem Tartrate (Ambien)  5 mg PO HS PRN; Protocol


   PRN Reason: Insomnia











- Labs


Labs: 


 





 04/28/18 08:00 





 04/28/18 08:00 





 











PT  16.4 SECONDS (9.4-12.5)  H  04/28/18  08:00    


 


INR  1.42  (0.93-1.08)  H  04/28/18  08:00    


 


APTT  27.1 Seconds (25.1-36.5)   04/28/18  08:00

## 2018-04-28 NOTE — CP.PCM.PN
Subjective





- Date & Time of Evaluation


Date of Evaluation: 04/28/18


Time of Evaluation: 07:04





- Subjective


Subjective: 





General Surgery:  Dr Huerta





Pt S&E.  Now on 3T.  NAEO.  Pt reports he was able to get some sleep.  Denies 

pain at this time.  Denies n/v, f/c.  Denies any further blood in his bowel 

movements, though they are still fairly liquid in consistency.





Objective





- Vital Signs/Intake and Output


Vital Signs (last 24 hours): 


 











Temp Pulse Resp BP Pulse Ox


 


 97.8 F   96 H  19   152/96 H  95 


 


 04/27/18 17:56  04/28/18 00:09  04/27/18 17:56  04/27/18 23:32  04/27/18 17:56








Intake and Output: 


 











 04/28/18 04/28/18





 06:59 18:59


 


Intake Total 300 


 


Output Total 50 


 


Balance 250 














- Medications


Medications: 


 Current Medications





Albuterol/Ipratropium (Duoneb 3 Mg/0.5 Mg (3 Ml) Ud)  3 ml IH [id number] [facility]


   Last Admin: 04/28/18 03:27 Dose:  Not Given


Budesonide (Pulmicort Respules)  0.5 mg IH [id number] [facility]


   Last Admin: 04/27/18 20:37 Dose:  0.5 mg


Clotrimazole (Mycelex Lana)  10 mg MT 5XD [facility]


   Last Admin: 04/27/18 23:32 Dose:  10 mg


Diltiazem HCl (Cardizem)  90 mg PO QID [facility]


   Last Admin: 04/27/18 23:32 Dose:  90 mg


Escitalopram Oxalate (Lexapro)  5 mg PO DAILY [facility]


   Last Admin: 04/27/18 10:13 Dose:  5 mg


Folic Acid (Folic Acid)  1 mg PO DAILY [facility]


   Last Admin: 04/27/18 10:13 Dose:  1 mg


Piperacillin Sod/Tazobactam Sod (Zosyn 2.25 Gm In 0.9% 100 Ml)  2.25 gm in 100 

mls @ 100 mls/hr IVPB Q6 [facility]


   PRN Reason: Protocol


   Stop: 04/30/18 18:01


   Last Admin: 04/28/18 06:25 Dose:  100 mls/hr


Insulin Human Regular (Humulin R Med)  0 units SC ACHS [facility]


   PRN Reason: Protocol


   Last Admin: 04/27/18 23:34 Dose:  Not Given


Lidocaine (Lidoderm)  1 ea TD DAILY [facility]


   Last Admin: 04/27/18 10:15 Dose:  1 ea


Methylprednisolone (Solu-Medrol)  20 mg IVP Q8 [facility]


   Last Admin: 04/28/18 05:55 Dose:  20 mg


Morphine Sulfate (Morphine)  1 mg IVP Q6 [facility]


   PRN Reason: PAIN, MODERATE [4-6]


   Last Admin: 04/27/18 17:03 Dose:  1 mg


Morphine Sulfate (Morphine)  2 mg IVP Q4H [facility]


   PRN Reason: severe pain 


   Last Admin: 04/28/18 00:13 Dose:  2 mg


Oxymetazoline HCl (Afrin 0.05%)  0 ml NS Q12H [facility]


   Last Admin: 04/27/18 23:32 Dose:  2 spr


Pantoprazole Sodium (Protonix Inj)  40 mg IVP Q12 [facility]


   Last Admin: 04/27/18 23:52 Dose:  40 mg


Polyethylene Glycol (Miralax)  17 gm PO BID [facility]


   Last Admin: 04/27/18 16:59 Dose:  17 gm


Sevelamer HCl (Renagel)  1,600 mg PO TID [facility]


   Last Admin: 04/27/18 18:02 Dose:  1,600 mg


Vitamin B Complex/Vit C/Folic Acid (Nephro-Javier)  1 tab PO 0800 [facility]


   Last Admin: 04/27/18 08:22 Dose:  1 tab


Zolpidem Tartrate (Ambien)  5 mg PO HS PRN; Protocol


   PRN Reason: Insomnia











- Labs


Labs: 


 





 04/27/18 06:20 





 04/27/18 06:20 





 











PT  15.9 SECONDS (9.4-12.5)  H  04/27/18  06:20    


 


INR  1.37  (0.93-1.08)  H  04/27/18  06:20    


 


APTT  23.0 Seconds (25.1-36.5)  L  04/27/18  06:20    














- Constitutional


Appears: Non-toxic, No Acute Distress





- Respiratory Exam


Respiratory Exam: absent: Accessory Muscle Use, Respiratory Distress





- Cardiovascular Exam


Cardiovascular Exam: absent: Tachycardia





- GI/Abdominal Exam


GI & Abdominal Exam: Distended, Soft.  absent: Firm, Guarding, Rigid, Tenderness





Assessment and Plan





- Assessment and Plan (Free Text)


Assessment: 





67M with LGI, BHARGAVI 2/2 ATN, and acute liver injury 2/2 hypovolemia


Plan: 





condition continues to improve


no further evidence of bleeding


would recommend repeat colonoscopy prior to discharge


no immediate surgical intervention indicated


will cont to follow





d/w Dr Bradley Chavez, PGY3

## 2018-04-28 NOTE — PN
DATE:  04/28/2018



SUBJECTIVE:  The patient is sitting in chair.  He is comfortable.  He

denies any abdominal pain, nausea, vomiting, rectal bleeding or melena.



PHYSICAL EXAMINATION:

VITAL SIGNS:  Reveal temperature of 97.8, blood pressure 155/88, heart rate

96.

HEENT:  Reveal sclerae to be white.  Conjunctivae pink.

NECK:  Supple.

CHEST:  Lungs are clear.

HEART:  Reveals irregularly irregular rate.

ABDOMEN:  Obese, soft, nontender.

EXTREMITIES:  Show no edema.



LABORATORY DATA:  Reveal white blood cell of 23.5, hemoglobin 11.  BUN 87,

creatinine 7.6, AST is down to 974, ALT is down to 738, alkaline

phosphatase is 111.



IMPRESSION:

1.  Elevated liver enzymes secondary to passive congestion of liver

secondary to low cardiac output from bradycardia.

2.  Acute renal failure secondary to acute tubular necrosis secondary to

low cardiac output from bradycardia.

3.  Atrial fibrillation.

4.  Coronary artery disease.

5.  The patient remains anuric.

6.  The patient also has elevated white blood cell count with systemic

inflammatory response syndrome with all cultures negative for infection.



RECOMMENDATIONS:

1.  Continue supportive care.

2.  Continue hemodialysis as per Renal.

3.  Would hold on anticoagulation at this point given high risk for GI

bleeding.





__________________________________________

Julio César Madrid MD



DD:  04/28/2018 13:30:08

DT:  04/28/2018 13:32:10

Job # [id number]

## 2018-04-28 NOTE — CP.PCM.PN
Subjective





- Date & Time of Evaluation


Date of Evaluation: 04/28/18


Time of Evaluation: 11:25





- Subjective


Subjective: 





Patient has no diarrhea, no fevers. No abdominal pain.





Objective





- Vital Signs/Intake and Output


Vital Signs (last 24 hours): 


 











Temp Pulse Resp BP Pulse Ox


 


 97.8 F   96 H  19   155/88 H  95 


 


 04/27/18 17:56  04/28/18 10:34  04/27/18 17:56  04/28/18 10:34  04/27/18 17:56








Intake and Output: 


 











 04/28/18 04/28/18





 06:59 18:59


 


Intake Total 300 


 


Output Total 50 


 


Balance 250 














- Medications


Medications: 


 Current Medications





Albuterol/Ipratropium (Duoneb 3 Mg/0.5 Mg (3 Ml) Ud)  3 ml IH [id number] [facility]


   Last Admin: 04/28/18 07:38 Dose:  3 ml


Budesonide (Pulmicort Respules)  0.5 mg IH [id number] [facility]


   Last Admin: 04/28/18 07:38 Dose:  0.5 mg


Clotrimazole (Mycelex Lana)  10 mg MT 5XD [facility]


   Last Admin: 04/28/18 10:34 Dose:  10 mg


Diltiazem HCl (Cardizem)  90 mg PO QID [facility]


   Last Admin: 04/28/18 10:34 Dose:  90 mg


Escitalopram Oxalate (Lexapro)  5 mg PO DAILY [facility]


   Last Admin: 04/28/18 10:33 Dose:  5 mg


Folic Acid (Folic Acid)  1 mg PO DAILY [facility]


   Last Admin: 04/28/18 10:34 Dose:  1 mg


Piperacillin Sod/Tazobactam Sod (Zosyn 2.25 Gm In 0.9% 100 Ml)  2.25 gm in 100 

mls @ 100 mls/hr IVPB Q6 [facility]


   PRN Reason: Protocol


   Stop: 04/30/18 18:01


   Last Admin: 04/28/18 06:25 Dose:  100 mls/hr


Insulin Human Regular (Humulin R Med)  0 units SC ACHS [facility]


   PRN Reason: Protocol


   Last Admin: 04/28/18 08:22 Dose:  3 units


Lidocaine (Lidoderm)  1 ea TD DAILY [facility]


   Last Admin: 04/28/18 10:29 Dose:  1 ea


Methylprednisolone (Solu-Medrol)  20 mg IVP Q8 [facility]


   Last Admin: 04/28/18 05:55 Dose:  20 mg


Morphine Sulfate (Morphine)  1 mg IVP Q6 [facility]


   PRN Reason: PAIN, MODERATE [4-6]


   Last Admin: 04/27/18 17:03 Dose:  1 mg


Morphine Sulfate (Morphine)  2 mg IVP Q4H [facility]


   PRN Reason: severe pain 


   Last Admin: 04/28/18 10:34 Dose:  2 mg


Oxymetazoline HCl (Afrin 0.05%)  0 ml NS Q12H [facility]


   Last Admin: 04/28/18 10:35 Dose:  2 spr


Pantoprazole Sodium (Protonix Inj)  40 mg IVP Q12 [facility]


   Last Admin: 04/28/18 10:33 Dose:  40 mg


Polyethylene Glycol (Miralax)  17 gm PO BID [facility]


   Last Admin: 04/28/18 10:33 Dose:  17 gm


Sevelamer HCl (Renagel)  1,600 mg PO TID [facility]


   Last Admin: 04/28/18 10:34 Dose:  1,600 mg


Vitamin B Complex/Vit C/Folic Acid (Nephro-Javier)  1 tab PO 0800 [facility]


   Last Admin: 04/28/18 08:24 Dose:  1 tab


Zolpidem Tartrate (Ambien)  5 mg PO HS PRN; Protocol


   PRN Reason: Insomnia











- Labs


Labs: 


 





 04/28/18 08:00 





 04/28/18 08:00 





 











PT  16.4 SECONDS (9.4-12.5)  H  04/28/18  08:00    


 


INR  1.42  (0.93-1.08)  H  04/28/18  08:00    


 


APTT  27.1 Seconds (25.1-36.5)   04/28/18  08:00    














- Constitutional


Appears: Chronically Ill





- Head Exam


Head Exam: NORMAL INSPECTION





- Neck Exam


Neck Exam: absent: Meningismus





- Respiratory Exam


Respiratory Exam: Decreased Breath Sounds





- Cardiovascular Exam


Cardiovascular Exam: +S1, +S2





- GI/Abdominal Exam


GI & Abdominal Exam: Soft.  absent: Tenderness





Assessment and Plan





- Assessment and Plan (Free Text)


Plan: 








Assessment


Severe sepsis probably with intra-abdominal infection with transaminitis, 

slowly improving


Crohn's disease


depression


dyslipidemia


chronic CHF


obesity with BMI 37





Plan


Continue Zosyn day 8 for 7-10 days and will follow up further plans of GI; will 

continue to monitor AST, ALT, Alk Phos, which are slowly improving


will continue to monitor clinically


cultures have been negative

## 2018-04-28 NOTE — PN
DATE:  04/28/2018



LOCATION:  The patient is in [facility] in [place], room 372,

bed 2.



SUBJECTIVE:  The patient was in ICU until yesterday.  He was transferred to

the medical floor because his clinical condition has improved.  The patient

was initially treated in the ICU with _____.  The patient has chronic lung

disease, diabetes mellitus.  The patient also gets treatment for

hypertension, chronic sepsis and the patient is on renal insufficiency

management, he gets dialysis.  He has a Tenckhoff catheter in the right

side of the neck.



PHYSICAL EXAMINATION:

VITAL SIGNS:  The patient's vital signs seen this morning, the pulse is 93,

blood pressure 152/96, the patient's respirations are 19 per minute,

temperature 97.8.

GENERAL:  The patient is awake and alert and he had conversation, complains

of chronic pain constantly, he needs morphine to control his pain.

LUNGS:  Bilateral crepitation diffusely present.  No localizing signs.

HEART:  The patient has atrial fibrillation with moderate ventricular

response.

ABDOMEN:  Soft.  No tenderness.

CNS:  The patient has no focal deficits.



ASSESSMENT AND PLAN:  The patient has had GI evaluation for GI bleeding. 

The patient has had multiple blood transfusions recently and the patient's

clinical condition is slowly improving.  We will need to continue current

management and follow up.  Provide physical therapy for the patient and

general care in addition to all medical management.  The laboratory finding

shows a hemoglobin of 11.4, his white count is 21,000.  His chemistry, the

blood sugar is 234 this morning.  _____ Still the patient needs speciality

treatment.







__________________________________________

Myron Lange MD



DD:  04/28/2018 8:41:22

DT:  04/28/2018 9:01:03

Job # [id number]

## 2018-04-29 LAB
ALBUMIN SERPL-MCNC: 3.6 G/DL (ref 3–4.8)
ALBUMIN/GLOB SERPL: 1.1 {RATIO} (ref 1.1–1.8)
ALT SERPL-CCNC: 603 U/L (ref 7–56)
APTT BLD: 21.9 SECONDS (ref 25.1–36.5)
AST SERPL-CCNC: 62 U/L (ref 17–59)
BILIRUB DIRECT SERPL-MCNC: 1 MG/DL (ref 0–0.4)
BUN SERPL-MCNC: 75 MG/DL (ref 7–21)
CALCIUM SERPL-MCNC: 8.4 MG/DL (ref 8.4–10.5)
GAMMA GLUTAMYL TRANSPEPTIDASE: 93 U/L (ref 8–78)
GFR NON-AFRICAN AMERICAN: 9
INR PPP: 1.42 (ref 0.93–1.08)
PROTHROMBIN TIME: 16.5 SECONDS (ref 9.4–12.5)

## 2018-04-29 RX ADMIN — BUDESONIDE SCH: 0.5 SUSPENSION RESPIRATORY (INHALATION) at 21:06

## 2018-04-29 RX ADMIN — IPRATROPIUM BROMIDE AND ALBUTEROL SULFATE SCH: .5; 3 SOLUTION RESPIRATORY (INHALATION) at 02:04

## 2018-04-29 RX ADMIN — INSULIN HUMAN SCH: 100 INJECTION, SOLUTION PARENTERAL at 22:03

## 2018-04-29 RX ADMIN — PIPERACILLIN SODIUM AND TAZOBACTAM SODIUM SCH MLS/HR: .25; 2 INJECTION, POWDER, LYOPHILIZED, FOR SOLUTION INTRAVENOUS at 12:17

## 2018-04-29 RX ADMIN — Medication SCH TAB: at 08:01

## 2018-04-29 RX ADMIN — BUDESONIDE SCH: 0.5 SUSPENSION RESPIRATORY (INHALATION) at 07:49

## 2018-04-29 RX ADMIN — POLYETHYLENE GLYCOL 3350 SCH GM: 17 POWDER, FOR SOLUTION ORAL at 18:25

## 2018-04-29 RX ADMIN — METHYLPREDNISOLONE SODIUM SUCCINATE SCH MG: 40 INJECTION, POWDER, FOR SOLUTION INTRAMUSCULAR; INTRAVENOUS at 14:30

## 2018-04-29 RX ADMIN — INSULIN HUMAN SCH UNITS: 100 INJECTION, SOLUTION PARENTERAL at 08:02

## 2018-04-29 RX ADMIN — Medication PRN MG: at 16:24

## 2018-04-29 RX ADMIN — INSULIN HUMAN SCH UNITS: 100 INJECTION, SOLUTION PARENTERAL at 12:15

## 2018-04-29 RX ADMIN — OXYMETAZOLINE HYDROCHLORIDE SCH SPR: 0.05 SPRAY NASAL at 21:55

## 2018-04-29 RX ADMIN — IPRATROPIUM BROMIDE AND ALBUTEROL SULFATE SCH: .5; 3 SOLUTION RESPIRATORY (INHALATION) at 21:06

## 2018-04-29 RX ADMIN — IPRATROPIUM BROMIDE AND ALBUTEROL SULFATE SCH ML: .5; 3 SOLUTION RESPIRATORY (INHALATION) at 10:51

## 2018-04-29 RX ADMIN — OXYMETAZOLINE HYDROCHLORIDE SCH SPR: 0.05 SPRAY NASAL at 10:05

## 2018-04-29 RX ADMIN — Medication PRN MG: at 10:16

## 2018-04-29 RX ADMIN — PIPERACILLIN SODIUM AND TAZOBACTAM SODIUM SCH MLS/HR: .25; 2 INJECTION, POWDER, LYOPHILIZED, FOR SOLUTION INTRAVENOUS at 06:28

## 2018-04-29 RX ADMIN — Medication PRN MG: at 20:45

## 2018-04-29 RX ADMIN — IPRATROPIUM BROMIDE AND ALBUTEROL SULFATE SCH: .5; 3 SOLUTION RESPIRATORY (INHALATION) at 07:49

## 2018-04-29 RX ADMIN — PIPERACILLIN SODIUM AND TAZOBACTAM SODIUM SCH MLS/HR: .25; 2 INJECTION, POWDER, LYOPHILIZED, FOR SOLUTION INTRAVENOUS at 18:29

## 2018-04-29 RX ADMIN — POLYETHYLENE GLYCOL 3350 SCH GM: 17 POWDER, FOR SOLUTION ORAL at 10:15

## 2018-04-29 RX ADMIN — METHYLPREDNISOLONE SODIUM SUCCINATE SCH MG: 40 INJECTION, POWDER, FOR SOLUTION INTRAMUSCULAR; INTRAVENOUS at 06:00

## 2018-04-29 RX ADMIN — INSULIN HUMAN SCH UNITS: 100 INJECTION, SOLUTION PARENTERAL at 16:18

## 2018-04-29 RX ADMIN — IPRATROPIUM BROMIDE AND ALBUTEROL SULFATE SCH: .5; 3 SOLUTION RESPIRATORY (INHALATION) at 05:00

## 2018-04-29 RX ADMIN — METHYLPREDNISOLONE SODIUM SUCCINATE SCH MG: 40 INJECTION, POWDER, FOR SOLUTION INTRAMUSCULAR; INTRAVENOUS at 22:08

## 2018-04-29 RX ADMIN — Medication PRN MG: at 00:33

## 2018-04-29 NOTE — PN
DATE:  04/29/2018



SUBJECTIVE:  The patient is sitting in chair, comfortable.  He remains

essentially anuric.  He denies any abdominal pain, odynophagia, nausea,

vomiting, rectal bleeding or melena.



PHYSICAL EXAMINATION:

VITAL SIGNS:  Reveal temperature of 97.8, blood pressure 138/76, heart rate

is 78.

HEENT:  Reveals sclerae to be white.  Conjunctivae pale.

NECK:  Supple.  There is no mass or crepitus in the neck.

CHEST:  Reveals lungs to be clear.

HEART:  Reveals an irregularly irregular rate.

ABDOMEN:  Obese, soft, nontender.  No mass.

EXTREMITIES:  Show trace pedal edema.



LABORATORY DATA:  Revealed white blood cell count 23.5, hemoglobin 11. 

Chemistries reveal BUN 75, creatinine 6.5.  AST is down to 62, ALT is down

to 603, alkaline phosphatase is 116, total bilirubin 1.3.



IMPRESSION:

1.  Elevated liver enzymes secondary to passive congestion of the liver

secondary to low cardiac output from bradycardia.

2.  Acute renal failure secondary to acute tubular necrosis from low

cardiac output secondary to bradycardia.

3.  Systemic inflammatory response syndrome with elevated white blood cell

counts.  Cultures have been negative.

4.  Atrial fibrillation.

5.  Coronary artery disease.



RECOMMENDATIONS:  Continue supportive care.  His liver enzymes are trending

down towards normal.  However, the patient remains anuric and will continue

to require hemodialysis.





__________________________________________

Julio César Madrid MD





DD:  04/29/2018 13:17:09

DT:  04/29/2018 13:19:12

Job # [id number]

## 2018-04-29 NOTE — CP.PCM.PN
Subjective





- Date & Time of Evaluation


Date of Evaluation: 04/29/18


Time of Evaluation: 06:55





- Subjective


Subjective: 





General Surgery:  Dr Huerta





Pt S&E.  HUGO.  Denies any abdominal pain.  Denies n/v, f/c.  Continues to have 

bowel movements, non-bloody.  States he is feeling better/stronger past few days








Objective





- Vital Signs/Intake and Output


Vital Signs (last 24 hours): 


 











Temp Pulse Resp BP Pulse Ox


 


 97.8 F   87   19   156/78 H  98 


 


 04/29/18 06:00  04/29/18 14:30  04/29/18 06:00  04/29/18 14:30  04/29/18 06:00








Intake and Output: 


 











 04/29/18 04/29/18





 06:59 18:59


 


Intake Total 200 


 


Balance 200 














- Medications


Medications: 


 Current Medications





Albuterol/Ipratropium (Duoneb 3 Mg/0.5 Mg (3 Ml) Ud)  3 ml IH [id number] [facility]


   Last Admin: 04/29/18 10:51 Dose:  3 ml


Budesonide (Pulmicort Respules)  0.5 mg IH [id number] [facility]


   Last Admin: 04/29/18 07:49 Dose:  Not Given


Clotrimazole (Mycelex Lana)  10 mg MT 5XD [facility]


   Last Admin: 04/29/18 10:16 Dose:  10 mg


Diltiazem HCl (Cardizem)  90 mg PO QID [facility]


   Last Admin: 04/29/18 14:30 Dose:  90 mg


Escitalopram Oxalate (Lexapro)  5 mg PO DAILY [facility]


   Last Admin: 04/29/18 10:19 Dose:  5 mg


Folic Acid (Folic Acid)  1 mg PO DAILY [facility]


   Last Admin: 04/29/18 10:15 Dose:  1 mg


Heparin Sodium (Porcine) (Heparin)  2,100 units ICA TUTA [facility]


   Last Admin: 04/28/18 18:04 Dose:  2,100 units


Heparin Sodium (Porcine) (Heparin)  1,700 units ICV TUTA [facility]


   Last Admin: 04/28/18 18:03 Dose:  1,700 units


Piperacillin Sod/Tazobactam Sod (Zosyn 2.25 Gm In 0.9% 100 Ml)  2.25 gm in 100 

mls @ 100 mls/hr IVPB Q6 [facility]


   PRN Reason: Protocol


   Stop: 04/30/18 18:01


   Last Admin: 04/29/18 12:17 Dose:  100 mls/hr


Insulin Human Regular (Humulin R Med)  0 units SC ACHS [facility]


   PRN Reason: Protocol


   Last Admin: 04/29/18 12:15 Dose:  5 units


Lidocaine (Lidoderm)  1 ea TD DAILY [facility]


   Last Admin: 04/29/18 10:17 Dose:  1 ea


Methylprednisolone (Solu-Medrol)  20 mg IVP Q8 [facility]


   Last Admin: 04/29/18 14:30 Dose:  20 mg


Morphine Sulfate (Morphine)  1 mg IVP Q6 PRN


   PRN Reason: PAIN, MODERATE [4-6]


   Last Admin: 04/27/18 17:03 Dose:  1 mg


Morphine Sulfate (Morphine)  2 mg IVP Q4H PRN


   PRN Reason: severe pain 


   Last Admin: 04/29/18 10:16 Dose:  2 mg


Oxymetazoline HCl (Afrin 0.05%)  0 ml NS Q12H [facility]


   Last Admin: 04/29/18 10:05 Dose:  3 spr


Pantoprazole Sodium (Protonix Inj)  40 mg IVP Q12 [facility]


   Last Admin: 04/29/18 10:15 Dose:  40 mg


Polyethylene Glycol (Miralax)  17 gm PO BID [facility]


   Last Admin: 04/29/18 10:15 Dose:  17 gm


Sevelamer HCl (Renagel)  1,600 mg PO TID [facility]


   Last Admin: 04/29/18 14:30 Dose:  1,600 mg


Vitamin B Complex/Vit C/Folic Acid (Nephro-Javier)  1 tab PO 0800 [facility]


   Last Admin: 04/29/18 08:01 Dose:  1 tab


Zolpidem Tartrate (Ambien)  5 mg PO HS PRN; Protocol


   PRN Reason: Insomnia


   Last Admin: 04/28/18 22:23 Dose:  5 mg











- Labs


Labs: 


 





 04/28/18 08:00 





 04/29/18 10:00 





 











PT  16.5 SECONDS (9.4-12.5)  H  04/29/18  10:00    


 


INR  1.42  (0.93-1.08)  H  04/29/18  10:00    


 


APTT  21.9 Seconds (25.1-36.5)  L  04/29/18  10:00    














- Constitutional


Appears: Non-toxic, No Acute Distress





- ENT Exam


ENT Exam: Mucous Membranes Moist





- Respiratory Exam


Respiratory Exam: absent: Accessory Muscle Use, Respiratory Distress





- Cardiovascular Exam


Cardiovascular Exam: REGULAR RHYTHM.  absent: Tachycardia





- GI/Abdominal Exam


GI & Abdominal Exam: Soft.  absent: Distended, Tenderness





Assessment and Plan





- Assessment and Plan (Free Text)


Assessment: 





67M s/p LGIB; resolved


Plan: 





no surgical intervention planned


will plan for AVF as outpatient


recommend repeat c-scope prior to d/c





d/w Dr Bradley Chavez, PGY3

## 2018-04-29 NOTE — CP.PCM.PN
Subjective





- Date & Time of Evaluation


Date of Evaluation: 04/29/18


Time of Evaluation: 12:40





- Subjective


Subjective: 





No diarrhea, no abdominal pain, no fevers.





Objective





- Vital Signs/Intake and Output


Vital Signs (last 24 hours): 


 











Temp Pulse Resp BP Pulse Ox


 


 97.8 F   81   19   163/90 H  98 


 


 04/29/18 06:00  04/29/18 06:00  04/29/18 06:00  04/29/18 06:00  04/29/18 06:00








Intake and Output: 


 











 04/29/18 04/29/18





 06:59 18:59


 


Intake Total 200 


 


Balance 200 














- Medications


Medications: 


 Current Medications





Albuterol/Ipratropium (Duoneb 3 Mg/0.5 Mg (3 Ml) Ud)  3 ml IH [id number] [facility]


   Last Admin: 04/29/18 07:49 Dose:  Not Given


Budesonide (Pulmicort Respules)  0.5 mg IH [id number] [facility]


   Last Admin: 04/29/18 07:49 Dose:  Not Given


Clotrimazole (Mycelex Lana)  10 mg MT 5XD [facility]


   Last Admin: 04/29/18 06:27 Dose:  10 mg


Diltiazem HCl (Cardizem)  90 mg PO QID [facility]


   Last Admin: 04/28/18 22:24 Dose:  90 mg


Escitalopram Oxalate (Lexapro)  5 mg PO DAILY [facility]


   Last Admin: 04/28/18 10:33 Dose:  5 mg


Folic Acid (Folic Acid)  1 mg PO DAILY [facility]


   Last Admin: 04/28/18 10:34 Dose:  1 mg


Heparin Sodium (Porcine) (Heparin)  [facility]


   Last Admin: 04/28/18 18:04 Dose:  2,100 units


Heparin Sodium (Porcine) (Heparin)  1,700 units ICV TUTA [facility]


   Last Admin: 04/28/18 18:03 Dose:  1,700 units


Piperacillin Sod/Tazobactam Sod (Zosyn 2.25 Gm In 0.9% 100 Ml)  2.25 gm in 100 

mls @ 100 mls/hr IVPB Q6 [facility]


   PRN Reason: Protocol


   Stop: 04/30/18 18:01


   Last Admin: 04/29/18 06:28 Dose:  100 mls/hr


Insulin Human Regular (Humulin R Med)  0 units SC ACHS [facility]


   PRN Reason: Protocol


   Last Admin: 04/29/18 08:02 Dose:  3 units


Lidocaine (Lidoderm)  1 ea TD DAILY [facility]


   Last Admin: 04/28/18 10:29 Dose:  1 ea


Methylprednisolone (Solu-Medrol)  20 mg IVP Q8 [facility]


   Last Admin: 04/28/18 22:30 Dose:  20 mg


Morphine Sulfate (Morphine)  1 mg IVP Q6 [facility]


   PRN Reason: PAIN, MODERATE [4-6]


   Last Admin: 04/27/18 17:03 Dose:  1 mg


Morphine Sulfate (Morphine)  2 mg IVP Q4H PRN


   PRN Reason: severe pain 


   Last Admin: 04/29/18 00:33 Dose:  2 mg


Oxymetazoline HCl (Afrin 0.05%)  0 ml NS Q12H [facility]


   Last Admin: 04/28/18 22:23 Dose:  2 spr


Pantoprazole Sodium (Protonix Inj)  40 mg IVP Q12 [facility]


   Last Admin: 04/28/18 22:27 Dose:  40 mg


Polyethylene Glycol (Miralax)  17 gm PO BID [facility]


   Last Admin: 04/28/18 18:30 Dose:  Not Given


Sevelamer HCl (Renagel)  1,600 mg PO TID [facility]


   Last Admin: 04/28/18 18:30 Dose:  Not Given


Vitamin B Complex/Vit C/Folic Acid (Nephro-Javier)  1 tab PO 0800 [facility]


   Last Admin: 04/29/18 08:01 Dose:  1 tab


Zolpidem Tartrate (Ambien)  5 mg PO HS PRN; Protocol


   PRN Reason: Insomnia


   Last Admin: 04/28/18 22:23 Dose:  5 mg











- Labs


Labs: 


 





 04/28/18 08:00 





 04/28/18 08:00 





 











PT  16.4 SECONDS (9.4-12.5)  H  04/28/18  08:00    


 


INR  1.42  (0.93-1.08)  H  04/28/18  08:00    


 


APTT  27.1 Seconds (25.1-36.5)   04/28/18  08:00    














- Constitutional


Appears: Chronically Ill





- Head Exam


Head Exam: NORMAL INSPECTION





- ENT Exam


ENT Exam: Mucous Membranes Moist





- Neck Exam


Neck Exam: absent: Meningismus





- Respiratory Exam


Respiratory Exam: Decreased Breath Sounds





- Cardiovascular Exam


Cardiovascular Exam: +S1, +S2





- GI/Abdominal Exam


GI & Abdominal Exam: Soft.  absent: Tenderness





Assessment and Plan





- Assessment and Plan (Free Text)


Plan: 





Assessment


Severe sepsis probably with intra-abdominal infection with transaminitis, 

slowly improving


Crohn's disease


depression


dyslipidemia


chronic CHF


obesity with BMI 37





Plan


Continue Zosyn day 9 for 7-10 days and will follow up further plans of GI; will 

continue to monitor AST, ALT, Alk Phos, which are slowly improving


will continue to monitor clinically


cultures have been negative

## 2018-04-29 NOTE — PN
DATE:  04/29/2018



LOCATION:  The patient is in [facility] in [place], room 372,

bed 2.



SUBJECTIVE:  The patient was admitted with GI infection, weakness,

dizziness.  The patient has significant past medical history of

hypertension, diabetes mellitus.  The patient has history of coronary

artery disease, has had cardiac cath in the past, chronic lung disease and

chronic pain.  The patient is seen this morning.  He says he had a couple

of hours restless night.  He does have problem in tolerance of pain, very

poor tolerance of pain.



PHYSICAL EXAMINATION:

VITAL SIGNS:  The pulse is 98, blood pressure 152/81, respirations 19, the

patient's temperature 98.2, O2 sat is 96% on 2 L of oxygen.

LUNGS:  There are bilateral scattered rales.

HEART:  Atrial fibrillation with moderate ventricular response.

ABDOMEN:  Soft.  Liver and spleen not palpable.

CNS:  No focal deficits are noted.  The patient has general weakness.



ASSESSMENT AND PLAN:  He has dialysis being performed by him at this time

for acute renal insufficiency.  The patient's list of medications consist

of Afrin, the patient is on Ambien 5 mg at night for sleep, the patient is

on Cardizem 90 mg four times a day, albuterol respiratory treatment, folic

acid.  The patient is on heparin for prophylaxis.  The patient is also

getting insulin coverage for diabetes, Lexapro 5 mg daily for depression. 

The patient is on morphine for pain.  The patient gets Mycelex for fungal

infection and vitamin B complex.  The patient is on a diabetic

heart-healthy diet.  He has been followed up by Dr. Chung, the

nephrologist, keeps an eye on his renal functions.  The patient is getting

followup consultation from cardiologist, Dr. Olu Ramirez.  The patient's

recent blood work, the hemoglobin 11.1, the white count is 23,500 that

suggests sepsis and the patient is on antibiotics to treat the sepsis.  The

patient's chemistry, the blood sugar was 249, it is elevated today.  His

BUN is 87, creatinine is 7.6 that was the last reading yesterday.  The

patient's electrolytes are within normal range.  The liver enzymes are

slightly abnormal.  We will continue current management.  The patient's

condition is very active and acute and needs further management.





__________________________________________

Myron Lange MD





DD:  04/29/2018 8:01:19

DT:  04/29/2018 9:30:20

[facility] # [id number]

## 2018-04-30 LAB
ALBUMIN SERPL-MCNC: 3.6 G/DL (ref 3–4.8)
ALBUMIN/GLOB SERPL: 1.2 {RATIO} (ref 1.1–1.8)
ALT SERPL-CCNC: 487 U/L (ref 7–56)
APTT BLD: 16.6 SECONDS (ref 25.1–36.5)
AST SERPL-CCNC: 51 U/L (ref 17–59)
BASOPHILS # BLD AUTO: 0.03 K/MM3 (ref 0–2)
BASOPHILS NFR BLD: 0.1 % (ref 0–3)
BILIRUB DIRECT SERPL-MCNC: 1 MG/DL (ref 0–0.4)
BUN SERPL-MCNC: 93 MG/DL (ref 7–21)
CALCIUM SERPL-MCNC: 8.5 MG/DL (ref 8.4–10.5)
EOSINOPHIL # BLD: 0 10*3/UL (ref 0–0.7)
EOSINOPHIL NFR BLD: 0 % (ref 1.5–5)
ERYTHROCYTE [DISTWIDTH] IN BLOOD BY AUTOMATED COUNT: 25.9 % (ref 11.5–14.5)
GAMMA GLUTAMYL TRANSPEPTIDASE: 89 U/L (ref 8–78)
GFR NON-AFRICAN AMERICAN: 7
GRANULOCYTES # BLD: 21.09 10*3/UL (ref 1.4–6.5)
GRANULOCYTES NFR BLD: 94.4 % (ref 50–68)
HGB BLD-MCNC: 11 G/DL (ref 14–18)
INR PPP: 1.31 (ref 0.93–1.08)
LYMPHOCYTES # BLD: 0.6 10*3/UL (ref 1.2–3.4)
LYMPHOCYTES NFR BLD AUTO: 2.8 % (ref 22–35)
MCH RBC QN AUTO: 22.7 PG (ref 25–35)
MCHC RBC AUTO-ENTMCNC: 32.4 G/DL (ref 31–37)
MCV RBC AUTO: 70.2 FL (ref 80–105)
MONOCYTES # BLD AUTO: 0.6 10*3/UL (ref 0.1–0.6)
MONOCYTES NFR BLD: 2.7 % (ref 1–6)
PLATELET # BLD: 375 10^3/UL (ref 120–450)
PMV BLD AUTO: 9.1 FL (ref 7–11)
PROTHROMBIN TIME: 15.2 SECONDS (ref 9.4–12.5)
RBC # BLD AUTO: 4.84 10^6/UL (ref 3.5–6.1)
WBC # BLD AUTO: 22.4 10^3/UL (ref 4.5–11)

## 2018-04-30 RX ADMIN — Medication PRN MG: at 00:48

## 2018-04-30 RX ADMIN — Medication PRN MG: at 05:13

## 2018-04-30 RX ADMIN — Medication PRN MG: at 22:55

## 2018-04-30 RX ADMIN — IPRATROPIUM BROMIDE AND ALBUTEROL SULFATE SCH: .5; 3 SOLUTION RESPIRATORY (INHALATION) at 15:02

## 2018-04-30 RX ADMIN — Medication SCH TAB: at 09:56

## 2018-04-30 RX ADMIN — Medication PRN MG: at 19:19

## 2018-04-30 RX ADMIN — METHYLPREDNISOLONE SODIUM SUCCINATE SCH: 40 INJECTION, POWDER, FOR SOLUTION INTRAMUSCULAR; INTRAVENOUS at 15:52

## 2018-04-30 RX ADMIN — INSULIN HUMAN SCH UNITS: 100 INJECTION, SOLUTION PARENTERAL at 10:05

## 2018-04-30 RX ADMIN — INSULIN HUMAN SCH: 100 INJECTION, SOLUTION PARENTERAL at 12:04

## 2018-04-30 RX ADMIN — PIPERACILLIN SODIUM AND TAZOBACTAM SODIUM SCH MLS/HR: .25; 2 INJECTION, POWDER, LYOPHILIZED, FOR SOLUTION INTRAVENOUS at 00:04

## 2018-04-30 RX ADMIN — OXYMETAZOLINE HYDROCHLORIDE SCH SPR: 0.05 SPRAY NASAL at 21:27

## 2018-04-30 RX ADMIN — METHYLPREDNISOLONE SODIUM SUCCINATE SCH MG: 40 INJECTION, POWDER, FOR SOLUTION INTRAMUSCULAR; INTRAVENOUS at 21:12

## 2018-04-30 RX ADMIN — BUDESONIDE SCH: 0.5 SUSPENSION RESPIRATORY (INHALATION) at 20:44

## 2018-04-30 RX ADMIN — MESALAMINE SCH MG: 500 CAPSULE ORAL at 19:08

## 2018-04-30 RX ADMIN — Medication PRN MG: at 09:50

## 2018-04-30 RX ADMIN — IPRATROPIUM BROMIDE AND ALBUTEROL SULFATE SCH: .5; 3 SOLUTION RESPIRATORY (INHALATION) at 11:43

## 2018-04-30 RX ADMIN — INSULIN HUMAN SCH: 100 INJECTION, SOLUTION PARENTERAL at 18:04

## 2018-04-30 RX ADMIN — PIPERACILLIN SODIUM AND TAZOBACTAM SODIUM SCH MLS/HR: .25; 2 INJECTION, POWDER, LYOPHILIZED, FOR SOLUTION INTRAVENOUS at 19:07

## 2018-04-30 RX ADMIN — METHYLPREDNISOLONE SODIUM SUCCINATE SCH MG: 40 INJECTION, POWDER, FOR SOLUTION INTRAMUSCULAR; INTRAVENOUS at 05:15

## 2018-04-30 RX ADMIN — MESALAMINE SCH MG: 500 CAPSULE ORAL at 21:23

## 2018-04-30 RX ADMIN — IPRATROPIUM BROMIDE AND ALBUTEROL SULFATE SCH ML: .5; 3 SOLUTION RESPIRATORY (INHALATION) at 07:46

## 2018-04-30 RX ADMIN — OXYMETAZOLINE HYDROCHLORIDE SCH SPR: 0.05 SPRAY NASAL at 09:47

## 2018-04-30 RX ADMIN — IPRATROPIUM BROMIDE AND ALBUTEROL SULFATE SCH: .5; 3 SOLUTION RESPIRATORY (INHALATION) at 04:25

## 2018-04-30 RX ADMIN — PIPERACILLIN SODIUM AND TAZOBACTAM SODIUM SCH: .25; 2 INJECTION, POWDER, LYOPHILIZED, FOR SOLUTION INTRAVENOUS at 15:52

## 2018-04-30 RX ADMIN — PIPERACILLIN SODIUM AND TAZOBACTAM SODIUM SCH MLS/HR: .25; 2 INJECTION, POWDER, LYOPHILIZED, FOR SOLUTION INTRAVENOUS at 05:16

## 2018-04-30 RX ADMIN — POLYETHYLENE GLYCOL 3350 SCH GM: 17 POWDER, FOR SOLUTION ORAL at 09:59

## 2018-04-30 RX ADMIN — POLYETHYLENE GLYCOL 3350 SCH GM: 17 POWDER, FOR SOLUTION ORAL at 19:08

## 2018-04-30 RX ADMIN — IPRATROPIUM BROMIDE AND ALBUTEROL SULFATE SCH: .5; 3 SOLUTION RESPIRATORY (INHALATION) at 00:30

## 2018-04-30 RX ADMIN — BUDESONIDE SCH MG: 0.5 SUSPENSION RESPIRATORY (INHALATION) at 07:46

## 2018-04-30 RX ADMIN — INSULIN HUMAN SCH: 100 INJECTION, SOLUTION PARENTERAL at 21:18

## 2018-04-30 RX ADMIN — IPRATROPIUM BROMIDE AND ALBUTEROL SULFATE SCH: .5; 3 SOLUTION RESPIRATORY (INHALATION) at 20:44

## 2018-04-30 NOTE — PN
DATE:  04/30/2018



CARDIOLOGY FOLLOWUP



SUBJECTIVE:  The patient is tolerating dialysis.



PHYSICAL EXAMINATION:

VITAL SIGNS:  Reveal blood pressure 152/94, heart rate is 100.

NECK:  Negative JVD.

LUNGS:  Without rales.

HEART:  Reveals S1, S2.

EXTREMITIES:  Without edema.



LABORATORY DATA:  Hemoglobin is 11, white count is up to 22,000.  BUN and

creatinine are 93 and 7.8.



IMPRESSION:

1.  Renal failure.

2.  Hypertension.

3.  Diabetes mellitus.

4.  Coronary artery disease.

5.  Obesity.



Given these findings, the patient is tolerating dialysis.  It is likely

that the patient will need long-term outpatient dialysis.







__________________________________________

Olu Ramirez MD



DD:  04/30/2018 16:31:38

DT:  04/30/2018 16:40:31

Job # [id number]

## 2018-04-30 NOTE — PN
DATE:  04/30/2018



SUBJECTIVE:  The patient was admitted with infection, sepsis.  The patient

has past history of coronary artery disease, has had angioplasty in the

past.  The patient also has history of chronic diabetes, chronic lung

disease.  The patient has history of hypertension.  The patient's condition

this morning, he seemed to be a little improved.  As far as his _____

concerned, he is not complaining too much, he states he had _____ hours of

sleep and he is on morphine for pain.



PHYSICAL EXAMINATION:

VITAL SIGNS:  Today, pulse is 99, blood pressure 161/91.

HEENT:  The patient's head is normocephalic.

NECK:  The thyroid is not enlarged.  JVP is flat.

LUNGS:  Trachea central.  Breath sounds are vesicular.

HEART:  The patient has history of atrial fibrillation and rhythm seem to

be normal sinus at this time.

ABDOMEN:  Soft.  Liver and spleen not palpable.  No tenderness.

CENTRAL NERVOUS SYSTEM:  He is conscious.  He is rational and oriented,

anxiety-prone.



The patient's medications consists of Afrin.  The patient is on Ambien for

sleep, Cardizem 90 mg 4 times a day, albuterol for respiratory treatment,

folic acid, heparin for prevention of blood clots.  The patient has

long-term anticoagulant, it was stopped because of GI bleeding in the

recent past.  The patient has had multiple blood transfusions for blood

loss.  The patient also has insulin coverage for diabetes, MiraLax for

constipation and he is on Lidoderm patch, morphine for pain and

pantoprazole 40 mg IV every 12 hours for GI complaints, such as, reflux and

gastritis.  The patient also gets Pulmicort twice a day.



LABORATORY DATA:  His blood work done recently, the hemoglobin is 11, white

count is still 22,000.



The patient is getting antibiotics, respiratory treatment, pain management

and the patient is monitored very closely for GI bleeding.  His overall

prognosis is guarded, condition is clinically improved.







__________________________________________

Myron Lange MD





DD:  04/30/2018 7:45:56

DT:  04/30/2018 9:27:49

Job # [id number]

## 2018-04-30 NOTE — CP.PCM.PN
Subjective





- Date & Time of Evaluation


Date of Evaluation: 04/30/18


Time of Evaluation: 14:17





- Subjective


Subjective: 





Patient still complaining of throat pain and dysphagia.  He is eating lunch at 

bedside without problem at time of eval.





Review of Systems





- Constitutional


Constitutional: absent: Fever, Chills, Sweats





- EENT


Eyes: As Per HPI


Ears: As Per HPI


Nose/Mouth/Throat: As Per HPI





- Cardiovascular


Cardiovascular: As Per HPI





- Respiratory


Respiratory: As Per HPI





- Gastrointestinal


Gastrointestinal: As Per HPI





- Genitourinary


Genitourinary: As Per HPI





- Reproductive: Male


Reproductive:Male: As Per HPI





- Musculoskeletal


Musculoskeletal: As Par HPI





- Integumentary


Integumentary: As Per HPI





- Neurological


Neurological: As Per HPI





- Psychiatric


Psychiatric: As Per HPI





- Endocrine


Endocrine: As Per HPI





- Hematologic/Lymphatic


Hematologic: As Per HPI





Objective





- Vital Signs/Intake and Output


Vital Signs (last 24 hours): 


 











Temp Pulse Resp BP Pulse Ox


 


 98.2 F   107 H  18   152/94 H  96 


 


 04/29/18 16:00  04/30/18 09:57  04/30/18 06:00  04/30/18 09:57  04/30/18 06:00








Intake and Output: 


 











 04/30/18 04/30/18





 06:59 18:59


 


Intake Total 1220 


 


Output Total 325 


 


Balance 895 














- Medications


Medications: 


 Current Medications





Albuterol/Ipratropium (Duoneb 3 Mg/0.5 Mg (3 Ml) Ud)  3 ml IH [id number] [facility]


   Last Admin: 04/30/18 11:43 Dose:  Not Given


Budesonide (Pulmicort Respules)  0.5 mg IH [id number] [facility]


   Last Admin: 04/30/18 07:46 Dose:  0.5 mg


Clotrimazole (Mycelex Lana)  10 mg MT 5XD [facility]


   Last Admin: 04/30/18 10:00 Dose:  10 mg


Diltiazem HCl (Cardizem)  90 mg PO QID [facility]


   Last Admin: 04/30/18 09:57 Dose:  90 mg


Escitalopram Oxalate (Lexapro)  5 mg PO DAILY [facility]


   Last Admin: 04/30/18 09:56 Dose:  5 mg


Folic Acid (Folic Acid)  1 mg PO DAILY [facility]


   Last Admin: 04/30/18 09:59 Dose:  1 mg


Heparin Sodium (Porcine) (Heparin)  2,100 units ICA [facility]


   Last Admin: 04/28/18 18:04 Dose:  2,100 units


Heparin Sodium (Porcine) (Heparin)  1,700 [facility]


   Last Admin: 04/28/18 18:03 Dose:  1,700 units


Piperacillin Sod/Tazobactam Sod (Zosyn 2.25 Gm In 0.9% 100 Ml)  2.25 gm in 100 

mls @ 100 mls/hr IVPB Q6 [facility]


   PRN Reason: Protocol


   Stop: 04/30/18 18:01


   Last Admin: 04/30/18 05:16 Dose:  100 mls/hr


Insulin Human Regular (Humulin R Med)  0 units SC ACHS [facility]


   PRN Reason: Protocol


   Last Admin: 04/30/18 10:05 Dose:  3 units


Lidocaine (Lidoderm)  1 ea TD DAILY [facility]


   Last Admin: 04/30/18 09:59 Dose:  1 ea


Methylprednisolone (Solu-Medrol)  20 mg IVP Q8 [facility]


   Last Admin: 04/30/18 05:15 Dose:  20 mg


Morphine Sulfate (Morphine)  2 mg IVP Q4H [facility]


   PRN Reason: severe pain 


   Last Admin: 04/30/18 09:50 Dose:  2 mg


Oxymetazoline HCl (Afrin 0.05%)  0 ml NS Q12H [facility]


   Last Admin: 04/30/18 09:47 Dose:  3 spr


Pantoprazole Sodium (Protonix Inj)  40 mg IVP Q12 [facility]


   Last Admin: 04/30/18 09:51 Dose:  40 mg


Polyethylene Glycol (Miralax)  17 gm PO BID [facility]


   Last Admin: 04/30/18 09:59 Dose:  17 gm


Sevelamer HCl (Renagel)  2,400 mg PO TID [facility]


Vitamin B Complex/Vit C/Folic Acid (Nephro-Javier)  1 tab PO 0800 [facility]


   Last Admin: 04/30/18 09:56 Dose:  1 tab


Zolpidem Tartrate (Ambien)  5 mg PO HS PRN; Protocol


   PRN Reason: Insomnia


   Last Admin: 04/29/18 21:59 Dose:  5 mg











- Labs


Labs: 


 





 04/30/18 05:45 





 04/30/18 05:45 





 











PT  15.2 SECONDS (9.4-12.5)  H  04/30/18  05:45    


 


INR  1.31  (0.93-1.08)  H  04/30/18  05:45    


 


APTT  16.6 Seconds (25.1-36.5)  L  04/30/18  05:45    














- Head Exam


Head Exam: ATRAUMATIC, NORMAL INSPECTION, NORMOCEPHALIC





- Eye Exam


Eye Exam: EOMI, Normal appearance


Pupil Exam: NORMAL ACCOMODATION





- ENT Exam


ENT Exam: Mucous Membranes Moist


Additional comments: 





fullness left posterior pharyngeal wall





- Neck Exam


Neck Exam: Normal Inspection





Assessment and Plan


(1) GI bleed


Status: Acute   





(2) Bronchitis


Status: Acute   





(3) CHF (congestive heart failure)


Status: Acute   





(4) Dyspnea


Status: Acute   





(5) New onset a-fib


Status: Acute   





(6) Rapid atrial fibrillation


Status: Acute   





(7) Dysphonia


Status: Acute   





(8) Oropharyngeal dysphagia


Status: Acute   





(9) Glottic edema


Status: Acute   





(10) Oral thrush


Status: Acute   





(11) Pharyngeal edema


Status: Acute   





- Assessment and Plan (Free Text)


Plan: 





Repeat imaging with MRI. pt to check with nephrology for IV contrast will be 

needed to assess post pharyngeal wall


Continue to monitor

## 2018-04-30 NOTE — PN
DATE:  04/29/2018



SUBJECTIVE:  Mr. Henson is a 67-year-old gentleman with significant medical

history, recently with a nasogastric tube, is now noted to be improving,

but continues to have some sore throat along the midline during swallowing

of the posterior retropharyngeal wall.  The patient is tolerating liquids

and solids without complication, coughing, but continues to feel

discomfort.  The patient will be discharged over the next 24-48 hours and

follow in the office of Dr. Talib Mejias.



IMPRESSION:  Retropharyngeal trauma, nasogastric tube irrigation with noted

interval improvement.



PLAN:  As discussed, continued increase in liquids and a followup

endoscopy.





__________________________________________

Talib Mejias DO





DD:  04/29/2018 11:02:11

DT:  04/29/2018 12:38:05

Job # [id number]

## 2018-04-30 NOTE — CP.PCM.PN
Subjective





- Date & Time of Evaluation


Date of Evaluation: 04/30/18


Time of Evaluation: 08:46





- Subjective


Subjective: 





Patient seen and examined at bedside. Per nursing no acute events occurred 

overnight. Patient tolerating diet with no complaints. Patient denies any fevers

, chills, nausea, vomiting, headaches, or any other complaints.





Objective





- Vital Signs/Intake and Output


Vital Signs (last 24 hours): 


 











Temp Pulse Resp BP Pulse Ox


 


 98.2 F   99 H  18   161/91 H  98 


 


 04/29/18 16:00  04/29/18 22:00  04/29/18 16:00  04/29/18 22:00  04/29/18 16:00








Intake and Output: 


 











 04/30/18 04/30/18





 06:59 18:59


 


Intake Total 1220 


 


Output Total 325 


 


Balance 895 














- Medications


Medications: 


 Current Medications





Albuterol/Ipratropium (Duoneb 3 Mg/0.5 Mg (3 Ml) Ud)  3 ml IH [id number] [facility]


   Last Admin: 04/30/18 07:46 Dose:  3 ml


Budesonide (Pulmicort Respules)  0.5 mg IH [id number] [facility]


   Last Admin: 04/30/18 07:46 Dose:  0.5 mg


Clotrimazole (Mycelex Lana)  10 mg MT 5XD [facility]


   Last Admin: 04/30/18 05:14 Dose:  10 mg


Diltiazem HCl (Cardizem)  90 mg PO QID [facility]


   Last Admin: 04/29/18 22:00 Dose:  90 mg


Escitalopram Oxalate (Lexapro)  5 mg PO DAILY [facility]


   Last Admin: 04/29/18 10:19 Dose:  5 mg


Folic Acid (Folic Acid)  1 mg PO DAILY [facility]


   Last Admin: 04/29/18 10:15 Dose:  1 mg


Heparin Sodium (Porcine) (Heparin)  2,[facility]


   Last Admin: 04/28/18 18:04 Dose:  2,100 units


Heparin Sodium (Porcine) (Heparin)  1,700 units [facility]CV [facility]


   Last Admin: 04/28/18 18:03 Dose:  1,700 units


Piperacillin Sod/Tazobactam Sod (Zosyn 2.25 Gm In 0.9% 100 Ml)  2.25 gm in 100 

mls @ 100 mls/hr IVPB Q6 [facility]


   PRN Reason: Protocol


   Stop: 04/30/18 18:01


   Last Admin: 04/30/18 05:16 Dose:  100 mls/hr


Insulin Human Regular (Humulin R Med)  0 units SC ACHS [facility]


   PRN Reason: Protocol


   Last Admin: 04/29/18 22:03 Dose:  Not Given


Lidocaine (Lidoderm)  1 ea TD DAILY [facility]


   Last Admin: 04/29/18 10:17 Dose:  1 ea


Methylprednisolone (Solu-Medrol)  20 mg IVP Q8 [facility]


   Last Admin: 04/30/18 05:15 Dose:  20 mg


Morphine Sulfate (Morphine)  1 mg IVP Q6 [facility]


   PRN Reason: PAIN, MODERATE [4-6]


   Last Admin: 04/27/18 17:03 Dose:  1 mg


Morphine Sulfate (Morphine)  2 mg IVP Q4H PRN


   PRN Reason: severe pain 


   Last Admin: 04/30/18 05:13 Dose:  2 mg


Oxymetazoline HCl (Afrin 0.05%)  0 ml NS Q12H [facility]


   Last Admin: 04/29/18 21:55 Dose:  2 spr


Pantoprazole Sodium (Protonix Inj)  40 mg IVP Q12 [facility]


   Last Admin: 04/29/18 22:05 Dose:  40 mg


Polyethylene Glycol (Miralax)  17 gm PO BID [facility]


   Last Admin: 04/29/18 18:25 Dose:  17 gm


Sevelamer HCl (Renagel)  1,600 mg PO TID [facility]


   Last Admin: 04/29/18 18:25 Dose:  1,600 mg


Vitamin B Complex/Vit C/Folic Acid (Nephro-Javier)  1 tab PO 0800 [facility]


   Last Admin: 04/29/18 08:01 Dose:  1 tab


Zolpidem Tartrate (Ambien)  5 mg PO HS PRN; Protocol


   PRN Reason: Insomnia


   Last Admin: 04/29/18 21:59 Dose:  5 mg











- Labs


Labs: 


 





 04/30/18 05:45 





 04/30/18 05:45 





 











PT  15.2 SECONDS (9.4-12.5)  H  04/30/18  05:45    


 


INR  1.31  (0.93-1.08)  H  04/30/18  05:45    


 


APTT  16.6 Seconds (25.1-36.5)  L  04/30/18  05:45    














- Head Exam


Head Exam: ATRAUMATIC, NORMAL INSPECTION





- Eye Exam


Eye Exam: EOMI, Normal appearance


Pupil Exam: NORMAL ACCOMODATION





- ENT Exam


ENT Exam: Mucous Membranes Moist





- Respiratory Exam


Respiratory Exam: NORMAL BREATHING PATTERN





- Cardiovascular Exam


Cardiovascular Exam: REGULAR RHYTHM





- GI/Abdominal Exam


GI & Abdominal Exam: Normal Bowel Sounds





- Back Exam


Back Exam: NORMAL INSPECTION





- Neurological Exam


Neurological Exam: Alert, Awake





- Psychiatric Exam


Psychiatric exam: Normal Affect, Normal Mood





- Skin


Skin Exam: Dry





Assessment and Plan





- Assessment and Plan (Free Text)


Assessment: 





67M s/p LGIB; resolved


Plan: 





no surgical intervention planned at this time


AVF as outpatient








Will discuss with Dr Huerta

## 2018-04-30 NOTE — CP.PCM.PN
Subjective





- Date & Time of Evaluation


Date of Evaluation: 04/30/18


Time of Evaluation: 14:32





- Subjective


Subjective: 


Follow up Nephrology Consultation Note





Assessment: stable


oligoanuric Acute Kidney Injury (N17.9) likely due to ATN, hypoperfusion now 

with fluid overload and DIALYSIS dependent since 04/24/18


shock, acute liver injury, lactic acidosis, coagulopathy


hx of chronic Hep C


Anemia, Hyperphosphatemia (E83.39), HTN (I12.9), DM, morbid obesity


CHF, CAD s/p sent





Plan


HD today and then tomorrow as TTS schedule. d/w CM and SW that pt likely will 

need placement for outpt hemodialysis. 


continue to monitor for spontaneous renal recovery. no evidence of renal 

recovery at present. 


maintain hydrodynamics stable. No ACEI/ARB due to BHARGAVI


Monitor Input/Output, daily weights and renal function with basic metabolic 

panel


A fib rate control as per primary team.


added phos binders as renagel 2400 mg TID with meals


AV access placement NOT indicated at this time as pt is expected to have some 

renal recovery in near future. 


d/w ENT about avoiding IV contrast (gadolinium and iodinated) unless absolutely 

necessary which if need to be given, should involve all risks/benefits d/w 

patient at first. Was suggested to obtain neck CT without contrast.





Dose meds/antibiotics for reduced GFR/dialysis status. Avoid fleets enema/

magnesium based laxatives. Avoid nephrotoxins/NSAIDs/ iodinated contrast (

unless needed emergently)


Glycemic control


Further work up for as per primary team





Thanks for allowing me to participate in care of your patient. Will follow 

patient with you. Please call if any Qs. 


Dr Elver Florez


Office: [phone] Cell: [phone] Fax: [phone]





Subjective: Noted events overnight. Patients feels okay. 


Denies chest pain, palpitation, shortness of breath, 


All other negative 





Physical Examination: 


General Appearance: Comfortable, in no acute respiratory distress, co-operative 

.better appearing, obese


Vitals reviewed and noted as below


Head; Atraumatic, normocephalic


ENT: no ulcers no thrush. Tongue is midline. Oropharynx: no rash or ulcers.


EYES: Pupils are equal, round and reactive to light accommodation. Eye muscles 

and extraocular movement intact. Sclera is anicteric.


Neck; supple no lymphadenopathy, no thyromegaly or bruit


Lungs: Normal respiratory rate/effort. Breath sounds bilateral equal and clearer


Heart: normal rate. s1s2 normal. No rub or gallop. 


Extremities: 2-3+ edema. No varicose veins


Neurological: Patient is awake oriented to person, place and time. No focal 

deficit. Strength bilateral appropriate and equal


Skin: Warm and dry. Normal turgor. No rash. Palpitation: Normal elasticity for 

age


Abdomen: Abdomen is soft. Bowel sounds +. There is no abdominal tenderness, no 

guarding/rigidity no organomegaly


Psych: normal insight and normal affect/mood


MSK: no joint tenderness or swelling. Digits and nails normal, no deformity


: kidney or bladder not palpable.


access: Rt IJ tunnelled catheter





Labs/imaging reviewed.


Past medical history, past surgical history, family history, social history, 

allergy reviewed and noted as below


Family hx: no hx of CKD. Rest non-contributory 





workup: normal LVEF echo 2017 but cardiac cath 2018: LVEF 45%


UA 30 protein


Hep C +


renal imaging unremarkable





Objective





- Vital Signs/Intake and Output


Vital Signs (last 24 hours): 


 











Temp Pulse Resp BP Pulse Ox


 


 98.2 F   107 H  18   152/94 H  96 


 


 04/29/18 16:00  04/30/18 09:57  04/30/18 06:00  04/30/18 09:57  04/30/18 06:00








Intake and Output: 


 











 04/30/18 04/30/18





 06:59 18:59


 


Intake Total 1220 


 


Output Total 325 


 


Balance 895 














- Medications


Medications: 


 Current Medications





Albuterol/Ipratropium (Duoneb 3 Mg/0.5 Mg (3 Ml) Ud)  3 ml IH [id number] [facility]


   Last Admin: 04/30/18 11:43 Dose:  Not Given


Budesonide (Pulmicort Respules)  0.5 mg IH [id number] [facility]


   Last Admin: 04/30/18 07:46 Dose:  0.5 mg


Clotrimazole (Mycelex Lana)  10 mg MT 5XD [facility]


   Last Admin: 04/30/18 10:00 Dose:  10 mg


Diltiazem HCl (Cardizem)  90 mg PO QID [facility]


   Last Admin: 04/30/18 09:57 Dose:  90 mg


Escitalopram Oxalate (Lexapro)  5 mg PO DAILY [facility]


   Last Admin: 04/30/18 09:56 Dose:  5 mg


Folic Acid (Folic Acid)  1 mg PO DAILY [facility]


   Last Admin: 04/30/18 09:59 Dose:  1 mg


Heparin Sodium (Porcine) (Heparin)  [facility]


   Last Admin: 04/28/18 18:04 Dose:  2,100 units


Heparin Sodium (Porcine) (Heparin)  1,700 units ICV TUTA [facility]


   Last Admin: 04/28/18 18:03 Dose:  1,700 units


Piperacillin Sod/Tazobactam Sod (Zosyn 2.25 Gm In 0.9% 100 Ml)  2.25 gm in 100 

mls @ 100 mls/hr IVPB Q6 [facility]


   PRN Reason: Protocol


   Stop: 04/30/18 18:01


   Last Admin: 04/30/18 05:16 Dose:  100 mls/hr


Insulin Human Regular (Humulin R Med)  0 units SC ACHS [facility]


   PRN Reason: Protocol


   Last Admin: 04/30/18 10:05 Dose:  3 units


Lidocaine (Lidoderm)  1 ea TD DAILY [facility]


   Last Admin: 04/30/18 09:59 Dose:  1 ea


Methylprednisolone (Solu-Medrol)  20 mg IVP Q8 [facility]


   Last Admin: 04/30/18 05:15 Dose:  20 mg


Morphine Sulfate (Morphine)  2 mg IVP Q4H [facility]


   PRN Reason: severe pain 


   Last Admin: 04/30/18 09:50 Dose:  2 mg


Oxymetazoline HCl (Afrin 0.05%)  0 ml NS Q12H [facility]


   Last Admin: 04/30/18 09:47 Dose:  3 spr


Pantoprazole Sodium (Protonix Inj)  40 mg IVP Q12 [facility]


   Last Admin: 04/30/18 09:51 Dose:  40 mg


Polyethylene Glycol (Miralax)  17 gm PO BID [facility]


   Last Admin: 04/30/18 09:59 Dose:  17 gm


Sevelamer HCl (Renagel)  2,400 mg PO TID [facility]


Vitamin B Complex/Vit C/Folic Acid (Nephro-Javier)  1 tab PO 0800 [facility]


   Last Admin: 04/30/18 09:56 Dose:  1 tab


Zolpidem Tartrate (Ambien)  5 mg PO HS PRN; Protocol


   PRN Reason: Insomnia


   Last Admin: 04/29/18 21:59 Dose:  5 mg











- Labs


Labs: 


 





 04/30/18 05:45 





 04/30/18 05:45 





 











PT  15.2 SECONDS (9.4-12.5)  H  04/30/18  05:45    


 


INR  1.31  (0.93-1.08)  H  04/30/18  05:45    


 


APTT  16.6 Seconds (25.1-36.5)  L  04/30/18  05:45

## 2018-05-01 VITALS — RESPIRATION RATE: 20 BRPM

## 2018-05-01 LAB
ALBUMIN SERPL-MCNC: 3.5 G/DL (ref 3–4.8)
ALBUMIN/GLOB SERPL: 1.2 {RATIO} (ref 1.1–1.8)
ALT SERPL-CCNC: 386 U/L (ref 7–56)
ANISOCYTOSIS BLD QL SMEAR: SLIGHT
APTT BLD: 26 SECONDS (ref 25.1–36.5)
AST SERPL-CCNC: 70 U/L (ref 17–59)
BASOPHILS # BLD AUTO: 0.02 K/MM3 (ref 0–2)
BASOPHILS NFR BLD: 0.1 % (ref 0–3)
BILIRUB DIRECT SERPL-MCNC: 0.8 MG/DL (ref 0–0.4)
BUN SERPL-MCNC: 77 MG/DL (ref 7–21)
BURR CELLS BLD QL SMEAR: SLIGHT
CALCIUM SERPL-MCNC: 8.2 MG/DL (ref 8.4–10.5)
DACRYOCYTES BLD QL SMEAR: SLIGHT
EOSINOPHIL # BLD: 0 10*3/UL (ref 0–0.7)
EOSINOPHIL NFR BLD: 0 % (ref 1.5–5)
ERYTHROCYTE [DISTWIDTH] IN BLOOD BY AUTOMATED COUNT: 26 % (ref 11.5–14.5)
GAMMA GLUTAMYL TRANSPEPTIDASE: 89 U/L (ref 8–78)
GFR NON-AFRICAN AMERICAN: 9
GRANULOCYTES # BLD: 19.73 10*3/UL (ref 1.4–6.5)
GRANULOCYTES NFR BLD: 92.9 % (ref 50–68)
HGB BLD-MCNC: 10.9 G/DL (ref 14–18)
HYPOCHROMIA BLD QL SMEAR: (no result)
INR PPP: 1.31 (ref 0.93–1.08)
LYMPHOCYTE: 2 % (ref 22–35)
LYMPHOCYTES # BLD: 1.2 10*3/UL (ref 1.2–3.4)
LYMPHOCYTES NFR BLD AUTO: 5.4 % (ref 22–35)
MCH RBC QN AUTO: 22.9 PG (ref 25–35)
MCHC RBC AUTO-ENTMCNC: 32.8 G/DL (ref 31–37)
MCV RBC AUTO: 69.6 FL (ref 80–105)
MICROCYTES BLD QL SMEAR: (no result)
MONOCYTE: 8 % (ref 1–6)
MONOCYTES # BLD AUTO: 0.4 10*3/UL (ref 0.1–0.6)
MONOCYTES NFR BLD: 1.6 % (ref 1–6)
NEUTROPHILS NFR BLD AUTO: 87 % (ref 50–70)
NEUTS BAND NFR BLD: 3 % (ref 0–2)
OVALOCYTES BLD QL SMEAR: SLIGHT
PLATELET # BLD EST: NORMAL 10*3/UL
PLATELET # BLD: 360 10^3/UL (ref 120–450)
PMV BLD AUTO: 9.6 FL (ref 7–11)
POIKILOCYTOSIS BLD QL SMEAR: (no result)
PROTHROMBIN TIME: 15.2 SECONDS (ref 9.4–12.5)
RBC # BLD AUTO: 4.77 10^6/UL (ref 3.5–6.1)
TARGETS BLD QL SMEAR: SLIGHT
WBC # BLD AUTO: 21.3 10^3/UL (ref 4.5–11)

## 2018-05-01 RX ADMIN — POLYETHYLENE GLYCOL 3350 SCH GM: 17 POWDER, FOR SOLUTION ORAL at 11:03

## 2018-05-01 RX ADMIN — BUDESONIDE SCH: 0.5 SUSPENSION RESPIRATORY (INHALATION) at 07:55

## 2018-05-01 RX ADMIN — OXYMETAZOLINE HYDROCHLORIDE SCH SPR: 0.05 SPRAY NASAL at 11:04

## 2018-05-01 RX ADMIN — IPRATROPIUM BROMIDE AND ALBUTEROL SULFATE SCH ML: .5; 3 SOLUTION RESPIRATORY (INHALATION) at 21:20

## 2018-05-01 RX ADMIN — INSULIN HUMAN SCH UNITS: 100 INJECTION, SOLUTION PARENTERAL at 16:45

## 2018-05-01 RX ADMIN — POLYETHYLENE GLYCOL 3350 SCH: 17 POWDER, FOR SOLUTION ORAL at 18:24

## 2018-05-01 RX ADMIN — METHYLPREDNISOLONE SODIUM SUCCINATE SCH MG: 40 INJECTION, POWDER, FOR SOLUTION INTRAMUSCULAR; INTRAVENOUS at 15:18

## 2018-05-01 RX ADMIN — Medication PRN MG: at 16:52

## 2018-05-01 RX ADMIN — Medication SCH TAB: at 10:59

## 2018-05-01 RX ADMIN — OXYMETAZOLINE HYDROCHLORIDE SCH SPR: 0.05 SPRAY NASAL at 21:54

## 2018-05-01 RX ADMIN — MESALAMINE SCH MG: 500 CAPSULE ORAL at 11:01

## 2018-05-01 RX ADMIN — INSULIN HUMAN SCH UNITS: 100 INJECTION, SOLUTION PARENTERAL at 12:30

## 2018-05-01 RX ADMIN — MESALAMINE SCH MG: 500 CAPSULE ORAL at 15:17

## 2018-05-01 RX ADMIN — IPRATROPIUM BROMIDE AND ALBUTEROL SULFATE SCH: .5; 3 SOLUTION RESPIRATORY (INHALATION) at 04:20

## 2018-05-01 RX ADMIN — Medication PRN MG: at 11:13

## 2018-05-01 RX ADMIN — INSULIN HUMAN SCH: 100 INJECTION, SOLUTION PARENTERAL at 22:13

## 2018-05-01 RX ADMIN — IPRATROPIUM BROMIDE AND ALBUTEROL SULFATE SCH: .5; 3 SOLUTION RESPIRATORY (INHALATION) at 11:49

## 2018-05-01 RX ADMIN — MESALAMINE SCH MG: 500 CAPSULE ORAL at 21:56

## 2018-05-01 RX ADMIN — IPRATROPIUM BROMIDE AND ALBUTEROL SULFATE SCH: .5; 3 SOLUTION RESPIRATORY (INHALATION) at 00:30

## 2018-05-01 RX ADMIN — INSULIN HUMAN SCH: 100 INJECTION, SOLUTION PARENTERAL at 07:30

## 2018-05-01 RX ADMIN — IPRATROPIUM BROMIDE AND ALBUTEROL SULFATE SCH: .5; 3 SOLUTION RESPIRATORY (INHALATION) at 16:16

## 2018-05-01 RX ADMIN — METHYLPREDNISOLONE SODIUM SUCCINATE SCH MG: 40 INJECTION, POWDER, FOR SOLUTION INTRAMUSCULAR; INTRAVENOUS at 05:24

## 2018-05-01 RX ADMIN — BUDESONIDE SCH MG: 0.5 SUSPENSION RESPIRATORY (INHALATION) at 21:19

## 2018-05-01 RX ADMIN — MESALAMINE SCH MG: 500 CAPSULE ORAL at 18:24

## 2018-05-01 RX ADMIN — IPRATROPIUM BROMIDE AND ALBUTEROL SULFATE SCH: .5; 3 SOLUTION RESPIRATORY (INHALATION) at 07:55

## 2018-05-01 NOTE — PN
DATE:  05/01/2018



SUBJECTIVE:  The patient is mildly dyspneic during exertion.



PHYSICAL EXAMINATION:

VITAL SIGNS:  Stable.

NECK:  Negative JVD.

LUNGS:  Without rales.

HEART:  Reveals S1, S2.

EXTREMITIES:  Without edema.



LABORATORY DATA:  Shows no change in his renal function.



IMPRESSION:

1.  Recurrent congestive heart failure.

2.  End-stage renal disease.

3.  Obesity.

4.  Coronary artery disease.

5.  Stable angina.

6.  Diabetes mellitus.



PLAN:  Given these findings, I had an extensive discussion with Mr. Henson

today.  Realistically, it is unlikely his renal function has shown any

evidence for improvement.  I have discussed long-term dialysis with the

patient.





__________________________________________

Olu Ramirez MD



DD:  05/01/2018 11:11:23

DT:  05/01/2018 11:16:24

Job # [id number]

## 2018-05-01 NOTE — CT
PROCEDURE:  CT NECK WITHOUT  CONTRAST



HISTORY:

dysphagia, glottis edema, for follow up



COMPARISON:

CT 04/23/2018



TECHNIQUE:

CT of the neck without intravenous contrast. Coronal and sagittal 

reformats generated. 



Radiation dose:  mGy-cm



This CT exam was performed using one or more of the following dose 

reduction techniques: Automated exposure control, adjustment of the 

mA and/or kV according to patient size, and/or use of iterative 

reconstruction technique.



FINDINGS:



NASOPHARYNX:

Unremarkable.



SUPRAHYOID NECK:

There has been significant improvement in the retropharyngeal and 

supraglottic soft tissue swelling that was seen previously.  There is 

still some persistent asymmetric swelling in the supraglottic larynx 

on the left. This is best appreciated on image 40 series 2. Continued 

follow-up with either CT imaging or laryngoscopy is recommended to 

assure complete resolution and rule out an underlying neoplasm.



INFRAHYOID NECK:

As above



MASS:

None.



GLANDS:

Parotid and submandibular glands unremarkable. Normal size thyroid 

gland, without nodule.



LYMPH NODES:

Normal. No lymphadenopathy.



CERVICAL SPINE:

No fracture or focal lesion. 



OTHER FINDINGS:

None.



IMPRESSION:

There has been significant improvement in the retropharyngeal and 

supraglottic soft tissue swelling that was seen previously.  There is 

still some persistent asymmetric swelling in the supraglottic larynx 

on the left.

## 2018-05-01 NOTE — CP.PCM.PN
Subjective





- Date & Time of Evaluation


Date of Evaluation: 05/01/18


Time of Evaluation: 11:15





- Subjective


Subjective: 





Comfortable, no abdominal pain, has occasional difficulty swallowing.





Objective





- Vital Signs/Intake and Output


Vital Signs (last 24 hours): 


 











Temp Pulse Resp BP Pulse Ox


 


 97.5 F L  81   20   160/103 H  99 


 


 05/01/18 09:11  05/01/18 09:11  05/01/18 09:11  05/01/18 09:11  05/01/18 09:11








Intake and Output: 


 











 05/01/18 05/01/18





 06:59 18:59


 


Intake Total 480 


 


Output Total 200 


 


Balance 280 














- Medications


Medications: 


 Current Medications





Acetaminophen (Tylenol 325mg Tab)  650 mg PO MOWEFR [facility]


Albuterol/Ipratropium (Duoneb 3 Mg/0.5 Mg (3 Ml) Ud)  3 ml IH [id number] [facility]


   Last Admin: 05/01/18 07:55 Dose:  Not Given


Budesonide (Pulmicort Respules)  0.5 mg IH [id number] [facility]


   Last Admin: 05/01/18 07:55 Dose:  Not Given


Clotrimazole (Mycelex Lana)  10 mg MT 5XD [facility]


   Last Admin: 05/01/18 05:25 Dose:  10 mg


Diltiazem HCl (Cardizem)  90 mg PO QID [facility]


   Last Admin: 04/30/18 21:20 Dose:  90 mg


Escitalopram Oxalate (Lexapro)  5 mg PO DAILY [facility]


   Last Admin: 04/30/18 09:56 Dose:  5 mg


Folic Acid (Folic Acid)  1 mg PO DAILY [facility]


   Last Admin: 04/30/18 09:59 Dose:  1 mg


Heparin Sodium (Porcine) (Heparin)  [facility]


   Last Admin: 04/30/18 16:49 Dose:  2,100 units


Heparin Sodium (Porcine) (Heparin)  1,700 [facility]


   Last Admin: 04/30/18 16:49 Dose:  1,700 units


Insulin Human Regular (Humulin R Med)  0 units SC [facility]


   PRN Reason: Protocol


   Last Admin: 04/30/18 21:18 Dose:  Not Given


Lidocaine (Lidoderm)  1 ea TD DAILY [facility]


   Last Admin: 04/30/18 09:59 Dose:  1 ea


Mesalamine (Pentasa)  1,000 mg PO QID [facility]


   Last Admin: 04/30/18 21:23 Dose:  1,000 mg


Methylprednisolone (Solu-Medrol)  20 mg IVP Q8 [facility]


   Last Admin: 05/01/18 05:24 Dose:  20 mg


Morphine Sulfate (Morphine)  2 mg IVP Q4H [facility]


   PRN Reason: severe pain 


   Last Admin: 04/30/18 22:55 Dose:  2 mg


Oxymetazoline HCl (Afrin 0.05%)  0 ml NS Q12H [facility]


   Last Admin: 04/30/18 21:27 Dose:  3 spr


Pantoprazole Sodium (Protonix Inj)  40 mg IVP Q12 [facility]


   Last Admin: 04/30/18 21:13 Dose:  40 mg


Polyethylene Glycol (Miralax)  17 gm PO BID [facility]


   Last Admin: 04/30/18 19:08 Dose:  17 gm


Sevelamer HCl (Renagel)  2,400 mg PO TID [facility]


   Last Admin: 04/30/18 18:59 Dose:  2,400 mg


Vitamin B Complex/Vit C/Folic Acid (Nephro-Javier)  1 tab PO 0800 [facility]


   Last Admin: 04/30/18 09:56 Dose:  1 tab


Zolpidem Tartrate (Ambien)  5 mg PO HS PRN; Protocol


   PRN Reason: Insomnia


   Last Admin: 04/30/18 20:37 Dose:  5 mg











- Labs


Labs: 


 





 05/01/18 07:11 





 05/01/18 07:11 





 











PT  15.2 SECONDS (9.4-12.5)  H  05/01/18  07:00    


 


INR  1.31  (0.93-1.08)  H  05/01/18  07:00    


 


APTT  26.0 Seconds (25.1-36.5)   05/01/18  07:00    














- Constitutional


Appears: Chronically Ill





- Head Exam


Head Exam: NORMAL INSPECTION





- Respiratory Exam


Respiratory Exam: Decreased Breath Sounds





- Cardiovascular Exam


Cardiovascular Exam: +S1, +S2





- GI/Abdominal Exam


GI & Abdominal Exam: Soft.  absent: Tenderness





Assessment and Plan





- Assessment and Plan (Free Text)


Plan: 





Assessment


S/P severe sepsis probably with intra-abdominal infection with transaminitis, S/

P treatment with antibiotics


Crohn's disease


depression


dyslipidemia


chronic CHF


obesity with BMI 37





Plan


Completed course of Zosyn - will continue to monitor off antibiotics since he 

is at risk for nosocomial infections


follow up further plans for dysphagia by ENT

## 2018-05-01 NOTE — CP.PCM.PN
Subjective





- Date & Time of Evaluation


Date of Evaluation: 05/01/18


Time of Evaluation: 15:13





- Subjective


Subjective: 





Follow up Nephrology Consultation Note





Assessment: stable


oligoanuric Acute Kidney Injury (N17.9) likely due to ATN, hypoperfusion now 

with fluid overload and DIALYSIS dependent since 04/24/18


shock, acute liver injury, lactic acidosis, coagulopathy


hx of chronic Hep C


Anemia, Hyperphosphatemia (E83.39), HTN (I12.9), DM, morbid obesity


CHF, CAD s/p sent





Plan


HD today and then  as TTS schedule. d/w CM and SW that pt likely will need 

placement for outpt hemodialysis. 


continue to monitor for spontaneous renal recovery. no evidence of renal 

recovery at present. 


maintain hydrodynamics stable. No ACEI/ARB due to BHARGAVI


Monitor Input/Output, daily weights and renal function with basic metabolic 

panel


A fib rate control as per primary team.


added phos binders as renagel 2400 mg TID with meals


AV access placement NOT indicated at this time as pt is expected to have some 

renal recovery in near future. 


pt advised to limit oral fluid intake 





Dose meds/antibiotics for reduced GFR/dialysis status. Avoid fleets enema/

magnesium based laxatives. Avoid nephrotoxins/NSAIDs/ iodinated contrast (

unless needed emergently)


Glycemic control


Further work up for as per primary team





Thanks for allowing me to participate in care of your patient. Will follow 

patient with you. Please call if any Qs. 


Dr Elver Florez


Office: [phone] Cell: [phone] Fax: [phone]





Subjective: Noted events overnight. Patients feels okay. 


Denies chest pain, palpitation, shortness of breath, 


All other negative . not much urine output. has leg swelling





Physical Examination: 


General Appearance: Comfortable, in no acute respiratory distress, co-operative 

.better appearing, obese


Vitals reviewed and noted as below


Head; Atraumatic, normocephalic


ENT: no ulcers no thrush. Tongue is midline. Oropharynx: no rash or ulcers.


EYES: Pupils are equal, round and reactive to light accommodation. Eye muscles 

and extraocular movement intact. Sclera is anicteric.


Neck; supple no lymphadenopathy, no thyromegaly or bruit


Lungs: Normal respiratory rate/effort. Breath sounds bilateral equal and clearer


Heart: normal rate. s1s2 normal. No rub or gallop. 


Extremities: 2-3+ edema. No varicose veins


Neurological: Patient is awake oriented to person, place and time. No focal 

deficit. Strength bilateral appropriate and equal


Skin: Warm and dry. Normal turgor. No rash. Palpitation: Normal elasticity for 

age


Abdomen: Abdomen is soft. Bowel sounds +. There is no abdominal tenderness, no 

guarding/rigidity no organomegaly


Psych: normal insight and normal affect/mood


MSK: no joint tenderness or swelling. Digits and nails normal, no deformity


: kidney or bladder not palpable.


access: Rt IJ tunnelled catheter





Labs/imaging reviewed.


Past medical history, past surgical history, family history, social history, 

allergy reviewed and noted as below


Family hx: no hx of CKD. Rest non-contributory 





workup: normal LVEF echo 2017 but cardiac cath 2018: LVEF 45%


UA 30 protein


Hep C +


renal imaging unremarkable








Objective





- Vital Signs/Intake and Output


Vital Signs (last 24 hours): 


 











Temp Pulse Resp BP Pulse Ox


 


 97.5 F L  86   20   167/104 H  99 


 


 05/01/18 09:11  05/01/18 11:00  05/01/18 09:11  05/01/18 11:00  05/01/18 09:11








Intake and Output: 


 











 05/01/18 05/01/18





 06:59 18:59


 


Intake Total 480 800


 


Output Total 200 


 


Balance 280 800














- Medications


Medications: 


 Current Medications





Acetaminophen (Tylenol 325mg Tab)  650 mg PO MOWEFR [facility]


Albuterol/Ipratropium (Duoneb 3 Mg/0.5 Mg (3 Ml) Ud)  3 ml IH [id number] [facility]


   Last Admin: 05/01/18 11:49 Dose:  Not Given


Budesonide (Pulmicort Respules)  0.5 mg IH [id number] [facility]


   Last Admin: 05/01/18 07:55 Dose:  Not Given


Clotrimazole (Mycelex Lana)  10 mg MT 5XD [facility]


   Last Admin: 05/01/18 11:02 Dose:  10 mg


Diltiazem HCl (Cardizem)  90 mg PO QID [facility]


   Last Admin: 05/01/18 11:00 Dose:  90 mg


Escitalopram Oxalate (Lexapro)  5 mg PO DAILY [facility]


   Last Admin: 05/01/18 11:02 Dose:  5 mg


Folic Acid (Folic Acid)  1 mg PO DAILY [facility]


   Last Admin: 05/01/18 11:02 Dose:  1 mg


Heparin Sodium (Porcine) (Heparin)  2,[facility]


   Last Admin: 05/01/18 11:31 Dose:  2,100 units


Heparin Sodium (Porcine) (Heparin)  1,700 units [facility]


   Last Admin: 05/01/18 11:32 Dose:  1,700 units


Insulin Human Regular (Humulin R Med)  0 units SC ACHS [facility]


   PRN Reason: Protocol


   Last Admin: 05/01/18 12:30 Dose:  1 units


Lidocaine (Lidoderm)  1 ea TD DAILY [facility]


   Last Admin: 05/01/18 11:02 Dose:  1 ea


Mesalamine (Pentasa)  1,000 mg PO QID [facility]


   Last Admin: 05/01/18 11:01 Dose:  1,000 mg


Methylprednisolone (Solu-Medrol)  20 mg IVP Q8 [facility]


   Last Admin: 05/01/18 05:24 Dose:  20 mg


Morphine Sulfate (Morphine)  2 mg IVP Q4H [facility]


   PRN Reason: severe pain 


   Last Admin: 05/01/18 11:13 Dose:  2 mg


Oxymetazoline HCl (Afrin 0.05%)  0 ml NS Q12H [facility]


   Last Admin: 05/01/18 11:04 Dose:  2 spr


Pantoprazole Sodium (Protonix Inj)  40 mg IVP Q12 [facility]


   Last Admin: 05/01/18 11:04 Dose:  40 mg


Polyethylene Glycol (Miralax)  17 gm PO BID [facility]


   Last Admin: 05/01/18 11:03 Dose:  17 gm


Sevelamer HCl (Renagel)  2,400 mg PO TID [facility]


   Last Admin: 05/01/18 11:00 Dose:  2,400 mg


Vitamin B Complex/Vit C/Folic Acid (Nephro-Javier)  1 tab PO 0800 [facility]


   Last Admin: 05/01/18 10:59 Dose:  1 tab


Zolpidem Tartrate (Ambien)  5 mg PO HS PRN; Protocol


   PRN Reason: Insomnia


   Last Admin: 04/30/18 20:37 Dose:  5 mg











- Labs


Labs: 


 





 05/01/18 07:11 





 05/01/18 07:11 





 











PT  15.2 SECONDS (9.4-12.5)  H  05/01/18  07:00    


 


INR  1.31  (0.93-1.08)  H  05/01/18  07:00    


 


APTT  26.0 Seconds (25.1-36.5)   05/01/18  07:00

## 2018-05-02 ENCOUNTER — HOSPITAL ENCOUNTER (INPATIENT)
Dept: HOSPITAL 42 - TRCU | Age: 68
LOS: 8 days | Discharge: HOME HEALTH | DRG: 377 | End: 2018-05-10
Attending: INTERNAL MEDICINE | Admitting: INTERNAL MEDICINE
Payer: MEDICARE

## 2018-05-02 VITALS
HEART RATE: 100 BPM | OXYGEN SATURATION: 98 % | DIASTOLIC BLOOD PRESSURE: 100 MMHG | SYSTOLIC BLOOD PRESSURE: 158 MMHG | TEMPERATURE: 98.6 F

## 2018-05-02 VITALS — BODY MASS INDEX: 39.6 KG/M2

## 2018-05-02 DIAGNOSIS — Z95.5: ICD-10-CM

## 2018-05-02 DIAGNOSIS — I87.8: ICD-10-CM

## 2018-05-02 DIAGNOSIS — R57.9: ICD-10-CM

## 2018-05-02 DIAGNOSIS — J38.4: ICD-10-CM

## 2018-05-02 DIAGNOSIS — N25.81: ICD-10-CM

## 2018-05-02 DIAGNOSIS — G89.29: ICD-10-CM

## 2018-05-02 DIAGNOSIS — F32.89: ICD-10-CM

## 2018-05-02 DIAGNOSIS — I25.10: ICD-10-CM

## 2018-05-02 DIAGNOSIS — M51.36: ICD-10-CM

## 2018-05-02 DIAGNOSIS — F11.20: ICD-10-CM

## 2018-05-02 DIAGNOSIS — K92.2: Primary | ICD-10-CM

## 2018-05-02 DIAGNOSIS — E87.5: ICD-10-CM

## 2018-05-02 DIAGNOSIS — B37.0: ICD-10-CM

## 2018-05-02 DIAGNOSIS — K50.911: ICD-10-CM

## 2018-05-02 DIAGNOSIS — Z91.19: ICD-10-CM

## 2018-05-02 DIAGNOSIS — S36.119A: ICD-10-CM

## 2018-05-02 DIAGNOSIS — K72.00: ICD-10-CM

## 2018-05-02 DIAGNOSIS — Z76.5: ICD-10-CM

## 2018-05-02 DIAGNOSIS — J39.2: ICD-10-CM

## 2018-05-02 DIAGNOSIS — N17.0: ICD-10-CM

## 2018-05-02 DIAGNOSIS — D68.9: ICD-10-CM

## 2018-05-02 DIAGNOSIS — G47.00: ICD-10-CM

## 2018-05-02 DIAGNOSIS — E55.9: ICD-10-CM

## 2018-05-02 DIAGNOSIS — Z79.899: ICD-10-CM

## 2018-05-02 DIAGNOSIS — M47.816: ICD-10-CM

## 2018-05-02 DIAGNOSIS — D50.9: ICD-10-CM

## 2018-05-02 DIAGNOSIS — Z53.20: ICD-10-CM

## 2018-05-02 DIAGNOSIS — E66.01: ICD-10-CM

## 2018-05-02 DIAGNOSIS — R65.20: ICD-10-CM

## 2018-05-02 DIAGNOSIS — E87.1: ICD-10-CM

## 2018-05-02 DIAGNOSIS — E11.22: ICD-10-CM

## 2018-05-02 DIAGNOSIS — E78.5: ICD-10-CM

## 2018-05-02 DIAGNOSIS — A41.9: ICD-10-CM

## 2018-05-02 DIAGNOSIS — J40: ICD-10-CM

## 2018-05-02 DIAGNOSIS — E87.2: ICD-10-CM

## 2018-05-02 DIAGNOSIS — I48.91: ICD-10-CM

## 2018-05-02 DIAGNOSIS — I27.20: ICD-10-CM

## 2018-05-02 DIAGNOSIS — E83.39: ICD-10-CM

## 2018-05-02 DIAGNOSIS — Z99.2: ICD-10-CM

## 2018-05-02 DIAGNOSIS — I50.9: ICD-10-CM

## 2018-05-02 DIAGNOSIS — I13.2: ICD-10-CM

## 2018-05-02 DIAGNOSIS — K59.00: ICD-10-CM

## 2018-05-02 DIAGNOSIS — N18.6: ICD-10-CM

## 2018-05-02 DIAGNOSIS — Z90.49: ICD-10-CM

## 2018-05-02 DIAGNOSIS — B18.2: ICD-10-CM

## 2018-05-02 DIAGNOSIS — R13.12: ICD-10-CM

## 2018-05-02 DIAGNOSIS — I45.10: ICD-10-CM

## 2018-05-02 DIAGNOSIS — E11.65: ICD-10-CM

## 2018-05-02 DIAGNOSIS — M50.30: ICD-10-CM

## 2018-05-02 DIAGNOSIS — D63.1: ICD-10-CM

## 2018-05-02 LAB
ALBUMIN SERPL-MCNC: 3.2 G/DL (ref 3–4.8)
ALBUMIN/GLOB SERPL: 1.2 {RATIO} (ref 1.1–1.8)
ALT SERPL-CCNC: 292 U/L (ref 7–56)
APTT BLD: 24.8 SECONDS (ref 25.1–36.5)
AST SERPL-CCNC: 41 U/L (ref 17–59)
BASOPHILS # BLD AUTO: 0.02 K/MM3 (ref 0–2)
BASOPHILS NFR BLD: 0.1 % (ref 0–3)
BILIRUB DIRECT SERPL-MCNC: 0.6 MG/DL (ref 0–0.4)
BUN SERPL-MCNC: 67 MG/DL (ref 7–21)
CALCIUM SERPL-MCNC: 8.3 MG/DL (ref 8.4–10.5)
EOSINOPHIL # BLD: 0 10*3/UL (ref 0–0.7)
EOSINOPHIL NFR BLD: 0 % (ref 1.5–5)
ERYTHROCYTE [DISTWIDTH] IN BLOOD BY AUTOMATED COUNT: 26.3 % (ref 11.5–14.5)
GAMMA GLUTAMYL TRANSPEPTIDASE: 80 U/L (ref 8–78)
GFR NON-AFRICAN AMERICAN: 11
GRANULOCYTES # BLD: 22.01 10*3/UL (ref 1.4–6.5)
GRANULOCYTES NFR BLD: 91.3 % (ref 50–68)
HGB BLD-MCNC: 10.1 G/DL (ref 14–18)
INR PPP: 1.28 (ref 0.93–1.08)
LYMPHOCYTES # BLD: 1.3 10*3/UL (ref 1.2–3.4)
LYMPHOCYTES NFR BLD AUTO: 5.2 % (ref 22–35)
MCH RBC QN AUTO: 22.7 PG (ref 25–35)
MCHC RBC AUTO-ENTMCNC: 32.3 G/DL (ref 31–37)
MCV RBC AUTO: 70.5 FL (ref 80–105)
MONOCYTES # BLD AUTO: 0.8 10*3/UL (ref 0.1–0.6)
MONOCYTES NFR BLD: 3.4 % (ref 1–6)
PLATELET # BLD: 294 10^3/UL (ref 120–450)
PMV BLD AUTO: 9.2 FL (ref 7–11)
PROTHROMBIN TIME: 14.8 SECONDS (ref 9.4–12.5)
RBC # BLD AUTO: 4.44 10^6/UL (ref 3.5–6.1)
WBC # BLD AUTO: 24.1 10^3/UL (ref 4.5–11)

## 2018-05-02 RX ADMIN — INSULIN HUMAN SCH UNITS: 100 INJECTION, SOLUTION PARENTERAL at 12:24

## 2018-05-02 RX ADMIN — OXYMETAZOLINE HYDROCHLORIDE SCH SPR: 0.05 SPRAY NASAL at 19:20

## 2018-05-02 RX ADMIN — IPRATROPIUM BROMIDE AND ALBUTEROL SULFATE SCH ML: .5; 3 SOLUTION RESPIRATORY (INHALATION) at 23:29

## 2018-05-02 RX ADMIN — IPRATROPIUM BROMIDE AND ALBUTEROL SULFATE SCH ML: .5; 3 SOLUTION RESPIRATORY (INHALATION) at 20:08

## 2018-05-02 RX ADMIN — IPRATROPIUM BROMIDE AND ALBUTEROL SULFATE SCH: .5; 3 SOLUTION RESPIRATORY (INHALATION) at 04:40

## 2018-05-02 RX ADMIN — INSULIN HUMAN SCH: 100 INJECTION, SOLUTION PARENTERAL at 22:20

## 2018-05-02 RX ADMIN — POLYETHYLENE GLYCOL 3350 SCH: 17 POWDER, FOR SOLUTION ORAL at 17:14

## 2018-05-02 RX ADMIN — MESALAMINE SCH MG: 500 CAPSULE ORAL at 14:02

## 2018-05-02 RX ADMIN — IPRATROPIUM BROMIDE AND ALBUTEROL SULFATE SCH ML: .5; 3 SOLUTION RESPIRATORY (INHALATION) at 10:59

## 2018-05-02 RX ADMIN — MESALAMINE SCH MG: 500 CAPSULE ORAL at 21:46

## 2018-05-02 RX ADMIN — IPRATROPIUM BROMIDE AND ALBUTEROL SULFATE SCH ML: .5; 3 SOLUTION RESPIRATORY (INHALATION) at 00:04

## 2018-05-02 RX ADMIN — OXYCODONE HYDROCHLORIDE AND ACETAMINOPHEN PRN TAB: 5; 325 TABLET ORAL at 22:57

## 2018-05-02 RX ADMIN — Medication SCH TAB: at 08:15

## 2018-05-02 RX ADMIN — BUDESONIDE SCH MG: 0.5 SUSPENSION RESPIRATORY (INHALATION) at 20:08

## 2018-05-02 RX ADMIN — MESALAMINE SCH MG: 500 CAPSULE ORAL at 17:12

## 2018-05-02 RX ADMIN — INSULIN HUMAN SCH UNITS: 100 INJECTION, SOLUTION PARENTERAL at 17:11

## 2018-05-02 RX ADMIN — OXYMETAZOLINE HYDROCHLORIDE SCH SPR: 0.05 SPRAY NASAL at 10:00

## 2018-05-02 RX ADMIN — INSULIN HUMAN SCH UNITS: 100 INJECTION, SOLUTION PARENTERAL at 08:15

## 2018-05-02 RX ADMIN — IPRATROPIUM BROMIDE AND ALBUTEROL SULFATE SCH ML: .5; 3 SOLUTION RESPIRATORY (INHALATION) at 07:46

## 2018-05-02 RX ADMIN — MESALAMINE SCH MG: 500 CAPSULE ORAL at 09:58

## 2018-05-02 RX ADMIN — IPRATROPIUM BROMIDE AND ALBUTEROL SULFATE SCH ML: .5; 3 SOLUTION RESPIRATORY (INHALATION) at 15:39

## 2018-05-02 RX ADMIN — BUDESONIDE SCH MG: 0.5 SUSPENSION RESPIRATORY (INHALATION) at 07:47

## 2018-05-02 RX ADMIN — POLYETHYLENE GLYCOL 3350 SCH GM: 17 POWDER, FOR SOLUTION ORAL at 09:57

## 2018-05-02 NOTE — CP.PCM.PN
Subjective





- Date & Time of Evaluation


Date of Evaluation: 05/02/18


Time of Evaluation: 11:05





- Subjective


Subjective: 


Follow up Nephrology Consultation Note





Assessment: stable


oligoanuric Acute Kidney Injury (N17.9) likely due to ATN, hypoperfusion now 

with fluid overload and DIALYSIS dependent since 04/24/18


shock, acute liver injury, lactic acidosis, coagulopathy


hx of chronic Hep C


Anemia, Hyperphosphatemia (E83.39), HTN (I12.9), DM, morbid obesity


CHF, CAD s/p sent, crohn's disease





Plan


HD tomorrow as TTS schedule. Nephrovite 1 tab/day. had d/w CM and SW that pt 

likely will need placement for outpt hemodialysis. 


continue to monitor for spontaneous renal recovery. no evidence of renal 

recovery at present. 


HTN control with meds as ordered. No ACEI/ARB due to BHARGAVI


Monitor Input/Output, daily weights and renal function with basic metabolic 

panel


A fib rate control as per primary team.


continue phos binders as renagel 2400 mg TID with meals


AV access placement NOT indicated at this time as pt is expected to have some 

renal recovery in near future. 


pt advised to limit oral fluid intake 1000 mL/day. renal/dialysis diet ordered.


will plan for therapy with JUMANA if Hb <10.





Dose meds/antibiotics for reduced GFR/dialysis status. Avoid fleets enema/

magnesium based laxatives. Avoid nephrotoxins/NSAIDs/ iodinated contrast (

unless needed emergently)


Glycemic control


Further work up for as per primary team





Thanks for allowing me to participate in care of your patient. Will follow 

patient with you. Please call if any Qs. d/w team


Dr Elver Florez


Office: [phone] Cell: [phone] Fax: [phone]





Subjective: Noted events overnight. Patients feels okay. 


Denies chest pain, palpitation, shortness of breath, 


All other negative. not much urine output. has leg swelling





Physical Examination: 


General Appearance: Comfortable, in no acute respiratory distress, co-operative 

.better appearing, obese


Vitals reviewed and noted as below


Head; Atraumatic, normocephalic


ENT: no ulcers no thrush. Tongue is midline. Oropharynx: no rash or ulcers.


EYES: Pupils are equal, round and reactive to light accommodation. Eye muscles 

and extraocular movement intact. Sclera is anicteric.


Neck; supple no lymphadenopathy, no thyromegaly or bruit


Lungs: Normal respiratory rate/effort. Breath sounds bilateral equal and clearer


Heart: normal rate. s1s2 normal. No rub or gallop. 


Extremities: 2-3+ edema. No varicose veins


Neurological: Patient is awake oriented to person, place and time. No focal 

deficit. Strength bilateral appropriate and equal


Skin: Warm and dry. Normal turgor. No rash. Palpitation: Normal elasticity for 

age


Abdomen: Abdomen is soft. Bowel sounds +. There is no abdominal tenderness, no 

guarding/rigidity no organomegaly


Psych: normal insight and normal affect/mood


MSK: no joint tenderness or swelling. Digits and nails normal, no deformity


: kidney or bladder not palpable.


access: Rt IJ tunnelled catheter





Labs/imaging reviewed.


Past medical history, past surgical history, family history, social history, 

allergy reviewed and noted as below


Family hx: no hx of CKD. Rest non-contributory 





workup: normal LVEF echo 2017 but cardiac cath 2018: LVEF 45%


UA 30 protein


Hep C +


renal imaging unremarkable








Objective





- Vital Signs/Intake and Output


Vital Signs (last 24 hours): 


 











Temp Pulse Resp BP Pulse Ox


 


 98.4 F   91 H  20   158/85 H  97 


 


 05/02/18 08:25  05/02/18 09:58  05/02/18 08:25  05/02/18 09:58  05/02/18 08:25








Intake and Output: 


 











 05/02/18 05/02/18





 06:59 18:59


 


Intake Total 1460 


 


Output Total 150 


 


Balance 1310 














- Medications


Medications: 


 Current Medications





Acetaminophen (Tylenol 325mg Tab)  650 mg PO MOWEFR [facility]


Albuterol/Ipratropium (Duoneb 3 Mg/0.5 Mg (3 Ml) Ud)  3 ml IH [id number] [facility]


   Last Admin: 05/02/18 10:59 Dose:  3 ml


Benzocaine/Menthol (Cepacol Sore Throat)  1 hi MT Q2H PRN


   PRN Reason: Sore Throat


   Last Admin: 05/02/18 10:56 Dose:  1 hi


Budesonide (Pulmicort Respules)  0.5 mg IH [id number] [facility]


   Last Admin: 05/02/18 07:47 Dose:  0.5 mg


Clotrimazole (Mycelex Lana)  10 mg MT 5XD [facility]


   Last Admin: 05/02/18 09:59 Dose:  10 mg


Diltiazem HCl (Cardizem)  90 mg PO QID [facility]


   Last Admin: 05/02/18 09:58 Dose:  90 mg


Escitalopram Oxalate (Lexapro)  5 mg PO DAILY [facility]


   Last Admin: 05/02/18 09:59 Dose:  5 mg


Folic Acid (Folic Acid)  1 mg PO DAILY [facility]


   Last Admin: 05/02/18 09:59 Dose:  1 mg


Heparin Sodium (Porcine) (Heparin)  2,[facility]


   Last Admin: 05/01/18 18:19 Dose:  Not Given


Heparin Sodium (Porcine) (Heparin)  1,700 units [facility]


   Last Admin: 05/01/18 18:20 Dose:  Not Given


Insulin Human Regular (Humulin R Med)  0 units SC [facility]


   PRN Reason: Protocol


   Last Admin: 05/02/18 08:15 Dose:  3 units


Lidocaine (Lidoderm)  1 ea TD DAILY [facility]


   Last Admin: 05/02/18 09:56 Dose:  1 ea


Mesalamine (Pentasa)  1,000 mg PO QID [facility]


   Last Admin: 05/02/18 09:58 Dose:  1,000 mg


Oxycodone/Acetaminophen (Percocet 5/325 Mg Tab)  1 tab PO Q6H PRN


   PRN Reason: Pain, severe (8-10)


   Stop: 05/04/18 21:50


Oxymetazoline HCl (Afrin 0.05%)  0 ml NS Q12H [facility]


   Last Admin: 05/02/18 10:00 Dose:  1 spr


Pantoprazole Sodium (Protonix Inj)  40 mg IVP Q12 [facility]


   Last Admin: 05/02/18 09:57 Dose:  40 mg


Polyethylene Glycol (Miralax)  17 gm PO BID [facility]


   Last Admin: 05/02/18 09:57 Dose:  17 gm


Sevelamer HCl (Renagel)  2,400 mg PO TID [facility]


   Last Admin: 05/02/18 09:57 Dose:  2,400 mg


Vitamin B Complex/Vit C/Folic Acid (Nephro-Javier)  1 tab PO 0800 [facility]


   Last Admin: 05/02/18 08:15 Dose:  1 tab


Zolpidem Tartrate (Ambien)  5 mg PO HS PRN; Protocol


   PRN Reason: Insomnia


   Last Admin: 05/01/18 21:55 Dose:  5 mg











- Labs


Labs: 


 





 05/02/18 06:15 





 05/02/18 06:15 





 











PT  14.8 SECONDS (9.4-12.5)  H  05/02/18  06:15    


 


INR  1.28  (0.93-1.08)  H  05/02/18  06:15    


 


APTT  24.8 Seconds (25.1-36.5)  L  05/02/18  06:15

## 2018-05-02 NOTE — PN
DATE:  05/02/2018



CARDIOLOGY FOLLOWUP



SUBJECTIVE:  The patient is without distress.



PHYSICAL EXAMINATION:

VITAL SIGNS:  Blood pressure is 158/85, heart rates in the 90s.

NECK:  Negative JVD.

LUNGS:  Without rales.

HEART:  Reveals S1, S2.

EXTREMITIES:  Without change.



LABORATORY DATA:  BUN and creatinine are 67 and 5.4 with a potassium of

4.5, glucose is 254.



IMPRESSION:

1.  End-stage renal disease.

2.  Stable angina.

3.  Coronary artery disease.

4.  Diabetes mellitus.

5.  Obesity.

6.  Hypertension.



The patient is clinically tolerating his dialysis.  No further cardiac

workup is indicated at this time.





__________________________________________

Olu Ramirez MD





DD:  05/02/2018 12:30:24

DT:  05/02/2018 12:34:33

Job # [id number]

## 2018-05-02 NOTE — PN
DATE:  05/01/2018



SUBJECTIVE:  The patient was seen in dialysis area, sitting up in the

recliner.  The patient is completely alert, awake, responsive.  Does not

offer any specific complaints  Overnight nurse's notes were reviewed.  The

patient appeared to be alert, awake, oriented x3.  Some episodes of

complaint of insomnia noted.



PHYSICAL EXAMINATION:

VITAL SIGNS:  T-max is 97 and 98.2, heart rate 86, 81.  Telemetry shows

atrial fibrillation, heart rate in 80s, 90s.  Blood pressure in the last 24

hours 152/94, 172/95, 167/104, 163/81; respirations 20; O2 sat 97-99%. 

Output only 200 mL.

HEENT:  Head examination normocephalic, atraumatic.  HEENT examination

shows pinkish pale conjunctivae.  Anicteric sclerae.  No oropharyngeal

lesion.  No neck rigidity.

CHEST:  Kyphosis.  Positive decreased breath sound at the bases. 

Occasional rhonchi, upper lung fields anteriorly.

CARDIOVASCULAR:  S1, S2.  Irregular rhythm.  Positive systolic murmur, left

sternal border, right second intercostal space.

ABDOMEN:  Protuberant, obese.  Positive bowel sound.  No hepatosplenomegaly

noted.  No guarding.  No rigidity noted.

GENITALIA:  Male.

RECTAL:  Deferred.

EXTREMITY:  Shows more than 2+ pitting edema of the lower extremity noted.

MUSCULOSKELETAL:  Shows a body mass index of 41.

NEUROLOGIC:  The patient is alert, awake, oriented x3.  Cranial nerves II

through XII intact.  Gait examination is not tested.

PSYCHIATRIC:  Positive for history of depression and insomnia.



DIAGNOSTICS:  On 05/01/2018, WBC 21.3, hemoglobin and hematocrit 11 and

33.2, MCV 69, platelet 360.  Granulocytes 87%, 3 bands.  PT/INR 15.2/26. 

Chemistry is abnormal for BUN 77, creatinine 6.4, GFR 11.  Random glucose

212.  Fingerstick blood sugar 243, 295, 294, 177, 289.  Calcium 8.2,

phosphorus 6.7, GGTP 89, AST 70, .  Hepatitis C PCR RNA none

detected.  Sputum cultures:  Yeast species.  Light growth.  The patient

received 3 units of PRBC and 6 units of FFP during this hospitalization.



IMPRESSION AND PLAN:

1.  Oliguric acute renal failure, acute kidney injury secondary to ischemic

acute tubular necrosis, hypoperfusion.

2.  Volume and fluid overload.

3.  Congestive heart failure.

4.  Acute renal failure, dialysis dependent.

5.  Shocked liver.

6.  Acute liver injury.

7.  Severe symptomatic bradycardia and hypotension.

8.  Increased anion gap metabolic acidosis and lactic acidosis.

9.  Coagulopathy.

10.  Chronic hepatitis C.

11.  Leukocytosis with granulocytosis and bandemia.

12.  Hyperphosphatemia.

13.  Morbid obesity.

14.  Noninsulin-requiring diabetes mellitus.

15.  History of coronary artery disease, history of angioplasty.

16.  Atrial fibrillation.

17.  Right upper chest dialysis catheter placement.

18.  Hypertension.

19.  Atrial fibrillation with intermittent rapid ventricular response.

20.  Leukocytosis with granulocytosis.

21.  Microcytic anemia.

22.  Bandemia.

23.  Coagulopathy (resolved).

24.  Increased anion gap metabolic acidosis and lactic acidosis.

25.  Hyperphosphatemia.

26.  Severe transaminitis.

27.  Secondary hyperparathyroidism with elevated PTH of 237.

28.  Proteinuria.

29.  Fecal occult blood positive.

30.  Questionable gastrointestinal bleeding.

31.  Chronic hepatitis C with negative hepatitis C PCR, RNA quantitative,

qualitative.

32.  Status post packed red blood cell transfusion x3 and FFP transfusion

x6.

33.  Resolving retropharyngeal supraglottic soft tissue swelling with

persisted asymmetrical swelling in the supraglottic larynx of the left.

34.  Status post right internal jugular dialysis catheter placement.

35.  Severe sepsis.

36.  History of Crohn's disease.

37.  History of depression.

38.  Morbid obesity with elevated body mass index of 41.

39.  Bilateral lower extremity venous stasis.

40.  End-stage renal disease, dialysis dependent.

41.  Dysphonia.

42.  Oropharyngeal dysphagia.

43.  Glottic edema.

44.  Pharyngeal edema.

45.  Mild pharyngeal dysphagia.

46.  Passive hepatic congestion secondary to low cardiac output,

bradycardia and hypotension.

47.  Deconditioning.

48.  Gait dysfunction.

49.  Morbid obesity.

50.  Insomnia.

51.  Constipation.

52.  Oral thrush.



Plan at this time, the patient has been ordered repeat labs.



CURRENT CONSULTATION:

1.  Cardiology.

2.  Gastroenterology.

3.  Infectious Disease.

4.  Nephrology.

5.  ENT.

6.  Radiology.



CURRENT MEDICATIONS:  Afrin nasal spray twice a day, Ambien 5 mg at bedtime

p.r.n., Cardizem 90 mg four times daily, DuoNeb nebulizer every 4 hours,

folic acid 1 mg daily, heparin to be given during dialysis, regular insulin

sliding scale coverage medium dose protocol, Lexapro 5 mg daily, Lidoderm

5% patch to the affected area, MiraLax 17 g twice a day, Mycelex Troches 10

mg five times a day, Nephro-Javier 1 tablet daily, mesalamine/Pentasa 1000 mg

four times daily, Protonix 40 IV every 12, Pulmicort nebulizer 0.5 mg every

12 hours, Renagel 2400 mg three times a day, Tylenol 650 Monday, Wednesday

and Friday.



The patient has been ordered repeat chest x-ray, PA and lateral.  The

patient has been ordered mapping for hemodialysis catheter.  BiPAP is

ordered.  The patient is on renal diet.  The patient has been ordered BETH

stockings, out of bed, SCDs.  Physical therapy, occupational therapy

ordered.  The patient has been told by multiple physicians including me

about the patient's need for long-term dialysis needs, which he

acknowledged and understands.



Dictated and electronically signed, not read.





__________________________________________

Kdoy Pratt MD





DD:  05/01/2018 19:34:24

DT:  05/01/2018 19:42:32

Job # [id number]

## 2018-05-02 NOTE — CP.PCM.PN
Subjective





- Date & Time of Evaluation


Date of Evaluation: 05/02/18


Time of Evaluation: 11:40





- Subjective


Subjective: 





Patient still with problems in swallowing, no fevers, no diarrhea, no abdominal 

pain.





Objective





- Vital Signs/Intake and Output


Vital Signs (last 24 hours): 


 











Temp Pulse Resp BP Pulse Ox


 


 98.4 F   91 H  20   158/85 H  97 


 


 05/02/18 08:25  05/02/18 08:25  05/02/18 08:25  05/02/18 08:25  05/02/18 08:25








Intake and Output: 


 











 05/02/18 05/02/18





 06:59 18:59


 


Intake Total 1460 


 


Output Total 150 


 


Balance 1310 














- Medications


Medications: 


 Current Medications





Acetaminophen (Tylenol 325mg Tab)  650 mg PO MOWEFR [facility]


Albuterol/Ipratropium (Duoneb 3 Mg/0.5 Mg (3 Ml) Ud)  3 ml IH [id number] [facility]


   Last Admin: 05/02/18 07:46 Dose:  3 ml


Budesonide (Pulmicort Respules)  0.5 mg IH [id number] [facility]


   Last Admin: 05/02/18 07:47 Dose:  0.5 mg


Clotrimazole (Mycelex Lana)  10 mg MT 5XD [facility]


   Last Admin: 05/02/18 05:45 Dose:  Not Given


Diltiazem HCl (Cardizem)  90 mg PO QID [facility]


   Last Admin: 05/01/18 21:55 Dose:  90 mg


Escitalopram Oxalate (Lexapro)  5 mg PO DAILY [facility]


   Last Admin: 05/01/18 11:02 Dose:  5 mg


Folic Acid (Folic Acid)  1 mg PO DAILY [facility]


   Last Admin: 05/01/18 11:02 Dose:  1 mg


Heparin Sodium (Porcine) (Heparin)  [facility]


   Last Admin: 05/01/18 18:19 Dose:  Not Given


Heparin Sodium (Porcine) (Heparin)  1,[facility]


   Last Admin: 05/01/18 18:20 Dose:  Not Given


Insulin Human Regular (Humulin R Med)  0 units SC [facility]


   PRN Reason: Protocol


   Last Admin: 05/02/18 08:15 Dose:  3 units


Lidocaine (Lidoderm)  1 ea TD DAILY [facility]


   Last Admin: 05/01/18 11:02 Dose:  1 ea


Mesalamine (Pentasa)  1,000 mg PO QID [facility]


   Last Admin: 05/01/18 21:56 Dose:  1,000 mg


Oxycodone/Acetaminophen (Percocet 5/325 Mg Tab)  1 tab PO Q6H PRN


   PRN Reason: Pain, severe (8-10)


   Stop: 05/04/18 21:50


Oxymetazoline HCl (Afrin 0.05%)  0 ml NS Q12H [facility]


   Last Admin: 05/01/18 21:54 Dose:  2 spr


Pantoprazole Sodium (Protonix Inj)  40 mg IVP Q12 [facility]


   Last Admin: 05/01/18 21:56 Dose:  40 mg


Polyethylene Glycol (Miralax)  17 gm PO BID [facility]


   Last Admin: 05/01/18 18:24 Dose:  Not Given


Sevelamer HCl (Renagel)  2,400 mg PO TID [facility]


   Last Admin: 05/01/18 18:25 Dose:  2,400 mg


Vitamin B Complex/Vit C/Folic Acid (Nephro-Javier)  1 tab PO 0800 [facility]


   Last Admin: 05/02/18 08:15 Dose:  1 tab


Zolpidem Tartrate (Ambien)  5 mg PO HS PRN; Protocol


   PRN Reason: Insomnia


   Last Admin: 05/01/18 21:55 Dose:  5 mg











- Labs


Labs: 


 





 05/02/18 06:15 





 05/02/18 06:15 





 











PT  14.8 SECONDS (9.4-12.5)  H  05/02/18  06:15    


 


INR  1.28  (0.93-1.08)  H  05/02/18  06:15    


 


APTT  24.8 Seconds (25.1-36.5)  L  05/02/18  06:15    














- Constitutional


Appears: Chronically Ill





- Head Exam


Head Exam: NORMAL INSPECTION





- Neck Exam


Neck Exam: absent: Meningismus





- Respiratory Exam


Respiratory Exam: Decreased Breath Sounds.  absent: Rales





- Cardiovascular Exam


Cardiovascular Exam: +S1, +S2





- GI/Abdominal Exam


GI & Abdominal Exam: Soft.  absent: Tenderness





Assessment and Plan





- Assessment and Plan (Free Text)


Plan: 





Assessment


S/P severe sepsis probably with intra-abdominal infection with transaminitis, S/

P treatment with antibiotics


intermittent dysphagia


Crohn's disease


depression


dyslipidemia


chronic CHF


obesity with BMI 37





Plan


Completed course of Zosyn - will continue to monitor off antibiotics since he 

is at risk for hospital-acquired infections


follow up further plans for dysphagia by ENT

## 2018-05-02 NOTE — RAD
HISTORY:

CHF  



COMPARISON:

04/23/2018



TECHNIQUE:

Chest PA and lateral



FINDINGS:



LUNGS:

No active pulmonary disease.



PLEURA:

No significant pleural effusion identified. No pneumothorax apparent.



CARDIOVASCULAR:

Massive cardiomegaly unchanged compared to the prior study. Venous 

access catheter in satisfactory position.



OSSEOUS STRUCTURES:

No significant abnormalities.



VISUALIZED UPPER ABDOMEN:

Normal.



OTHER FINDINGS:

None.



IMPRESSION:

No active disease. No significant interval change compared to the 

prior examination(s).

## 2018-05-02 NOTE — PN
DATE:  05/01/2018



SUBJECTIVE:  The patient is a 67-year-old male, seen in bed 372/02 , seen

during meal.  The patient is tolerating both solid and liquid without

dysphagia, without cough.  Continues to complain of midline throat

discomfort, it has not progressed, but also has not resolved at this time. 

The patient has had recent CAT scan, which we be evaluated.



ASSESSMENT AND PLAN:  The patient will progress to subacute care for

continued physical therapy and will be followed in subacute setting.  As

discussed with the patient, we will follow in the office after discharge

for laryngoscopy.





__________________________________________

Talib Mejias DO



DD:  05/01/2018 18:10:08

DT:  05/01/2018 19:48:37

Job # [id number]

## 2018-05-02 NOTE — CP.PCM.DIS
Provider





- Provider


Date of Admission: 


04/20/18 22:20





Attending physician: 


Kody Pratt MD





Primary care physician: 


Kody Pratt MD





Consults: 


Critical care - Dr. Cabezas/Dr. Garza


ID - Dr. Lu


Nephrology - Dr. Florez


Surgery 


ENT - Dr. Harden





Time Spent in preparation of Discharge (in minutes): 45





Hospital Course





- Lab Results


Lab Results: 


 Micro Results





04/21/18 03:20   Blood   Blood Culture - Final


                            NO GROWTH AFTER 5 DAYS


04/21/18 03:20   Blood   Gram Stain - Final


                            TEST NOT PERFORMED


04/21/18 02:45   Blood   Blood Culture - Final


                            NO GROWTH AFTER 5 DAYS


04/21/18 02:45   Blood   Gram Stain - Final


                            TEST NOT PERFORMED


04/23/18 17:00   Sputum   Gram Stain - Final


04/23/18 17:00   Sputum   Sputum Culture - Final


                            Yeast Species


04/21/18 01:30   Naris   MRSA Culture (Admit) - Final


                            MRSA NOT DETECTED


04/21/18 01:00   Urine,Barlow   Urine Culture - Final


                            No Growth (<1,000 CFU/ML)





 Most Recent Lab Values











WBC  24.1 10^3/ul (4.5-11.0)  H  05/02/18  06:15    


 


RBC  4.44 10^6/uL (3.5-6.1)   05/02/18  06:15    


 


Hgb  10.1 g/dL (14.0-18.0)  L  05/02/18  06:15    


 


Hct  31.3 % (42.0-52.0)  L  05/02/18  06:15    


 


MCV  70.5 fl (80.0-105.0)  L  05/02/18  06:15    


 


MCH  22.7 pg (25.0-35.0)  L  05/02/18  06:15    


 


MCHC  32.3 g/dl (31.0-37.0)   05/02/18  06:15    


 


RDW  26.3 % (11.5-14.5)  H  05/02/18  06:15    


 


Plt Count  294 10^3/uL (120.0-450.0)   05/02/18  06:15    


 


MPV  9.2 fl (7.0-11.0)   05/02/18  06:15    


 


Gran %  91.3 % (50.0-68.0)  H  05/02/18  06:15    


 


Lymph % (Auto)  5.2 % (22.0-35.0)  L  05/02/18  06:15    


 


Mono % (Auto)  3.4 % (1.0-6.0)   05/02/18  06:15    


 


Eos % (Auto)  0.0 % (1.5-5.0)  L  05/02/18  06:15    


 


Baso % (Auto)  0.1 % (0.0-3.0)   05/02/18  06:15    


 


Gran #  22.01  (1.4-6.5)  H  05/02/18  06:15    


 


Lymph # (Auto)  1.3  (1.2-3.4)   05/02/18  06:15    


 


Mono # (Auto)  0.8  (0.1-0.6)  H  05/02/18  06:15    


 


Eos # (Auto)  0.0  (0.0-0.7)   05/02/18  06:15    


 


Baso # (Auto)  0.02 K/mm3 (0.0-2.0)   05/02/18  06:15    


 


Neutrophils % (Manual)  87 % (50.0-70.0)  H  05/01/18  07:11    


 


Band Neutrophils %  3 % (0-2)  H  05/01/18  07:11    


 


Lymphocytes % (Manual)  2 % (22.0-35.0)  L  05/01/18  07:11    


 


Atypical Lymphs %  3 % (0.0-0.0)  H  04/27/18  06:20    


 


Monocytes % (Manual)  8 % (1.0-6.0)  H  05/01/18  07:11    


 


Metamyelocytes %  1 %  04/27/18  06:20    


 


Nucleated RBC %  1 %  04/24/18  05:30    


 


Platelet Evaluation  Normal  (NORMAL)   05/01/18  07:11    


 


Large Platelets  Present   04/24/18  05:30    


 


Polychromasia  Slight   04/24/18  05:30    


 


Hypochromasia  2+   05/01/18  07:11    


 


Poikilocytosis (manual  1+   05/01/18  07:11    


 


Anisocytosis (manual)  Slight   05/01/18  07:11    


 


Microcytosis (manual)  2+   05/01/18  07:11    


 


Target Cells  Slight   05/01/18  07:11    


 


Tear Drop Cells  Slight   05/01/18  07:11    


 


Ovalocytes  Slight   05/01/18  07:11    


 


Jaswinder Cells  Slight   05/01/18  07:11    


 


Acanthocytes (Spur)  1+   04/21/18  05:00    


 


Schistocytes  Slight   04/27/18  06:20    


 


PT  14.8 SECONDS (9.4-12.5)  H  05/02/18  06:15    


 


INR  1.28  (0.93-1.08)  H  05/02/18  06:15    


 


APTT  24.8 Seconds (25.1-36.5)  L  05/02/18  06:15    


 


pCO2  24 mm/Hg (35-45)  L  04/21/18  20:50    


 


pO2  37 mm/Hg (30-55)   04/23/18  08:30    


 


HCO3  11.5 mmol/L (21-28)  L  04/21/18  20:50    


 


ABG pH  7.29  (7.35-7.45)  L  04/21/18  20:50    


 


ABG Total CO2  12.2 mmol.L (22-28)  L  04/21/18  20:50    


 


ABG O2 Saturation  97.9 % (95-98)   04/21/18  20:50    


 


ABG Base Excess  -13.5 mmol/L (-2.0-3.0)  L  04/21/18  20:50    


 


ABG Potassium  4.4 mmol/L (3.6-5.2)   04/21/18  20:50    


 


VBG pH  7.34  (7.32-7.43)   04/23/18  08:30    


 


VBG pCO2  53.0  (40-60)   04/23/18  08:30    


 


VBG HCO3  28.6 mmol/l (21-28)  H  04/23/18  08:30    


 


VBG Total CO2  22.6 mmol.L (22-28)   04/22/18  05:00    


 


VBG O2 Sat (Calc)  73.2 % (40-65)  H  04/23/18  08:30    


 


VBG Base Excess  1.8 mmol/L (0.0-2.0)   04/23/18  08:30    


 


VBG Potassium  4.1 mmol/L (3.6-5.2)   04/22/18  05:00    


 


Sodium  135.0 mmol/L (132-148)   04/22/18  05:00    


 


Chloride  92.0 mmol/L ()  L  04/22/18  05:00    


 


Glucose  162 mg/dl ()  H  04/22/18  05:00    


 


Lactate  8.6 mmol/L (0.7-2.1)  H*  04/22/18  05:00    


 


FiO2  21.0 %  04/22/18  05:00    


 


Sodium  131 mmol/L (132-148)  L  05/02/18  06:15    


 


Potassium  4.5 mmol/L (3.6-5.0)   05/02/18  06:15    


 


Chloride  95 mmol/L ()  L  05/02/18  06:15    


 


Carbon Dioxide  25 mmol/L (21-33)   05/02/18  06:15    


 


Anion Gap  16  (10-20)   05/02/18  06:15    


 


BUN  67 mg/dL (7-21)  H  05/02/18  06:15    


 


Creatinine  5.4 mg/dl (0.8-1.5)  H  05/02/18  06:15    


 


Est GFR ( Amer)  13   05/02/18  06:15    


 


Est GFR (Non-Af Amer)  11   05/02/18  06:15    


 


POC Glucose (mg/dL)  221 mg/dL ()  H  05/02/18  16:38    


 


Random Glucose  254 mg/dL ()  H  05/02/18  06:15    


 


Lactic Acid  2.2 mmol/L (0.7-2.1)  H  04/27/18  06:20    


 


Calcium  8.3 mg/dL (8.4-10.5)  L  05/02/18  06:15    


 


Phosphorus  5.9 mg/dL (2.5-4.5)  H  05/02/18  06:15    


 


Magnesium  2.1 mg/dL (1.7-2.2)   05/02/18  06:15    


 


Total Bilirubin  0.8 mg/dL (0.2-1.3)   05/02/18  06:15    


 


Direct Bilirubin  0.6 mg/dL (0.0-0.4)  H  05/02/18  06:15    


 


GGT  80 U/L (8-78)  H  05/02/18  06:15    


 


AST  41 U/L (17-59)   05/02/18  06:15    


 


ALT  292 U/L (7-56)  H  05/02/18  06:15    


 


Alkaline Phosphatase  125 U/L ()   05/02/18  06:15    


 


Ammonia  14 umol/L (9-33)   04/21/18  13:00    


 


Total Creatine Kinase  99 U/L ()   04/21/18  05:00    


 


Troponin I  0.03 ng/mL  04/21/18  13:00    


 


Total Protein  6.0 g/dL (5.8-8.3)   05/02/18  06:15    


 


Albumin  3.2 g/dL (3.0-4.8)   05/02/18  06:15    


 


Globulin  2.8 gm/dL  05/02/18  06:15    


 


Albumin/Globulin Ratio  1.2  (1.1-1.8)   05/02/18  06:15    


 


Amylase  181 U/L ()  H  04/22/18  07:40    


 


Lipase  487 U/L ()  H  04/22/18  07:40    


 


Procalcitonin  0.22 NG/ML (0.19-0.49)   04/21/18  02:00    


 


PTH Intact Whole Molec  237 pg/mL (14-64)  H  04/21/18  05:00    


 


Arterial Blood Potassium  4.4 mmol/L (3.6-5.2)   04/21/18  20:50    


 


Venous Blood Potassium  4.1 mmol/L (3.6-5.2)   04/22/18  05:00    


 


Urine Color  Yellow  (YELLOW)   04/21/18  04:00    


 


Urine Appearance  Clear  (CLEAR)   04/21/18  04:00    


 


Urine pH  6.0  (4.7-8.0)   04/21/18  04:00    


 


Ur Specific Gravity  1.025  (1.005-1.035)   04/21/18  04:00    


 


Urine Protein  30 mg/dL (<30 mg/dL)  H  04/21/18  04:00    


 


Urine Glucose (UA)  Negative mg/dL (NEGATIVE)   04/21/18  04:00    


 


Urine Ketones  Negative mg/dL (NEGATIVE)   04/21/18  04:00    


 


Urine Blood  Negative  (NEGATIVE)   04/21/18  04:00    


 


Urine Nitrate  Negative  (NEGATIVE)   04/21/18  04:00    


 


Urine Bilirubin  Negative  (NEGATIVE)   04/21/18  04:00    


 


Urine Urobilinogen  0.2 E.U./dL (<1 E.U./dL)   04/21/18  04:00    


 


Ur Leukocyte Esterase  Negative Breana/uL (NEGATIVE)   04/21/18  04:00    


 


Urine RBC  0 - 2 /hpf (0-2)   04/21/18  04:00    


 


Urine WBC  0 - 2 /hpf (0-6)   04/21/18  04:00    


 


Ur Epithelial Cells  0 - 2 /hpf (0-5)   04/21/18  04:00    


 


Stool Occult Blood  Positive  (NEGATIVE)  H  04/21/18  01:15    


 


Salicylates  2 mg/dL (2.0-20.0)   04/21/18  05:00    


 


Urine Opiates Screen  Positive  (NEGATIVE)  H  04/21/18  06:00    


 


Urine Methadone Screen  Negative  (NEGATIVE)   04/21/18  06:00    


 


Acetaminophen  < 10.0 ug/ml (10.0-20.0)  L  04/21/18  13:00    


 


Ur Barbiturates Screen  Negative  (NEGATIVE)   04/21/18  06:00    


 


Ur Phencyclidine Scrn  Negative  (NEGATIVE)   04/21/18  06:00    


 


Ur Amphetamines Screen  Negative  (NEGATIVE)   04/21/18  06:00    


 


U Benzodiazepines Scrn  Negative  (NEGATIVE)   04/21/18  06:00    


 


U Oth Cocaine Metabols  Negative  (NEGATIVE)   04/21/18  06:00    


 


U Cannabinoids Screen  Negative  (NEGATIVE)   04/21/18  06:00    


 


Hepatitis A IgM Ab  Negative  (NEGATIVE)   04/21/18  05:00    


 


Hep Bs Antigen  Negative  (NEGATIVE)   04/21/18  05:00    


 


Hep B Core IgM Ab  Negative  (NEGATIVE)   04/21/18  05:00    


 


Hepatitis C Antibody  Reactive  (Non Reactive)    04/24/18  09:49    


 


Hep C Ab Signal/Cutoff  19.8 Ratio (<1.0)  H  04/24/18  09:49    


 


Hepatitis C RNA  <15 not detected IU/mL (<15)   04/24/18  09:49    


 


HCV RNA Quant (PCR)  <1.18 not detected Log IU/mL (<1.18)   04/24/18  09:49    


 


HCV RNA (PCR) IUs/ml  <15 not detected IU/mL (<15)   04/27/18  11:00    


 


HCV RNA PCR log IUs/ml  <1.18 not detected Log IU/mL (<1.18)   04/27/18  11:00 

   


 


Blood Type  O POSITIVE   04/20/18  21:07    


 


Antibody Screen  Negative   04/20/18  21:07    


 


Crossmatch  See Detail   04/20/18  21:07    


 


BBK History Checked  Patient has bt   04/20/18  21:07    














- Hospital Course


Hospital Course: 


Patient is a 67-year-old male with an extensive medical history including CAD s/

p LAD stent 1/2018, paroxysmal atrial fibrillation originally on xarelto (

stopped on 4/17), diabetes type 2, chronic back pain, diverticulosis, crohn's 

disease, HTN, HLD, DJD who [place] [facility] 

[place]ness and malaise. He had [place] [facility] [place] prior for 

gastrointestinal bleed as well as atrial fibrillation with RVR. When he 

presented [place] [facility] [place], he was noted to have a blood 

pressure of 70/54 and a heart rate of 44. He was subsequently transferred to 

the ICU for symptomatic hypotension with acute liver injury likely secondary to 

hypoperfusion as a result of decreased cardiac output. During his ICU course, 

he was noted to be in afib with RVR and was started on a cardizem drip. He was 

noted to have acute kidney injury likely secondary to ATN. He was seen by 

multiple consultants including critical care, nephrology, infectious disease, 

cardiology, ENT and surgery. His renal function failed to improve despite 

downtrending of his liver enzymes and he was thereafter started on dialysis. 

Despite aggressive medical treatment his renal function failed to improve and 

arrangements were made for likely permanent need for hemodialysis. His symptoms 

improved after control of his blood pressure, heart rate and renal function. He 

was subsequently transferred to the medical floor and underwent evaluation by 

physical therapy. He was thereafter transferred to the transitional care unit 

for more focused physical therapy. 





Discharge Exam





- Head Exam


Head Exam: NORMAL INSPECTION





- Eye Exam


Eye Exam: EOMI


Pupil Exam: PERRL





- Respiratory Exam


Respiratory Exam: Decreased Breath Sounds.  absent: Rales, Rhonchi, Wheezes





- Cardiovascular Exam


Cardiovascular Exam: +S1, +S2.  absent: Gallop, JVD, Rubs





- GI/Abdominal Exam


GI & Abdominal Exam: Soft.  absent: Distended, Firm, Guarding, Rigid, Tenderness





- Extremities Exam


Extremities exam: pedal edema


Additional comments: 


2+ edema





- Neurological Exam


Neurological exam: Alert, CN II-XII Intact, Oriented x3





- Psychiatric Exam


Psychiatric exam: Normal Affect, Normal Mood





- Skin


Skin Exam: Dry, Intact, Normal Color, Warm





Discharge Plan





- Follow Up Plan


Condition: FAIR


Disposition: TRANSF TO SNF


Instructions:  Bradycardia, Gastrointestinal Bleeding (DC), Low Blood Pressure (

DC)


Additional Instructions: 


PATIENT GOING TO TRCU. 





Referrals: 


Kody Pratt MD [Primary Care Provider] - 


Chito Huerta MD [Staff Provider] -

## 2018-05-03 LAB
ALBUMIN SERPL-MCNC: 3.3 G/DL (ref 3–4.8)
ALBUMIN/GLOB SERPL: 1.1 {RATIO} (ref 1.1–1.8)
ALT SERPL-CCNC: 247 U/L (ref 7–56)
AST SERPL-CCNC: 36 U/L (ref 17–59)
BASOPHILS # BLD AUTO: 0.03 K/MM3 (ref 0–2)
BASOPHILS NFR BLD: 0.1 % (ref 0–3)
BUN SERPL-MCNC: 82 MG/DL (ref 7–21)
CALCIUM SERPL-MCNC: 8.5 MG/DL (ref 8.4–10.5)
EOSINOPHIL # BLD: 0.3 10*3/UL (ref 0–0.7)
EOSINOPHIL NFR BLD: 1.2 % (ref 1.5–5)
ERYTHROCYTE [DISTWIDTH] IN BLOOD BY AUTOMATED COUNT: 26.6 % (ref 11.5–14.5)
GFR NON-AFRICAN AMERICAN: 8
HGB BLD-MCNC: 10.5 G/DL (ref 14–18)
MCH RBC QN AUTO: 22.6 PG (ref 25–35)
MCHC RBC AUTO-ENTMCNC: 31.7 G/DL (ref 31–37)
MCV RBC AUTO: 71.3 FL (ref 80–105)
MONOCYTES # BLD AUTO: 1 10*3/UL (ref 0.1–0.6)
MONOCYTES NFR BLD: 3.7 % (ref 1–6)
PLATELET # BLD: 273 10^3/UL (ref 120–450)
PMV BLD AUTO: 9.7 FL (ref 7–11)
RBC # BLD AUTO: 4.64 10^6/UL (ref 3.5–6.1)
WBC # BLD AUTO: 27.1 10^3/UL (ref 4.5–11)

## 2018-05-03 PROCEDURE — F08Z4ZZ HOME MANAGEMENT TREATMENT: ICD-10-PCS | Performed by: INTERNAL MEDICINE

## 2018-05-03 PROCEDURE — 5A1D70Z PERFORMANCE OF URINARY FILTRATION, INTERMITTENT, LESS THAN 6 HOURS PER DAY: ICD-10-PCS

## 2018-05-03 PROCEDURE — F07Z9ZZ GAIT TRAINING/FUNCTIONAL AMBULATION TREATMENT: ICD-10-PCS | Performed by: INTERNAL MEDICINE

## 2018-05-03 PROCEDURE — F08Z2ZZ GROOMING/PERSONAL HYGIENE TREATMENT: ICD-10-PCS | Performed by: INTERNAL MEDICINE

## 2018-05-03 PROCEDURE — F08Z0ZZ BATHING/SHOWERING TECHNIQUES TREATMENT: ICD-10-PCS | Performed by: INTERNAL MEDICINE

## 2018-05-03 PROCEDURE — F08Z1ZZ DRESSING TECHNIQUES TREATMENT: ICD-10-PCS | Performed by: INTERNAL MEDICINE

## 2018-05-03 PROCEDURE — F07M6ZZ THERAPEUTIC EXERCISE TREATMENT OF MUSCULOSKELETAL SYSTEM - WHOLE BODY: ICD-10-PCS | Performed by: INTERNAL MEDICINE

## 2018-05-03 RX ADMIN — INSULIN HUMAN SCH UNITS: 100 INJECTION, SOLUTION PARENTERAL at 06:33

## 2018-05-03 RX ADMIN — PURIFIED WATER PRN LOZ: 99.05 LIQUID OPHTHALMIC at 13:54

## 2018-05-03 RX ADMIN — OXYMETAZOLINE HYDROCHLORIDE SCH SPR: 0.05 SPRAY NASAL at 06:33

## 2018-05-03 RX ADMIN — INSULIN HUMAN SCH: 100 INJECTION, SOLUTION PARENTERAL at 20:33

## 2018-05-03 RX ADMIN — MESALAMINE SCH: 500 CAPSULE ORAL at 14:44

## 2018-05-03 RX ADMIN — OXYMETAZOLINE HYDROCHLORIDE SCH SPR: 0.05 SPRAY NASAL at 20:25

## 2018-05-03 RX ADMIN — OXYCODONE HYDROCHLORIDE AND ACETAMINOPHEN PRN TAB: 5; 325 TABLET ORAL at 12:55

## 2018-05-03 RX ADMIN — POLYETHYLENE GLYCOL 3350 SCH GM: 17 POWDER, FOR SOLUTION ORAL at 20:22

## 2018-05-03 RX ADMIN — PANTOPRAZOLE SODIUM SCH MG: 40 TABLET, DELAYED RELEASE ORAL at 05:23

## 2018-05-03 RX ADMIN — ACETYLCYSTEINE SCH ML: 200 INHALANT RESPIRATORY (INHALATION) at 13:15

## 2018-05-03 RX ADMIN — MESALAMINE SCH MG: 500 CAPSULE ORAL at 12:19

## 2018-05-03 RX ADMIN — Medication SCH TAB: at 09:05

## 2018-05-03 RX ADMIN — IPRATROPIUM BROMIDE AND ALBUTEROL SULFATE SCH ML: .5; 3 SOLUTION RESPIRATORY (INHALATION) at 07:17

## 2018-05-03 RX ADMIN — OXYCODONE HYDROCHLORIDE AND ACETAMINOPHEN PRN TAB: 5; 325 TABLET ORAL at 08:57

## 2018-05-03 RX ADMIN — POLYETHYLENE GLYCOL 3350 SCH: 17 POWDER, FOR SOLUTION ORAL at 09:06

## 2018-05-03 RX ADMIN — IPRATROPIUM BROMIDE AND ALBUTEROL SULFATE SCH: .5; 3 SOLUTION RESPIRATORY (INHALATION) at 04:00

## 2018-05-03 RX ADMIN — BUDESONIDE SCH MG: 0.5 SUSPENSION RESPIRATORY (INHALATION) at 07:18

## 2018-05-03 RX ADMIN — OXYCODONE HYDROCHLORIDE AND ACETAMINOPHEN PRN TAB: 5; 325 TABLET ORAL at 20:36

## 2018-05-03 RX ADMIN — MESALAMINE SCH MG: 500 CAPSULE ORAL at 20:23

## 2018-05-03 RX ADMIN — ACETYLCYSTEINE SCH: 200 INHALANT RESPIRATORY (INHALATION) at 20:37

## 2018-05-03 RX ADMIN — INSULIN HUMAN SCH UNITS: 100 INJECTION, SOLUTION PARENTERAL at 12:18

## 2018-05-03 RX ADMIN — PURIFIED WATER PRN LOZ: 99.05 LIQUID OPHTHALMIC at 09:07

## 2018-05-03 RX ADMIN — IPRATROPIUM BROMIDE AND ALBUTEROL SULFATE SCH: .5; 3 SOLUTION RESPIRATORY (INHALATION) at 20:37

## 2018-05-03 RX ADMIN — INSULIN HUMAN SCH: 100 INJECTION, SOLUTION PARENTERAL at 22:40

## 2018-05-03 RX ADMIN — IPRATROPIUM BROMIDE AND ALBUTEROL SULFATE SCH ML: .5; 3 SOLUTION RESPIRATORY (INHALATION) at 13:15

## 2018-05-03 RX ADMIN — MESALAMINE SCH MG: 500 CAPSULE ORAL at 22:38
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
## 2018-05-03 NOTE — CP.PCM.PCO
Physician Communication Note





- Physician Communication Note


Physician Communication Note: 67M s/p LGIB resolved, AVF as outpatient, pt now 

in TCU
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
## 2018-05-03 NOTE — DS
FINAL PROGRESS NOTE AND DISCHARGE SUMMARY



HISTORY OF PRESENT ILLNESS:  The patient was accepted to Transitional Care

Unit after the patient's insurance authorized the TCU admission approval

according to the  and the case management notes.  The patient

was seen in room 372, bed 2.  The patient was seen lying in the bed.  T max

is 98.4.  The patient's telemetry shows atrial fibrillation.  Overnight

nurse's notes were reviewed.  The patient was found to be alert, awake,

oriented x3.  The patient required intermittent Percocet for pain.



PHYSICAL EXAMINATION:

VITAL SIGNS:  T-max afebrile.  Heart rate 91, 92, 96; blood pressure

158/83, 158/85, 165/88, 163/81; respirations 20; O2 sat 98%.

HEENT:  Head:  Normocephalic, atraumatic.  Pinkish conjunctivae.  Anicteric

sclerae.  No oropharyngeal lesion.  No neck rigidity.

CHEST:  Kyphosis.

LUNGS:  Show decreased breath sounds at the bases, left more than the

right.

CARDIOVASCULAR:  Shows S1 and S2, regular rhythm.  Positive systolic murmur

in left sternal border, right second intercostal space, left second

intercostal space.

ABDOMEN:  Protuberant, obese.  Positive bowel sounds.

GENITALIA:  Male.

RECTAL:  Deferred.

EXTREMITY:  Shows 2+ pitting edema of the lower extremity.

MUSCULOSKELETAL:  Shows a body mass index of 41.

NEUROLOGIC:  The patient is alert, awake, and oriented x3.  Cranial nerves

II through XII intact.  Gait examination is not tested.  Vascular

examination could not be tested.  Motor strength is 5/5 in upper and lower

extremities.



Chest x-ray 05/01, PA and lateral film shows cardiomegaly.  Right upper

chest dialysis catheter noted.  The patient is seen by Nephrology,

Cardiology, Infectious Disease, and their recommendations noted.



FINAL IMPRESSION, PLAN, AND DISCHARGE DIAGNOSES:

1.  Severe gait dysfunction, deconditioning.

2.  Oliguric acute renal failure and acute kidney injury secondary to

ischemic tubular necrosis and hypoperfusion.

3.  Status post severely symptomatic bradycardia and hypotension.

4.  Hypertension.

5.  Atrial fibrillation.

6.  Persistent leukocytosis with granulocytosis.

7.  Bandemia.

8.  Microcytic anemia.

9.  Coagulopathy (resolved).

10.  Increased anion gap metabolic acidosis and lactic acidosis.

11.  Hyponatremia.

12.  Hyperglycemia probably steroid-induced.

13.  Hyperphosphatemia.

14.  Transaminitis.

15.  Status post shock liver.

16.  Secondary hyperparathyroidism with elevated PTH of 237.

17.  Proteinuria.

18.  Gastrointestinal bleeding with fecal occult blood positive.

19.  Hepatitis C antibody reactive with negative hepatitis C RNA PCR

quantitative, qualitative.

20.  Status post packed red blood cell transfusion x3 and fresh frozen

plasma transfusion x6.

21.  Status post right upper chest dialysis catheter placement.

22.  Right bundle-branch block.

23.  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

24.  Coronary artery disease.

25.  Bilateral lower extremity venous stasis.

26.  Severe sepsis.

27.  History of Crohn disease.

28.  History of depression and insomnia.

29.  History of chronic narcotic-dependent pain syndrome.

30.  End-stage renal disease, hemodialysis requiring three times a week.

31.  Morbid obesity with elevated body mass index of 41.

32.  Volume and fluid overload.

33.  Acute liver injury versus shock liver and hepatic passive congestion.

34.  Mild pharyngeal dysphagia.

35.  Glottic and pharyngeal edema (resolving).

36.  Resolving retropharyngeal and supraglottic soft tissue edema and

swelling with persistent asymmetric swelling of the supraglottic larynx on

the left.

37.  Atrial fibrillation with slow ventricle response.

38.  Acute renal failure.

39.  Constipation.

40.  Oral thrush.



Plan at this time, the patient was finally accepted.  As per the social

services and case management note, the patient was finally accepted to TCU.

After the patient's insurance authorized the patient's admission to TCU,

the patient is to be discharged to TCU with following continuation of the

medications;

1.  Afrin nasal spray 0.05% to each nostril twice a day.

2.  Ambien 5 mg at bedtime p.r.n.

3.  Cardizem 90 mg four times a day.

4.  Cepacol lozenges every 2 hours p.r.n.

5.  DuoNeb nebulizer every 4 hours.

6.  Folic acid 1 mg daily.

7.  Regular insulin medium dose sliding scale coverage before meals and at

bedtime.

8.  Lexapro 5 mg daily.

9.  Lidoderm 5% patch to the affected area daily.

10.  MiraLax 17 g twice a day.

11.  Mycelex troches 10 mg five times a day.

12.  Nephro-Javier 1 capsule daily.

13.  Pentasa 1000 mg four times a day.

14.  Percocet 5/325 one tablet every 6 hours p.r.n.

15.  Protonix 40 mg p.o. daily.

16.  Pulmicort nebulizer 0.5 mg every 12 hours.

17.  Renagel 2400 mg three times a day.



The patient is to be discharged to TCU under Dr. Pratt's service.



The patient will be discharged to TCU.



Time spent in the entire discharge process more than 45 minutes.



Dictated and electronically signed, not read.







__________________________________________

Kody Pratt MD



DD:  05/02/2018 21:12:03

DT:  05/03/2018 13:51:27

Job # [id number]

## 2018-05-03 NOTE — HP
DATE OF EXAM:  05/03/2018



HISTORY OF PRESENT ILLNESS:  The patient is now admitted to Transitional

Care Unit in room 313, bed 1.  The patient is seen in room 313, bed 1. 

Overnight nurse's notes were reviewed.  The patient refused BiPAP and CPAP.

No overnight adverse events were documented.  The patient is alert, awake,

responsive.  Does not offer any specific complaint.



CODE STATUS:  Full code.



Living will advance directive none.



ALLERGIES:  AS PER THE [employer] PROFILE.



MEDICATIONS:  As per the MAR of today, which is reviewed.



PAST MEDICAL AND SURGICAL HISTORY:  As per previous hospitalization from

04/20/2018 to 05/02/2018.  Please refer to the history and physical

examination and discharge summary from the hospitalization of 04/20/2018 to

05/02/2018.



The patient is seen in room 313, bed 1.



PHYSICAL EXAMINATION:

VITAL SIGNS:  T-max, the patient is afebrile, heart rate 81, 89, 94, 96;

blood pressure 140/78, 150/80, 138/90; respiration 18-20, O2 sat is 95%,

96%, 98% on room air and 2 liters nasal cannula.

HEENT:  Head:  Normocephalic, atraumatic.  HEENT examination shows pinkish

pale conjunctivae.  Anicteric sclerae.  No oropharyngeal lesion.  No neck

rigidity.  Positive right upper chest dialysis catheter noted.

LUNGS:  Shows decreased breath sound at the bases, left more than the

right.

CARDIOVASCULAR:  S1 and S2, irregular rhythm.  Positive systolic murmur,

left sternal border, left second intercostal space.

ABDOMEN:  Obese, protuberant.  Positive bowel sounds.  No

hepatosplenomegaly appreciable.  No guarding.  No rigidity.  No rebound

tenderness.

GENITALIA:  Male.

RECTAL:  Deferred.

EXTREMITIES:  Shows pitting edema of the lower extremity.  The patient is

refusing to have BETH stockings and SCDs.

MUSCULOSKELETAL:  Shows elevated body mass index.  Gait examination is not

tested.

VASCULAR:  Could not be palpable pulses.

NEUROLOGIC:  The patient is alert, awake, oriented x3.  Cranial nerves

II-XII limited, but intact.

PSYCHIATRIC:  Positive for history of depression, insomnia and questionable

anxiety.



DIAGNOSTICS:  Abnormal lab data from May 3rd, sodium is down to 129, BUN

82, creatinine 6.8, WBC 27,000, hemoglobin and hematocrit is 10.5 and 33.1,

platelet 273,000.



IMPRESSION:

1.  Deconditioning.

2.  Gait dysfunction.

3.  Acute renal failure hemodialysis requiring versus end-stage renal

disease, hemodialysis dependent three times a week via the right upper

chest dialysis catheter.

4.  Status post severely symptomatic hypotension and bradycardia.

5.  Leukocytosis with granulocytosis and bandemia.

6.  Anemia.

7.  Acute renal failure.

8.  Status post non-hemolyzed hyperkalemia.

9.  Severe transaminitis versus acute liver failure (resolving versus shock

liver).

10.  Acute renal failure secondary to ischemic acute tubular necrosis

secondary to hypotension, hypoperfusion and bradycardia.

11.  Shock liver.

12.  Severe transaminitis.

13.  Coagulopathy.

14.  Pharyngeal and glottic edema and swelling (resolving).

15.  Questionable oropharyngeal dysphagia (resolving).

16.  Morbid obesity with elevated body mass index.

17.  Hyponatremia.

18.  Normocytic anemia.

19.  History of coronary artery disease, coronary angioplasty.

20.  Atrial fibrillation.

21.  Pulmonary hypertension.

22.  Gastrointestinal bleeding with fecal occult blood positive.

23.  Secondary hyperparathyroidism secondary to renal failure.

24.  Noninsulin-requiring diabetes mellitus.

25.  Hyperlipidemia.

26.  Chronic narcotic-dependent pain syndrome.

27.  Insomnia.

28.  Depression.

29.  Bilateral lower extremity venous stasis.

30.  Volume overload and fluid overload.

31.  Morbid obesity.

32.  Cervical and lumbar spine degenerative disc disease and narcotic

dependent pain syndrome.

33.  Questionable diabetic neuropathy.

34.  History of Crohn disease.

35.  Severe sepsis (resolved).



PLAN AT THIS TIME:  The patient is to be continued with consultation with

Nephrology for continuation of hemodialysis three times a week via the

right upper chest dialysis catheter.  The patient was seen by Surgery. 

Their recommendation is elective outpatient AV fistula placement.  The

patient is to be continued on the medication as per the MAR, which are

reviewed.  The patient will have diagnostic lab done on dialysis days.  The

patient has been advised to comply with physical therapy, occupational

therapy, ambulation therapy, gait training.  The patient was advised to

comply with all medications and therapeutic intervention, which are

ordered.  The patient was advised out of bed to recliner.  The patient was

advised elevation of the legs lying and sitting.  The patient was advised

SCDs and BETH stockings.  The patient was advised increased activity.  The

patient was again reinforced and advised weight loss.



The patient will continue on the Transitional Care Unit with continuation

of physical therapy, occupational therapy, ambulation therapy, gait

training, etc.  The patient will be continued on TCU till approved number

of TCU days by the patient's insurance.



Dictated and electronically signed, not read.





__________________________________________

Kody Pratt MD





DD:  05/03/2018 9:44:17

DT:  05/03/2018 9:51:51

Job # [id number]

## 2018-05-04 RX ADMIN — OXYCODONE HYDROCHLORIDE AND ACETAMINOPHEN PRN TAB: 5; 325 TABLET ORAL at 05:40

## 2018-05-04 RX ADMIN — MESALAMINE SCH MG: 500 CAPSULE ORAL at 21:32

## 2018-05-04 RX ADMIN — IPRATROPIUM BROMIDE AND ALBUTEROL SULFATE SCH ML: .5; 3 SOLUTION RESPIRATORY (INHALATION) at 13:19

## 2018-05-04 RX ADMIN — Medication SCH TAB: at 09:25

## 2018-05-04 RX ADMIN — ACETYLCYSTEINE SCH: 200 INHALANT RESPIRATORY (INHALATION) at 01:19

## 2018-05-04 RX ADMIN — INSULIN HUMAN SCH UNITS: 100 INJECTION, SOLUTION PARENTERAL at 06:41

## 2018-05-04 RX ADMIN — MESALAMINE SCH MG: 500 CAPSULE ORAL at 13:47

## 2018-05-04 RX ADMIN — OXYCODONE HYDROCHLORIDE AND ACETAMINOPHEN PRN TAB: 5; 325 TABLET ORAL at 20:45

## 2018-05-04 RX ADMIN — INSULIN HUMAN SCH UNITS: 100 INJECTION, SOLUTION PARENTERAL at 17:42

## 2018-05-04 RX ADMIN — MESALAMINE SCH MG: 500 CAPSULE ORAL at 17:37

## 2018-05-04 RX ADMIN — INSULIN HUMAN SCH: 100 INJECTION, SOLUTION PARENTERAL at 22:26

## 2018-05-04 RX ADMIN — INSULIN HUMAN SCH UNITS: 100 INJECTION, SOLUTION PARENTERAL at 12:30

## 2018-05-04 RX ADMIN — POLYETHYLENE GLYCOL 3350 SCH GM: 17 POWDER, FOR SOLUTION ORAL at 17:37

## 2018-05-04 RX ADMIN — ACETYLCYSTEINE SCH ML: 200 INHALANT RESPIRATORY (INHALATION) at 07:08

## 2018-05-04 RX ADMIN — OXYMETAZOLINE HYDROCHLORIDE SCH SPR: 0.05 SPRAY NASAL at 06:38

## 2018-05-04 RX ADMIN — MESALAMINE SCH MG: 500 CAPSULE ORAL at 09:26

## 2018-05-04 RX ADMIN — ACETYLCYSTEINE SCH ML: 200 INHALANT RESPIRATORY (INHALATION) at 13:19

## 2018-05-04 RX ADMIN — IPRATROPIUM BROMIDE AND ALBUTEROL SULFATE SCH ML: .5; 3 SOLUTION RESPIRATORY (INHALATION) at 07:08

## 2018-05-04 RX ADMIN — ACETYLCYSTEINE SCH ML: 200 INHALANT RESPIRATORY (INHALATION) at 21:45

## 2018-05-04 RX ADMIN — IPRATROPIUM BROMIDE AND ALBUTEROL SULFATE SCH: .5; 3 SOLUTION RESPIRATORY (INHALATION) at 01:20

## 2018-05-04 RX ADMIN — OXYCODONE HYDROCHLORIDE AND ACETAMINOPHEN PRN TAB: 5; 325 TABLET ORAL at 11:10

## 2018-05-04 RX ADMIN — IPRATROPIUM BROMIDE AND ALBUTEROL SULFATE SCH ML: .5; 3 SOLUTION RESPIRATORY (INHALATION) at 21:45

## 2018-05-04 RX ADMIN — POLYETHYLENE GLYCOL 3350 SCH GM: 17 POWDER, FOR SOLUTION ORAL at 09:24

## 2018-05-04 RX ADMIN — OXYMETAZOLINE HYDROCHLORIDE SCH SPR: 0.05 SPRAY NASAL at 21:31

## 2018-05-04 RX ADMIN — BUDESONIDE SCH MG: 0.5 SUSPENSION RESPIRATORY (INHALATION) at 07:08

## 2018-05-04 RX ADMIN — PANTOPRAZOLE SODIUM SCH MG: 40 TABLET, DELAYED RELEASE ORAL at 05:35

## 2018-05-04 NOTE — PN
DATE:  05/04/2018



SUBJECTIVE:  The patient is seen in room 313, bed 1.  The patient is out of

bed to recliner, alert, awake, responsive.  The patient is working on his

computer, laptop.



PHYSICAL EXAMINATION

GENERAL:  The patient is alert, awake, responsive.

VITAL SIGNS:  T-max 97.3.  Heart rate 70, 93, 78.  Blood pressure 184/90,

145/76, 168/80, 153/92.  O2 sat is 96%.  Respiration is 20.

HEAD:  Normocephalic, atraumatic.

EENT:  Shows pinkish conjunctivae.  Anicteric sclerae.  No oropharyngeal

lesion.  No neck rigidity.

CHEST:  Symmetrical.  Positive kyphosis.

LUNGS:  Shows decreased breath sound at the bases.

CARDIOVASCULAR:  S1, S2, irregular rhythm.

ABDOMEN:  Protuberant, obese.  Positive bowel sounds.

GENITALIA:  Male.

RECTAL:  Deferred.

EXTREMITY:  Shows 2-3+ pitting edema and dense edema of the lower

extremity.  The patient does not have BETH stockings and SCDs.

MUSCULOSKELETAL:  Shows a body mass index of 39.6.

NEUROLOGIC:  The patient is alert, awake, responsive.  He is able to move

upper and lower extremity without assistance.  Gait examination not tested.

BMI is still 40.



DIAGNOSTICS:  On 05/03, WBC 27.1, hemoglobin/hematocrit 10.5/33.1, platelet

273.  Fingerstick blood sugar 163, 210, 141, 160, 154, 152.  Sodium 129,

potassium 4.4, chloride 94, CO2 of 23, anion gap 17, BUN 82, creatinine

6.8, GFR 8, glucose 154.  LFTs are normal.  .



IMPRESSION AND PLAN:

1.  Severe deconditioning.

2.  Gait dysfunction.

3.  Morbid obesity with elevated body mass index of 40.

4.  Fluid and volume overload.

5.  _____ acute renal failure, hemodialysis requiring three times a week.

6.  Ischemic acute tubular necrosis with hypoperfusion, hypotension and

severe bradycardia.

7.  Shock liver with acute liver injury.

8.  Lactic acidosis, increased anion gap metabolic acidosis.

9.  Coagulopathy.

10.  Chronic hepatitis C.

11.  Non-insulin requiring diabetes mellitus with hyperglycemia.

12.  Morbid obesity.

13.  Bilateral lower extremity venous stasis.

14.  Hypertension.

15.  Atrial fibrillation with slow and rapid ventricular response.

16.  Leukocytosis.

17.  Microcytic anemia.

18.  Hyponatremia.

19.  End-stage renal disease, hemodialysis dependent.

20.  Bilateral lower extremity venous stasis.

21.  Right upper chest dialysis catheter placement.

22.  History of insomnia.

23.  Depression.

24.  Chronic narcotic-dependent pain syndrome.

25.  Cervical and lumbar spine degenerative joint disease.

26.  Oral thrush.

27.  History of Crohn disease.

28.  Secondary hyperparathyroidism.

29.  Mild pharyngeal dysphagia.

30.  Constipation.



Plan at this time, the patient is to be continued on TCU with daily PT,

occupational therapy, ambulation therapy, gait training.  The patient has

been ordered repeat labs on dialysis.  Current consultation; Nephrology and

Vascular Surgery.  Diabetic education ordered.  Current medications: 

Mucomyst nebulizer 20% 4 mL every 6 hours, Afrin nasal spray, Ambien 5 mg

at bedtime p.r.n., hydralazine 50 mg twice a day, Cardizem 90 mg four times

a day, Cepacol lozenges, DuoNeb nebulizer every 6 hours, folic acid 1 mg

daily, Humulin R medium dose sliding scale coverage, Lexapro 5 mg daily,

Lidoderm 5% patch to the affected area, MiraLax 17 g twice a day, Mycelex

Lana 10 mg five times a day, Nephro-Javier 1 tablet daily, Pentasa 1000 mg

four times a day, Percocet 5/325 one tablet every 6 hours p.r.n., Protonix

40 mg daily, Pulmicort nebulizer 0.5 mg every 12 hours, Renagel 2400 mg

three times a day.  The patient is on BiPAP, oxygen 2 liters.  Renal diet,

out of bed, BETH stockings, SCDs, physical therapy, occupational therapy

ordered.



Dictated and electronically signed, not read.





__________________________________________

Kody Pratt MD



DD:  05/04/2018 10:40:17

DT:  05/04/2018 10:48:08

Job # [id number]

## 2018-05-04 NOTE — CP.PCM.PN
Subjective





- Date & Time of Evaluation


Date of Evaluation: 05/04/18


Time of Evaluation: 15:38





- Subjective


Subjective: 





Nephrology Consultation Note





Assessment: stable


oligoanuric Acute Kidney Injury (N17.9) likely due to ATN, hypoperfusion now 

with fluid overload and DIALYSIS dependent since 04/24/18


shock, acute liver injury, lactic acidosis, coagulopathy: RESOLVED


hx of chronic Hep C


Anemia, Hyperphosphatemia (E83.39), HTN (I12.9), DM, morbid obesity


CHF, CAD s/p sent, crohn's disease





Plan


HD tomorrow as TTS schedule. continue with Nephrovite 1 tab/day. UF goal 4-4.5 

Kgs as tolerated by BP.


continue to monitor for spontaneous renal recovery. no evidence of renal 

recovery at present. 


HTN control with meds as ordered. No ACEI/ARB due to recent BHARGAVI. started 

hydralazine


Monitor Input/Output, daily weights and renal function with basic metabolic 

panel


A fib rate control as per primary team.


continue phos binders as renagel 2400 mg TID with meals


AV access placement NOT indicated at this time as pt is expected to have some 

renal recovery in near future. 


pt advised to limit oral fluid intake 1000 mL/day. renal/dialysis diet ordered.


will plan for therapy with JUMANA if Hb <10. check TSAT/ferritin/Vit D level. last 

PTH at goal 237.





Dose meds/antibiotics for reduced GFR/dialysis status. Avoid fleets enema/

magnesium based laxatives. Avoid nephrotoxins/NSAIDs/ iodinated contrast (

unless needed emergently)


Glycemic control


Further work up for as per primary team





Thanks for allowing me to participate in care of your patient. Will follow 

patient with you. Please call if any Qs. d/w team


Dr Elver Florez


Office: [phone] Cell: [phone] Fax: [phone]





CC: leg swelling


reason for consult: BHARGAVI and dialysis 


HPI: pt is a 67 M with hx of morbid obesity, DM, HTN, CAD s/p stent, CHF, crohn'

s disease, chronic Hep C + initially admitted to hospital 04/20/18 with shock 

and liver/kidney injury with coagulopathy, required dialysis initiation since 4/ 24/18 and then d/c to TCU. renal consult for BHARGAVI management, 


he feels same, c/o difficulty swallowing and lack of sleep


denies SOB. not much urine output


has leg swelling





ROS: Denies chest pain, palpitation, shortness of breath, 


All other negative except as mentioned in HPI such as not much urine output. 

has leg swelling





Physical Examination: 


General Appearance: Comfortable, in no acute respiratory distress, co-operative 

.better appearing, obese


Vitals reviewed and noted as below


Head; Atraumatic, normocephalic


ENT: no ulcers no thrush. Tongue is midline. Oropharynx: no rash or ulcers.


EYES: Pupils are equal, round and reactive to light accommodation. Eye muscles 

and extraocular movement intact. Sclera is anicteric.


Neck; supple no lymphadenopathy, no thyromegaly or bruit


Lungs: Normal respiratory rate/effort. Breath sounds bilateral decreased at 

bases with few crackles


Heart: normal rate. s1s2 normal. No rub or gallop. 


Extremities: 2-3+ edema. No varicose veins


Neurological: Patient is awake oriented to person, place and time. No focal 

deficit. Strength bilateral appropriate and equal


Skin: Warm and dry. Normal turgor. No rash. Palpitation: Normal elasticity for 

age


Abdomen: Abdomen is soft. Bowel sounds +. There is no abdominal tenderness, no 

guarding/rigidity no organomegaly


Psych: normal insight and normal affect/mood


MSK: no joint tenderness or swelling. Digits and nails normal, no deformity


: kidney or bladder not palpable.


access: Rt IJ tunnelled catheter





Labs/imaging reviewed.


Past medical history, past surgical history, family history, social history, 

allergy reviewed and noted as below


Family hx: no hx of CKD. Rest non-contributory 





workup: normal LVEF echo 2017 but cardiac cath 2018: LVEF 45%


UA 30 protein


Hep C +


renal imaging unremarkable








Objective





- Vital Signs/Intake and Output


Vital Signs (last 24 hours): 


 











Temp Pulse Resp BP Pulse Ox


 


 97.3 F L  93 H  20   144/75   96 


 


 05/03/18 12:28  05/03/18 22:40  05/03/18 12:28  05/04/18 13:46  05/03/18 12:28








Intake and Output: 


 











 05/04/18 05/04/18





 06:59 18:59


 


Intake Total 240 


 


Output Total 300 


 


Balance -60 














- Medications


Medications: 


 Current Medications





Acetylcysteine (Acetylcysteine 20%)  4 ml IH [id number] [facility]


   Last Admin: 05/04/18 13:19 Dose:  4 ml


Albuterol/Ipratropium (Duoneb 3 Mg/0.5 Mg (3 Ml) Ud)  3 ml IH Q6 [facility]


   PRN Reason: Protocol


   Last Admin: 05/04/18 13:19 Dose:  3 ml


Benzocaine/Menthol (Cepacol Sore Throat)  1 hi MT Q2H PRN; Protocol


   PRN Reason: Sore Throat


   Last Admin: 05/03/18 13:54 Dose:  1 hi


Budesonide (Pulmicort Respules)  0.5 mg IH [id number] [facility]


   PRN Reason: Protocol


   Last Admin: 05/04/18 07:08 Dose:  0.5 mg


Clotrimazole (Mycelex Lana)  10 mg MT 5XD [facility]


   PRN Reason: Protocol


   Last Admin: 05/04/18 13:46 Dose:  10 mg


Diltiazem HCl (Cardizem)  90 mg PO QID [facility]


   PRN Reason: Protocol


   Last Admin: 05/04/18 13:46 Dose:  90 mg


Escitalopram Oxalate (Lexapro)  5 mg PO DAILY [facility]


   PRN Reason: Protocol


   Last Admin: 05/04/18 09:25 Dose:  5 mg


Folic Acid (Folic Acid)  1 mg PO DAILY [facility]


   PRN Reason: Protocol


   Last Admin: 05/04/18 09:25 Dose:  1 mg


Hydralazine HCl (Apresoline)  50 mg PO BID [facility]


   Last Admin: 05/04/18 11:10 Dose:  50 mg


Insulin Human Regular (Humulin R Med)  0 units SC ACHS [facility]


   PRN Reason: Protocol


   Last Admin: 05/04/18 12:30 Dose:  1 units


Lidocaine (Lidoderm)  1 ea TD DAILY [facility]


   PRN Reason: Protocol


   Last Admin: 05/04/18 09:24 Dose:  1 ea


Mesalamine (Pentasa)  1,000 mg PO QID [facility]


   PRN Reason: Protocol


   Last Admin: 05/04/18 13:47 Dose:  1,000 mg


Oxycodone/Acetaminophen (Percocet 5/325 Mg Tab)  1 tab PO Q6H PRN; Protocol


   PRN Reason: Pain, severe (8-10)


   Stop: 05/05/18 18:52


   Last Admin: 05/04/18 11:10 Dose:  1 tab


Oxymetazoline HCl (Afrin 0.05%)  0 ml NS Q12H [facility]


   PRN Reason: Protocol


   Last Admin: 05/04/18 06:38 Dose:  1 spr


Pantoprazole Sodium (Protonix Ec Tab)  40 mg PO 0600 [facility]


   PRN Reason: Protocol


   Last Admin: 05/04/18 05:35 Dose:  40 mg


Polyethylene Glycol (Miralax)  17 gm PO BID [facility]


   PRN Reason: Protocol


   Last Admin: 05/04/18 09:24 Dose:  17 gm


Sevelamer HCl (Renagel)  2,400 mg PO TID [facility]


   PRN Reason: Protocol


   Last Admin: 05/04/18 13:47 Dose:  2,400 mg


Vitamin B Complex/Vit C/Folic Acid (Nephro-Javier)  1 tab PO 0800 [facility]


   PRN Reason: Protocol


   Last Admin: 05/04/18 09:25 Dose:  1 tab


Zolpidem Tartrate (Ambien)  5 mg PO HS PRN; Protocol


   PRN Reason: Insomnia


   Last Admin: 05/03/18 22:45 Dose:  5 mg











- Labs


Labs: 


 





 05/03/18 06:30 





 05/03/18 06:30

## 2018-05-05 LAB
% IRON SATURATION: 28 % (ref 20–55)
ALBUMIN SERPL-MCNC: 3.7 G/DL (ref 3–4.8)
ALBUMIN/GLOB SERPL: 1.2 {RATIO} (ref 1.1–1.8)
ALT SERPL-CCNC: 178 U/L (ref 7–56)
AST SERPL-CCNC: 37 U/L (ref 17–59)
BASOPHILS # BLD AUTO: 0.01 K/MM3 (ref 0–2)
BASOPHILS NFR BLD: 0 % (ref 0–3)
BUN SERPL-MCNC: 73 MG/DL (ref 7–21)
CALCIUM SERPL-MCNC: 8.7 MG/DL (ref 8.4–10.5)
EOSINOPHIL # BLD: 0.3 10*3/UL (ref 0–0.7)
EOSINOPHIL NFR BLD: 1.5 % (ref 1.5–5)
ERYTHROCYTE [DISTWIDTH] IN BLOOD BY AUTOMATED COUNT: 27.2 % (ref 11.5–14.5)
FERRITIN SERPL-MCNC: 203 NG/ML
GFR NON-AFRICAN AMERICAN: 8
GRANULOCYTES # BLD: 17.66 10*3/UL (ref 1.4–6.5)
GRANULOCYTES NFR BLD: 87.1 % (ref 50–68)
HGB BLD-MCNC: 10.7 G/DL (ref 14–18)
IRON SERPL-MCNC: 95 UG/DL (ref 45–180)
LYMPHOCYTES # BLD: 1.4 10*3/UL (ref 1.2–3.4)
LYMPHOCYTES NFR BLD AUTO: 6.7 % (ref 22–35)
MCH RBC QN AUTO: 22.7 PG (ref 25–35)
MCHC RBC AUTO-ENTMCNC: 31.8 G/DL (ref 31–37)
MCV RBC AUTO: 71.2 FL (ref 80–105)
MONOCYTES # BLD AUTO: 1 10*3/UL (ref 0.1–0.6)
MONOCYTES NFR BLD: 4.7 % (ref 1–6)
PLATELET # BLD: 216 10^3/UL (ref 120–450)
RBC # BLD AUTO: 4.72 10^6/UL (ref 3.5–6.1)
TIBC SERPL-MCNC: 344 UG/DL (ref 261–462)
WBC # BLD AUTO: 20.3 10^3/UL (ref 4.5–11)

## 2018-05-05 PROCEDURE — 5A1D70Z PERFORMANCE OF URINARY FILTRATION, INTERMITTENT, LESS THAN 6 HOURS PER DAY: ICD-10-PCS

## 2018-05-05 RX ADMIN — PANTOPRAZOLE SODIUM SCH MG: 40 TABLET, DELAYED RELEASE ORAL at 05:25

## 2018-05-05 RX ADMIN — IPRATROPIUM BROMIDE AND ALBUTEROL SULFATE SCH: .5; 3 SOLUTION RESPIRATORY (INHALATION) at 20:34

## 2018-05-05 RX ADMIN — Medication SCH TAB: at 09:20

## 2018-05-05 RX ADMIN — INSULIN HUMAN SCH: 100 INJECTION, SOLUTION PARENTERAL at 17:12

## 2018-05-05 RX ADMIN — MESALAMINE SCH: 500 CAPSULE ORAL at 14:13

## 2018-05-05 RX ADMIN — IPRATROPIUM BROMIDE AND ALBUTEROL SULFATE SCH: .5; 3 SOLUTION RESPIRATORY (INHALATION) at 07:15

## 2018-05-05 RX ADMIN — OXYMETAZOLINE HYDROCHLORIDE SCH SPR: 0.05 SPRAY NASAL at 19:06

## 2018-05-05 RX ADMIN — ACETYLCYSTEINE SCH: 200 INHALANT RESPIRATORY (INHALATION) at 20:34

## 2018-05-05 RX ADMIN — BUDESONIDE SCH: 0.5 SUSPENSION RESPIRATORY (INHALATION) at 20:34

## 2018-05-05 RX ADMIN — MESALAMINE SCH MG: 500 CAPSULE ORAL at 22:26

## 2018-05-05 RX ADMIN — INSULIN HUMAN SCH: 100 INJECTION, SOLUTION PARENTERAL at 11:35

## 2018-05-05 RX ADMIN — MESALAMINE SCH MG: 500 CAPSULE ORAL at 09:19

## 2018-05-05 RX ADMIN — POLYETHYLENE GLYCOL 3350 SCH GM: 17 POWDER, FOR SOLUTION ORAL at 17:19

## 2018-05-05 RX ADMIN — ACETYLCYSTEINE SCH: 200 INHALANT RESPIRATORY (INHALATION) at 07:14

## 2018-05-05 RX ADMIN — IPRATROPIUM BROMIDE AND ALBUTEROL SULFATE SCH: .5; 3 SOLUTION RESPIRATORY (INHALATION) at 13:31

## 2018-05-05 RX ADMIN — IPRATROPIUM BROMIDE AND ALBUTEROL SULFATE SCH: .5; 3 SOLUTION RESPIRATORY (INHALATION) at 03:16

## 2018-05-05 RX ADMIN — MESALAMINE SCH MG: 500 CAPSULE ORAL at 17:20

## 2018-05-05 RX ADMIN — INSULIN HUMAN SCH: 100 INJECTION, SOLUTION PARENTERAL at 07:08

## 2018-05-05 RX ADMIN — OXYCODONE HYDROCHLORIDE AND ACETAMINOPHEN PRN TAB: 5; 325 TABLET ORAL at 05:29

## 2018-05-05 RX ADMIN — OXYMETAZOLINE HYDROCHLORIDE SCH SPR: 0.05 SPRAY NASAL at 08:19

## 2018-05-05 RX ADMIN — ACETYLCYSTEINE SCH: 200 INHALANT RESPIRATORY (INHALATION) at 03:16

## 2018-05-05 RX ADMIN — ACETYLCYSTEINE SCH: 200 INHALANT RESPIRATORY (INHALATION) at 13:32

## 2018-05-05 RX ADMIN — INSULIN HUMAN SCH: 100 INJECTION, SOLUTION PARENTERAL at 22:27

## 2018-05-05 RX ADMIN — BUDESONIDE SCH: 0.5 SUSPENSION RESPIRATORY (INHALATION) at 07:15

## 2018-05-05 RX ADMIN — POLYETHYLENE GLYCOL 3350 SCH: 17 POWDER, FOR SOLUTION ORAL at 09:21

## 2018-05-05 NOTE — PN
DATE:  05/05/2018



SUBJECTIVE:  The patient is seen sitting up in the bed today.  The patient

is alert, awake, responsive.  The patient has finished breakfast today. 

The patient's overnight nurse's notes were reviewed.



REVIEW OF SYSTEMS:  The patient's 13 system review was only positive for

mild intermittent neck and back pain.  The patient denies any chest pain or

shortness of breath.  Denies any diarrhea or constipation.  Denies any

hemoptysis, hematemesis, or melena.  Denies any hematochezia.



PHYSICAL EXAMINATION:

VITAL SIGNS:  Noted.  Patient is afebrile.  T-max is 98.4; pulse rate 83,

94, 96, atrial fibrillation, irregular; blood pressure 148/74, 150/89,

138/86; respirations 18 to 20; O2 sat mid to high 90s.

HEENT:  Head: Normocephalic, atraumatic.  Pinkish conjunctivae.  Anicteric

sclerae.  No oropharyngeal lesion.  No neck rigidity.

CHEST:  Kyphosis.

LUNGS:  Show no rales, crackles, or wheezing.

CARDIOVASCULAR:  S1 and S2, regular rhythm.  Questionable systolic murmur

in the left sternal border, right second intercostal space, left second

intercostal space.

ABDOMEN:  Obese, protuberant.  No hepatosplenomegaly palpable.

GENITALIA:  Male.

RECTAL:  Deferred.

EXTREMITIES:  Lower extremity shows positive 2 to 3+ pitting edema of the

lower extremity.

MUSCULOSKELETAL:  Shows elevated body mass index.

NEUROLOGIC:  The patient is alert, awake, and oriented x3.  Cranial nerves

II through XII intact.  Gait examination is not tested.

VASCULAR:  Could not be assessed.



DIAGNOSTICS:  Reviewed from today.  WBC count is down to 20,000, hemoglobin

greater than 10, hematocrit greater than 33.  Platelets are within normal

limit.  CMP and LFTs were reviewed.  Sodium has now corrected and gone up

to 133 to 135 from 129.  BUN and creatinine 70s and 6.7.  Potassium is

within normal limit.  Glucose is slightly elevated.  LFTs are within normal

limit today and improved significantly.



The patient is awaiting for dialysis today.



IMPRESSION AND PLAN:

1.  Severe deconditioning and gait dysfunction.

2.  Morbid obesity.

3.  Bilateral lower extremity venous stasis.

4.  Volume and fluid overload.

5.  Non-insulin-requiring diabetes mellitus.

6.  Chronic narcotic-dependent pain syndrome.

7.  Atrial fibrillation with slow and rapid ventricle response.

8.  History of coronary artery disease and angioplasty and stent placement.

9.  Congestive heart failure.

10.  Shock liver versus severe transaminitis.

11.  Coagulopathy.

12.  Severe sepsis.

13.  Hypertension.

14.  Secondary hyperparathyroidism of renal origin.

15.  Anemia.

16.  Status post multiple packed red blood cell transfusion and fresh

frozen plasma transfusion.

17.  End-stage renal disease, hemodialysis dependent, and acute renal

failure secondary to ischemic acute tubular necrosis secondary to

hypoperfusion, hypotension, and severe symptomatic bradycardia.

18.  Beta-blocker induced bradycardia.

19.  Status post severe symptomatic bradycardia and hypotension.



PLAN:  At this time, the patient will be continued on Transitional Care

Unit with continuation of the physical therapy, occupational therapy,

ambulation therapy, and gait training.  The patient's medications will be

continued as per the MAR.  The patient will continue on hemodialysis as per

the schedule.  Patient's volume overload and fluid overload discussed with

Nephrology.  Nephrology recommends continuation of the hemodialysis to

achieve closer to ideal body weight.



The patient updated about his condition, diagnosis, treatment plan,

management plan, all diagnostic data, and recommendation again reinforced

and re-explained to the patient at length and all questions concerned

answered.



Dictated and electronically signed, not read.







__________________________________________

Kody Pratt MD



DD:  05/05/2018 15:08:00

DT:  05/05/2018 15:15:21

Job # [id number]

## 2018-05-05 NOTE — CP.PCM.PN
Subjective





- Date & Time of Evaluation


Date of Evaluation: 05/05/18


Time of Evaluation: 15:44





- Subjective


Subjective: 





Nephrology Consultation Note





Assessment: stable


oligoanuric Acute Kidney Injury (N17.9) likely due to ATN, hypoperfusion now 

with fluid overload and DIALYSIS dependent since 04/24/18


shock, acute liver injury, lactic acidosis, coagulopathy: RESOLVED


hx of chronic Hep C


Anemia, Hyperphosphatemia (E83.39), HTN (I12.9), DM, morbid obesity


CHF, CAD s/p sent, crohn's disease





Plan


HD today as TTS schedule. continue with Nephrovite 1 tab/day. UF goal 4-4.5 Kgs 

as tolerated by BP.


continue to monitor for spontaneous renal recovery. no evidence of renal 

recovery at present. 


HTN control with meds as ordered. No ACEI/ARB due to recent BHARGAVI. started 

hydralazine


Monitor Input/Output, daily weights and renal function with basic metabolic 

panel


A fib rate control as per primary team.


continue phos binders as renagel 2400 mg TID with meals


AV access placement NOT indicated at this time as pt is expected to have some 

renal recovery in near future. 


pt advised to limit oral fluid intake 1000 mL/day. renal/dialysis diet ordered.


will plan for therapy with JUMANA if Hb <10. check TSAT/ferritin/Vit D level. last 

PTH at goal 237.


added lasix 80 mg from tomorrow 





Dose meds/antibiotics for reduced GFR/dialysis status. Avoid fleets enema/

magnesium based laxatives. Avoid nephrotoxins/NSAIDs/ iodinated contrast (

unless needed emergently)


Glycemic control


Further work up for as per primary team





Thanks for allowing me to participate in care of your patient. Will follow 

patient with you. Please call if any Qs. d/w team


Dr Elver Florez


Office: [phone] Cell: [phone] Fax: [phone]





CC: leg swelling


reason for consult: BHARGAVI and dialysis 


HPI: pt is a 67 M with hx of morbid obesity, DM, HTN, CAD s/p stent, CHF, crohn'

s disease, chronic Hep C + initially admitted to hospital 04/20/18 with shock 

and liver/kidney injury with coagulopathy, required dialysis initiation since 4/ 24/18 and then d/c to TCU. renal consult for BHARGAVI management, 


he feels same, c/o difficulty swallowing and lack of sleep


denies SOB. not much urine output


has leg swelling





ROS: Denies chest pain, palpitation, shortness of breath, 


All other negative except as mentioned in HPI. improved urine output. has leg 

swelling and backpain





Physical Examination: 


General Appearance: Comfortable, in no acute respiratory distress, co-operative 

.better appearing, obese


Vitals reviewed and noted as below


Head; Atraumatic, normocephalic


ENT: no ulcers no thrush. Tongue is midline. Oropharynx: no rash or ulcers.


EYES: Pupils are equal, round and reactive to light accommodation. Eye muscles 

and extraocular movement intact. Sclera is anicteric.


Neck; supple no lymphadenopathy, no thyromegaly or bruit


Lungs: Normal respiratory rate/effort. Breath sounds bilateral decreased at 

bases with few crackles


Heart: normal rate. s1s2 normal. No rub or gallop. 


Extremities: 2-3+ edema. No varicose veins


Neurological: Patient is awake oriented to person, place and time. No focal 

deficit. Strength bilateral appropriate and equal


Skin: Warm and dry. Normal turgor. No rash. Palpitation: Normal elasticity for 

age


Abdomen: Abdomen is soft. Bowel sounds +. There is no abdominal tenderness, no 

guarding/rigidity no organomegaly


Psych: normal insight and normal affect/mood


MSK: no joint tenderness or swelling. Digits and nails normal, no deformity


: kidney or bladder not palpable.


access: Rt IJ tunnelled catheter





Labs/imaging reviewed.


Past medical history, past surgical history, family history, social history, 

allergy reviewed and noted as below


Family hx: no hx of CKD. Rest non-contributory 





workup: normal LVEF echo 2017 but cardiac cath 2018: LVEF 45%


UA 30 protein


Hep C +


renal imaging unremarkable





Objective





- Vital Signs/Intake and Output


Vital Signs (last 24 hours): 


 











Temp Pulse Resp BP Pulse Ox


 


 98.4 F   90   20   159/90 H  94 L


 


 05/04/18 17:11  05/04/18 17:11  05/04/18 17:11  05/05/18 09:20  05/04/18 17:11








Intake and Output: 


 











 05/05/18 05/05/18





 06:59 18:59


 


Intake Total 420 


 


Output Total 500 


 


Balance -80 














- Medications


Medications: 


 Current Medications





Acetylcysteine (Acetylcysteine 20%)  4 ml IH [id number] [facility]


   Last Admin: 05/05/18 13:32 Dose:  Not Given


Albuterol/Ipratropium (Duoneb 3 Mg/0.5 Mg (3 Ml) Ud)  3 ml IH Q6 [facility]


   PRN Reason: Protocol


   Last Admin: 05/05/18 13:31 Dose:  Not Given


Benzocaine/Menthol (Cepacol Sore Throat)  1 hi MT Q2H PRN; Protocol


   PRN Reason: Sore Throat


   Last Admin: 05/03/18 13:54 Dose:  1 hi


Budesonide (Pulmicort Respules)  0.5 mg IH [id number] [facility]


   PRN Reason: Protocol


   Last Admin: 05/05/18 07:15 Dose:  Not Given


Clotrimazole (Mycelex Lana)  10 mg MT 5XD [facility]


   PRN Reason: Protocol


   Last Admin: 05/05/18 14:13 Dose:  Not Given


Diltiazem HCl (Cardizem)  90 mg PO QID [facility]


   PRN Reason: Protocol


   Last Admin: 05/05/18 14:12 Dose:  Not Given


Escitalopram Oxalate (Lexapro)  5 mg PO DAILY [facility]


   PRN Reason: Protocol


   Last Admin: 05/05/18 09:19 Dose:  5 mg


Folic Acid (Folic Acid)  1 mg PO DAILY [facility]


   PRN Reason: Protocol


   Last Admin: 05/05/18 09:20 Dose:  1 mg


Furosemide (Lasix)  80 mg PO DAILY [facility]


Hydralazine HCl (Apresoline)  50 mg PO BID [facility]


   Last Admin: 05/05/18 09:20 Dose:  50 mg


Insulin Human Regular (Humulin R Med)  0 units SC ACHS [facility]


   PRN Reason: Protocol


   Last Admin: 05/05/18 11:35 Dose:  Not Given


Lidocaine (Lidoderm)  1 ea TD DAILY [facility]


   PRN Reason: Protocol


   Last Admin: 05/05/18 09:19 Dose:  1 ea


Mesalamine (Pentasa)  1,000 mg PO QID [facility]


   PRN Reason: Protocol


   Last Admin: 05/05/18 14:13 Dose:  Not Given


Oxycodone/Acetaminophen (Percocet 5/325 Mg Tab)  1 tab PO Q6H PRN; Protocol


   PRN Reason: Pain, severe (8-10)


   Stop: 05/05/18 18:52


   Last Admin: 05/05/18 05:29 Dose:  1 tab


Oxymetazoline HCl (Afrin 0.05%)  0 ml NS Q12H [facility]


   PRN Reason: Protocol


   Last Admin: 05/05/18 08:19 Dose:  1 spr


Pantoprazole Sodium (Protonix Ec Tab)  40 mg PO 0600 [facility]


   PRN Reason: Protocol


   Last Admin: 05/05/18 05:25 Dose:  40 mg


Polyethylene Glycol (Miralax)  17 gm PO BID [facility]


   PRN Reason: Protocol


   Last Admin: 05/05/18 09:21 Dose:  Not Given


Sevelamer HCl (Renagel)  2,400 mg PO TID [facility]


   PRN Reason: Protocol


   Last Admin: 05/05/18 14:13 Dose:  Not Given


Vitamin B Complex/Vit C/Folic Acid (Nephro-Javier)  1 tab PO 0800 [facility]


   PRN Reason: Protocol


   Last Admin: 05/05/18 09:20 Dose:  1 tab


Zolpidem Tartrate (Ambien)  5 mg PO HS PRN; Protocol


   PRN Reason: Insomnia


   Last Admin: 05/04/18 22:49 Dose:  5 mg











- Labs


Labs: 


 





 05/05/18 08:20 





 05/05/18 08:20

## 2018-05-06 RX ADMIN — OXYMETAZOLINE HYDROCHLORIDE SCH SPR: 0.05 SPRAY NASAL at 19:05

## 2018-05-06 RX ADMIN — INSULIN HUMAN SCH UNITS: 100 INJECTION, SOLUTION PARENTERAL at 13:45

## 2018-05-06 RX ADMIN — IPRATROPIUM BROMIDE AND ALBUTEROL SULFATE SCH: .5; 3 SOLUTION RESPIRATORY (INHALATION) at 01:10

## 2018-05-06 RX ADMIN — PANTOPRAZOLE SODIUM SCH MG: 40 TABLET, DELAYED RELEASE ORAL at 05:53

## 2018-05-06 RX ADMIN — OXYCODONE HYDROCHLORIDE AND ACETAMINOPHEN PRN TAB: 5; 325 TABLET ORAL at 16:30

## 2018-05-06 RX ADMIN — ACETYLCYSTEINE SCH: 200 INHALANT RESPIRATORY (INHALATION) at 13:14

## 2018-05-06 RX ADMIN — MESALAMINE SCH MG: 500 CAPSULE ORAL at 13:46

## 2018-05-06 RX ADMIN — OXYCODONE HYDROCHLORIDE AND ACETAMINOPHEN PRN TAB: 5; 325 TABLET ORAL at 21:31

## 2018-05-06 RX ADMIN — POLYETHYLENE GLYCOL 3350 SCH GM: 17 POWDER, FOR SOLUTION ORAL at 09:14

## 2018-05-06 RX ADMIN — MESALAMINE SCH MG: 500 CAPSULE ORAL at 09:17

## 2018-05-06 RX ADMIN — OXYCODONE HYDROCHLORIDE AND ACETAMINOPHEN PRN TAB: 5; 325 TABLET ORAL at 07:36

## 2018-05-06 RX ADMIN — IPRATROPIUM BROMIDE AND ALBUTEROL SULFATE SCH: .5; 3 SOLUTION RESPIRATORY (INHALATION) at 07:11

## 2018-05-06 RX ADMIN — IPRATROPIUM BROMIDE AND ALBUTEROL SULFATE SCH: .5; 3 SOLUTION RESPIRATORY (INHALATION) at 19:08

## 2018-05-06 RX ADMIN — Medication SCH TAB: at 07:37

## 2018-05-06 RX ADMIN — BUDESONIDE SCH: 0.5 SUSPENSION RESPIRATORY (INHALATION) at 19:00

## 2018-05-06 RX ADMIN — INSULIN HUMAN SCH UNITS: 100 INJECTION, SOLUTION PARENTERAL at 17:25

## 2018-05-06 RX ADMIN — INSULIN HUMAN SCH: 100 INJECTION, SOLUTION PARENTERAL at 06:42

## 2018-05-06 RX ADMIN — IPRATROPIUM BROMIDE AND ALBUTEROL SULFATE SCH: .5; 3 SOLUTION RESPIRATORY (INHALATION) at 13:14

## 2018-05-06 RX ADMIN — BUDESONIDE SCH: 0.5 SUSPENSION RESPIRATORY (INHALATION) at 07:10

## 2018-05-06 RX ADMIN — ACETYLCYSTEINE SCH: 200 INHALANT RESPIRATORY (INHALATION) at 07:12

## 2018-05-06 RX ADMIN — ACETYLCYSTEINE SCH: 200 INHALANT RESPIRATORY (INHALATION) at 01:10

## 2018-05-06 RX ADMIN — ACETYLCYSTEINE SCH: 200 INHALANT RESPIRATORY (INHALATION) at 19:08

## 2018-05-06 RX ADMIN — POLYETHYLENE GLYCOL 3350 SCH: 17 POWDER, FOR SOLUTION ORAL at 17:24

## 2018-05-06 RX ADMIN — INSULIN HUMAN SCH: 100 INJECTION, SOLUTION PARENTERAL at 21:15

## 2018-05-06 RX ADMIN — MESALAMINE SCH MG: 500 CAPSULE ORAL at 21:07

## 2018-05-06 RX ADMIN — MESALAMINE SCH MG: 500 CAPSULE ORAL at 17:24

## 2018-05-06 RX ADMIN — OXYMETAZOLINE HYDROCHLORIDE SCH SPR: 0.05 SPRAY NASAL at 06:46

## 2018-05-06 NOTE — PN
DATE:  05/06/2018



SUBJECTIVE:  The patient is in room 313, bed 1.  Overnight nurse's notes

were reviewed.  The patient complained of neck and back pain for which the

patient was continued on Percocet.



PHYSICAL EXAMINATION:

VITAL SIGNS:  T-max, the patient is afebrile.  Heart rate 78, 84, 89, 92;

blood pressure 160/84, 150/90.  Respirations 18-20, O2 sat is mid to high

90s.

HEENT:  Head examination normocephalic, atraumatic.  HEENT examination

shows pinkish pale conjunctivae.  Anicteric sclerae.  No oropharyngeal

lesion.  No neck rigidity.

CHEST:  Kyphosis.

LUNGS:  Decreased breath sound at the bases.

CARDIOVASCULAR:  S1, S2, irregular rhythm.  Positive systolic murmur, left

sternal border, right second intercostal space, left second intercostal

space.

GENITALIA:  Male.

RECTAL:  Deferred.

EXTREMITY:  Shows positive almost 2+ pitting edema of the lower extremity.

MUSCULOSKELETAL:  Shows an elevated body mass index.

ABDOMEN:  Protuberant, obese.  No hepatosplenomegaly was appreciable.

VASCULAR:  Unable to palpate pulses.

MUSCULOSKELETAL:  Shows an elevated body mass index.

NEUROLOGIC:  The patient is alert, awake, oriented x3.  Gait examination

could not be tested.



DIAGNOSTICS:  From 05/05/2018 was reviewed.



IMPRESSION AND PLAN:

1.  Deconditioning.

2.  Gait dysfunction.

3.  Hypertension.

4.  Atrial fibrillation.

5.  Acute kidney injury and acute renal failure, hemodialysis requiring and

dependent.

6.  End-stage renal disease, hemodialysis dependent via the right upper

chest hemodialysis catheter.

7.  End-stage renal disease, hemodialysis dependent three times a week.

8.  Anemia.

9.  Cervical and lumbar spine degenerative disk disease and chronic

narcotic-dependent pain syndrome.

10.  Morbid obesity.

11.  Coronary artery disease.

12.  History of coronary angioplasty and stent placement.

13.  Atrial fibrillation with rapid and slow ventricular response.

14.  Status post severe symptomatic bradycardia and hypotension.

15.  Acute renal failure, acute kidney injury secondary to hypoperfusion,

hypotension and ischemic acute tubular necrosis.

16.  Status post severe transaminitis and shock liver.

17.  Status post coagulopathy.

18.  Bilateral lower extremity venous stasis.

19.  Pulmonary hypertension.

20.  Non-insulin-requiring diabetes mellitus.

21.  Hyperlipidemia.

22.  Insomnia.

23.  Depression.

24.  Narcotic-dependent pain syndrome.

25.  History of Crohn disease.

26.  Hyperlipidemia.



Plan at this time, the patient is to be continued on TCU stay with daily

ambulation therapy, physical therapy, occupational therapy, gait training.



The patient was seen by Nephrology, Dr. Fonseca.  The patient is started on

Lasix p.o. to optimize the patient's weight management.  The patient's

weight management has to be optimized and to improve urine output as the

patient has almost 500 mL of urine output in the last 24 hours.  The

patient will be continued on sliding scale coverage.  The patient will be

continued on his pain medications.  The patient will be continued on all

the medications as per the MVR today, which was reviewed.  The patient also

will continue with out of bed to chair, recliner, ambulation therapy, gait

training, physical therapy, occupational therapy.



Dictated and electronically signed, not read.





__________________________________________

Kody Pratt MD





DD:  05/06/2018 11:16:53

DT:  05/06/2018 11:24:25

Job # [id number]

## 2018-05-06 NOTE — CP.PCM.PN
Subjective





- Date & Time of Evaluation


Date of Evaluation: 05/06/18


Time of Evaluation: 11:48





- Subjective


Subjective: 





Nephrology Consultation Note





Assessment: stable


oligoanuric Acute Kidney Injury (N17.9) likely due to ATN, hypoperfusion now 

with fluid overload and DIALYSIS dependent since 04/24/18


shock, acute liver injury, lactic acidosis, coagulopathy: RESOLVED


hx of chronic Hep C


Anemia, Hyperphosphatemia (E83.39), HTN (I12.9), DM, morbid obesity


CHF, CAD s/p sent, crohn's disease





Plan


HD tuesday as TTS schedule. continue with Nephrovite 1 tab/day. UF goal 4-4.5 

Kgs as tolerated by BP. may do extra HD tomorrow


continue to monitor for spontaneous renal recovery. no evidence of renal 

recovery at present. 


HTN control with meds as ordered. No ACEI/ARB due to recent BHARGAVI. started 

hydralazine 50 mg TID


Monitor Input/Output, daily weights and renal function with basic metabolic 

panel


A fib rate control as per primary team.


continue phos binders as renagel 2400 mg TID with meals


AV access placement NOT indicated at this time as pt is expected to have some 

renal recovery in near future. 


pt advised to limit oral fluid intake 1000 mL/day. renal/dialysis diet ordered.


will plan for therapy with JUMANA if Hb <10. will give IV iron with HD. 


ordered for monthly Vit D. last PTH at goal 237.


added lasix 80 mg/day





Dose meds/antibiotics for reduced GFR/dialysis status. Avoid fleets enema/

magnesium based laxatives. Avoid nephrotoxins/NSAIDs/ iodinated contrast (

unless needed emergently)


Glycemic control


Further work up for as per primary team





Thanks for allowing me to participate in care of your patient. Will follow 

patient with you. Please call if any Qs. d/w team


Dr Elver Florez


Office: [phone] Cell: [phone] Fax: [phone]





CC: leg swelling


reason for consult: BHARGAVI and dialysis 


HPI: pt is a 67 M with hx of morbid obesity, DM, HTN, CAD s/p stent, CHF, crohn'

s disease, chronic Hep C + initially admitted to hospital 04/20/18 with shock 

and liver/kidney injury with coagulopathy, required dialysis initiation since 4/ 24/18 and then d/c to TCU. renal consult for BHARGAVI management, 


he feels same, c/o difficulty swallowing and lack of sleep


denies SOB. not much urine output


has leg swelling





ROS: Denies chest pain, palpitation, shortness of breath, 


All other negative except as mentioned in HPI. improved urine output. has leg 

swelling and backpain





Physical Examination: 


General Appearance: Comfortable, in no acute respiratory distress, co-operative 

.better appearing, obese


Vitals reviewed and noted as below


Head; Atraumatic, normocephalic


ENT: no ulcers no thrush. Tongue is midline. Oropharynx: no rash or ulcers.


EYES: Pupils are equal, round and reactive to light accommodation. Eye muscles 

and extraocular movement intact. Sclera is anicteric.


Neck; supple no lymphadenopathy, no thyromegaly or bruit


Lungs: Normal respiratory rate/effort. Breath sounds bilateral decreased at 

bases with few crackles


Heart: normal rate. s1s2 normal. No rub or gallop. 


Extremities: 2-3+ edema. No varicose veins


Neurological: Patient is awake oriented to person, place and time. No focal 

deficit. Strength bilateral appropriate and equal


Skin: Warm and dry. Normal turgor. No rash. Palpitation: Normal elasticity for 

age


Abdomen: Abdomen is soft. Bowel sounds +. There is no abdominal tenderness, no 

guarding/rigidity no organomegaly


Psych: normal insight and normal affect/mood


MSK: no joint tenderness or swelling. Digits and nails normal, no deformity


: kidney or bladder not palpable.


access: Rt IJ tunnelled catheter





Labs/imaging reviewed.


Past medical history, past surgical history, family history, social history, 

allergy reviewed and noted as below


Family hx: no hx of CKD. Rest non-contributory 





workup: normal LVEF echo 2017 but cardiac cath 2018: LVEF 45%


UA 30 protein


Hep C +


renal imaging unremarkable





Objective





- Vital Signs/Intake and Output


Vital Signs (last 24 hours): 


 











Temp Pulse Resp BP Pulse Ox


 


 97.2 F L  90   20   169/80 H  98 


 


 05/05/18 10:00  05/05/18 22:27  05/05/18 10:00  05/06/18 09:13  05/05/18 10:00








Intake and Output: 


 











 05/06/18 05/06/18





 06:59 18:59


 


Intake Total 240 


 


Output Total 100 125


 


Balance 140 -125














- Medications


Medications: 


 Current Medications





Acetylcysteine (Acetylcysteine 20%)  4 ml IH [id number] [facility]


   Last Admin: 05/06/18 07:12 Dose:  Not Given


Albuterol/Ipratropium (Duoneb 3 Mg/0.5 Mg (3 Ml) Ud)  3 ml IH Q6 [facility]


   PRN Reason: Protocol


   Last Admin: 05/06/18 07:11 Dose:  Not Given


Benzocaine/Menthol (Cepacol Sore Throat)  1 hi MT Q2H PRN; Protocol


   PRN Reason: Sore Throat


   Last Admin: 05/03/18 13:54 Dose:  1 hi


Budesonide (Pulmicort Respules)  0.5 mg IH [id number] [facility]


   PRN Reason: Protocol


   Last Admin: 05/05/18 20:34 Dose:  Not Given


Clotrimazole (Mycelex Lana)  10 mg MT 5XD [facility]


   PRN Reason: Protocol


   Last Admin: 05/06/18 09:14 Dose:  10 mg


Diltiazem HCl (Cardizem)  90 mg PO QID [facility]


   PRN Reason: Protocol


   Last Admin: 05/06/18 09:12 Dose:  90 mg


Escitalopram Oxalate (Lexapro)  5 mg PO DAILY [facility]


   PRN Reason: Protocol


   Last Admin: 05/06/18 09:13 Dose:  5 mg


Folic Acid (Folic Acid)  1 mg PO DAILY [facility]


   PRN Reason: Protocol


   Last Admin: 05/06/18 09:13 Dose:  1 mg


Furosemide (Lasix)  80 mg PO DAILY [facility]


   Last Admin: 05/06/18 09:13 Dose:  80 mg


Hydralazine HCl (Apresoline)  50 mg PO TID [facility]


Iron Sucrose 100 mg/ Sodium (Chloride)  105 mls @ 210 mls/hr IVPB [facility]TS ONE


   Stop: 05/08/18 10:58


Insulin Human Regular (Humulin R Med)  0 units SC ACHS [facility]


   PRN Reason: Protocol


   Last Admin: 05/06/18 06:42 Dose:  Not Given


Lidocaine (Lidoderm)  1 ea TD DAILY [facility]


   PRN Reason: Protocol


   Last Admin: 05/06/18 09:11 Dose:  1 ea


Mesalamine (Pentasa)  1,000 mg PO QID [facility]


   PRN Reason: Protocol


   Last Admin: 05/06/18 09:17 Dose:  1,000 mg


Oxycodone/Acetaminophen (Percocet 5/325 Mg Tab)  1 tab PO Q6H [facility]


   PRN Reason: Pain, moderate (4-7)


   Stop: 05/11/18 22:42


   Last Admin: 05/06/18 07:36 Dose:  1 tab


Oxymetazoline HCl (Afrin 0.05%)  0 ml NS Q12H [facility]


   PRN Reason: Protocol


   Last Admin: 05/06/18 06:46 Dose:  1 spr


Pantoprazole Sodium (Protonix Ec Tab)  40 mg PO 0600 [facility]


   PRN Reason: Protocol


   Last Admin: 05/06/18 05:53 Dose:  40 mg


Polyethylene Glycol (Miralax)  17 gm PO BID [facility]


   PRN Reason: Protocol


   Last Admin: 05/06/18 09:14 Dose:  17 gm


Sevelamer HCl (Renagel)  2,400 mg PO TID [facility]


   PRN Reason: Protocol


   Last Admin: 05/06/18 09:18 Dose:  2,400 mg


Vitamin B Complex/Vit C/Folic Acid (Nephro-Javier)  1 tab PO 0800 [facility]


   PRN Reason: Protocol


   Last Admin: 05/06/18 07:37 Dose:  1 tab


Zolpidem Tartrate (Ambien)  5 mg PO HS PRN; Protocol


   PRN Reason: Insomnia


   Last Admin: 05/05/18 22:37 Dose:  5 mg











- Labs


Labs: 


 





 05/05/18 08:20 





 05/05/18 08:20

## 2018-05-07 RX ADMIN — INSULIN HUMAN SCH: 100 INJECTION, SOLUTION PARENTERAL at 19:56

## 2018-05-07 RX ADMIN — ACETYLCYSTEINE SCH: 200 INHALANT RESPIRATORY (INHALATION) at 20:10

## 2018-05-07 RX ADMIN — INSULIN HUMAN SCH: 100 INJECTION, SOLUTION PARENTERAL at 06:46

## 2018-05-07 RX ADMIN — INSULIN HUMAN SCH UNITS: 100 INJECTION, SOLUTION PARENTERAL at 12:02

## 2018-05-07 RX ADMIN — MESALAMINE SCH: 500 CAPSULE ORAL at 14:30

## 2018-05-07 RX ADMIN — POLYETHYLENE GLYCOL 3350 SCH GM: 17 POWDER, FOR SOLUTION ORAL at 19:57

## 2018-05-07 RX ADMIN — BUDESONIDE SCH: 0.5 SUSPENSION RESPIRATORY (INHALATION) at 07:10

## 2018-05-07 RX ADMIN — IPRATROPIUM BROMIDE AND ALBUTEROL SULFATE SCH: .5; 3 SOLUTION RESPIRATORY (INHALATION) at 20:10

## 2018-05-07 RX ADMIN — MESALAMINE SCH MG: 500 CAPSULE ORAL at 21:21

## 2018-05-07 RX ADMIN — IPRATROPIUM BROMIDE AND ALBUTEROL SULFATE SCH: .5; 3 SOLUTION RESPIRATORY (INHALATION) at 13:26

## 2018-05-07 RX ADMIN — MESALAMINE SCH MG: 500 CAPSULE ORAL at 17:29

## 2018-05-07 RX ADMIN — ACETYLCYSTEINE SCH: 200 INHALANT RESPIRATORY (INHALATION) at 07:09

## 2018-05-07 RX ADMIN — IPRATROPIUM BROMIDE AND ALBUTEROL SULFATE SCH: .5; 3 SOLUTION RESPIRATORY (INHALATION) at 02:17

## 2018-05-07 RX ADMIN — OXYMETAZOLINE HYDROCHLORIDE SCH SPR: 0.05 SPRAY NASAL at 19:57

## 2018-05-07 RX ADMIN — OXYCODONE HYDROCHLORIDE AND ACETAMINOPHEN PRN TAB: 5; 325 TABLET ORAL at 21:13

## 2018-05-07 RX ADMIN — PANTOPRAZOLE SODIUM SCH MG: 40 TABLET, DELAYED RELEASE ORAL at 05:57

## 2018-05-07 RX ADMIN — POLYETHYLENE GLYCOL 3350 SCH GM: 17 POWDER, FOR SOLUTION ORAL at 10:30

## 2018-05-07 RX ADMIN — INSULIN HUMAN SCH: 100 INJECTION, SOLUTION PARENTERAL at 22:36

## 2018-05-07 RX ADMIN — IPRATROPIUM BROMIDE AND ALBUTEROL SULFATE SCH: .5; 3 SOLUTION RESPIRATORY (INHALATION) at 07:10

## 2018-05-07 RX ADMIN — ACETYLCYSTEINE SCH: 200 INHALANT RESPIRATORY (INHALATION) at 13:26

## 2018-05-07 RX ADMIN — Medication SCH TAB: at 07:50

## 2018-05-07 RX ADMIN — OXYCODONE HYDROCHLORIDE AND ACETAMINOPHEN PRN TAB: 5; 325 TABLET ORAL at 11:54

## 2018-05-07 RX ADMIN — MESALAMINE SCH MG: 500 CAPSULE ORAL at 11:00

## 2018-05-07 RX ADMIN — BUDESONIDE SCH: 0.5 SUSPENSION RESPIRATORY (INHALATION) at 20:10

## 2018-05-07 RX ADMIN — ACETYLCYSTEINE SCH: 200 INHALANT RESPIRATORY (INHALATION) at 02:17

## 2018-05-07 RX ADMIN — OXYMETAZOLINE HYDROCHLORIDE SCH SPR: 0.05 SPRAY NASAL at 06:01

## 2018-05-07 NOTE — CP.PCM.PN
Objective





- Vital Signs/Intake and Output


Vital Signs (last 24 hours): 


 











Temp Pulse Resp BP Pulse Ox


 


 97.9 F   87   19   163/88 H  95 


 


 05/06/18 16:00  05/07/18 10:28  05/06/18 16:00  05/07/18 10:29  05/06/18 16:00








Intake and Output: 


 











 05/07/18 05/07/18





 06:59 18:59


 


Intake Total 360 


 


Balance 360 














- Medications


Medications: 


 Current Medications





Acetylcysteine (Acetylcysteine 20%)  4 ml IH [id number] [facility]


   Last Admin: 05/07/18 07:09 Dose:  Not Given


Albuterol/Ipratropium (Duoneb 3 Mg/0.5 Mg (3 Ml) Ud)  3 ml IH Q6 [facility]


   PRN Reason: Protocol


   Last Admin: 05/07/18 07:10 Dose:  Not Given


Benzocaine/Menthol (Cepacol Sore Throat)  1 hi MT Q2H PRN; Protocol


   PRN Reason: Sore Throat


   Last Admin: 05/03/18 13:54 Dose:  1 hi


Budesonide (Pulmicort Respules)  0.5 mg IH [id number] [facility]


   PRN Reason: Protocol


   Last Admin: 05/07/18 07:10 Dose:  Not Given


Clotrimazole (Mycelex Lana)  10 mg MT 5XD [facility]


   PRN Reason: Protocol


   Last Admin: 05/07/18 11:00 Dose:  10 mg


Diltiazem HCl (Cardizem)  90 mg PO QID [facility]


   PRN Reason: Protocol


   Last Admin: 05/07/18 10:28 Dose:  90 mg


Escitalopram Oxalate (Lexapro)  5 mg PO DAILY [facility]


   PRN Reason: Protocol


   Last Admin: 05/07/18 10:29 Dose:  5 mg


Folic Acid (Folic Acid)  1 mg PO DAILY [facility]


   PRN Reason: Protocol


   Last Admin: 05/07/18 10:29 Dose:  1 mg


Furosemide (Lasix)  80 mg PO DAILY [facility]


   Last Admin: 05/07/18 10:29 Dose:  80 mg


Hydralazine HCl (Apresoline)  50 mg PO TID [facility]


   Last Admin: 05/07/18 10:27 Dose:  50 mg


Iron Sucrose 100 mg/ Sodium (Chloride)  105 mls @ 210 mls/hr IVPB TTS ONE


   Stop: 05/08/18 10:58


Insulin Human Regular (Humulin R Med)  0 units SC ACHS [facility]


   PRN Reason: Protocol


   Last Admin: 05/07/18 06:46 Dose:  Not Given


Lidocaine (Lidoderm)  1 ea TD DAILY [facility]


   PRN Reason: Protocol


   Last Admin: 05/07/18 10:30 Dose:  1 ea


Mesalamine (Pentasa)  1,000 mg PO QID [facility]


   PRN Reason: Protocol


   Last Admin: 05/07/18 11:00 Dose:  1,000 mg


Oxycodone/Acetaminophen (Percocet 5/325 Mg Tab)  1 tab PO Q6H [facility]


   PRN Reason: Pain, moderate (4-7)


   Stop: 05/11/18 22:42


   Last Admin: 05/07/18 11:54 Dose:  1 tab


Oxymetazoline HCl (Afrin 0.05%)  0 ml NS Q12H [facility]


   PRN Reason: Protocol


   Last Admin: 05/07/18 06:01 Dose:  1 spr


Pantoprazole Sodium (Protonix Ec Tab)  40 mg PO 0600 [facility]


   PRN Reason: Protocol


   Last Admin: 05/07/18 05:57 Dose:  40 mg


Polyethylene Glycol (Miralax)  17 gm PO BID [facility]


   PRN Reason: Protocol


   Last Admin: 05/07/18 10:30 Dose:  17 gm


Sevelamer HCl (Renagel)  2,400 mg PO TID [facility]


   PRN Reason: Protocol


   Last Admin: 05/07/18 11:00 Dose:  2,400 mg


Vitamin B Complex/Vit C/Folic Acid (Nephro-Javier)  1 tab PO 0800 [facility]


   PRN Reason: Protocol


   Last Admin: 05/07/18 07:50 Dose:  1 tab


Zolpidem Tartrate (Ambien)  5 mg PO HS PRN; Protocol


   PRN Reason: Insomnia


   Last Admin: 05/06/18 22:45 Dose:  5 mg











- Labs


Labs: 


 





 05/05/18 08:20 





 05/05/18 08:20

## 2018-05-07 NOTE — CP.PCM.PN
Subjective





- Date & Time of Evaluation


Date of Evaluation: 05/07/18


Time of Evaluation: 11:20





- Subjective


Subjective: 


Nephrology Consultation Note





Assessment: stable


oligoanuric Acute Kidney Injury (N17.9) likely due to ATN, hypoperfusion now 

with fluid overload and DIALYSIS dependent since 04/24/18


shock, acute liver injury, lactic acidosis, coagulopathy: RESOLVED


hx of chronic Hep C


Anemia, Hyperphosphatemia (E83.39), HTN (I12.9), DM, morbid obesity


CHF, CAD s/p sent, crohn's disease





Plan


will do extra HD today for fluid overload


next HD tuesday as TTS schedule. continue with Nephrovite 1 tab/day. UF goal 4-

4.5 Kgs as tolerated by BP. 


continue to monitor for spontaneous renal recovery. no evidence of renal 

recovery at present. d/w RN to monitor I/O


HTN control with meds as ordered. No ACEI/ARB due to recent BHARGAVI. started 

hydralazine 50 mg TID


Monitor Input/Output, daily weights and renal function with basic metabolic 

panel


A fib rate control as per primary team.


continue phos binders as renagel 2400 mg TID with meals


AV access placement NOT indicated at this time as pt is expected to have some 

renal recovery in near future. 


pt advised to limit oral fluid intake 1000 mL/day. renal/dialysis diet ordered.


will plan for therapy with JUMANA if Hb <10. will give IV iron with HD. 


ordered for monthly Vit D. last PTH at goal 237.


added lasix 80 mg/day to optimize volume status





Dose meds/antibiotics for reduced GFR/dialysis status. Avoid fleets enema/

magnesium based laxatives. Avoid nephrotoxins/NSAIDs/ iodinated contrast (

unless needed emergently)


Glycemic control


Further work up for as per primary team





Thanks for allowing me to participate in care of your patient. Will follow 

patient with you. Please call if any Qs. d/w team


Dr Elver Florez


Office: [phone] Cell: [phone] Fax: [phone]





CC: leg swelling


reason for consult: BHARGAVI and dialysis 


HPI: pt is a 67 M with hx of morbid obesity, DM, HTN, CAD s/p stent, CHF, crohn'

s disease, chronic Hep C + initially admitted to hospital 04/20/18 with shock 

and liver/kidney injury with coagulopathy, required dialysis initiation since 4/ 24/18 and then d/c to TCU. renal consult for BHARGAVI management, 


he feels same, c/o difficulty swallowing and lack of sleep


denies SOB. not much urine output


has leg swelling





ROS: Denies chest pain, palpitation, shortness of breath, 


All other negative except as mentioned in HPI. improved urine output. has leg 

swelling and backpain





Physical Examination: 


General Appearance: Comfortable, in no acute respiratory distress, co-operative 

.better appearing, obese


Vitals reviewed and noted as below


Head; Atraumatic, normocephalic


ENT: no ulcers no thrush. Tongue is midline. Oropharynx: no rash or ulcers.


EYES: Pupils are equal, round and reactive to light accommodation. Eye muscles 

and extraocular movement intact. Sclera is anicteric.


Neck; supple no lymphadenopathy, no thyromegaly or bruit


Lungs: Normal respiratory rate/effort. Breath sounds bilateral decreased at 

bases with few crackles


Heart: normal rate. s1s2 normal. No rub or gallop. 


Extremities: 2-3+ edema. No varicose veins. has compressive bandage in legs


Neurological: Patient is awake oriented to person, place and time. No focal 

deficit. Strength bilateral appropriate and equal


Skin: Warm and dry. Normal turgor. No rash. Palpitation: Normal elasticity for 

age


Abdomen: Abdomen is soft. Bowel sounds +. There is no abdominal tenderness, no 

guarding/rigidity no organomegaly


Psych: normal insight and normal affect/mood


MSK: no joint tenderness or swelling. Digits and nails normal, no deformity


: kidney or bladder not palpable.


access: Rt IJ tunnelled catheter





Labs/imaging reviewed.


Past medical history, past surgical history, family history, social history, 

allergy reviewed and noted as below


Family hx: no hx of CKD. Rest non-contributory 





workup: normal LVEF echo 2017 but cardiac cath 2018: LVEF 45%


UA 30 protein


Hep C +


renal imaging unremarkable








Objective





- Vital Signs/Intake and Output


Vital Signs (last 24 hours): 


 











Temp Pulse Resp BP Pulse Ox


 


 97.9 F   87   19   163/88 H  95 


 


 05/06/18 16:00  05/07/18 10:28  05/06/18 16:00  05/07/18 10:29  05/06/18 16:00








Intake and Output: 


 











 05/07/18 05/07/18





 06:59 18:59


 


Intake Total 360 


 


Balance 360 














- Medications


Medications: 


 Current Medications





Acetylcysteine (Acetylcysteine 20%)  4 ml IH [id number] [facility]


   Last Admin: 05/07/18 07:09 Dose:  Not Given


Albuterol/Ipratropium (Duoneb 3 Mg/0.5 Mg (3 Ml) Ud)  3 ml IH Q6 [facility]


   PRN Reason: Protocol


   Last Admin: 05/07/18 07:10 Dose:  Not Given


Benzocaine/Menthol (Cepacol Sore Throat)  1 hi MT Q2H PRN; Protocol


   PRN Reason: Sore Throat


   Last Admin: 05/03/18 13:54 Dose:  1 hi


Budesonide (Pulmicort Respules)  0.5 mg IH [id number] [facility]


   PRN Reason: Protocol


   Last Admin: 05/07/18 07:10 Dose:  Not Given


Clotrimazole (Mycelex Lana)  10 mg MT 5XD [facility]


   PRN Reason: Protocol


   Last Admin: 05/07/18 11:00 Dose:  10 mg


Diltiazem HCl (Cardizem)  90 mg PO QID [facility]


   PRN Reason: Protocol


   Last Admin: 05/07/18 10:28 Dose:  90 mg


Escitalopram Oxalate (Lexapro)  5 mg PO DAILY [facility]


   PRN Reason: Protocol


   Last Admin: 05/07/18 10:29 Dose:  5 mg


Folic Acid (Folic Acid)  1 mg PO DAILY [facility]


   PRN Reason: Protocol


   Last Admin: 05/07/18 10:29 Dose:  1 mg


Furosemide (Lasix)  80 mg PO DAILY [facility]


   Last Admin: 05/07/18 10:29 Dose:  80 mg


Hydralazine HCl (Apresoline)  50 mg PO TID [facility]


   Last Admin: 05/07/18 10:27 Dose:  50 mg


Iron Sucrose 100 mg/ Sodium (Chloride)  105 mls @ 210 mls/hr IVPB TTS ONE


   Stop: 05/08/18 10:58


Insulin Human Regular (Humulin R Med)  0 units SC ACHS [facility]


   PRN Reason: Protocol


   Last Admin: 05/07/18 06:46 Dose:  Not Given


Lidocaine (Lidoderm)  1 ea TD DAILY [facility]


   PRN Reason: Protocol


   Last Admin: 05/07/18 10:30 Dose:  1 ea


Mesalamine (Pentasa)  1,000 mg PO QID [facility]


   PRN Reason: Protocol


   Last Admin: 05/07/18 11:00 Dose:  1,000 mg


Oxycodone/Acetaminophen (Percocet 5/325 Mg Tab)  1 tab PO Q6H PRN


   PRN Reason: Pain, moderate (4-7)


   Stop: 05/11/18 22:42


   Last Admin: 05/06/18 21:31 Dose:  1 tab


Oxymetazoline HCl (Afrin 0.05%)  0 ml NS Q12H [facility]


   PRN Reason: Protocol


   Last Admin: 05/07/18 06:01 Dose:  1 spr


Pantoprazole Sodium (Protonix Ec Tab)  40 mg PO 0600 [facility]


   PRN Reason: Protocol


   Last Admin: 05/07/18 05:57 Dose:  40 mg


Polyethylene Glycol (Miralax)  17 gm PO BID [facility]


   PRN Reason: Protocol


   Last Admin: 05/07/18 10:30 Dose:  17 gm


Sevelamer HCl (Renagel)  2,400 mg PO TID [facility]


   PRN Reason: Protocol


   Last Admin: 05/07/18 11:00 Dose:  2,400 mg


Vitamin B Complex/Vit C/Folic Acid (Nephro-Javier)  1 tab PO 0800 [facility]


   PRN Reason: Protocol


   Last Admin: 05/07/18 07:50 Dose:  1 tab


Zolpidem Tartrate (Ambien)  5 mg PO HS PRN; Protocol


   PRN Reason: Insomnia


   Last Admin: 05/06/18 22:45 Dose:  5 mg











- Labs


Labs: 


 





 05/05/18 08:20 





 05/05/18 08:20

## 2018-05-07 NOTE — US
HISTORY:

Leg pain and swelling. Evaluate for DVT



PHYSICIAN(S):  Olu Morales MD.



TECHNIQUE:

Duplex sonography and color-flow Doppler with graded compression were 

used to evaluate the deep venous systems of both lower extremities. 

The exam is limited by body habitus and edema. The tibial veins are 

not well seen



FINDINGS:

The visualized deep venous systems of both lower extremities are 

sonographically normal and compressible. Normal wave forms and 

augmentation are seen. There is no sonographic evidence for deep 

venous thrombosis in the visualized segments of both lower 

extremities.



IMPRESSION:

No sonographic evidence for deep venous thrombosis in the visualized 

segments of both lower extremities. 



Limited exam

## 2018-05-07 NOTE — PN
DATE:  05/07/2018



SUBJECTIVE:  The patient is in room 313, bed one in the TCU.  Overnight

nurses' notes were reviewed.  The patient required intermittent p.r.n. pain

medication for his back pain, neck pain.  The patient has been requesting

pain medication and the patient has also stated yesterday that he does not

like being disturbed by the nursing staff to check his vital signs early in

the morning and which disturbs his sleep, etc.  I have explained to the

patient very clearly about the nursing protocol and indication and need for

close patient monitoring and vital signs are to be done on an inpatient

basis, which was explained to the patient in the presence of the patient's

nurse.



REVIEW OF SYSTEMS:  Thirteen-system review was done, pertinent positive

negative as stated above.



PHYSICAL EXAMINATION:

VITAL SIGNS:  T-max 97.9, heart rate 103-106, blood pressure 145//77,

O2 saturation 95%.

GENERAL:  The patient is seen out of bed to recliner.

HEENT:  Head examination, normocephalic and atraumatic. HEENT examination

shows pinkish pale conjunctivae.  Anicteric sclerae.  No oropharyngeal

lesion.  No neck rigidity.

CHEST:  Shows right upper chest dialysis catheter.

LUNGS:  Shows decreased breath sound at the bases, left more than the

right.

CARDIOVASCULAR:  S1, S2, regular rhythm.  Positive systolic murmur, left

sternal border, right second intercostal space, left second intercostal

space.

ABDOMEN:  Protuberant, obese.  No hepatosplenomegaly was appreciable.

GENITALIA:  Male.

EXTREMITIES:  Shows more than 2+ pitting edema of the lower extremity,

which is tense but no calf tenderness.

MUSCULOSKELETAL:  Shows an elevated body mass index for his weight and

height.  Gait examination is not tested.  Motor strength is 5/5 in upper

and lower extremities.

VASCULAR:  Could not be assessed because of venous stasis.



DIAGNOSTICS:  Noninvasive venous Doppler of the lower extremity was done

last night, which was negative for DVT.



Fingerstick blood sugar 139, 132, 209, 155, 129, 175.



IMPRESSION AND PLAN:

1.  Chronic narcotic-dependent pain syndrome.

2.  Insomnia.

3.  History of depression.

4.  Morbid obesity.

5.  Deconditioning.

6.  Gait dysfunction.

7.  Acute kidney injury.

8.  Acute renal failure.

9.  End-stage renal disease, hemodialysis dependent.



10.  Acute tubular necrosis leading to acute kidney injury and acute renal

failure secondary to hypoperfusion, hypotension and severe symptomatic

bradycardia.

11.  Coronary artery disease with coronary angioplasty.

12.  Atrial fibrillation with slow and rapid ventricular response.

13.  Right bundle-branch block.

14.  Cervical and lumbar spine degenerative disk disease.

15.  History of Crohn disease.

16.  Severe sepsis.

17.  Gastrointestinal bleeding with fecal occult blood positive.

18.  Anemia.

19.  Leukocytosis.

20.  Severe transaminitis and shock liver.

21.  Coagulopathy.

22.  Bilateral lower extremity venous stasis.

23.  Non-insulin-dependent diabetes mellitus.

24.  History of hypertension.

25.  History of coronary artery disease.

26.  Pulmonary artery pulmonary hypertension with elevated right

ventricular systolic pressure.

27.  Crohn disease.

28.  Morbid obesity.

29.  Hyperlipidemia.



PLAN:  At this time, the patient will be continued on Transitional Care

Unit with dialysis three times a week via the right upper chest _____

catheter.  The patient will be scheduled for outpatient AV fistula on

elective basis as per Dr. Huerta and the surgical resident.



The patient will be continued on medication as per the MAR.  New

medications which has been added by Nephrology is Lasix 80 mg daily p.o. 

The patient is started on IV Venofer on dialysis days, Tuesday, Thursday,

Saturday via Nephrology.  The patient is to be continued on routine

medications as per the MAR, which is as per the MAR on today, which was

reviewed.  The patient will continue on Transitional Care Unit with

physical therapy, occupational therapy, ambulation therapy, gait training. 

The patient has been reordered about BETH stockings, SCDs, elevation of the

lower extremity on two pillows lying and sitting.  The patient was advised

increased activity.  The patient was advised to reduce the dependence on

narcotics for pain control.  The patient was again updated about his

condition, diagnosis, treatment plan, management plan, ____all the

physicians.  The patient was reinforced and re-explained at length in

layman's language.  All questions concerned answered.



The patient will continue on the Transitional Care Unit in approved number

of days.



Dictated and electronically signed, not read.







__________________________________________

Kody Pratt MD



DD:  05/07/2018 10:02:20

DT:  05/07/2018 10:08:59

[facility] # [id number]

## 2018-05-08 VITALS — RESPIRATION RATE: 20 BRPM

## 2018-05-08 LAB
ALBUMIN SERPL-MCNC: 3.6 G/DL (ref 3–4.8)
ALBUMIN/GLOB SERPL: 1.2 {RATIO} (ref 1.1–1.8)
ALT SERPL-CCNC: 100 U/L (ref 7–56)
AST SERPL-CCNC: 32 U/L (ref 17–59)
BASOPHILS # BLD AUTO: 0.01 K/MM3 (ref 0–2)
BASOPHILS NFR BLD: 0.1 % (ref 0–3)
BUN SERPL-MCNC: 35 MG/DL (ref 7–21)
CALCIUM SERPL-MCNC: 8.4 MG/DL (ref 8.4–10.5)
EOSINOPHIL # BLD: 0.3 10*3/UL (ref 0–0.7)
EOSINOPHIL NFR BLD: 1.8 % (ref 1.5–5)
ERYTHROCYTE [DISTWIDTH] IN BLOOD BY AUTOMATED COUNT: 28 % (ref 11.5–14.5)
GFR NON-AFRICAN AMERICAN: 14
GRANULOCYTES # BLD: 11.98 10*3/UL (ref 1.4–6.5)
GRANULOCYTES NFR BLD: 83.7 % (ref 50–68)
HGB BLD-MCNC: 9.7 G/DL (ref 14–18)
LYMPHOCYTES # BLD: 1.3 10*3/UL (ref 1.2–3.4)
LYMPHOCYTES NFR BLD AUTO: 9.2 % (ref 22–35)
MCH RBC QN AUTO: 22.7 PG (ref 25–35)
MCHC RBC AUTO-ENTMCNC: 31.7 G/DL (ref 31–37)
MCV RBC AUTO: 71.5 FL (ref 80–105)
MONOCYTES # BLD AUTO: 0.8 10*3/UL (ref 0.1–0.6)
MONOCYTES NFR BLD: 5.2 % (ref 1–6)
PLATELET # BLD: 170 10^3/UL (ref 120–450)
RBC # BLD AUTO: 4.28 10^6/UL (ref 3.5–6.1)
WBC # BLD AUTO: 14.3 10^3/UL (ref 4.5–11)

## 2018-05-08 PROCEDURE — 5A1D70Z PERFORMANCE OF URINARY FILTRATION, INTERMITTENT, LESS THAN 6 HOURS PER DAY: ICD-10-PCS

## 2018-05-08 RX ADMIN — IPRATROPIUM BROMIDE AND ALBUTEROL SULFATE SCH: .5; 3 SOLUTION RESPIRATORY (INHALATION) at 13:13

## 2018-05-08 RX ADMIN — OXYMETAZOLINE HYDROCHLORIDE SCH: 0.05 SPRAY NASAL at 06:17

## 2018-05-08 RX ADMIN — MESALAMINE SCH MG: 500 CAPSULE ORAL at 21:00

## 2018-05-08 RX ADMIN — OXYCODONE HYDROCHLORIDE AND ACETAMINOPHEN PRN TAB: 5; 325 TABLET ORAL at 20:49

## 2018-05-08 RX ADMIN — MESALAMINE SCH MG: 500 CAPSULE ORAL at 11:04

## 2018-05-08 RX ADMIN — OXYMETAZOLINE HYDROCHLORIDE SCH SPR: 0.05 SPRAY NASAL at 20:00

## 2018-05-08 RX ADMIN — ACETYLCYSTEINE SCH: 200 INHALANT RESPIRATORY (INHALATION) at 01:08

## 2018-05-08 RX ADMIN — POLYETHYLENE GLYCOL 3350 SCH GM: 17 POWDER, FOR SOLUTION ORAL at 17:29

## 2018-05-08 RX ADMIN — ACETYLCYSTEINE SCH: 200 INHALANT RESPIRATORY (INHALATION) at 21:45

## 2018-05-08 RX ADMIN — IPRATROPIUM BROMIDE AND ALBUTEROL SULFATE SCH: .5; 3 SOLUTION RESPIRATORY (INHALATION) at 07:24

## 2018-05-08 RX ADMIN — MESALAMINE SCH: 500 CAPSULE ORAL at 16:09

## 2018-05-08 RX ADMIN — INSULIN HUMAN SCH: 100 INJECTION, SOLUTION PARENTERAL at 06:30

## 2018-05-08 RX ADMIN — OXYCODONE HYDROCHLORIDE AND ACETAMINOPHEN PRN TAB: 5; 325 TABLET ORAL at 16:13

## 2018-05-08 RX ADMIN — ACETYLCYSTEINE SCH: 200 INHALANT RESPIRATORY (INHALATION) at 13:13

## 2018-05-08 RX ADMIN — ACETYLCYSTEINE SCH: 200 INHALANT RESPIRATORY (INHALATION) at 07:24

## 2018-05-08 RX ADMIN — INSULIN HUMAN SCH: 100 INJECTION, SOLUTION PARENTERAL at 16:09

## 2018-05-08 RX ADMIN — OXYCODONE HYDROCHLORIDE AND ACETAMINOPHEN PRN TAB: 5; 325 TABLET ORAL at 05:46

## 2018-05-08 RX ADMIN — PANTOPRAZOLE SODIUM SCH MG: 40 TABLET, DELAYED RELEASE ORAL at 05:40

## 2018-05-08 RX ADMIN — Medication SCH TAB: at 07:57

## 2018-05-08 RX ADMIN — OXYCODONE HYDROCHLORIDE AND ACETAMINOPHEN PRN TAB: 5; 325 TABLET ORAL at 01:19

## 2018-05-08 RX ADMIN — INSULIN HUMAN SCH UNITS: 100 INJECTION, SOLUTION PARENTERAL at 17:27

## 2018-05-08 RX ADMIN — IPRATROPIUM BROMIDE AND ALBUTEROL SULFATE SCH: .5; 3 SOLUTION RESPIRATORY (INHALATION) at 21:45

## 2018-05-08 RX ADMIN — IPRATROPIUM BROMIDE AND ALBUTEROL SULFATE SCH: .5; 3 SOLUTION RESPIRATORY (INHALATION) at 01:08

## 2018-05-08 RX ADMIN — POLYETHYLENE GLYCOL 3350 SCH GM: 17 POWDER, FOR SOLUTION ORAL at 11:02

## 2018-05-08 RX ADMIN — INSULIN HUMAN SCH: 100 INJECTION, SOLUTION PARENTERAL at 22:06

## 2018-05-08 RX ADMIN — MESALAMINE SCH MG: 500 CAPSULE ORAL at 17:30

## 2018-05-08 NOTE — PN
DATE:  05/08/2018



SUBJECTIVE:  The patient is in TCU bed 313.  Overnight nurse's notes were

reviewed.  The patient refused nebulizer treatment.  The patient refused

BiPAP.  The patient needed to have the Percocet dose increased.  There was

no adverse events documented by the nurses.



PHYSICAL EXAMINATION:

VITAL SIGNS:  T-max 97.7, heart rate 96, blood pressure 150/92,

respirations 20, O2 sat 98%.

HEENT:  Head examination normocephalic, atraumatic.  HEENT examination

shows pinkish conjunctivae.  Anicteric sclerae.  No oropharyngeal lesion. 

No neck rigidity.

CHEST:  Kyphosis.

LUNGS:  Shows decreased breath sound at the bases.  Positive right upper

chest dialysis catheter.

CARDIOVASCULAR:  S1, S2.  Irregular rhythm.  Positive systolic murmur,

right second intercostal space, left second intercostal space, left sternal

border.

ABDOMEN:  Obese, protuberant.  Positive bowel sound.  No appreciable

hepatosplenomegaly noted.

GENITALIA:  Male.

RECTAL:  Deferred.

EXTREMITY:  Shows positive 2+ pitting edema.  Positive Ace wraps.

MUSCULOSKELETAL:  Shows an elevated body mass index.



LABORATORY DATA:  None from today pending.



Fingerstick blood sugar 125, 238, 171, 153, 139, 132.



IMPRESSION AND PLAN:

1.  Deconditioning.

2.  Gait dysfunction.

3.  Morbid obesity.

4.  Poor compliance and noncompliance.

5.  Acute renal failure, acute kidney injury secondary to ischemic acute

tubular necrosis secondary to hypoperfusion, hypotension and severe

symptomatic hypotension and bradycardia and atrial fibrillation with slow

ventricular response.

6.  End-stage renal disease, hemodialysis dependent three times a week via

the right upper chest dialysis catheter.

7.  Non-insulin-requiring diabetes mellitus.

8.  Chronic narcotic-dependent pain syndrome.

9.  Cervical and lumbar spine degenerative disk disease.

10.  Insomnia.

11.  History of depression.

12.  Hypertension.

13.  Atrial fibrillation with rapid ventricular response.

14.  Right bundle-branch block.

15.  Coronary artery disease.

16.  Status post work coronary angioplasty.

17.  Bilateral lower extremity venous stasis.

18.  Narcotic seeking behavior.

19.  Secondary hyperparathyroidism of renal region

20.  Gastrointestinal bleeding with fecal occult blood positive.

21.  Iron-deficiency.

22.  Secondary hyperparathyroidism.

23.  Insomnia.

24.  Congestive heart failure with elevated BNP.



Plan at this time, the patient is to be continued on hemodialysis three

times a week Tuesday, Thursday, Saturday with extra dialysis as per

Nephrology.  The patient will be continued on the medications as per the

MAR of today, which was reviewed.  The patient will have serial labs done

on dialysis.  The patient will be continued on Transitional Care Unit with

physical therapy, occupational therapy, ambulation therapy, gait training. 

The patient was re-advised about compliance with nurse's and doctors'

recommendation.  The patient was advised of weight loss, strict diet,

increased activity.  The patient was able to ambulate with assistance of

cane yesterday, which I noticed.



The patient will be continued on the medications as per the MAR of today,

which was reviewed.



Dictated and electronically signed, not read.





__________________________________________

Kody Pratt MD





DD:  05/08/2018 9:43:27

DT:  05/08/2018 9:53:59

[facility] # [id number]

## 2018-05-08 NOTE — CP.PCM.PN
Subjective





- Date & Time of Evaluation


Date of Evaluation: 05/08/18


Time of Evaluation: 14:56





- Subjective


Subjective: 





Nephrology Consultation Note





Assessment: stable


oligoanuric Acute Kidney Injury (N17.9) likely due to ATN, hypoperfusion now 

with fluid overload and DIALYSIS dependent since 04/24/18


shock, acute liver injury, lactic acidosis, coagulopathy: RESOLVED


hx of chronic Hep C


Anemia, Hyperphosphatemia (E83.39), HTN (I12.9), DM, morbid obesity


CHF, CAD s/p sent, crohn's disease





Plan


next HD today as TTS schedule. continue with Nephrovite 1 tab/day. UF goal 4-

4.5 Kgs as tolerated by BP. 


continue to monitor for spontaneous renal recovery. no evidence of renal 

recovery at present. d/w RN to monitor I/O


HTN control with meds as ordered. No ACEI/ARB due to recent BHARGAVI. started 

hydralazine 50 mg TID


Monitor Input/Output, daily weights and renal function with basic metabolic 

panel


A fib rate control as per primary team.


continue phos binders as renagel 1600 mg TID with meals


AV access placement NOT indicated at this time as pt is expected to have some 

renal recovery in near future. 


pt advised to limit oral fluid intake 1000 mL/day. renal/dialysis diet ordered.


plan for therapy with JUMANA as Hb <10. will give IV iron with HD. aransep 40 mcg 5

/10/18 ordered


ordered for monthly Vit D. last PTH at goal 237.


added lasix 80 mg/day to optimize volume status





Dose meds/antibiotics for reduced GFR/dialysis status. Avoid fleets enema/

magnesium based laxatives. Avoid nephrotoxins/NSAIDs/ iodinated contrast (

unless needed emergently)


Glycemic control


Further work up for as per primary team





Thanks for allowing me to participate in care of your patient. Will follow 

patient with you. Please call if any Qs. had d/w team


Dr Elver Florez


Office: [phone] Cell: [phone] Fax: [phone]





CC: leg swelling


reason for consult: BHARGAVI and dialysis 


HPI: pt is a 67 M with hx of morbid obesity, DM, HTN, CAD s/p stent, CHF, crohn'

s disease, chronic Hep C + initially admitted to hospital 04/20/18 with shock 

and liver/kidney injury with coagulopathy, required dialysis initiation since 4/ 24/18 and then d/c to TCU. renal consult for BHARGAVI management, 


he feels same, c/o difficulty swallowing and lack of sleep


denies SOB. not much urine output


has leg swelling





ROS: Denies chest pain, palpitation, shortness of breath, 


All other negative except as mentioned in HPI. improved urine output. has leg 

swelling and backpain





Physical Examination: seen on HD


General Appearance: Comfortable, in no acute respiratory distress, co-operative 

.better appearing, obese


Vitals reviewed and noted as below


Head; Atraumatic, normocephalic


ENT: no ulcers no thrush. Tongue is midline. Oropharynx: no rash or ulcers.


EYES: Pupils are equal, round and reactive to light accommodation. Eye muscles 

and extraocular movement intact. Sclera is anicteric.


Neck; supple no lymphadenopathy, no thyromegaly or bruit


Lungs: Normal respiratory rate/effort. Breath sounds bilateral decreased at 

bases otherwise clear


Heart: normal rate. s1s2 normal. No rub or gallop. 


Extremities: 2+ edema. No varicose veins. has compressive bandage in legs


Neurological: Patient is awake oriented to person, place and time. No focal 

deficit. Strength bilateral appropriate and equal


Skin: Warm and dry. Normal turgor. No rash. Palpitation: Normal elasticity for 

age


Abdomen: Abdomen is soft. Bowel sounds +. There is no abdominal tenderness, no 

guarding/rigidity no organomegaly


Psych: normal insight and normal affect/mood


MSK: no joint tenderness or swelling. Digits and nails normal, no deformity


: kidney or bladder not palpable.


access: Rt IJ tunnelled catheter





Labs/imaging reviewed.


Past medical history, past surgical history, family history, social history, 

allergy reviewed and noted as below


Family hx: no hx of CKD. Rest non-contributory 





workup: normal LVEF echo 2017 but cardiac cath 2018: LVEF 45%


UA 30 protein


Hep C +


renal imaging unremarkable





Objective





- Vital Signs/Intake and Output


Vital Signs (last 24 hours): 


 











Temp Pulse Resp BP Pulse Ox


 


 97.7 F   67   20   174/90 H  98 


 


 05/08/18 06:00  05/08/18 12:09  05/08/18 06:00  05/08/18 11:03  05/08/18 06:00








Intake and Output: 


 











 05/08/18 05/08/18





 06:59 18:59


 


Intake Total 120 


 


Output Total 550 


 


Balance -430 














- Medications


Medications: 


 Current Medications





Acetylcysteine (Acetylcysteine 20%)  4 ml IH [id number] [facility]


   Last Admin: 05/08/18 13:13 Dose:  Not Given


Albuterol/Ipratropium (Duoneb 3 Mg/0.5 Mg (3 Ml) Ud)  3 ml IH Q6 [facility]UMBERTO


   PRN Reason: Protocol


   Last Admin: 05/08/18 13:13 Dose:  Not Given


Benzocaine/Menthol (Cepacol Sore Throat)  1 hi MT Q2H PRN; Protocol


   PRN Reason: Sore Throat


   Last Admin: 05/03/18 13:54 Dose:  1 hi


Budesonide (Pulmicort Respules)  0.5 mg IH [id number] [facility]BERTO


   PRN Reason: Protocol


   Last Admin: 05/07/18 20:10 Dose:  Not Given


Clotrimazole (Mycelex Lana)  10 mg MT 5XD [facility]ERTO


   PRN Reason: Protocol


   Last Admin: 05/08/18 11:03 Dose:  10 mg


Darbepoetin Kenneth (Aranesp)  40 mcg IVP ONCE ONE


   Stop: 05/10/18 14:56


Diltiazem HCl (Cardizem)  90 mg PO QID [facility]BERTO


   PRN Reason: Protocol


   Last Admin: 05/08/18 11:03 Dose:  90 mg


Escitalopram Oxalate (Lexapro)  5 mg PO DAILY [facility]


   PRN Reason: Protocol


   Last Admin: 05/08/18 11:04 Dose:  5 mg


Folic Acid (Folic Acid)  1 mg PO DAILY [facility]


   PRN Reason: Protocol


   Last Admin: 05/08/18 11:02 Dose:  1 mg


Furosemide (Lasix)  80 mg PO DAILY [facility]


   Last Admin: 05/08/18 11:04 Dose:  Not Given


Hydralazine HCl (Apresoline)  50 mg PO TID [facility]


   Last Admin: 05/08/18 11:03 Dose:  50 mg


Insulin Human Regular (Humulin R Med)  0 units SC ACHS [facility]UMBERTO


   PRN Reason: Protocol


   Last Admin: 05/08/18 06:30 Dose:  Not Given


Lidocaine (Lidoderm)  1 ea TD DAILY [facility]


   PRN Reason: Protocol


   Last Admin: 05/08/18 11:04 Dose:  1 ea


Mesalamine (Pentasa)  1,000 mg PO QID [facility]ERTO


   PRN Reason: Protocol


   Last Admin: 05/08/18 11:04 Dose:  1,000 mg


Ondansetron HCl (Zofran Inj)  4 mg IVP Q4H PRN


   PRN Reason: Nausea/Vomiting


Oxycodone/Acetaminophen (Percocet 5/325 Mg Tab)  1 tab PO Q4H PRN; Protocol


   PRN Reason: Pain, moderate (4-7)


   Stop: 05/08/18 22:42


   Last Admin: 05/08/18 05:46 Dose:  1 tab


Oxymetazoline HCl (Afrin 0.05%)  0 ml NS Q12H [facility]ERT[facility]


   PRN Reason: Protocol


   Last Admin: 05/08/18 06:17 Dose:  Not Given


Pantoprazole Sodium (Protonix Ec Tab)  40 mg PO 0600 [facility]ERTO


   PRN Reason: Protocol


   Last Admin: 05/08/18 05:40 Dose:  40 mg


Polyethylene Glycol (Miralax)  17 gm PO BID [facility]ERTO


   PRN Reason: Protocol


   Last Admin: 05/08/18 11:02 Dose:  17 gm


Sevelamer HCl (Renagel)  2,400 mg PO TID [facility]ERTO


   PRN Reason: Protocol


   Last Admin: 05/08/18 11:02 Dose:  2,400 mg


Vitamin B Complex/Vit C/Folic Acid (Nephro-Javier)  1 tab PO 0800 [facility]ERTO


   PRN Reason: Protocol


   Last Admin: 05/08/18 07:57 Dose:  1 tab


Zolpidem Tartrate (Ambien)  5 mg PO HS PRN; Protocol


   PRN Reason: Insomnia


   Last Admin: 05/07/18 23:16 Dose:  5 mg











- Labs


Labs: 


 





 05/08/18 11:43 





 05/08/18 11:43

## 2018-05-08 NOTE — CP.PCM.PN
Subjective





- Date & Time of Evaluation


Date of Evaluation: 05/08/18


Time of Evaluation: 06:00





- Subjective


Subjective: 


Patient seen and evaluated bedside. No acute issues overnight. Patient says he 

is doing better, walking around. patient denies any chest pain, shortness of 

breath, nausea, or any other complaints at this time. 








Objective





- Vital Signs/Intake and Output


Vital Signs (last 24 hours): 


 











Temp Pulse Resp BP Pulse Ox


 


 97.7 F   96 H  20   150/92 H  98 


 


 05/08/18 06:00  05/08/18 06:00  05/08/18 06:00  05/08/18 06:29  05/08/18 06:00








Intake and Output: 


 











 05/07/18 05/08/18





 18:59 06:59


 


Intake Total  120


 


Output Total  550


 


Balance  -430














- Medications


Medications: 


 Current Medications





Acetylcysteine (Acetylcysteine 20%)  4 ml IH [id number] [facility]


   Last Admin: 05/08/18 01:08 Dose:  Not Given


Albuterol/Ipratropium (Duoneb 3 Mg/0.5 Mg (3 Ml) Ud)  3 ml IH Q6 [facility]


   PRN Reason: Protocol


   Last Admin: 05/08/18 01:08 Dose:  Not Given


Benzocaine/Menthol (Cepacol Sore Throat)  1 hi MT Q2H PRN; Protocol


   PRN Reason: Sore Throat


   Last Admin: 05/03/18 13:54 Dose:  1 hi


Budesonide (Pulmicort Respules)  0.5 mg IH [id number] [facility]


   PRN Reason: Protocol


   Last Admin: 05/07/18 20:10 Dose:  Not Given


Clotrimazole (Mycelex Lana)  10 mg MT 5XD [facility]


   PRN Reason: Protocol


   Last Admin: 05/08/18 05:40 Dose:  10 mg


Diltiazem HCl (Cardizem)  90 mg PO QID [facility]


   PRN Reason: Protocol


   Last Admin: 05/07/18 21:20 Dose:  90 mg


Escitalopram Oxalate (Lexapro)  5 mg PO DAILY [facility]


   PRN Reason: Protocol


   Last Admin: 05/07/18 10:29 Dose:  5 mg


Folic Acid (Folic Acid)  1 mg PO DAILY [facility]


   PRN Reason: Protocol


   Last Admin: 05/07/18 10:29 Dose:  1 mg


Furosemide (Lasix)  80 mg PO DAILY [facility]


   Last Admin: 05/08/18 06:29 Dose:  80 mg


Hydralazine HCl (Apresoline)  50 mg PO TID [facility]


   Last Admin: 05/07/18 19:57 Dose:  50 mg


Iron Sucrose 100 mg/ Sodium (Chloride)  105 mls @ 210 mls/hr IVPB TTS ONE


   Stop: 05/08/18 10:58


Insulin Human Regular (Humulin R Med)  0 units SC ACHS [facility]


   PRN Reason: Protocol


   Last Admin: 05/08/18 06:30 Dose:  Not Given


Lidocaine (Lidoderm)  1 ea TD DAILY [facility]


   PRN Reason: Protocol


   Last Admin: 05/07/18 10:30 Dose:  1 ea


Mesalamine (Pentasa)  1,000 mg PO QID [facility]


   PRN Reason: Protocol


   Last Admin: 05/07/18 21:21 Dose:  1,000 mg


Oxycodone/Acetaminophen (Percocet 5/325 Mg Tab)  1 tab PO Q4H PRN; Protocol


   PRN Reason: Pain, moderate (4-7)


   Stop: 05/08/18 22:42


   Last Admin: 05/08/18 05:46 Dose:  1 tab


Oxymetazoline HCl (Afrin 0.05%)  0 ml NS Q12H [facility]


   PRN Reason: Protocol


   Last Admin: 05/08/18 06:17 Dose:  Not Given


Pantoprazole Sodium (Protonix Ec Tab)  40 mg PO 0600 [facility]


   PRN Reason: Protocol


   Last Admin: 05/08/18 05:40 Dose:  40 mg


Polyethylene Glycol (Miralax)  17 gm PO BID [facility]


   PRN Reason: Protocol


   Last Admin: 05/07/18 19:57 Dose:  17 gm


Sevelamer HCl (Renagel)  2,400 mg PO TID [facility]


   PRN Reason: Protocol


   Last Admin: 05/07/18 17:30 Dose:  2,400 mg


Vitamin B Complex/Vit C/Folic Acid (Nephro-Javier)  1 tab PO 0800 [facility]


   PRN Reason: Protocol


   Last Admin: 05/07/18 07:50 Dose:  1 tab


Zolpidem Tartrate (Ambien)  5 mg PO HS PRN; Protocol


   PRN Reason: Insomnia


   Last Admin: 05/07/18 23:16 Dose:  5 mg











- Labs


Labs: 


 





 05/05/18 08:20 





 05/05/18 08:20 











- Constitutional


Appears: Non-toxic, No Acute Distress





- Head Exam


Head Exam: ATRAUMATIC, NORMAL INSPECTION, NORMOCEPHALIC





- Eye Exam


Eye Exam: EOMI, Normal appearance





- ENT Exam


ENT Exam: Mucous Membranes Moist





- Respiratory Exam


Respiratory Exam: Decreased Breath Sounds





- Cardiovascular Exam


Cardiovascular Exam: REGULAR RHYTHM





- GI/Abdominal Exam


GI & Abdominal Exam: Distended





- Neurological Exam


Neurological Exam: Alert, Awake, Oriented x3





Assessment and Plan





- Assessment and Plan (Free Text)


Assessment: 


67 M with hx of morbid obesity, DM, HTN, CAD s/p stent, CHF, crohn's disease, 

chronic Hep C + initially admitted to hospital 04/20/18 with shock and liver/

kidney injury with coagulopathy, required dialysis initiation since 4/24/18 and 

then d/c to TCU.

## 2018-05-09 VITALS — TEMPERATURE: 97.9 F

## 2018-05-09 VITALS — OXYGEN SATURATION: 96 %

## 2018-05-09 VITALS — HEART RATE: 92 BPM

## 2018-05-09 RX ADMIN — PURIFIED WATER PRN LOZ: 99.05 LIQUID OPHTHALMIC at 07:54

## 2018-05-09 RX ADMIN — ACETYLCYSTEINE SCH: 200 INHALANT RESPIRATORY (INHALATION) at 21:00

## 2018-05-09 RX ADMIN — INSULIN HUMAN SCH: 100 INJECTION, SOLUTION PARENTERAL at 06:32

## 2018-05-09 RX ADMIN — OXYCODONE HYDROCHLORIDE AND ACETAMINOPHEN PRN TAB: 5; 325 TABLET ORAL at 17:13

## 2018-05-09 RX ADMIN — IPRATROPIUM BROMIDE AND ALBUTEROL SULFATE SCH: .5; 3 SOLUTION RESPIRATORY (INHALATION) at 04:18

## 2018-05-09 RX ADMIN — MESALAMINE SCH MG: 500 CAPSULE ORAL at 10:15

## 2018-05-09 RX ADMIN — MESALAMINE SCH MG: 500 CAPSULE ORAL at 14:12

## 2018-05-09 RX ADMIN — INSULIN HUMAN SCH: 100 INJECTION, SOLUTION PARENTERAL at 22:19

## 2018-05-09 RX ADMIN — OXYMETAZOLINE HYDROCHLORIDE SCH: 0.05 SPRAY NASAL at 06:53

## 2018-05-09 RX ADMIN — OXYCODONE HYDROCHLORIDE AND ACETAMINOPHEN PRN TAB: 5; 325 TABLET ORAL at 13:15

## 2018-05-09 RX ADMIN — OXYCODONE HYDROCHLORIDE AND ACETAMINOPHEN PRN TAB: 5; 325 TABLET ORAL at 21:27

## 2018-05-09 RX ADMIN — Medication SCH TAB: at 07:54

## 2018-05-09 RX ADMIN — ACETYLCYSTEINE SCH: 200 INHALANT RESPIRATORY (INHALATION) at 07:13

## 2018-05-09 RX ADMIN — POLYETHYLENE GLYCOL 3350 SCH: 17 POWDER, FOR SOLUTION ORAL at 17:15

## 2018-05-09 RX ADMIN — IPRATROPIUM BROMIDE AND ALBUTEROL SULFATE SCH: .5; 3 SOLUTION RESPIRATORY (INHALATION) at 21:00

## 2018-05-09 RX ADMIN — OXYMETAZOLINE HYDROCHLORIDE SCH: 0.05 SPRAY NASAL at 21:25

## 2018-05-09 RX ADMIN — IPRATROPIUM BROMIDE AND ALBUTEROL SULFATE SCH: .5; 3 SOLUTION RESPIRATORY (INHALATION) at 13:00

## 2018-05-09 RX ADMIN — IPRATROPIUM BROMIDE AND ALBUTEROL SULFATE SCH: .5; 3 SOLUTION RESPIRATORY (INHALATION) at 07:14

## 2018-05-09 RX ADMIN — BUDESONIDE SCH: 0.5 SUSPENSION RESPIRATORY (INHALATION) at 07:14

## 2018-05-09 RX ADMIN — OXYCODONE HYDROCHLORIDE AND ACETAMINOPHEN PRN TAB: 5; 325 TABLET ORAL at 06:57

## 2018-05-09 RX ADMIN — MESALAMINE SCH MG: 500 CAPSULE ORAL at 21:27

## 2018-05-09 RX ADMIN — MESALAMINE SCH MG: 500 CAPSULE ORAL at 17:12

## 2018-05-09 RX ADMIN — INSULIN HUMAN SCH: 100 INJECTION, SOLUTION PARENTERAL at 12:51

## 2018-05-09 RX ADMIN — PANTOPRAZOLE SODIUM SCH MG: 40 TABLET, DELAYED RELEASE ORAL at 05:31

## 2018-05-09 RX ADMIN — ACETYLCYSTEINE SCH: 200 INHALANT RESPIRATORY (INHALATION) at 13:00

## 2018-05-09 RX ADMIN — POLYETHYLENE GLYCOL 3350 SCH GM: 17 POWDER, FOR SOLUTION ORAL at 10:14

## 2018-05-09 RX ADMIN — ACETYLCYSTEINE SCH: 200 INHALANT RESPIRATORY (INHALATION) at 04:14

## 2018-05-09 NOTE — CP.PCM.PN
Subjective





- Date & Time of Evaluation


Date of Evaluation: 05/09/18


Time of Evaluation: 14:23





- Subjective


Subjective: 


Nephrology Consultation Note





Assessment: stable


oligoanuric Acute Kidney Injury (N17.9) likely due to ATN, hypoperfusion now 

with fluid overload and DIALYSIS dependent since 04/24/18


shock, acute liver injury, lactic acidosis, coagulopathy: RESOLVED


hx of chronic Hep C


Anemia, Hyperphosphatemia (E83.39), HTN (I12.9), DM, morbid obesity


CHF, CAD s/p sent, crohn's disease





Plan


next HD tomorrow as TTS schedule. continue with Nephrovite 1 tab/day. UF goal 4-

4.5 Kgs as tolerated by BP. 


continue to monitor for spontaneous renal recovery. no evidence of renal 

recovery at present. d/w RN to monitor I/O. UOP better at 600 ml


HTN control with meds as ordered. No ACEI/ARB due to recent BHARGAVI. 


Monitor Input/Output, daily weights and renal function with basic metabolic 

panel


A fib rate control as per primary team.


continue phos binders as renagel 1600 mg TID with meals


AV access placement NOT indicated at this time as pt is expected to have some 

renal recovery in near future. 


pt advised to limit oral fluid intake 1000 mL/day. renal/dialysis diet ordered.


plan for therapy with JUMANA as Hb <10. will give IV iron with HD. aransep 40 mcg 5

/10/18 ordered


ordered for monthly Vit D. last PTH at goal 237.


added lasix 80 mg/day to optimize volume status





Dose meds/antibiotics for reduced GFR/dialysis status. Avoid fleets enema/

magnesium based laxatives. Avoid nephrotoxins/NSAIDs/ iodinated contrast (

unless needed emergently)


Glycemic control


Further work up for as per primary team





Thanks for allowing me to participate in care of your patient. Will follow 

patient with you. Please call if any Qs. had d/w team


Dr Elver Florez


Office: [phone] Cell: [phone] Fax: [phone]





CC: leg swelling


reason for consult: BHARGAVI and dialysis 


HPI: pt is a 67 M with hx of morbid obesity, DM, HTN, CAD s/p stent, CHF, crohn'

s disease, chronic Hep C + initially admitted to hospital 04/20/18 with shock 

and liver/kidney injury with coagulopathy, required dialysis initiation since 4/ 24/18 and then d/c to TCU. renal consult for BHARGAVI management, 


he feels same, c/o difficulty swallowing and lack of sleep


denies SOB. not much urine output


has leg swelling





ROS: Denies chest pain, palpitation, shortness of breath, 


All other negative except as mentioned in HPI. improved urine output. has leg 

swelling and backpain





Physical Examination: seen on HD


General Appearance: Comfortable, in no acute respiratory distress, co-operative 

.better appearing, obese


Vitals reviewed and noted as below


Head; Atraumatic, normocephalic


ENT: no ulcers no thrush. Tongue is midline. Oropharynx: no rash or ulcers.


EYES: Pupils are equal, round and reactive to light accommodation. Eye muscles 

and extraocular movement intact. Sclera is anicteric.


Neck; supple no lymphadenopathy, no thyromegaly or bruit


Lungs: Normal respiratory rate/effort. Breath sounds bilateral decreased at 

bases otherwise clear


Heart: normal rate. s1s2 normal. No rub or gallop. 


Extremities: improving edema. No varicose veins. has compressive bandage in legs


Neurological: Patient is awake oriented to person, place and time. No focal 

deficit. Strength bilateral appropriate and equal


Skin: Warm and dry. Normal turgor. No rash. Palpitation: Normal elasticity for 

age


Abdomen: Abdomen is soft. Bowel sounds +. There is no abdominal tenderness, no 

guarding/rigidity no organomegaly


Psych: normal insight and normal affect/mood


MSK: no joint tenderness or swelling. Digits and nails normal, no deformity


: kidney or bladder not palpable.


access: Rt IJ tunnelled catheter





Labs/imaging reviewed.


Past medical history, past surgical history, family history, social history, 

allergy reviewed and noted as below


Family hx: no hx of CKD. Rest non-contributory 





workup: normal LVEF echo 2017 but cardiac cath 2018: LVEF 45%


UA 30 protein


Hep C +


renal imaging unremarkable








Objective





- Vital Signs/Intake and Output


Vital Signs (last 24 hours): 


 











Temp Pulse Resp BP Pulse Ox


 


 98.0 F   112 H  20   134/77   96 


 


 05/09/18 06:00  05/09/18 13:16  05/09/18 06:00  05/09/18 13:16  05/09/18 11:59








Intake and Output: 


 











 05/09/18 05/09/18





 06:59 18:59


 


Intake Total 360 


 


Output Total 600 


 


Balance -240 














- Medications


Medications: 


 Current Medications





Acetylcysteine (Acetylcysteine 20%)  4 ml IH [id number] [facility]


   Last Admin: 05/09/18 13:00 Dose:  Not Given


Albuterol/Ipratropium (Duoneb 3 Mg/0.5 Mg (3 Ml) Ud)  3 ml IH Q6 [facility]


   PRN Reason: Protocol


   Last Admin: 05/09/18 13:00 Dose:  Not Given


Benzocaine/Menthol (Cepacol Sore Throat)  1 hi MT Q2H PRN; Protocol


   PRN Reason: Sore Throat


   Last Admin: 05/09/18 07:54 Dose:  1 hi


Budesonide (Pulmicort Respules)  0.5 mg IH [id number] [facility]


   PRN Reason: Protocol


   Last Admin: 05/09/18 07:14 Dose:  Not Given


Clotrimazole (Mycelex Lana)  10 mg MT 5XD [facility]


   PRN Reason: Protocol


   Last Admin: 05/09/18 10:14 Dose:  10 mg


Darbepoetin Kenneth (Aranesp)  40 mcg IVP ONCE ONE


   Stop: 05/10/18 14:56


Diltiazem HCl (Cardizem)  90 mg PO QID [facility]


   PRN Reason: Protocol


   Last Admin: 05/09/18 13:16 Dose:  90 mg


Escitalopram Oxalate (Lexapro)  5 mg PO DAILY [facility]


   PRN Reason: Protocol


   Last Admin: 05/09/18 10:14 Dose:  5 mg


Folic Acid (Folic Acid)  1 mg PO DAILY [facility]


   PRN Reason: Protocol


   Last Admin: 05/09/18 10:14 Dose:  1 mg


Furosemide (Lasix)  80 mg PO DAILY [facility]


   Last Admin: 05/09/18 10:14 Dose:  80 mg


Hydralazine HCl (Apresoline)  50 mg PO TID [facility]


   Last Admin: 05/09/18 13:16 Dose:  50 mg


Insulin Human Regular (Humulin R Med)  0 units SC ACHS [facility]


   PRN Reason: Protocol


   Last Admin: 05/09/18 12:51 Dose:  Not Given


Lidocaine (Lidoderm)  1 ea TD DAILY [facility]


   PRN Reason: Protocol


   Last Admin: 05/09/18 10:14 Dose:  1 ea


Mesalamine (Pentasa)  1,000 mg PO QID [facility]


   PRN Reason: Protocol


   Last Admin: 05/09/18 10:15 Dose:  1,000 mg


Ondansetron HCl (Zofran Inj)  4 mg IVP Q4H PRN


   PRN Reason: Nausea/Vomiting


Oxycodone/Acetaminophen (Percocet 5/325 Mg Tab)  1 tab PO Q4H PRN; Protocol


   PRN Reason: Pain, moderate (4-7)


   Stop: 05/12/18 06:40


   Last Admin: 05/09/18 13:15 Dose:  1 tab


Oxymetazoline HCl (Afrin 0.05%)  0 ml NS Q12H [facility]


   PRN Reason: Protocol


   Last Admin: 05/09/18 06:53 Dose:  Not Given


Pantoprazole Sodium (Protonix Ec Tab)  40 mg PO 0600 [facility]


   PRN Reason: Protocol


   Last Admin: 05/09/18 05:31 Dose:  40 mg


Polyethylene Glycol (Miralax)  17 gm PO BID [facility]


   PRN Reason: Protocol


   Last Admin: 05/09/18 10:14 Dose:  17 gm


Sevelamer HCl (Renagel)  1,600 mg PO TID [facility]


   PRN Reason: Protocol


   Last Admin: 05/09/18 10:15 Dose:  1,600 mg


Vitamin B Complex/Vit C/Folic Acid (Nephro-Javier)  1 tab PO 0800 [facility]


   PRN Reason: Protocol


   Last Admin: 05/09/18 07:54 Dose:  1 tab


Zolpidem Tartrate (Ambien)  5 mg PO HS PRN; Protocol


   PRN Reason: Insomnia


   Last Admin: 05/08/18 23:28 Dose:  5 mg











- Labs


Labs: 


 





 05/08/18 11:43 





 05/08/18 11:43
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
## 2018-05-09 NOTE — PN
DATE:  05/09/2018



SUBJECTIVE:  The patient is seen in room 313 bed 1.  The patient is out of

bed to recliner.  The patient is awake, responsive.  Does not appear to be

any distress.  The patient is complaining of hoarseness.  Overnight nurses'

notes were reviewed.  The patient was found to be alert, awake, responsive.

The patient is seen by speech therapist.  Their recommendations were noted.

The patient refused BiPAP.



OBJECTIVE:

VITAL SIGNS:  T-max 98; pulse 71-87; blood pressure 158/93, 144/74;

respirations 20; O2 sat 96%.  Intake and output was reviewed.

HEENT:  Head examination:  Normocephalic, atraumatic.  HEENT examination: 

Pinkish pale conjunctivae.  Anicteric sclerae.  No oropharyngeal lesion. 

No neck rigidity.

CHEST:  Kyphosis.

LUNGS:  Examination shows decreased breath sound at the bases.

CARDIOVASCULAR:  Shows S1, S2, irregular rhythm.  Positive systolic murmur,

left sternal border, right second intercostal space, left second

intercostal space.

ABDOMEN:  Protuberant, obese.  Unable to appreciate any hepatosplenomegaly.

GENITALIA:  Male.

RECTAL:  Deferred.

EXTREMITIES:  Lower extremity shows 2+ to 3+ pitting edema.

MUSCULOSKELETAL:  Examination shows a body mass index of 37.  Gait

examination is not tested.  Motor strength is 5/5 in upper and lower

extremity.

PSYCHIATRIC:  Positive for history of insomnia and depression.



DIAGNOSTICS:  05/08, WBC 14.3, hemoglobin and hematocrit 9.7/31, platelets

170.  Granulocytes 84%   Sodium 138, potassium 3.7, chloride 99, CO2 29,

anion gap 14, BUN 35, creatinine 4.2, GFR 17.  Glucose 156, 111, 140, 170,

155, 125.  Calcium 8.4, phosphorus 2.8, magnesium 1.8.  ALT is 100.



IMPRESSION AND PLAN:

1.  Deconditioning.

2.  Gait dysfunction.

3.  Morbid obesity.

4.  End-stage renal disease, hemodialysis dependent.

5.  Chronic narcotic-dependent pain syndrome.

6.  Hypertension.

7.  Atrial fibrillation with rapid and slow ventricular response.

8.  Uncontrolled diabetes mellitus with hyperglycemia.

9.  Hypovitaminosis D.

10.  Leukocytosis with granulocytosis.

11.  Microcytic anemia.

12.  Mild oropharyngeal dysphagia with low-to-moderate risk of aspiration

due to hoarse voice, weak cough, throat pain.



13.  Bilateral lower extremity venous stasis.

14.  Oligoanuric acute kidney injury, acute renal failure secondary to

ischemic acute tubular necrosis secondary to hypoperfusion, hypotension

with fluid and volume overload.

15.  Severe transaminitis.

16.  Noncompliance.

17.  Shock liver.

18.  Increased anion gap metabolic acidosis and lactic acidosis.

19.  _____.

20.  Chronic hepatitis C.



PLAN:  At this time, the patient will continue on the Transitional Care

Unit until the approved number of days.  The patient has been requested to

be seen again by Ear, Nose, and Throat for persistent dysphagia and

hoarseness.  Diabetic education referral made.  The patient is on Mucomyst

nebulizer 20% nebulizer every 6 hours, Afrin nasal spray, Ambien 5 mg at

bedtime p.r.n., hydralazine 50 mg three times a day.  The patient is going

to receive Aranesp 40 mcg IV once on 05/10, Cardizem 90 mg four times a

day, Cepacol lozenges, DuoNeb nebulizer every 6 hours, folic acid 1 mg

daily, Humulin R medium dose protocol before meals and at bedtime, Venofer

100 mg IV Tuesday, Thursday, Saturday, Lasix 80 mg p.o. daily, Lexapro 5 mg

daily Lidoderm 5% patch daily, MiraLax 17 g twice a day, Mycelex troches 10

mg five times a day, Nephro-Javier 1 capsule daily, Pentasa 1000 mg four

times a day, Percocet 5/325 one tablet every 4 hours p.r.n., Protonix 40 mg

daily, Pulmicort nebulizer 0.5 mg every 12 hours, Renagel 1600 mg three

times a day, Zofran 4 mg IV every 4 hours p.r.n.  The patient is on oxygen

BiPap, BETH stockings, SCDs.  Occupational therapy and physical therapy

ordered.  The patient has been refusing SCDs and BETH stockings, the patient

was advised to wear them.



Dictated and electronically signed, not read.







__________________________________________

Kody Pratt MD



DD:  05/09/2018 12:02:29

DT:  05/09/2018 12:14:07

Job # [id number]

## 2018-05-09 NOTE — CP.PCM.PN
Subjective





- Date & Time of Evaluation


Date of Evaluation: 05/09/18


Time of Evaluation: 13:37





- Subjective


Subjective: 





pt seen and examined, pt still having some dysphagia/dysphonia





Objective





- Vital Signs/Intake and Output


Vital Signs (last 24 hours): 


 











Temp Pulse Resp BP Pulse Ox


 


 98.0 F   112 H  20   134/77   96 


 


 05/09/18 06:00  05/09/18 13:16  05/09/18 06:00  05/09/18 13:16  05/09/18 11:59








Intake and Output: 


 











 05/09/18 05/09/18





 06:59 18:59


 


Intake Total 360 


 


Output Total 600 


 


Balance -240 














- Medications


Medications: 


 Current Medications





Acetylcysteine (Acetylcysteine 20%)  4 ml IH [id number] [facility]


   Last Admin: 05/09/18 13:00 Dose:  Not Given


Albuterol/Ipratropium (Duoneb 3 Mg/0.5 Mg (3 Ml) Ud)  3 ml IH Q6 [facility]


   PRN Reason: Protocol


   Last Admin: 05/09/18 13:00 Dose:  Not Given


Benzocaine/Menthol (Cepacol Sore Throat)  1 hi MT Q2H PRN; Protocol


   PRN Reason: Sore Throat


   Last Admin: 05/09/18 07:54 Dose:  1 hi


Budesonide (Pulmicort Respules)  0.5 mg IH [id number] [facility]


   PRN Reason: Protocol


   Last Admin: 05/09/18 07:14 Dose:  Not Given


Clotrimazole (Mycelex Lana)  10 mg MT 5XD [facility]


   PRN Reason: Protocol


   Last Admin: 05/09/18 10:14 Dose:  10 mg


Darbepoetin Kenneth (Aranesp)  40 mcg IVP ONCE ONE


   Stop: 05/10/18 14:56


Diltiazem HCl (Cardizem)  90 mg PO QID [facility]


   PRN Reason: Protocol


   Last Admin: 05/09/18 13:16 Dose:  90 mg


Escitalopram Oxalate (Lexapro)  5 mg PO DAILY [facility]


   PRN Reason: Protocol


   Last Admin: 05/09/18 10:14 Dose:  5 mg


Folic Acid (Folic Acid)  1 mg PO DAILY [facility]


   PRN Reason: Protocol


   Last Admin: 05/09/18 10:14 Dose:  1 mg


Furosemide (Lasix)  80 mg PO DAILY [facility]


   Last Admin: 05/09/18 10:14 Dose:  80 mg


Hydralazine HCl (Apresoline)  50 mg PO TID [facility]


   Last Admin: 05/09/18 13:16 Dose:  50 mg


Insulin Human Regular (Humulin R Med)  0 units SC ACHS [facility]


   PRN Reason: Protocol


   Last Admin: 05/09/18 12:51 Dose:  Not Given


Lidocaine (Lidoderm)  1 ea TD DAILY [facility]


   PRN Reason: Protocol


   Last Admin: 05/09/18 10:14 Dose:  1 ea


Mesalamine (Pentasa)  1,000 mg PO QID [facility]


   PRN Reason: Protocol


   Last Admin: 05/09/18 10:15 Dose:  1,000 mg


Ondansetron HCl (Zofran Inj)  4 mg IVP Q4H PRN


   PRN Reason: Nausea/Vomiting


Oxycodone/Acetaminophen (Percocet 5/325 Mg Tab)  1 tab PO Q4H PRN; Protocol


   PRN Reason: Pain, moderate (4-7)


   Stop: 05/12/18 06:40


   Last Admin: 05/09/18 13:15 Dose:  1 tab


Oxymetazoline HCl (Afrin 0.05%)  0 ml NS Q12H [facility]


   PRN Reason: Protocol


   Last Admin: 05/09/18 06:53 Dose:  Not Given


Pantoprazole Sodium (Protonix Ec Tab)  40 mg PO 0600 [facility]


   PRN Reason: Protocol


   Last Admin: 05/09/18 05:31 Dose:  40 mg


Polyethylene Glycol (Miralax)  17 gm PO BID HUMBERTO


   PRN Reason: Protocol


   Last Admin: 05/09/18 10:14 Dose:  17 gm


Sevelamer HCl (Renagel)  1,600 mg PO TID HUMBERTO


   PRN Reason: Protocol


   Last Admin: 05/09/18 10:15 Dose:  1,600 mg


Vitamin B Complex/Vit C/Folic Acid (Nephro-Javier)  1 tab PO 0800 HUMBERTO


   PRN Reason: Protocol


   Last Admin: 05/09/18 07:54 Dose:  1 tab


Zolpidem Tartrate (Ambien)  5 mg PO HS PRN; Protocol


   PRN Reason: Insomnia


   Last Admin: 05/08/18 23:28 Dose:  5 mg











- Labs


Labs: 


 





 05/08/18 11:43 





 05/08/18 11:43 











- Head Exam


Head Exam: ATRAUMATIC, NORMAL INSPECTION





- ENT Exam


ENT Exam: Mucous Membranes Moist, Normal Exam


Additional comments: 





Direct fiberoptic laryngoscopy performed: due to limitations with mirror exam 

and pt anatomy.


scope inserted into rt nares and advanced to supraglottic region


motion of vc wnl


ulcerations and yeast b/l FVC and TVC and epiglottis


airway patent and stable





- Neck Exam


Neck Exam: Full ROM, Normal Inspection





Assessment and Plan


(1) Bronchitis


Status: Acute   





(2) CHF (congestive heart failure)


Status: Acute   





(3) Dysphonia


Status: Acute   





(4) Dyspnea


Status: Acute   





(5) GI bleed


Status: Acute   





(6) Glottic edema


Status: Acute   





(7) New onset a-fib


Status: Acute   





(8) Oral thrush


Status: Acute   





(9) Oropharyngeal dysphagia


Status: Acute   





(10) Pharyngeal edema


Status: Acute   





(11) Rapid atrial fibrillation


Status: Acute   





- Assessment and Plan (Free Text)


Plan: 





continue thrush treatment with magic mouthwash, improved findings, reeval as 

out patient

## 2018-05-10 VITALS — SYSTOLIC BLOOD PRESSURE: 141 MMHG | DIASTOLIC BLOOD PRESSURE: 77 MMHG

## 2018-05-10 RX ADMIN — ACETYLCYSTEINE SCH: 200 INHALANT RESPIRATORY (INHALATION) at 07:09

## 2018-05-10 RX ADMIN — INSULIN HUMAN SCH: 100 INJECTION, SOLUTION PARENTERAL at 12:14

## 2018-05-10 RX ADMIN — PANTOPRAZOLE SODIUM SCH MG: 40 TABLET, DELAYED RELEASE ORAL at 05:36

## 2018-05-10 RX ADMIN — OXYCODONE HYDROCHLORIDE AND ACETAMINOPHEN PRN TAB: 5; 325 TABLET ORAL at 11:20

## 2018-05-10 RX ADMIN — IPRATROPIUM BROMIDE AND ALBUTEROL SULFATE SCH: .5; 3 SOLUTION RESPIRATORY (INHALATION) at 02:55

## 2018-05-10 RX ADMIN — BUDESONIDE SCH: 0.5 SUSPENSION RESPIRATORY (INHALATION) at 07:09

## 2018-05-10 RX ADMIN — POLYETHYLENE GLYCOL 3350 SCH GM: 17 POWDER, FOR SOLUTION ORAL at 09:30

## 2018-05-10 RX ADMIN — INSULIN HUMAN SCH: 100 INJECTION, SOLUTION PARENTERAL at 06:43

## 2018-05-10 RX ADMIN — MESALAMINE SCH MG: 500 CAPSULE ORAL at 09:30

## 2018-05-10 RX ADMIN — IPRATROPIUM BROMIDE AND ALBUTEROL SULFATE SCH: .5; 3 SOLUTION RESPIRATORY (INHALATION) at 07:08

## 2018-05-10 RX ADMIN — Medication SCH TAB: at 09:29

## 2018-05-10 RX ADMIN — OXYCODONE HYDROCHLORIDE AND ACETAMINOPHEN PRN TAB: 5; 325 TABLET ORAL at 06:41

## 2018-05-10 RX ADMIN — OXYMETAZOLINE HYDROCHLORIDE SCH SPR: 0.05 SPRAY NASAL at 08:26

## 2018-05-10 RX ADMIN — ACETYLCYSTEINE SCH: 200 INHALANT RESPIRATORY (INHALATION) at 02:55

## 2018-05-10 NOTE — CP.PCM.DIS
Provider





- Provider


Date of Admission: 


05/02/18 18:13





Attending physician: 


Kody Pratt MD





Primary care physician: 


Kody Pratt MD








Hospital Course





- Lab Results


Lab Results: 


 Most Recent Lab Values











WBC  14.3 10^3/ul (4.5-11.0)  H D 05/08/18  11:43    


 


RBC  4.28 10^6/uL (3.5-6.1)   05/08/18  11:43    


 


Hgb  9.7 g/dL (14.0-18.0)  L  05/08/18  11:43    


 


Hct  30.6 % (42.0-52.0)  L  05/08/18  11:43    


 


MCV  71.5 fl (80.0-105.0)  L  05/08/18  11:43    


 


MCH  22.7 pg (25.0-35.0)  L  05/08/18  11:43    


 


MCHC  31.7 g/dl (31.0-37.0)   05/08/18  11:43    


 


RDW  28.0 % (11.5-14.5)  H  05/08/18  11:43    


 


Plt Count  170 10^3/uL (120.0-450.0)   05/08/18  11:43    


 


MPV  9.7 fl (7.0-11.0)   05/03/18  06:30    


 


Gran %  83.7 % (50.0-68.0)  H  05/08/18  11:43    


 


Lymph % (Auto)  9.2 % (22.0-35.0)  L  05/08/18  11:43    


 


Mono % (Auto)  5.2 % (1.0-6.0)   05/08/18  11:43    


 


Eos % (Auto)  1.8 % (1.5-5.0)   05/08/18  11:43    


 


Baso % (Auto)  0.1 % (0.0-3.0)   05/08/18  11:43    


 


Gran #  11.98  (1.4-6.5)  H  05/08/18  11:43    


 


Lymph # (Auto)  1.3  (1.2-3.4)   05/08/18  11:43    


 


Mono # (Auto)  0.8  (0.1-0.6)  H  05/08/18  11:43    


 


Eos # (Auto)  0.3  (0.0-0.7)   05/08/18  11:43    


 


Baso # (Auto)  0.01 K/mm3 (0.0-2.0)   05/08/18  11:43    


 


Sodium  138 mmol/L (132-148)   05/08/18  11:43    


 


Potassium  3.7 mmol/L (3.6-5.0)   05/08/18  11:43    


 


Chloride  99 mmol/L ()   05/08/18  11:43    


 


Carbon Dioxide  29 mmol/L (21-33)   05/08/18  11:43    


 


Anion Gap  14  (10-20)   05/08/18  11:43    


 


BUN  35 mg/dL (7-21)  H  05/08/18  11:43    


 


Creatinine  4.2 mg/dl (0.8-1.5)  H  05/08/18  11:43    


 


Est GFR ( Amer)  17   05/08/18  11:43    


 


Est GFR (Non-Af Amer)  14   05/08/18  11:43    


 


POC Glucose (mg/dL)  133 mg/dL ()  H  05/10/18  05:20    


 


Random Glucose  155 mg/dL ()  H  05/08/18  11:43    


 


Calcium  8.4 mg/dL (8.4-10.5)   05/08/18  11:43    


 


Phosphorus  2.8 mg/dL (2.5-4.5)   05/08/18  11:43    


 


Magnesium  1.8 mg/dL (1.7-2.2)   05/08/18  11:43    


 


Iron  95 ug/dL ()   05/05/18  08:20    


 


TIBC  344 ug/dL (261-462)   05/05/18  08:20    


 


% Saturation  28 % (20-55)   05/05/18  08:20    


 


Ferritin  203.0 ng/mL  05/05/18  08:20    


 


Total Bilirubin  0.5 mg/dL (0.2-1.3)   05/08/18  11:43    


 


AST  32 U/L (17-59)   05/08/18  11:43    


 


ALT  100 U/L (7-56)  H  05/08/18  11:43    


 


Alkaline Phosphatase  95 U/L ()   05/08/18  11:43    


 


Total Protein  6.5 g/dL (5.8-8.3)   05/08/18  11:43    


 


Albumin  3.6 g/dL (3.0-4.8)   05/08/18  11:43    


 


Globulin  2.9 gm/dL  05/08/18  11:43    


 


Albumin/Globulin Ratio  1.2  (1.1-1.8)   05/08/18  11:43    


 


25-OH Vitamin D Total  27.6 NG/ML (30.0-100.0)  L  05/05/18  08:20    














Discharge Exam





- Head Exam


Head Exam: ATRAUMATIC, NORMAL INSPECTION





Discharge Plan





- Discharge Medications


Prescriptions: 


Clotrimazole 1% Cream [Lotrimin 1%] 1 gm TP DAILY #30 tube


diltiaZEM [Cardizem] 90 mg PO QID #120 tab


Folic Acid 1 mg PO DAILY #90 tab


Furosemide [Lasix] 80 mg PO DAILY 14 Days #14 tablet


Furosemide [Lasix] 80 mg PO DAILY #30 tab


hydrALAZINE [Apresoline] 50 mg PO TID #90 tab


Lidocaine 5% [Lidoderm] 1 ea TD DAILY #30 patch


Mag&Al/Simet/Diphen/Lido [First Magic Mouthwash] 5 ml MM DAILY 30 Days  kit


Oxymetazoline 0.05% [Afrin 0.05%] 1 puff NS Q12H #1 bottle


Pantoprazole [Protonix EC Tab] 40 mg PO 0600 #30 ect


Polyethylene Glycol 3350 [Miralax] 17 gm PO BID #30 packet


Sevelamer [Renagel] 1,600 mg PO TID #90 tab


Vitamin B Complex/Vit C/Folic [Nephro-Javier] 1 tab PO 0800 #30 tab





- Follow Up Plan


Condition: GOOD


Disposition: HOME/ ROUTINE


Instructions:  Hepatitis C (DC), High Blood Pressure (DC), Low Back Pain  (DC), 

Hemodialysis (DC), Gastrointestinal Bleeding (DC), Diabetes Type 2 (DC), End 

Stage Kidney Disease (DC)


Additional Instructions: 


Please take lasix 80mg once a day for 2 weeks.


Follow up with Dr. Pratt in office in one week 


Referrals: 


Elver Florez MD [Staff Provider] - 


Kody Pratt MD [Primary Care Provider] - 


Olu Ramirez MD [Staff Provider] -

## 2018-05-10 NOTE — PN
DATE:  05/10/2018



FINAL PROGRESS NOTE AND DISCHARGE SUMMARY



SUBJECTIVE:  The patient is in TCU room 313, bed 1.  Overnight nurse's

notes were reviewed.  No adverse events documented except the usual the

patient's chronic pain.



PHYSICAL EXAMINATION:

VITAL SIGNS:  T-max 97.9-98.7, heart rate 89-92, blood pressure 137/78,

pulse ox 96%.  Fingerstick blood sugar 133, 195, 135, 156, 111, 140.

HEENT:  Head examination normocephalic, atraumatic.  HEENT examination

shows pinkish pale conjunctivae.  Anicteric sclerae.  No oropharyngeal

lesion.  No neck rigidity.

CHEST:  Symmetrical.  Positive kyphosis noted.

LUNGS:  Shows decreased breath sounds.

CARDIOVASCULAR:  S1, S2, irregular rhythm.  Positive systolic murmur, left

sternal border, right second intercostal space, left second intercostal

space.

ABDOMEN:  Protuberant, obese.  Positive bowel sound.  No appreciable

hepatosplenomegaly noted.

GENITALIA:  Male.

RECTAL:  Deferred.

EXTREMITIES:  Still shows dense 2 to 3+ pitting edema.  Positive Ace wrap

noted.

MUSCULOSKELETAL:  Shows elevated body mass index for stated weight and

height.



DIAGNOSTICS:  None.  Fingerstick blood sugar 133, 195, 135, 156, 111, 140.



The patient is seen by Ear, Nose, Throat yesterday, Dr. Harden.  The

patient had a bedside regular endoscopy, which shows true and false vocal

cord and epiglottis thrush.  The patient was recommended Magic mouthwash by

Dr. Harden.



FINAL IMPRESSION AND PLAN & DISCHARGE DIAGNOSES:

1.  Gait dysfunction.

2.  Deconditioning.

3.  Mild oropharyngeal dysphagia with mild risk for aspiration.

4.  True and false vocal cord and epiglottis oral thrush.

5.  Laryngeal yeast infection and ulceration.

6.  End-stage renal disease, hemodialysis dependent.

7.  Acute renal failure, acute kidney injury secondary to ischemic acute

tubular necrosis secondary to hypoperfusion, hypotension and severe

symptomatic bradycardia.

8.  Atrial fibrillation with rapid and slow ventricular response.

9.  Non-hemolyzed hyperkalemia.

10.  Morbid obesity.

11.  Diabetes mellitus.

12.  Hypertension.

13.  Gastrointestinal bleeding with fecal occult blood positive.

14.  Severe symptomatic hypotension and bradycardia.

15.  Chronic narcotic-dependent pain syndrome.

16.  Cervical and lumbar spine degenerative disc disease.

17.  Insomnia.

18.  Depression.

19.  Hyperlipidemia.

20.  Secondary hyperparathyroidism of renal origin.

21.  Iron-deficiency anemia.

22.  Anemia of chronic kidney disease.

23.  Bilateral lower extremity venous stasis.

24.  Morbid obesity.

25.  Gait dysfunction.

26.  History of coronary artery disease, coronary angioplasty and atrial

fibrillation.

27.  History of atrial fibrillation.

28.  Pulmonary hypertension.

29.  Status post right upper chest dialysis catheter placement.

30.  End-stage renal disease, hemodialysis dependent three times a week via

the right upper chest hemodialysis catheter.



Plan at this time, as mentioned, the patient seen by Ear, Nose, Throat. 

The patient had a laryngoscopy done, which shows laryngeal yeast infection

and ulceration.  Recommended Magic mouthwash.  The patient was seen by Dr. Fonseca from Nephrology.  His recommendation is that the patient has his

volume overload and fluid status are better and the patient has lost about

good 20-30 pounds with dialysis, which needs to be continued.  The patient

at present will continue on hemodialysis three times a week.  The patient

will be considered for discharge possibly today after completion of

Transitional Care Unit stay.  Next, the patient's case will be referred for

discharge planning to , Case Management.  The patient's case

will be referred to  for outpatient dialysis setup three

times a week.



If the patient is cleared for discharge today, the patient will be

discharged home with discharge followup with Dr. Pratt within 1 week. 

Discharge followup for dialysis three times a week.



The copy of the diet will be given to the patient and the family upon

discharge.  The patient's discharge medications will be as per updated

ambulatory orders, which will be revised upon discharge.  The patient has

been advised strict compliance with diet, medications, activity

restrictions, limitations.  The patient has been advised extensive

discharge instructions.  The patient was advised close followup with Dr. Pratt, Nephrology, Cardiology, etc.  The patient was advised outpatient

Vascular Surgery followup for AV fistula placement.



Time spent in the entire discharge process more than 45 minutes.



Dictated and electronically signed, not read.





__________________________________________

Kody Pratt MD





DD:  05/10/2018 9:34:49

DT:  05/10/2018 9:42:43

Job # [id number]



OPHELIA

## 2018-06-18 ENCOUNTER — HOSPITAL ENCOUNTER (OUTPATIENT)
Dept: HOSPITAL 42 - SDSVAS | Age: 68
Discharge: HOME | End: 2018-06-18
Attending: RADIOLOGY
Payer: MEDICARE

## 2018-06-18 VITALS
DIASTOLIC BLOOD PRESSURE: 92 MMHG | TEMPERATURE: 98.2 F | HEART RATE: 79 BPM | OXYGEN SATURATION: 99 % | SYSTOLIC BLOOD PRESSURE: 154 MMHG

## 2018-06-18 VITALS — BODY MASS INDEX: 32.8 KG/M2

## 2018-06-18 VITALS — RESPIRATION RATE: 18 BRPM

## 2018-06-18 DIAGNOSIS — N17.9: ICD-10-CM

## 2018-06-18 DIAGNOSIS — Z49.01: Primary | ICD-10-CM

## 2018-06-18 LAB
APTT BLD: 30.1 SECONDS (ref 25.1–36.5)
BASOPHILS # BLD AUTO: 0.01 K/MM3 (ref 0–2)
BASOPHILS NFR BLD: 0.1 % (ref 0–3)
BUN SERPL-MCNC: 13 MG/DL (ref 7–21)
CALCIUM SERPL-MCNC: 9.6 MG/DL (ref 8.4–10.5)
EOSINOPHIL # BLD: 0.3 10*3/UL (ref 0–0.7)
EOSINOPHIL NFR BLD: 3.3 % (ref 1.5–5)
ERYTHROCYTE [DISTWIDTH] IN BLOOD BY AUTOMATED COUNT: 23.1 % (ref 11.5–14.5)
GFR NON-AFRICAN AMERICAN: > 60
GRANULOCYTES # BLD: 5.96 10*3/UL (ref 1.4–6.5)
GRANULOCYTES NFR BLD: 73.5 % (ref 50–68)
HGB BLD-MCNC: 10.9 G/DL (ref 14–18)
INR PPP: 1.22 (ref 0.93–1.08)
LYMPHOCYTES # BLD: 1.3 10*3/UL (ref 1.2–3.4)
LYMPHOCYTES NFR BLD AUTO: 15.5 % (ref 22–35)
MCH RBC QN AUTO: 25.3 PG (ref 25–35)
MCHC RBC AUTO-ENTMCNC: 31.9 G/DL (ref 31–37)
MCV RBC AUTO: 79.5 FL (ref 80–105)
MONOCYTES # BLD AUTO: 0.6 10*3/UL (ref 0.1–0.6)
MONOCYTES NFR BLD: 7.6 % (ref 1–6)
PLATELET # BLD: 359 10^3/UL (ref 120–450)
PMV BLD AUTO: 9.5 FL (ref 7–11)
PROTHROMBIN TIME: 14.1 SECONDS (ref 9.4–12.5)
RBC # BLD AUTO: 4.3 10^6/UL (ref 3.5–6.1)
WBC # BLD AUTO: 8.1 10^3/UL (ref 4.5–11)

## 2018-06-18 PROCEDURE — 99152 MOD SED SAME PHYS/QHP 5/>YRS: CPT

## 2018-06-18 PROCEDURE — 85730 THROMBOPLASTIN TIME PARTIAL: CPT

## 2018-06-18 PROCEDURE — 85610 PROTHROMBIN TIME: CPT

## 2018-06-18 PROCEDURE — 36589 REMOVAL TUNNELED CV CATH: CPT

## 2018-06-18 PROCEDURE — 80048 BASIC METABOLIC PNL TOTAL CA: CPT

## 2018-06-18 PROCEDURE — 36415 COLL VENOUS BLD VENIPUNCTURE: CPT

## 2018-06-18 PROCEDURE — 85025 COMPLETE CBC W/AUTO DIFF WBC: CPT

## 2018-06-18 NOTE — VASCULAR
PROCEDURE:  Removal of tunneled right IJ dialysis catheter.



CLINICAL HISTORY:  Acute renal failure.  Improving renal function.  

Remove tunneled dialysis catheter. .



PHYSICIAN(S):  Olu Morales M.D.



TECHNIQUE:

The relative risks and indications of the procedure were explained to 

the patient and consent obtained. The patient was placed supine on 

the arteriogram table and the tunneled right IJ dialysis catheter 

prepped and draped in the usual sterile fashion. Conscious sedation 

and monitoring were provided throughout the procedure by nurse. 



1% Xylocaine was used to anesthetize skin and soft tissues along the 

tunnel. The tunnel and cuff were bluntly dissected. The catheter was 

removed and pressure applied at the venous insertion site. A single 

interrupted suture was placed at the exit site. The patient tolerated 

the procedure well.



IMPRESSION:

1. Removal of the patient's tunneled right IJ dialysis catheter.

## 2020-08-05 NOTE — ED PDOC
Arrival/HPI





- General


Chief Complaint: Shortness Of Breath


Time Seen by Provider: 12/26/17 14:50


Historian: Patient





- History of Present Illness


Narrative History of Present Illness (Text): 


12/26/17 15:22


A 67 year old male, whose past medical history includes crohn's disease, 

hypertension, hyperlipidemia, degenerative disc disease to neck, prediabietic, 

chronic back pain, and questionable CHF, presents to the emergency department 

complaining of shortness of breath with associated cough for 1 week. Patient 

reports symptoms worsen at night with wheezing. Notes cough consists of phlegm 

and also experiences some body aches. Patient denies any fever, chills, 

appetite changes, lower extremity swelling, chest pain, or any other 

complaints. Also, patient mentions having no recent travels and recent surgery. 





PMD: Dr. Pratt





Time/Duration: 1 week


Symptom Onset: Sudden


Symptom Course: Unchanged





Past Medical History





- Provider Review


Nursing Documentation Reviewed: Yes





- Infectious Disease


Hx of Infectious Diseases: None





- Tetanus Immunization


Tetanus Immunization: >10 years Ago





- Cardiac


Hx Cardiac Disorders: Yes


Hx Hypertension: Yes





- Pulmonary


Hx Respiratory Disorders: No





- Neurological


Hx Neurological Disorder: No





- HEENT


Hx HEENT Disorder: No





- Renal


Hx Renal Disorder: No





- Endocrine/Metabolic


Hx Endocrine Disorders: Yes


Other/Comment: HIGH BLOOD SUGAR





- Hematological/Oncological


Hx Blood Disorders: No





- Integumentary


Hx Dermatological Disorder: No





- Musculoskeletal/Rheumatological


Hx Arthritis: Yes


Other/Comment: NECK PAIN





- Gastrointestinal


Hx Gastrointestinal Disorders: Yes


Other/Comment: CROHNS





- Genitourinary/Gynecological


Hx Genitourinary Disorders: No





- Psychiatric


Hx Psychophysiologic Disorder: Yes


Hx Anxiety: Yes


Hx Depression: Yes


Hx Substance Use: No





- Surgical History


Hx Cholecystectomy: Yes





- Anesthesia


Hx Anesthesia: Yes





- Suicidal Assessment


Feels Threatened In Home Enviroment: No





Family/Social History





- Physician Review


Nursing Documentation Reviewed: Yes


Family/Social History: No Known Family HX


Smoking Status: Never Smoked


Hx Alcohol Use: No


Hx Substance Use: No


Hx Substance Use Treatment: No





Allergies/Home Meds


Allergies/Adverse Reactions: 


Allergies





No Known Allergies Allergy (Verified 12/26/17 14:49)


 








Home Medications: 


 Home Meds











 Medication  Instructions  Recorded  Confirmed


 


DULoxetine [Cymbalta] 30 mg PO DAILY 12/26/17 12/26/17


 


DULoxetine [Cymbalta] 60 mg PO DAILY 12/26/17 12/26/17


 


Ergocalciferol (Vitamin D2) 500 mg PO DAILY 12/26/17 12/26/17





[Vitamin D2]   


 


Escitalopram Oxalate [Lexapro] 5 mg PO DAILY 12/26/17 12/26/17


 


Furosemide [Lasix] 40 mg PO DAILY 12/26/17 12/26/17


 


Mesalamine ER Cap [Pentasa] 500 mg PO DAILY 12/26/17 12/26/17


 


Metoprolol Tartrate [Lopressor] 50 mg PO DAILY 12/26/17 12/26/17


 


Oxycodone HCl/Acetaminophen 1 tab PO Q8 PRN 12/26/17 12/26/17





[Percocet  mg Tablet]   


 


Rosuvastatin Calcium [Crestor] 10 mg PO DAILY 12/26/17 12/26/17


 


Valsartan/Hydrochlorothiazide 1 tab PO DAILY 12/26/17 12/26/17





[Diovan Hct 160-25 mg Tablet]   


 


Zolpidem [Ambien] 10 mg PO HS 12/26/17 12/26/17


 


amLODIPine [Norvasc] 10 mg PO DAILY 12/26/17 12/26/17














Review of Systems





- Physician Review


All systems were reviewed & negative as marked: Yes





- Review of Systems


Constitutional: absent: Fevers, Night Sweats


Respiratory: SOB, Cough (with phlegm), Wheezing (only at night)


Cardiovascular: absent: Chest Pain


Gastrointestinal: absent: Appetite Changes


Musculoskeletal: Myalgias.  absent: Other (no lower extremity swelling)





Physical Exam


Vital Signs Reviewed: Yes


Vital Signs











  Temp Pulse Resp BP Pulse Ox


 


 12/26/17 15:54    22   96


 


 12/26/17 15:42     128/76 


 


 12/26/17 14:50  97.8 F  106 H  20  123/86  98











Temperature: Afebrile


Blood Pressure: Normal


Pulse: Regular


Respiratory Rate: Tachypneic


Appearance: Positive for: Well-Appearing


Pain Distress: None


Mental Status: Positive for: Alert and Oriented X 3





- Systems Exam


Head: Present: Atraumatic, Normocephalic


Pupils: Present: PERRL


Extroacular Muscles: Present: EOMI


Conjunctiva: Present: Normal


Mouth: Present: Moist Mucous Membranes


Neck: Present: Normal Range of Motion


Respiratory/Chest: Present: Wheezes (tracfe expiratory wheezing), Rhonchi (

bilaterally)


Cardiovascular: Present: Regular Rate and Rhythm, Normal S1, S2.  No: Murmurs


Abdomen: Present: Normal Bowel Sounds.  No: Tenderness, Distention, Peritoneal 

Signs


Back: Present: Normal Inspection


Upper Extremity: Present: Normal Inspection.  No: Cyanosis, Edema


Lower Extremity: No: Edema


Neurological: Present: GCS=15, CN II-XII Intact, Speech Normal


Skin: Present: Warm, Dry, Normal Color.  No: Rashes


Psychiatric: Present: Alert, Oriented x 3, Normal Insight, Normal Concentration





Medical Decision Making


ED Course and Treatment: 


12/26/17 15:27


Impression: 67 year old male with shortness of breath with cough. Physical exam 

shows rhonchi bilaterally, trace expiratory wheezing, slightly tachypneic, no 

lower extremity edema.





Differential Diagnosis included but are not limited to:  Bronchitis vs. 

Pneumonia vs. Influenza vs. CHF; New onset Afib





Plan:


-- EKG


-- Chest X-ray


-- Labs


-- Venous Blood Gas


-- Duoneb


-- Lasix


-- Medrol


-- Blood Culture


-- Reassess and disposition





Prior Visits:


Notes and results from previous visits were reviewed. Patient was last seen in 

the emergency department on 02/13/2016 for chronic back pain. Patient was 

discharged home.





Progress Notes:


12/26/2017 15:59


Chest X-Ray


IMPRESSION: No active pulmonary disease. 


Dictator: Jennifer Snow MD





12/26/17 16:31


EKG: Afib at 96 bpm with RBBB, no ST elevations





Case discussed with patient's Cardiologist Dr. Sesay who states that he does 

not have a history of Afib and that he had a recent Echo with borderline LV fxn 

with EF 50%. ABG pending. 





12/26/17 17:08


Case discussed with Dr. Spaulding who is covering for Dr. Pratt who agrees to place 

on Telemetry and consult Dr. Ramirez. He agrees to anticoagulation. He also 

requested Rocephin x 1 dose. 





12/26/17 17:24


Case discussed with Dr. Ramirez who recommends Lovenox 1mg/kg and Metoprolol 25mg 

PO BID. Discussed plan with family and they agree to stay in the hospital. 





- Lab Interpretations


Lab Results: 








 12/26/17 15:30 





 12/26/17 15:30 





 Lab Results





12/26/17 16:35: pCO2 35, pO2 94.0, HCO3 26.1, ABG pH 7.48 H, ABG Total CO2 27.2

, ABG O2 Saturation 98.1 H, ABG O2 Content 17.9, ABG Base Excess 2.8, ABG 

Hemoglobin 13.2, ABG Carboxyhemoglobin 1.4, POC ABG HHb (Measured) 1.9, ABG 

Methemoglobin 0.9, ABG O2 Capacity 18.2, Hgb O2 Saturation 95.8, FiO2 21.0


12/26/17 15:30: Sodium 138, Chloride 94 L, Potassium 4.4, Carbon Dioxide 31, 

Anion Gap 17, BUN 24 H, Creatinine 0.9, Est GFR (African Amer) > 60, Est GFR (

Non-Af Amer) > 60, Random Glucose 158 H, Calcium 9.8, Total Bilirubin 0.4, AST 

29, ALT 37, Alkaline Phosphatase 68, Lactate Dehydrogenase 501, Total Creatine 

Kinase 142, Troponin I < 0.01, NT-Pro-B Natriuret Pep 893 H, Total Protein 8.2, 

Albumin 4.3, Globulin 3.9, Albumin/Globulin Ratio 1.1


12/26/17 15:30: pO2 28 L, VBG pH 7.32, VBG pCO2 69.0 H*, VBG HCO3 35.6 H, VBG 

Total CO2 37.7 H, VBG O2 Sat (Calc) 66.5 H, VBG Base Excess 7.0 H, VBG 

Potassium 4.3, Sodium 136.0, Chloride 99.0, Glucose 165 H, Lactate 1.7, FiO2 

21.0, Venous Blood Potassium 4.3


12/26/17 15:30: PT 12.8 H, INR 1.17 H, APTT 28.5


12/26/17 15:30: WBC 10.4, RBC 4.94, Hgb 12.2 L, Hct 38.7 L, MCV 78.3 L, MCH 

24.7 L, MCHC 31.5, RDW 16.6 H, Plt Count 339, MPV 9.6, Gran % 72.0 H, Lymph % (

Auto) 16.0 L, Mono % (Auto) 8.0 H, Eos % (Auto) 3.8, Baso % (Auto) 0.2, Gran # 

7.47 H, Lymph # 1.7, Mono # 0.8 H, Eos # 0.4, Baso # 0.02








I have reviewed the lab results: Yes





- RAD Interpretation


Radiology Orders: 








12/26/17 15:28


CHEST PORTABLE [RAD] Stat 














- Medication Orders


Current Medication Orders: 








Enoxaparin Sodium (Lovenox)  110 mg SC STAT STA


   PRN Reason: Protocol


   Stop: 12/26/17 17:24


Ceftriaxone Sodium (Rocephin 1 Gram Ivpb)  1 gm in 100 mls @ 200 mls/hr IVPB 

STAT STA


   PRN Reason: Protocol


   Stop: 12/26/17 17:39


Metoprolol Succinate (Toprol Xl)  25 mg PO STAT STA


   Stop: 12/26/17 17:24





Discontinued Medications





Albuterol/Ipratropium (Duoneb 3 Mg/0.5 Mg (3 Ml) Ud)  3 ml IH Q15M HUMBERTO


   Stop: 12/26/17 16:01


   Last Admin: 12/26/17 15:49  Dose: 3 ml





Furosemide (Lasix)  40 mg IVP STAT STA


   Stop: 12/26/17 15:28


   Last Admin: 12/26/17 15:42  Dose: 40 mg





MAR Blood Pressure


 Document     12/26/17 15:42  MS  (Rec: 12/26/17 15:42  MS  [id number])


     Blood Pressure


      Blood Pressure (100//90)             128/76


IVP Administration


 Document     12/26/17 15:42  MS  (Rec: 12/26/17 15:42  MS  [id number])


     Charges for Administration


      # of IVP Administrations                   1





Methylprednisolone (Solu-Medrol)  125 mg IVP STAT STA


   Stop: 12/26/17 15:28


   Last Admin: 12/26/17 15:42  Dose: 125 mg





IVP Administration


 Document     12/26/17 15:42  MS  (Rec: 12/26/17 15:42  MS  [id number])


     Charges for Administration


      # of IVP Administrations                   1





Morphine Sulfate (Morphine)  2 mg IVP STAT STA


   Stop: 12/26/17 16:00


   Last Admin: 12/26/17 16:08  Dose: 2 mg





MAR Pain Assessment


 Document     12/26/17 16:08  MS  (Rec: 12/26/17 16:10  MS  [id number])


     Pain Reassessment


      Is this a pain reassessment?               No


     Sleep


      Is patient sleeping during reassessment?   No


     Presence of Pain


      Presence of Pain                           Yes


     Pain Scale Used


      Pain Scale Used                            Numeric


     Location


      Upper or Lower                             Lower


      Pain Location Body Site                    Back


     Description


      Description                                Intermittent


      Intensity of Pain at present               7


      Pain Behavior                              Facial Grimacing


      Aggravating Factors                        Changing Position


IVP Administration


 Document     12/26/17 16:08  MS  (Rec: 12/26/17 16:10  MS  [id number])


     Charges for Administration


      # of IVP Administrations                   1














- Scribe Statement


The provider has reviewed the documentation as recorded by the David Cash





Provider Scribe Attestation:


All medical record entries made by the Scribe were at my direction and 

personally dictated by me. I have reviewed the chart and agree that the record 

accurately reflects my personal performance of the history, physical exam, 

medical decision making, and the department course for this patient. I have 

also personally directed, reviewed, and agree with the discharge instructions 

and disposition.








Disposition/Present on Arrival





- Present on Arrival


Any Indicators Present on Arrival: No


History of DVT/PE: No


History of Uncontrolled Diabetes: No


Urinary Catheter: No


History of Decub. Ulcer: No


History Surgical Site Infection Following: None





- Disposition


Have Diagnosis and Disposition been Completed?: Yes


Diagnosis: 


 New onset a-fib, Bronchitis





Disposition Time: 17:35


Patient Plan: Admission


Condition: FAIR


Referrals: 


Kody Pratt MD [Primary Care Provider] - Follow up with primary


Forms:  [employer] (English)
No

## 2023-04-12 NOTE — RAD
HISTORY:



COMPARISON:

02/11/2018                                                            

































       .



TECHNIQUE:

Chest PA and lateral



FINDINGS:



LINES AND TUBES:

None. 



LUNG AND PLEURA:

The lungs are well inflated and clear. 



HEART AND MEDIASTINUM:

Again seen is severe cardiomegaly. The hilar and mediastinal contours 

are within normal limits.



SKELETAL STRUCTURES:

There is diffuse bone demineralization. There are age indeterminate 

but likely chronic superior endplate compression deformities in the 

mid thoracic spine. 



VISUALIZED UPPER ABDOMEN:

Normal.



OTHER FINDINGS:

None.



IMPRESSION:

No active pulmonary disease.



Persistent severe cardiomegaly. Mohs Rapid Report Verbiage: The area of clinically evident tumor was marked with skin marking ink and appropriately hatched.  The initial incision was made following the Mohs approach through the skin.  The specimen was taken to the lab, divided into the necessary number of pieces, chromacoded and processed according to the Mohs protocol.  This was repeated in successive stages until a tumor free defect was achieved.